# Patient Record
Sex: MALE | Race: WHITE | NOT HISPANIC OR LATINO | Employment: OTHER | URBAN - METROPOLITAN AREA
[De-identification: names, ages, dates, MRNs, and addresses within clinical notes are randomized per-mention and may not be internally consistent; named-entity substitution may affect disease eponyms.]

---

## 2017-01-04 ENCOUNTER — OFFICE VISIT (OUTPATIENT)
Dept: FAMILY MEDICINE | Facility: CLINIC | Age: 82
End: 2017-01-04
Payer: COMMERCIAL

## 2017-01-04 VITALS
OXYGEN SATURATION: 97 % | TEMPERATURE: 98 F | DIASTOLIC BLOOD PRESSURE: 74 MMHG | HEART RATE: 73 BPM | RESPIRATION RATE: 22 BRPM | HEIGHT: 67 IN | WEIGHT: 214 LBS | SYSTOLIC BLOOD PRESSURE: 130 MMHG | BODY MASS INDEX: 33.59 KG/M2

## 2017-01-04 DIAGNOSIS — J44.9 CHRONIC OBSTRUCTIVE PULMONARY DISEASE, UNSPECIFIED COPD TYPE (H): Primary | ICD-10-CM

## 2017-01-04 DIAGNOSIS — J43.1 PANLOBULAR EMPHYSEMA (H): ICD-10-CM

## 2017-01-04 DIAGNOSIS — R04.0 EPISTAXIS: ICD-10-CM

## 2017-01-04 PROCEDURE — 99214 OFFICE O/P EST MOD 30 MIN: CPT | Performed by: FAMILY MEDICINE

## 2017-01-04 NOTE — PROGRESS NOTES
"  SUBJECTIVE:                                                    Nitish Coreas is a 82 year old male who presents to clinic today for the following health issues:      COPD Follow-Up    Symptoms are currently: stable    Current fatigue or dyspnea with ambulation: stable     Shortness of breath: stable    Increased or change in Cough/Sputum: No- cough has improved and sputum is clear now instead of yellow like it was before    Fever(s): No    Baseline ambulation without stopping to rest 1000 feet. Able to walk up 1 flights of stairs without stopping to rest.    Any ER/UC or hospital admissions since your last visit? No     History   Smoking status     Former Smoker -- 4.00 packs/day for 45 years     Types: Cigarettes     Quit date: 05/08/1991   Smokeless tobacco     Never Used     FEV1        1.81   12/8/2014  [id number]      73%   12/8/2014       Amount of exercise or physical activity: None    Problems taking medications regularly: No    Medication side effects: nausea has improved. Nosebleeds     Diet: regular (no restrictions)    nosebleed      Duration: years    Description (location/character/radiation): right nostril    Intensity:  moderate    Accompanying signs and symptoms: when exhaling    History (similar episodes/previous evaluation): None    Precipitating or alleviating factors: None    Therapies tried and outcome: salt water nasal spray       Problem list and histories reviewed & adjusted, as indicated.  Additional history: as documented    Problem list, Medication list, Allergies, and Medical/Social/Surgical histories reviewed in [facility] and updated as appropriate.    ROS:  Constitutional, HEENT, cardiovascular, pulmonary, gi and gu systems are negative, except as otherwise noted.    OBJECTIVE:                                                    /74 mmHg  Pulse 73  Temp(Src) 98  F (36.7  C) (Tympanic)  Resp 22  Ht 5' 7\" (1.702 m)  Wt 214 lb (97.07 kg)  BMI 33.51 kg/m2  SpO2 97%  Body mass " index is 33.51 kg/(m^2).  GENERAL APPEARANCE: healthy, alert and no distress  RESP: lungs clear to auscultation - no rales, rhonchi or wheezes         ASSESSMENT/PLAN:                                                    No diagnosis found.    There are no Patient Instructions on file for this visit.    Remi Peterson MD  [facility]

## 2017-01-04 NOTE — MR AVS SNAPSHOT
After Visit Summary   1/4/2017    Nitish Coreas    MRN: [id number]           Patient Information     Date Of Birth          9/4/1934        Visit Information        Provider Department      1/4/2017 7:45 AM Remi Peterson MD [facility]        Today's Diagnoses     Chronic obstructive pulmonary disease, unspecified COPD type (H)    -  1     Epistaxis         Panlobular emphysema (H)           Care Instructions    Will see back in March. Referred to ENT for nose bleeds.        Follow-ups after your visit        Additional Services     OTOLARYNGOLOGY REFERRAL       Your provider has referred you to: FMG: [facility] [phone]   [email or web address]  FHN: [facility] [phone]   [email or web address]    Please be aware that coverage of these services is subject to the terms and limitations of your health insurance plan.  Call member services at your health plan with any benefit or coverage questions.      Please bring the following with you to your appointment:    (1) Any X-Rays, CTs or MRIs which have been performed.  Contact the facility where they were done to arrange for  prior to your scheduled appointment.   (2) List of current medications  (3) This referral request   (4) Any documents/labs given to you for this referral                  Who to contact     If you have questions or need follow up information about today's clinic visit or your schedule please contact [facility] directly at [phone].  Normal or non-critical lab and imaging results will be communicated to you by MyChart, letter or phone within 4 business days after the clinic has received the results. If you do not hear from us within 7 days, please contact the clinic through MyChart or phone. If you have  "a critical or abnormal lab result, we will notify you by phone as soon as possible.  Submit refill requests through [employer] or call your pharmacy and they will forward the refill request to us. Please allow 3 business days for your refill to be completed.          Additional Information About Your Visit        5151tuanhart Information     [employer] lets you send messages to your doctor, view your test results, renew your prescriptions, schedule appointments and more. To sign up, go to [email or web address] . Click on \"Log in\" on the left side of the screen, which will take you to the Welcome page. Then click on \"Sign up Now\" on the right side of the page.     You will be asked to enter the access code listed below, as well as some personal information. Please follow the directions to create your username and password.     Your access code is: [email or web address]  Expires: 3/19/2017  4:30 PM     Your access code will  in 90 days. If you need help or a new code, please call your [place] clinic or [phone].        Care EveryWhere ID     This is your Care EveryWhere ID. This could be used by other organizations to access your [place] medical records  [id number]        Your Vitals Were     Pulse Temperature Respirations Height BMI (Body Mass Index) Pulse Oximetry    73 98  F (36.7  C) (Tympanic) 22 5' 7\" (1.702 m) 33.51 kg/m2 97%       Blood Pressure from Last 3 Encounters:   17 130/74   16 126/82   16 124/82    Weight from Last 3 Encounters:   17 214 lb (97.07 kg)   16 215 lb (97.523 kg)   16 209 lb (94.802 kg)              We Performed the Following     OTOLARYNGOLOGY REFERRAL          Today's Medication Changes          These changes are accurate as of: 17  8:05 AM.  If you have any questions, ask your nurse or doctor.               These medicines have changed or have updated prescriptions.        Dose/Directions    fluticasone 100 MCG/BLIST Aepb   Commonly known as:  FLOVENT " DISKUS   This may have changed:    - how much to take  - how to take this  - when to take this  - additional instructions   Used for:  Panlobular emphysema (H)   Changed by:  Remi Peterson MD        2 puffs in the AM and one puff at night   Quantity:  3 Inhaler   Refills:  3            Where to get your medicines      These medications were sent to [facility] - [place], [place] - [address]  [address] [place] [address]     Phone:  [phone]    - fluticasone 100 MCG/BLIST Aepb             Primary Care Provider Office Phone # Fax #    Remi Peterson [phone]       [facility] [address] [facility] [address]        Thank you!     Thank you for choosing [facility]  for your care. Our goal is always to provide you with excellent care. Hearing back from our patients is one way we can continue to improve our services. Please take a few minutes to complete the written survey that you may receive in the mail after your visit with us. Thank you!             Your Updated Medication List - Protect others around you: Learn how to safely use, store and throw away your medicines at www.disposemymeds.org.          This list is accurate as of: 1/4/17  8:05 AM.  Always use your most recent med list.                   Brand Name Dispense Instructions for use    CLARITIN PO      Take 1 tablet by mouth daily       clindamycin 300 MG capsule    CLEOCIN    40 capsule    Take 4 capsules (1,200 mg) by mouth daily Take 4 capsules on hour before dental procedures.       fish oil-omega-3 fatty acids 1000 MG capsule      Take 2 g by mouth daily.       fluticasone 100 MCG/BLIST Aepb    FLOVENT DISKUS    3 Inhaler    2 puffs in the AM and one puff at night       gemfibrozil 600 MG tablet    LOPID    180 tablet    TAKE 1 TABLET TWICE DAILY       glucosamine 500 MG Tabs      Take 1 tablet by mouth daily.       lisinopril  40 MG tablet    PRINIVIL/ZESTRIL    90 tablet    Take 1 tablet (40 mg) by mouth daily       lovastatin 40 MG tablet    MEVACOR    180 tablet    TAKE 2 TABLETS AT BEDTIME       METAMUCIL PO      Take by mouth daily       MULTIVITAL Tabs      Take 1 tablet by mouth daily.       ranitidine 75 MG tablet    ZANTAC    30 tablet    Take 2 tablets (150 mg) by mouth daily       VITAMIN C PO      Take 1,000 mg by mouth daily       vitamin D 2000 UNITS Caps      Take 1 tablet by mouth 3 times daily.

## 2017-01-04 NOTE — NURSING NOTE
"Chief Complaint   Patient presents with     Respiratory Problems     Recheck Medication     flovent inhaler       Initial /74 mmHg  Pulse 73  Temp(Src) 98  F (36.7  C) (Tympanic)  Resp 22  Ht 5' 7\" (1.702 m)  Wt 214 lb (97.07 kg)  BMI 33.51 kg/m2  SpO2 97% Estimated body mass index is 33.51 kg/(m^2) as calculated from the following:    Height as of this encounter: 5' 7\" (1.702 m).    Weight as of this encounter: 214 lb (97.07 kg).  BP completed using cuff size: nelson Garcia CMA      "

## 2017-01-05 ENCOUNTER — TELEPHONE (OUTPATIENT)
Dept: FAMILY MEDICINE | Facility: CLINIC | Age: 82
End: 2017-01-05

## 2017-01-05 DIAGNOSIS — J43.1 PANLOBULAR EMPHYSEMA (H): Primary | ICD-10-CM

## 2017-01-05 NOTE — TELEPHONE ENCOUNTER
FYI  Called pharmacy with doctors message below.   Pharmacist states with qty ordered patient would need 5 inhalers for 90 day supply. Verbal approval given.  Medication list was updated

## 2017-01-05 NOTE — TELEPHONE ENCOUNTER
fluticasone (FLOVENT DISKUS) 100 MCG/BLIST AEPB    Rx for flovent is 2 puffs in the am and 1 puff in the pm for a total of 300 mcg/ day.  Maximum recommended dosage is 200 mcg/day  Please clarify the dosing /directions

## 2017-01-10 ENCOUNTER — TELEPHONE (OUTPATIENT)
Dept: FAMILY MEDICINE | Facility: CLINIC | Age: 82
End: 2017-01-10

## 2017-01-10 DIAGNOSIS — J44.9 CHRONIC OBSTRUCTIVE PULMONARY DISEASE, UNSPECIFIED COPD TYPE (H): Primary | ICD-10-CM

## 2017-01-12 NOTE — TELEPHONE ENCOUNTER
Patient states he is completely out of his medication and need it as soon as possible. He would like a call when this is done.

## 2017-01-15 RX ORDER — FLUTICASONE PROPIONATE 110 UG/1
2 AEROSOL, METERED RESPIRATORY (INHALATION) 2 TIMES DAILY
Qty: 3 INHALER | Refills: 1 | Status: SHIPPED | OUTPATIENT
Start: 2017-01-15 | End: 2017-01-17

## 2017-01-17 ENCOUNTER — TELEPHONE (OUTPATIENT)
Dept: FAMILY MEDICINE | Facility: CLINIC | Age: 82
End: 2017-01-17

## 2017-01-17 DIAGNOSIS — J44.9 CHRONIC OBSTRUCTIVE PULMONARY DISEASE, UNSPECIFIED COPD TYPE (H): Primary | ICD-10-CM

## 2017-01-17 RX ORDER — FLUTICASONE PROPIONATE 110 UG/1
2 AEROSOL, METERED RESPIRATORY (INHALATION) 2 TIMES DAILY
Qty: 3 INHALER | Refills: 1 | Status: SHIPPED | OUTPATIENT
Start: 2017-01-17 | End: 2017-04-11

## 2017-01-23 NOTE — TELEPHONE ENCOUNTER
Patient calling to state that he contacted [facility] pharmacy and there is still further documentation required before he can receive the Rx for fluticasone (FLOVENT HFA) 110 MCG/ACT Inhaler

## 2017-01-23 NOTE — TELEPHONE ENCOUNTER
I spoke with Ann-Marie at [facility] and she states this medication is covered without additional info or a PA. I have attempted to contact this patient by phone with the following results: left message to return my call on answering machine.

## 2017-03-23 ENCOUNTER — TELEPHONE (OUTPATIENT)
Dept: FAMILY MEDICINE | Facility: CLINIC | Age: 82
End: 2017-03-23

## 2017-03-23 NOTE — TELEPHONE ENCOUNTER
Reason for Call:  Other call back    Detailed comments: use of inhalers-states first day of new inhalers and doesn't know how to use    Phone Number Patient can be reached at: Home number on file [phone] (home)    Best Time: asap    Can we leave a detailed message on this number? YES    Call taken on 3/23/2017 at 7:28 AM by ABISAI HOLDER

## 2017-04-11 ENCOUNTER — OFFICE VISIT (OUTPATIENT)
Dept: FAMILY MEDICINE | Facility: CLINIC | Age: 82
End: 2017-04-11
Payer: COMMERCIAL

## 2017-04-11 VITALS
BODY MASS INDEX: 33.05 KG/M2 | OXYGEN SATURATION: 95 % | TEMPERATURE: 97 F | HEART RATE: 75 BPM | DIASTOLIC BLOOD PRESSURE: 80 MMHG | WEIGHT: 211 LBS | SYSTOLIC BLOOD PRESSURE: 130 MMHG

## 2017-04-11 DIAGNOSIS — J44.9 CHRONIC OBSTRUCTIVE PULMONARY DISEASE, UNSPECIFIED COPD TYPE (H): Primary | ICD-10-CM

## 2017-04-11 PROCEDURE — 99213 OFFICE O/P EST LOW 20 MIN: CPT | Performed by: FAMILY MEDICINE

## 2017-04-11 RX ORDER — FLUTICASONE PROPIONATE 220 UG/1
2 AEROSOL, METERED RESPIRATORY (INHALATION) 2 TIMES DAILY
Qty: 3 INHALER | Refills: 3 | Status: SHIPPED | OUTPATIENT
Start: 2017-04-11 | End: 2017-08-14

## 2017-04-11 NOTE — PATIENT INSTRUCTIONS
"Will increase flovent to 220 mcg twice daily. Will use up his 110 mcg inhalers at 4 puffs twice daily. Add 6 inch tube of 5/8\" polyethylene as an extender to his inhaler. Will see back in one month.  "

## 2017-04-11 NOTE — MR AVS SNAPSHOT
"              After Visit Summary   4/11/2017    Nitish Coreas    MRN: [id number]           Patient Information     Date Of Birth          9/4/1934        Visit Information        Provider Department      4/11/2017 9:15 AM Remi Peterson MD [facility]        Today's Diagnoses     Chronic obstructive pulmonary disease, unspecified COPD type (H)    -  1      Care Instructions    Will increase flovent to 220 mcg twice daily. Will use up his 110 mcg inhalers at 4 puffs twice daily. Add 6 inch tube of 5/8\" polyethylene as an extender to his inhaler. Will see back in one month.        Follow-ups after your visit        Who to contact     If you have questions or need follow up information about today's clinic visit or your schedule please contact [facility] directly at [phone].  Normal or non-critical lab and imaging results will be communicated to you by Chelailehart, letter or phone within 4 business days after the clinic has received the results. If you do not hear from us within 7 days, please contact the clinic through Chelailehart or phone. If you have a critical or abnormal lab result, we will notify you by phone as soon as possible.  Submit refill requests through [employer] or call your pharmacy and they will forward the refill request to us. Please allow 3 business days for your refill to be completed.          Additional Information About Your Visit        MyChart Information     [employer] lets you send messages to your doctor, view your test results, renew your prescriptions, schedule appointments and more. To sign up, go to [email or web address] . Click on \"Log in\" on the left side of the screen, which will take you to the Welcome page. Then click on \"Sign up Now\" on the right side of the page.     You will be asked to enter the access code listed below, as well as some personal information. Please follow the directions to create your " username and password.     Your access code is: [email or web address]  Expires: 7/10/2017  9:32 AM     Your access code will  in 90 days. If you need help or a new code, please call your [facility] or [phone].        Care EveryWhere ID     This is your Care EveryWhere ID. This could be used by other organizations to access your [place] medical records  [id number]        Your Vitals Were     Pulse Temperature Pulse Oximetry BMI (Body Mass Index)          75 97  F (36.1  C) (Tympanic) 95% 33.05 kg/m2         Blood Pressure from Last 3 Encounters:   17 130/80   17 130/74   16 126/82    Weight from Last 3 Encounters:   17 211 lb (95.7 kg)   17 214 lb (97.1 kg)   16 215 lb (97.5 kg)              Today, you had the following     No orders found for display         Today's Medication Changes          These changes are accurate as of: 17  9:33 AM.  If you have any questions, ask your nurse or doctor.               Start taking these medicines.        Dose/Directions    fluticasone 220 MCG/ACT Inhaler   Commonly known as:  FLOVENT HFA   Used for:  Chronic obstructive pulmonary disease, unspecified COPD type (H)   Replaces:  fluticasone 110 MCG/ACT Inhaler   Started by:  Remi Peterson MD        Dose:  2 puff   Inhale 2 puffs into the lungs 2 times daily   Quantity:  3 Inhaler   Refills:  3         Stop taking these medicines if you haven't already. Please contact your care team if you have questions.     fluticasone 110 MCG/ACT Inhaler   Commonly known as:  FLOVENT HFA   Replaced by:  fluticasone 220 MCG/ACT Inhaler   Stopped by:  Remi Peterson MD                Where to get your medicines      These medications were sent to [facility] - [place], [place] - [address]  [address] [place] [address]     Phone:  [phone]     fluticasone 220 MCG/ACT Inhaler                Primary Care Provider Office Phone # Fax #    Remi  Darryl Peterson [phone]       [facility] [address]  [facility] [address]        Thank you!     Thank you for choosing [facility]  for your care. Our goal is always to provide you with excellent care. Hearing back from our patients is one way we can continue to improve our services. Please take a few minutes to complete the written survey that you may receive in the mail after your visit with us. Thank you!             Your Updated Medication List - Protect others around you: Learn how to safely use, store and throw away your medicines at www.disposemymeds.org.          This list is accurate as of: 4/11/17  9:33 AM.  Always use your most recent med list.                   Brand Name Dispense Instructions for use    CLARITIN PO      Take 1 tablet by mouth daily       clindamycin 300 MG capsule    CLEOCIN    40 capsule    Take 4 capsules (1,200 mg) by mouth daily Take 4 capsules on hour before dental procedures.       fish oil-omega-3 fatty acids 1000 MG capsule      Take 2 g by mouth daily.       fluticasone 220 MCG/ACT Inhaler    FLOVENT HFA    3 Inhaler    Inhale 2 puffs into the lungs 2 times daily       gemfibrozil 600 MG tablet    LOPID    180 tablet    TAKE 1 TABLET TWICE DAILY       glucosamine 500 MG Tabs      Take 1 tablet by mouth daily.       lisinopril 40 MG tablet    PRINIVIL/ZESTRIL    90 tablet    Take 1 tablet (40 mg) by mouth daily       lovastatin 40 MG tablet    MEVACOR    180 tablet    TAKE 2 TABLETS AT BEDTIME       METAMUCIL PO      Take by mouth daily Reported on 4/11/2017       MULTIVITAL Tabs      Take 1 tablet by mouth daily.       ranitidine 75 MG tablet    ZANTAC    30 tablet    Take 2 tablets (150 mg) by mouth daily       VITAMIN C PO      Take 1,000 mg by mouth daily       vitamin D 2000 UNITS Caps      Take 1 tablet by mouth 3 times daily.

## 2017-04-11 NOTE — NURSING NOTE
"Chief Complaint   Patient presents with     Recheck Medication       Initial /80 (BP Location: Left arm, Patient Position: Chair, Cuff Size: Adult Regular)  Pulse 75  Temp 97  F (36.1  C) (Tympanic)  Wt 211 lb (95.7 kg)  SpO2 95%  BMI 33.05 kg/m2 Estimated body mass index is 33.05 kg/(m^2) as calculated from the following:    Height as of 1/4/17: 5' 7\" (1.702 m).    Weight as of this encounter: 211 lb (95.7 kg).  Medication Reconciliation: complete  "

## 2017-04-11 NOTE — PROGRESS NOTES
"  SUBJECTIVE:                                                    Nitish Coreas is a 82 year old male who presents to clinic today for the following health issues:      COPD Follow-Up    Symptoms are currently: Stable, but is still coughing up phlegm     Current fatigue or dyspnea with ambulation: stable     Shortness of breath: stable    Increased or change in Cough/Sputum: Yes-      Fever(s): No    Baseline ambulation without stopping to rest No  \"football field and a half\", feet. Able to walk up 2 flights of stairs without stopping to rest.    Any ER/UC or hospital admissions since your last visit? No     History   Smoking Status     Former Smoker     Packs/day: 4.00     Years: 45.00     Types: Cigarettes     Quit date: 5/8/1991   Smokeless Tobacco     Never Used     Lab Results   Component Value Date    FEV1 1.81 12/08/2014    [id number] 73% 12/08/2014          Amount of exercise or physical activity: 2-3 days/week for an average of 15-30 minutes    Problems taking medications regularly: No    Medication side effects: none    Diet: regular (no restrictions)          Problem list and histories reviewed & adjusted, as indicated.  Additional history: as documented    Patient Active Problem List   Diagnosis     Hyperlipidemia LDL goal <100     Hypertension goal BP (blood pressure) < 140/90     Renal insufficiency     Chronic rhinitis     Constipation     Advanced directives, counseling/discussion     BMI 30-35     Status post total knee replacement, unspecified laterality     Chronic obstructive pulmonary disease, unspecified COPD type (H)     Past Surgical History:   Procedure Laterality Date     C REPLANTATION THUMB DISTAL,COMPLETE  1962    cut off right thumb and repair     C TOTAL KNEE ARTHROPLASTY  2011, 2000    left then right     DENTAL SURGERY  1958    wisdom teeth     TONSILLECTOMY & ADENOIDECTOMY  1949       Social History   Substance Use Topics     Smoking status: Former Smoker     Packs/day: 4.00     " Years: 45.00     Types: Cigarettes     Quit date: 5/8/1991     Smokeless tobacco: Never Used     Alcohol use 0.0 oz/week     0 Standard drinks or equivalent per week      Comment: 1-2 friday night- cocktail     Family History   Problem Relation Age of Onset     CANCER Mother      Respiratory Mother      HEART DISEASE Father      CEREBROVASCULAR DISEASE Father      CEREBROVASCULAR DISEASE Sister      CVA x 2     DIABETES No family hx of      Coronary Artery Disease No family hx of      Hypertension No family hx of      Hyperlipidemia No family hx of      Colon Cancer No family hx of      Prostate Cancer No family hx of      Other Cancer No family hx of      Depression No family hx of      Anxiety Disorder No family hx of      MENTAL ILLNESS No family hx of      Substance Abuse No family hx of      Anesthesia Reaction No family hx of      Asthma No family hx of      OSTEOPOROSIS No family hx of      Genetic Disorder No family hx of      Thyroid Disease No family hx of      Obesity No family hx of      Unknown/Adopted No family hx of      Breast Cancer Daughter            Reviewed and updated as needed this visit by clinical staff  Tobacco  Allergies  Meds       Reviewed and updated as needed this visit by Provider         ROS:  Constitutional, HEENT, cardiovascular, pulmonary, gi and gu systems are negative, except as otherwise noted.  RESP:POSITIVE for cough-productive    OBJECTIVE:                                                    /80 (BP Location: Left arm, Patient Position: Chair, Cuff Size: Adult Regular)  Pulse 75  Temp 97  F (36.1  C) (Tympanic)  Wt 211 lb (95.7 kg)  SpO2 95%  BMI 33.05 kg/m2  Body mass index is 33.05 kg/(m^2).  GENERAL APPEARANCE: healthy, alert and no distress  RESP: lungs clear to auscultation - no rales, rhonchi or wheezes         ASSESSMENT/PLAN:                                                        ICD-10-CM    1. Chronic obstructive pulmonary disease, unspecified COPD  "type (H) J44.9        Patient Instructions   Will increase flovent to 220 mcg twice daily. Will use up his 110 mcg inhalers at 4 puffs twice daily. Add 6 inch tube of 5/8\" polyethylene as an extender to his inhaler. Will see back in one month.      Remi Peterson MD  [facility]    "

## 2017-05-15 ENCOUNTER — OFFICE VISIT (OUTPATIENT)
Dept: FAMILY MEDICINE | Facility: CLINIC | Age: 82
End: 2017-05-15
Payer: COMMERCIAL

## 2017-05-15 VITALS
BODY MASS INDEX: 32.42 KG/M2 | SYSTOLIC BLOOD PRESSURE: 120 MMHG | WEIGHT: 207 LBS | HEART RATE: 78 BPM | RESPIRATION RATE: 14 BRPM | TEMPERATURE: 98 F | OXYGEN SATURATION: 94 % | DIASTOLIC BLOOD PRESSURE: 70 MMHG

## 2017-05-15 DIAGNOSIS — J44.9 CHRONIC OBSTRUCTIVE PULMONARY DISEASE, UNSPECIFIED COPD TYPE (H): Primary | ICD-10-CM

## 2017-05-15 PROCEDURE — 99213 OFFICE O/P EST LOW 20 MIN: CPT | Performed by: FAMILY MEDICINE

## 2017-05-15 NOTE — NURSING NOTE
"Chief Complaint   Patient presents with     Respiratory Problems       Initial /70  Pulse 78  Temp 98  F (36.7  C) (Tympanic)  Resp 14  Wt 207 lb (93.9 kg)  SpO2 94%  BMI 32.42 kg/m2 Estimated body mass index is 32.42 kg/(m^2) as calculated from the following:    Height as of 1/4/17: 5' 7\" (1.702 m).    Weight as of this encounter: 207 lb (93.9 kg).  Medication Reconciliation: complete     Vikki Garcia CMA      "

## 2017-05-15 NOTE — PROGRESS NOTES
SUBJECTIVE:                                                    Nitish Coreas is a 82 year old male who presents to clinic today for the following health issues:      COPD Follow-Up    Symptoms are currently: stable    Current fatigue or dyspnea with ambulation: stable     Shortness of breath: stable    Increased or change in Cough/Sputum: No    Fever(s): No    Baseline ambulation without stopping to rest 1000 feet. Able to walk up 1 flights of stairs without stopping to rest.    Any ER/UC or hospital admissions since your last visit? No     History   Smoking Status     Former Smoker     Packs/day: 4.00     Years: 45.00     Types: Cigarettes     Quit date: 5/8/1991   Smokeless Tobacco     Never Used     Lab Results   Component Value Date    FEV1 1.81 12/08/2014    ENH7MMC 73% 12/08/2014          Amount of exercise or physical activity: None    Problems taking medications regularly: No    Medication side effects: none    Diet: regular (no restrictions)          Problem list and histories reviewed & adjusted, as indicated.  Additional history: as documented    Patient Active Problem List   Diagnosis     Hyperlipidemia LDL goal <100     Hypertension goal BP (blood pressure) < 140/90     Renal insufficiency     Chronic rhinitis     Constipation     Advanced directives, counseling/discussion     BMI 30-35     Status post total knee replacement, unspecified laterality     Chronic obstructive pulmonary disease, unspecified COPD type (H)     Past Surgical History:   Procedure Laterality Date     C REPLANTATION THUMB DISTAL,COMPLETE  1962    cut off right thumb and repair     C TOTAL KNEE ARTHROPLASTY  2011, 2000    left then right     DENTAL SURGERY  1958    wisdom teeth     TONSILLECTOMY & ADENOIDECTOMY  1949       Social History   Substance Use Topics     Smoking status: Former Smoker     Packs/day: 4.00     Years: 45.00     Types: Cigarettes     Quit date: 5/8/1991     Smokeless tobacco: Never Used     Alcohol  use 0.0 oz/week     0 Standard drinks or equivalent per week      Comment: 1-2 friday night- cocktail     Family History   Problem Relation Age of Onset     CANCER Mother      Respiratory Mother      HEART DISEASE Father      CEREBROVASCULAR DISEASE Father      CEREBROVASCULAR DISEASE Sister      CVA x 2     DIABETES No family hx of      Coronary Artery Disease No family hx of      Hypertension No family hx of      Hyperlipidemia No family hx of      Colon Cancer No family hx of      Prostate Cancer No family hx of      Other Cancer No family hx of      Depression No family hx of      Anxiety Disorder No family hx of      MENTAL ILLNESS No family hx of      Substance Abuse No family hx of      Anesthesia Reaction No family hx of      Asthma No family hx of      OSTEOPOROSIS No family hx of      Genetic Disorder No family hx of      Thyroid Disease No family hx of      Obesity No family hx of      Unknown/Adopted No family hx of      Breast Cancer Daughter            Reviewed and updated as needed this visit by clinical staff  Tobacco  Allergies  Med Hx  Surg Hx  Fam Hx  Soc Hx      Reviewed and updated as needed this visit by Provider         ROS:  Constitutional, neuro, ENT, endocrine, pulmonary, cardiac, gastrointestinal, genitourinary, musculoskeletal, integument and psychiatric systems are negative, except as otherwise noted.  RESP:NEGATIVE for significant cough or SOB    OBJECTIVE:                                                    /70  Pulse 78  Temp 98  F (36.7  C) (Tympanic)  Resp 14  Wt 207 lb (93.9 kg)  SpO2 94%  BMI 32.42 kg/m2  Body mass index is 32.42 kg/(m^2).  GENERAL APPEARANCE: healthy, alert and no distress  RESP: lungs clear to auscultation - no rales, rhonchi or wheezes         ASSESSMENT/PLAN:                                                        ICD-10-CM    1. Chronic obstructive pulmonary disease, unspecified COPD type (H) J44.9        Patient Instructions   Will see back in  August. No change in medications.      Remi Peterson MD  [facility]

## 2017-05-15 NOTE — MR AVS SNAPSHOT
"              After Visit Summary   5/15/2017    Nitish Coreas    MRN: [id number]           Patient Information     Date Of Birth          9/4/1934        Visit Information        Provider Department      5/15/2017 7:30 AM Remi Peterson MD [facility]        Today's Diagnoses     Chronic obstructive pulmonary disease, unspecified COPD type (H)    -  1      Care Instructions    Will see back in August. No change in medications.        Follow-ups after your visit        Your next 10 appointments already scheduled     Aug 14, 2017  8:45 AM CDT   SHORT with Remi Peterson MD   [facility] ([facility]    [address] [place] [address] [phone]              Who to contact     If you have questions or need follow up information about today's clinic visit or your schedule please contact [facility] directly at [phone].  Normal or non-critical lab and imaging results will be communicated to you by [employer], letter or phone within 4 business days after the clinic has received the results. If you do not hear from us within 7 days, please contact the clinic through [employer] or phone. If you have a critical or abnormal lab result, we will notify you by phone as soon as possible.  Submit refill requests through [employer] or call your pharmacy and they will forward the refill request to us. Please allow 3 business days for your refill to be completed.          Additional Information About Your Visit        [employer] Information     [employer] lets you send messages to your doctor, view your test results, renew your prescriptions, schedule appointments and more. To sign up, go to [email or web address] . Click on \"Log in\" on the left side of the screen, which will take you to the Welcome page. Then click on \"Sign up Now\" on the right side of the page. "     You will be asked to enter the access code listed below, as well as some personal information. Please follow the directions to create your username and password.     Your access code is: [email or web address]  Expires: 7/10/2017  9:32 AM     Your access code will  in 90 days. If you need help or a new code, please call your [place] clinic or [phone].        Care EveryWhere ID     This is your Care EveryWhere ID. This could be used by other organizations to access your [place] medical records  [id number]        Your Vitals Were     Pulse Temperature Respirations Pulse Oximetry BMI (Body Mass Index)       78 98  F (36.7  C) (Tympanic) 14 94% 32.42 kg/m2        Blood Pressure from Last 3 Encounters:   05/15/17 120/70   17 130/80   17 130/74    Weight from Last 3 Encounters:   05/15/17 207 lb (93.9 kg)   17 211 lb (95.7 kg)   17 214 lb (97.1 kg)              Today, you had the following     No orders found for display       Primary Care Provider Office Phone # Fax #    Remi Peterson [phone]       [facility] [address] [facility] [address]        Thank you!     Thank you for choosing [facility]  for your care. Our goal is always to provide you with excellent care. Hearing back from our patients is one way we can continue to improve our services. Please take a few minutes to complete the written survey that you may receive in the mail after your visit with us. Thank you!             Your Updated Medication List - Protect others around you: Learn how to safely use, store and throw away your medicines at www.disposemymeds.org.          This list is accurate as of: 5/15/17 12:50 PM.  Always use your most recent med list.                   Brand Name Dispense Instructions for use    CLARITIN PO      Take 1 tablet by mouth daily       clindamycin 300 MG capsule    CLEOCIN    40 capsule    Take 4 capsules (1,200  mg) by mouth daily Take 4 capsules on hour before dental procedures.       fish oil-omega-3 fatty acids 1000 MG capsule      Take 2 g by mouth daily.       fluticasone 220 MCG/ACT Inhaler    FLOVENT HFA    3 Inhaler    Inhale 2 puffs into the lungs 2 times daily       gemfibrozil 600 MG tablet    LOPID    180 tablet    TAKE 1 TABLET TWICE DAILY       glucosamine 500 MG Tabs      Take 1 tablet by mouth daily.       lisinopril 40 MG tablet    PRINIVIL/ZESTRIL    90 tablet    Take 1 tablet (40 mg) by mouth daily       lovastatin 40 MG tablet    MEVACOR    180 tablet    TAKE 2 TABLETS AT BEDTIME       METAMUCIL PO      Take by mouth daily Reported on 4/11/2017       MULTIVITAL Tabs      Take 1 tablet by mouth daily.       ranitidine 75 MG tablet    ZANTAC    30 tablet    Take 2 tablets (150 mg) by mouth daily       VITAMIN C PO      Take 1,000 mg by mouth daily       vitamin D 2000 UNITS Caps      Take 1 tablet by mouth 3 times daily.

## 2017-07-10 ENCOUNTER — TRANSFERRED RECORDS (OUTPATIENT)
Dept: HEALTH INFORMATION MANAGEMENT | Facility: CLINIC | Age: 82
End: 2017-07-10

## 2017-08-14 ENCOUNTER — OFFICE VISIT (OUTPATIENT)
Dept: FAMILY MEDICINE | Facility: CLINIC | Age: 82
End: 2017-08-14
Payer: COMMERCIAL

## 2017-08-14 VITALS
HEIGHT: 67 IN | WEIGHT: 204 LBS | HEART RATE: 72 BPM | SYSTOLIC BLOOD PRESSURE: 126 MMHG | TEMPERATURE: 98 F | DIASTOLIC BLOOD PRESSURE: 74 MMHG | RESPIRATION RATE: 18 BRPM | OXYGEN SATURATION: 97 % | BODY MASS INDEX: 32.02 KG/M2

## 2017-08-14 DIAGNOSIS — J44.9 CHRONIC OBSTRUCTIVE PULMONARY DISEASE, UNSPECIFIED COPD TYPE (H): Primary | Chronic | ICD-10-CM

## 2017-08-14 DIAGNOSIS — E78.5 HYPERLIPIDEMIA LDL GOAL <100: Chronic | ICD-10-CM

## 2017-08-14 DIAGNOSIS — N28.9 RENAL INSUFFICIENCY: Chronic | ICD-10-CM

## 2017-08-14 DIAGNOSIS — I10 HYPERTENSION GOAL BP (BLOOD PRESSURE) < 140/90: Chronic | ICD-10-CM

## 2017-08-14 LAB
ALBUMIN SERPL-MCNC: 3.7 G/DL (ref 3.4–5)
ALBUMIN UR-MCNC: 100 MG/DL
ALP SERPL-CCNC: 81 U/L (ref 40–150)
ALT SERPL W P-5'-P-CCNC: 24 U/L (ref 0–70)
ANION GAP SERPL CALCULATED.3IONS-SCNC: 9 MMOL/L (ref 3–14)
APPEARANCE UR: CLEAR
AST SERPL W P-5'-P-CCNC: 22 U/L (ref 0–45)
BASOPHILS # BLD AUTO: 0 10E9/L (ref 0–0.2)
BASOPHILS NFR BLD AUTO: 0.3 %
BILIRUB SERPL-MCNC: 0.5 MG/DL (ref 0.2–1.3)
BILIRUB UR QL STRIP: NEGATIVE
BUN SERPL-MCNC: 31 MG/DL (ref 7–30)
CALCIUM SERPL-MCNC: 9.1 MG/DL (ref 8.5–10.1)
CHLORIDE SERPL-SCNC: 109 MMOL/L (ref 94–109)
CHOLEST SERPL-MCNC: 149 MG/DL
CO2 SERPL-SCNC: 22 MMOL/L (ref 20–32)
COLOR UR AUTO: YELLOW
CREAT SERPL-MCNC: 1.58 MG/DL (ref 0.66–1.25)
DIFFERENTIAL METHOD BLD: ABNORMAL
EOSINOPHIL # BLD AUTO: 0.2 10E9/L (ref 0–0.7)
EOSINOPHIL NFR BLD AUTO: 2.1 %
ERYTHROCYTE [DISTWIDTH] IN BLOOD BY AUTOMATED COUNT: 13.1 % (ref 10–15)
GFR SERPL CREATININE-BSD FRML MDRD: 42 ML/MIN/1.7M2
GLUCOSE SERPL-MCNC: 97 MG/DL (ref 70–99)
GLUCOSE UR STRIP-MCNC: NEGATIVE MG/DL
HCT VFR BLD AUTO: 43.8 % (ref 40–53)
HDLC SERPL-MCNC: 37 MG/DL
HGB BLD-MCNC: 15.1 G/DL (ref 13.3–17.7)
HGB UR QL STRIP: NEGATIVE
KETONES UR STRIP-MCNC: NEGATIVE MG/DL
LDLC SERPL CALC-MCNC: 84 MG/DL
LEUKOCYTE ESTERASE UR QL STRIP: NEGATIVE
LYMPHOCYTES # BLD AUTO: 1.7 10E9/L (ref 0.8–5.3)
LYMPHOCYTES NFR BLD AUTO: 22.3 %
MCH RBC QN AUTO: 33.6 PG (ref 26.5–33)
MCHC RBC AUTO-ENTMCNC: 34.5 G/DL (ref 31.5–36.5)
MCV RBC AUTO: 98 FL (ref 78–100)
MONOCYTES # BLD AUTO: 1 10E9/L (ref 0–1.3)
MONOCYTES NFR BLD AUTO: 13.4 %
NEUTROPHILS # BLD AUTO: 4.8 10E9/L (ref 1.6–8.3)
NEUTROPHILS NFR BLD AUTO: 61.9 %
NITRATE UR QL: NEGATIVE
NONHDLC SERPL-MCNC: 112 MG/DL
PH UR STRIP: 5 PH (ref 5–7)
PLATELET # BLD AUTO: 222 10E9/L (ref 150–450)
POTASSIUM SERPL-SCNC: 4.4 MMOL/L (ref 3.4–5.3)
PROT SERPL-MCNC: 7.4 G/DL (ref 6.8–8.8)
RBC # BLD AUTO: 4.49 10E12/L (ref 4.4–5.9)
RBC #/AREA URNS AUTO: ABNORMAL /HPF (ref 0–2)
SODIUM SERPL-SCNC: 140 MMOL/L (ref 133–144)
SP GR UR STRIP: 1.02 (ref 1–1.03)
TRIGL SERPL-MCNC: 140 MG/DL
URN SPEC COLLECT METH UR: ABNORMAL
UROBILINOGEN UR STRIP-ACNC: 0.2 EU/DL (ref 0.2–1)
WBC # BLD AUTO: 7.8 10E9/L (ref 4–11)
WBC #/AREA URNS AUTO: ABNORMAL /HPF (ref 0–2)

## 2017-08-14 PROCEDURE — 80053 COMPREHEN METABOLIC PANEL: CPT | Performed by: FAMILY MEDICINE

## 2017-08-14 PROCEDURE — 80061 LIPID PANEL: CPT | Performed by: FAMILY MEDICINE

## 2017-08-14 PROCEDURE — 99214 OFFICE O/P EST MOD 30 MIN: CPT | Performed by: FAMILY MEDICINE

## 2017-08-14 PROCEDURE — 85025 COMPLETE CBC W/AUTO DIFF WBC: CPT | Performed by: FAMILY MEDICINE

## 2017-08-14 PROCEDURE — 81001 URINALYSIS AUTO W/SCOPE: CPT | Performed by: FAMILY MEDICINE

## 2017-08-14 PROCEDURE — 36415 COLL VENOUS BLD VENIPUNCTURE: CPT | Performed by: FAMILY MEDICINE

## 2017-08-14 RX ORDER — FLUTICASONE PROPIONATE 220 UG/1
2 AEROSOL, METERED RESPIRATORY (INHALATION) 2 TIMES DAILY
Qty: 3 INHALER | Refills: 3 | Status: SHIPPED | OUTPATIENT
Start: 2017-08-14 | End: 2018-01-31

## 2017-08-14 NOTE — PROGRESS NOTES
SUBJECTIVE:                                                    Nitish Coreas is a 82 year old male who presents to clinic today for the following health issues:      COPD Follow-Up    Symptoms are currently: stable    Current fatigue or dyspnea with ambulation: none    Shortness of breath: stable    Increased or change in Cough/Sputum: Yes- 2 attacks a day    Fever(s): No    Baseline ambulation without stopping to rest 1000 feet. Able to walk up 1 flights of stairs without stopping to rest.    Any ER/UC or hospital admissions since your last visit? No     History   Smoking Status     Former Smoker     Packs/day: 4.00     Years: 45.00     Types: Cigarettes     Quit date: 5/8/1991   Smokeless Tobacco     Never Used     Lab Results   Component Value Date    FEV1 1.81 12/08/2014    [id number] 73% 12/08/2014         Amount of exercise or physical activity: None    Problems taking medications regularly: No    Medication side effects: none    Diet: regular (no restrictions)        Hypertension Follow-up      Outpatient blood pressures are not being checked.    Low Salt Diet: no added salt          Problem list and histories reviewed & adjusted, as indicated.  Additional history: as documented    Patient Active Problem List   Diagnosis     Hyperlipidemia LDL goal <100     Hypertension goal BP (blood pressure) < 140/90     Renal insufficiency     Chronic rhinitis     Constipation     Advanced directives, counseling/discussion     BMI 30-35     Status post total knee replacement, unspecified laterality     Chronic obstructive pulmonary disease, unspecified COPD type (H)     Past Surgical History:   Procedure Laterality Date     C REPLANTATION THUMB DISTAL,COMPLETE  1962    cut off right thumb and repair     C TOTAL KNEE ARTHROPLASTY  2011, 2000    left then right     DENTAL SURGERY  1958    wisdom teeth     TONSILLECTOMY & ADENOIDECTOMY  1949       Social History   Substance Use Topics     Smoking status: Former Smoker  "    Packs/day: 4.00     Years: 45.00     Types: Cigarettes     Quit date: 5/8/1991     Smokeless tobacco: Never Used     Alcohol use 0.0 oz/week     0 Standard drinks or equivalent per week      Comment: 1-2 friday night- cocktail     Family History   Problem Relation Age of Onset     CANCER Mother      Respiratory Mother      HEART DISEASE Father      CEREBROVASCULAR DISEASE Father      CEREBROVASCULAR DISEASE Sister      CVA x 2     DIABETES No family hx of      Coronary Artery Disease No family hx of      Hypertension No family hx of      Hyperlipidemia No family hx of      Colon Cancer No family hx of      Prostate Cancer No family hx of      Other Cancer No family hx of      Depression No family hx of      Anxiety Disorder No family hx of      MENTAL ILLNESS No family hx of      Substance Abuse No family hx of      Anesthesia Reaction No family hx of      Asthma No family hx of      OSTEOPOROSIS No family hx of      Genetic Disorder No family hx of      Thyroid Disease No family hx of      Obesity No family hx of      Unknown/Adopted No family hx of      Breast Cancer Daughter              Reviewed and updated as needed this visit by clinical staffTobacco  Allergies  Meds  Med Hx  Surg Hx  Fam Hx  Soc Hx      Reviewed and updated as needed this visit by Provider         ROS:  Constitutional, neuro, ENT, endocrine, pulmonary, cardiac, gastrointestinal, genitourinary, musculoskeletal, integument and psychiatric systems are negative, except as otherwise noted.  RESP:POSITIVE for SOB/dyspnea and sputum 2 times/day  CV: NEGATIVE for chest pain, palpitations or peripheral edema      OBJECTIVE:                                                    /74  Pulse 72  Temp 98  F (36.7  C) (Tympanic)  Resp 18  Ht 5' 7\" (1.702 m)  Wt 204 lb (92.5 kg)  SpO2 97%  BMI 31.95 kg/m2  Body mass index is 31.95 kg/(m^2).  GENERAL APPEARANCE: healthy, alert and no distress  RESP: lungs clear to auscultation - no rales, " rhonchi or wheezes  CV: regular rates and rhythm, normal S1 S2, no S3 or S4 and no murmur, click or rub         ASSESSMENT/PLAN:                                                        ICD-10-CM    1. Chronic obstructive pulmonary disease, unspecified COPD type (H) J44.9 fluticasone (FLOVENT HFA) 220 MCG/ACT Inhaler   2. Hyperlipidemia LDL goal <100 E78.5 Lipid Profile   3. Hypertension goal BP (blood pressure) < 140/90 I10 UA with Microscopic     CBC with platelets differential     Comprehensive metabolic panel   4. Renal insufficiency N28.9        Patient Instructions   flovent was refilled. Will check labs.      Remi Peterson MD  [facility]

## 2017-08-14 NOTE — LETTER
Nitish Coreas  [address] [address]        August 15, 2017          Dear ,    We are writing to inform you of your test results.    Your test results fall within the expected range(s) or remain unchanged from previous results.  Please continue with current treatment plan. We can repeat these in 6 months.    Resulted Orders   UA with Microscopic   Result Value Ref Range    Color Urine Yellow     Appearance Urine Clear     Glucose Urine Negative NEG mg/dL    Bilirubin Urine Negative NEG    Ketones Urine Negative NEG mg/dL    Specific Gravity Urine 1.020 1.003 - 1.035    pH Urine 5.0 5.0 - 7.0 pH    Protein Albumin Urine 100 (A) NEG mg/dL    Urobilinogen Urine 0.2 0.2 - 1.0 EU/dL    Nitrite Urine Negative NEG    Blood Urine Negative NEG    Leukocyte Esterase Urine Negative NEG    Source Midstream Urine     WBC Urine O - 2 0 - 2 /HPF    RBC Urine O - 2 0 - 2 /HPF   CBC with platelets differential   Result Value Ref Range    WBC 7.8 4.0 - 11.0 10e9/L    RBC Count 4.49 4.4 - 5.9 10e12/L    Hemoglobin 15.1 13.3 - 17.7 g/dL    Hematocrit 43.8 40.0 - 53.0 %    MCV 98 78 - 100 fl    MCH 33.6 (H) 26.5 - 33.0 pg    MCHC 34.5 31.5 - 36.5 g/dL    RDW 13.1 10.0 - 15.0 %    Platelet Count 222 150 - 450 10e9/L    Diff Method Automated Method     % Neutrophils 61.9 %    % Lymphocytes 22.3 %    % Monocytes 13.4 %    % Eosinophils 2.1 %    % Basophils 0.3 %    Absolute Neutrophil 4.8 1.6 - 8.3 10e9/L    Absolute Lymphocytes 1.7 0.8 - 5.3 10e9/L    Absolute Monocytes 1.0 0.0 - 1.3 10e9/L    Absolute Eosinophils 0.2 0.0 - 0.7 10e9/L    Absolute Basophils 0.0 0.0 - 0.2 10e9/L   Comprehensive metabolic panel   Result Value Ref Range    Sodium 140 133 - 144 mmol/L    Potassium 4.4 3.4 - 5.3 mmol/L    Chloride 109 94 - 109 mmol/L    Carbon Dioxide 22 20 - 32 mmol/L    Anion Gap 9 3 - 14 mmol/L    Glucose 97 70 - 99 mg/dL      Comment:      Fasting specimen    Urea Nitrogen 31 (H) 7 - 30 mg/dL     Creatinine 1.58 (H) 0.66 - 1.25 mg/dL    GFR Estimate 42 (L) >60 mL/min/1.7m2      Comment:      Non  GFR Calc    GFR Estimate If Black 51 (L) >60 mL/min/1.7m2      Comment:       GFR Calc    Calcium 9.1 8.5 - 10.1 mg/dL    Bilirubin Total 0.5 0.2 - 1.3 mg/dL    Albumin 3.7 3.4 - 5.0 g/dL    Protein Total 7.4 6.8 - 8.8 g/dL    Alkaline Phosphatase 81 40 - 150 U/L    ALT 24 0 - 70 U/L    AST 22 0 - 45 U/L   Lipid Profile   Result Value Ref Range    Cholesterol 149 <200 mg/dL    Triglycerides 140 <150 mg/dL      Comment:      Fasting specimen    HDL Cholesterol 37 (L) >39 mg/dL    LDL Cholesterol Calculated 84 <100 mg/dL      Comment:      Desirable:       <100 mg/dl    Non HDL Cholesterol 112 <130 mg/dL       If you have any questions or concerns, please call the clinic at the number listed above.       Sincerely,        Remi Peterson MD

## 2017-08-14 NOTE — MR AVS SNAPSHOT
"              After Visit Summary   8/14/2017    Nitish Coreas    MRN: [id number]           Patient Information     Date Of Birth          9/4/1934        Visit Information        Provider Department      8/14/2017 8:45 AM Remi Peterson MD [facility]        Today's Diagnoses     Chronic obstructive pulmonary disease, unspecified COPD type (H)    -  1    Hyperlipidemia LDL goal <100        Hypertension goal BP (blood pressure) < 140/90        Renal insufficiency          Care Instructions    flovent was refilled. Will check labs.          Follow-ups after your visit        Who to contact     If you have questions or need follow up information about today's clinic visit or your schedule please contact [facility] directly at [phone].  Normal or non-critical lab and imaging results will be communicated to you by MyChart, letter or phone within 4 business days after the clinic has received the results. If you do not hear from us within 7 days, please contact the clinic through MyChart or phone. If you have a critical or abnormal lab result, we will notify you by phone as soon as possible.  Submit refill requests through [employer] or call your pharmacy and they will forward the refill request to us. Please allow 3 business days for your refill to be completed.          Additional Information About Your Visit        [employer] Information     [employer] technologies lets you send messages to your doctor, view your test results, renew your prescriptions, schedule appointments and more. To sign up, go to [email or web address] . Click on \"Log in\" on the left side of the screen, which will take you to the Welcome page. Then click on \"Sign up Now\" on the right side of the page.     You will be asked to enter the access code listed below, as well as some personal information. Please follow the directions to create your username and password.     Your access code " "is: [email or web address]  Expires: 2017  9:01 AM     Your access code will  in 90 days. If you need help or a new code, please call your [place] clinic or [phone].        Care EveryWhere ID     This is your Care EveryWhere ID. This could be used by other organizations to access your [place] medical records  [id number]        Your Vitals Were     Pulse Temperature Respirations Height Pulse Oximetry BMI (Body Mass Index)    72 98  F (36.7  C) (Tympanic) 18 5' 7\" (1.702 m) 97% 31.95 kg/m2       Blood Pressure from Last 3 Encounters:   17 126/74   05/15/17 120/70   17 130/80    Weight from Last 3 Encounters:   17 204 lb (92.5 kg)   05/15/17 207 lb (93.9 kg)   17 211 lb (95.7 kg)              We Performed the Following     CBC with platelets differential     Comprehensive metabolic panel     Lipid Profile     UA with Microscopic          Where to get your medicines      These medications were sent to [facility] [address]  [address], [facility] [address]     Phone:  [phone]     fluticasone 220 MCG/ACT Inhaler          Primary Care Provider Office Phone # Fax #    Remi Peterson [phone] [address] [facility] [facility] [address]        Equal Access to Services     GERRY GOMEZ AH: Hadii maine ku hadasho Soomaali, waaxda luqadaha, qaybta kaalmada adeegyada, waxay alf kim adealli vasquez . So [facility] [phone].    ATENCIÓN: Si habla español, tiene a ramos disposición servicios gratuitos de asistencia lingüística. Carolin al [phone].    We comply with applicable federal civil rights laws and [place] laws. We do not discriminate on the basis of race, color, national origin, age, disability sex, sexual orientation or gender identity.            Thank you!     Thank you for choosing [facility]  for your care. Our goal is always to provide you with excellent care. " Hearing back from our patients is one way we can continue to improve our services. Please take a few minutes to complete the written survey that you may receive in the mail after your visit with us. Thank you!             Your Updated Medication List - Protect others around you: Learn how to safely use, store and throw away your medicines at www.disposemymeds.org.          This list is accurate as of: 8/14/17  9:45 AM.  Always use your most recent med list.                   Brand Name Dispense Instructions for use Diagnosis    CLARITIN PO      Take 1 tablet by mouth daily        clindamycin 300 MG capsule    CLEOCIN    40 capsule    Take 4 capsules (1,200 mg) by mouth daily Take 4 capsules on hour before dental procedures.    Status post total knee replacement, unspecified laterality       fish oil-omega-3 fatty acids 1000 MG capsule      Take 2 g by mouth daily.        fluticasone 220 MCG/ACT Inhaler    FLOVENT HFA    3 Inhaler    Inhale 2 puffs into the lungs 2 times daily    Chronic obstructive pulmonary disease, unspecified COPD type (H)       gemfibrozil 600 MG tablet    LOPID    180 tablet    TAKE 1 TABLET TWICE DAILY    Hyperlipidemia LDL goal <100       glucosamine 500 MG Tabs      Take 1 tablet by mouth daily.        lisinopril 40 MG tablet    PRINIVIL/ZESTRIL    90 tablet    Take 1 tablet (40 mg) by mouth daily    Hypertension goal BP (blood pressure) < 140/90       lovastatin 40 MG tablet    MEVACOR    180 tablet    TAKE 2 TABLETS AT BEDTIME    Hyperlipidemia LDL goal <100       METAMUCIL PO      Take by mouth daily Reported on 4/11/2017        MULTIVITAL Tabs      Take 1 tablet by mouth daily.        ranitidine 75 MG tablet    ZANTAC    30 tablet    Take 2 tablets (150 mg) by mouth daily    Stomach upset       VITAMIN C PO      Take 1,000 mg by mouth daily        vitamin D 2000 UNITS Caps      Take 1 tablet by mouth 3 times daily.

## 2017-08-14 NOTE — NURSING NOTE
"Chief Complaint   Patient presents with     COPD       Initial /74  Pulse 72  Temp 98  F (36.7  C) (Tympanic)  Resp 18  Ht 5' 7\" (1.702 m)  Wt 204 lb (92.5 kg)  SpO2 97%  BMI 31.95 kg/m2 Estimated body mass index is 31.95 kg/(m^2) as calculated from the following:    Height as of this encounter: 5' 7\" (1.702 m).    Weight as of this encounter: 204 lb (92.5 kg).  Medication Reconciliation: complete     Vikki Garcia CMA      "

## 2017-11-14 ENCOUNTER — OFFICE VISIT (OUTPATIENT)
Dept: FAMILY MEDICINE | Facility: CLINIC | Age: 82
End: 2017-11-14
Payer: COMMERCIAL

## 2017-11-14 VITALS
BODY MASS INDEX: 32.11 KG/M2 | RESPIRATION RATE: 18 BRPM | HEART RATE: 74 BPM | TEMPERATURE: 97.9 F | OXYGEN SATURATION: 96 % | DIASTOLIC BLOOD PRESSURE: 84 MMHG | WEIGHT: 205 LBS | SYSTOLIC BLOOD PRESSURE: 130 MMHG

## 2017-11-14 DIAGNOSIS — J44.1 COPD EXACERBATION (H): Primary | ICD-10-CM

## 2017-11-14 PROCEDURE — 99214 OFFICE O/P EST MOD 30 MIN: CPT | Performed by: FAMILY MEDICINE

## 2017-11-14 RX ORDER — AZITHROMYCIN 250 MG/1
TABLET, FILM COATED ORAL
Qty: 6 TABLET | Refills: 0 | Status: SHIPPED | OUTPATIENT
Start: 2017-11-14 | End: 2017-11-19

## 2017-11-14 NOTE — PROGRESS NOTES
SUBJECTIVE:   Nitish Coreas is a 83 year old male who presents to clinic today for the following health issues:      COPD Follow-Up    Symptoms are currently: stable    Current fatigue or dyspnea with ambulation: stable     Shortness of breath: stable    Increased or change in Cough/Sputum: Yes-  Coughing x 3 weeks    Fever(s): yes on and off    Baseline ambulation without stopping to rest:  1000 feet. Able to walk up 1 flights of stairs without stopping to rest.    Any ER/UC or hospital admissions since your last visit? No     History   Smoking Status     Former Smoker     Packs/day: 4.00     Years: 45.00     Types: Cigarettes     Quit date: 5/8/1991   Smokeless Tobacco     Never Used     Lab Results   Component Value Date    FEV1 1.81 12/08/2014    TFI6NFI 73% 12/08/2014         Amount of exercise or physical activity: None    Problems taking medications regularly: No    Medication side effects: none    Diet: regular (no restrictions)              Problem list and histories reviewed & adjusted, as indicated.  Additional history: as documented    Patient Active Problem List   Diagnosis     Hyperlipidemia LDL goal <100     Hypertension goal BP (blood pressure) < 140/90     Renal insufficiency     Chronic rhinitis     Constipation     Advanced directives, counseling/discussion     BMI 30-35     Status post total knee replacement, unspecified laterality     Chronic obstructive pulmonary disease, unspecified COPD type (H)     Past Surgical History:   Procedure Laterality Date     C REPLANTATION THUMB DISTAL,COMPLETE  1962    cut off right thumb and repair     C TOTAL KNEE ARTHROPLASTY  2011, 2000    left then right     DENTAL SURGERY  1958    wisdom teeth     TONSILLECTOMY & ADENOIDECTOMY  1949       Social History   Substance Use Topics     Smoking status: Former Smoker     Packs/day: 4.00     Years: 45.00     Types: Cigarettes     Quit date: 5/8/1991     Smokeless tobacco: Never Used     Alcohol use 0.0  oz/week     0 Standard drinks or equivalent per week      Comment: 1-2 friday night- cocktail     Family History   Problem Relation Age of Onset     CANCER Mother      Respiratory Mother      HEART DISEASE Father      CEREBROVASCULAR DISEASE Father      CEREBROVASCULAR DISEASE Sister      CVA x 2     DIABETES No family hx of      Coronary Artery Disease No family hx of      Hypertension No family hx of      Hyperlipidemia No family hx of      Colon Cancer No family hx of      Prostate Cancer No family hx of      Other Cancer No family hx of      Depression No family hx of      Anxiety Disorder No family hx of      MENTAL ILLNESS No family hx of      Substance Abuse No family hx of      Anesthesia Reaction No family hx of      Asthma No family hx of      OSTEOPOROSIS No family hx of      Genetic Disorder No family hx of      Thyroid Disease No family hx of      Obesity No family hx of      Unknown/Adopted No family hx of      Breast Cancer Daughter              Reviewed and updated as needed this visit by clinical staffTobacco  Allergies  Meds       Reviewed and updated as needed this visit by Provider         ROS:  Constitutional, neuro, ENT, endocrine, pulmonary, cardiac, gastrointestinal, genitourinary, musculoskeletal, integument and psychiatric systems are negative, except as otherwise noted.      OBJECTIVE:                                                    /84  Pulse 74  Temp 97.9  F (36.6  C) (Tympanic)  Resp 18  Wt 205 lb (93 kg)  SpO2 96%  BMI 32.11 kg/m2  Body mass index is 32.11 kg/(m^2).  GENERAL APPEARANCE: healthy, alert and no distress  EYES: Eyes grossly normal to inspection, PERRL and conjunctivae and sclerae normal  HENT: ear canals and TM's normal and nose and mouth without ulcers or lesions  RESP: lungs clear to auscultation - no rales, rhonchi or wheezes  CV: regular rates and rhythm, normal S1 S2, no S3 or S4 and no murmur, click or rub  LYMPHATICS: normal ant/post cervical and  supraclavicular nodes         ASSESSMENT/PLAN:                                                        ICD-10-CM    1. COPD exacerbation (H) J44.1 azithromycin (ZITHROMAX) 250 MG tablet       Patient Instructions   Will treat symptomatically and see back as needed if not improving.Patient was placed on antibiotic as noted. Will push fluids.  Try tylenol and advil.  May use salt water nasal sprays. Could try a Vane pot.      Remi Peterson MD  [facility]

## 2017-11-14 NOTE — LETTER
My COPD Action Plan     Name: Nitish Coreas    YOB: 1934   Date: 11/14/2017    My doctor: Remi Peterson MD   My clinic: [address] [address]  [phone]  My Controller Medicine: { :848103}   Dose: ***     My Rescue Medicine: { :137468}   Dose: ***     My Flare Up Medicine: { :194543}   Dose: *** FEV-1 (no units)   Date Value   12/08/2014 1.81     FEV1/FVC (no units)   Date Value   12/08/2014 73%      My COPD Severity: { :481881}      Use of Oxygen: { :916675}     Make sure you've had your pneumonia   vaccines.          GREEN ZONE       Doing well today      Usual level of activity and exercise    Usual amount of cough and mucus    No shortness of breath    Usual level of health (thinking clearly, sleeping well, feel like eating) Actions:      Take daily medicines    Use oxygen as prescribed    Follow regular exercise and diet plan    Avoid cigarette smoke and other irritants that harm the lungs           YELLOW ZONE          Having a bad day or flare up      Short of breath more than usual    A lot more sputum (mucus) than usual    Sputum looks yellow, green, tan, brown or bloody    More coughing or wheezing    Fever or chills    Less energy; trouble completing activities    Trouble thinking or focusing    Using quick relief inhaler or nebulizer more often    Poor sleep; symptoms wake me up    Do not feel like eating Actions:      Get plenty of rest    Take daily medicines    Use quick relief inhaler every *** hours    If you use oxygen, call you doctor to see if you should adjust your oxygen    Do breathing exercises or other things to help you relax    Let a loved one, friend or neighbor know you are feeling worse    Call your care team if you have 2 or more symptoms.  Start taking steroids or antibiotics if directed by your care team           RED ZONE       Need medical care now      Severe  shortness of breath (feel you can't breathe)    Fever, chills    Not enough breath to do any activity    Trouble coughing up mucus, walking or talking    Blood in mucus    Frequent coughing   Rescue medicines are not working    Not able to sleep because of breathing    Feel confused or drowsy    Chest pain    Actions:      Call your health care team.  If you cannot reach your care team, call 911 or go to the emergency room.        Electronically signed by: Vikki Garcia, November 14, 2017  Annual Reminders:  Meet with Care Team, Flu Shot every Fall  Pharmacy:    [facility]/PHARMACY #[id number] - [place], [place] - [address] - [facility] [address]

## 2017-11-14 NOTE — PATIENT INSTRUCTIONS
Will treat symptomatically and see back as needed if not improving.Patient was placed on antibiotic as noted. Will push fluids.  Try tylenol and advil.  May use salt water nasal sprays. Could try a Vane pot.

## 2017-11-14 NOTE — MR AVS SNAPSHOT
"              After Visit Summary   11/14/2017    Nitish Coreas    MRN: [id number]           Patient Information     Date Of Birth          9/4/1934        Visit Information        Provider Department      11/14/2017 7:45 AM Remi Peterson MD [facility]        Today's Diagnoses     COPD exacerbation (H)    -  1      Care Instructions    Will treat symptomatically and see back as needed if not improving.Patient was placed on antibiotic as noted. Will push fluids.  Try tylenol and advil.  May use salt water nasal sprays. Could try a Vane pot.          Follow-ups after your visit        Follow-up notes from your care team     Return if symptoms worsen or fail to improve, for Routine Visit.      Who to contact     If you have questions or need follow up information about today's clinic visit or your schedule please contact [facility] directly at [phone].  Normal or non-critical lab and imaging results will be communicated to you by WorldRemithart, letter or phone within 4 business days after the clinic has received the results. If you do not hear from us within 7 days, please contact the clinic through WorldRemithart or phone. If you have a critical or abnormal lab result, we will notify you by phone as soon as possible.  Submit refill requests through Qriket or call your pharmacy and they will forward the refill request to us. Please allow 3 business days for your refill to be completed.          Additional Information About Your Visit        MyChart Information     Qriket lets you send messages to your doctor, view your test results, renew your prescriptions, schedule appointments and more. To sign up, go to [email or web address] . Click on \"Log in\" on the left side of the screen, which will take you to the Welcome page. Then click on \"Sign up Now\" on the right side of the page.     You will be asked to enter the access code listed below, as " well as some personal information. Please follow the directions to create your username and password.     Your access code is: [email or web address]  Expires: 2018  8:16 AM     Your access code will  in 90 days. If you need help or a new code, please call your [place] clinic or [phone].        Care EveryWhere ID     This is your Care EveryWhere ID. This could be used by other organizations to access your [place] medical records  [id number]        Your Vitals Were     Pulse Temperature Respirations Pulse Oximetry BMI (Body Mass Index)       74 97.9  F (36.6  C) (Tympanic) 18 96% 32.11 kg/m2        Blood Pressure from Last 3 Encounters:   17 130/84   17 126/74   05/15/17 120/70    Weight from Last 3 Encounters:   17 205 lb (93 kg)   17 204 lb (92.5 kg)   05/15/17 207 lb (93.9 kg)              Today, you had the following     No orders found for display         Today's Medication Changes          These changes are accurate as of: 17  8:16 AM.  If you have any questions, ask your nurse or doctor.               Start taking these medicines.        Dose/Directions    azithromycin 250 MG tablet   Commonly known as:  ZITHROMAX   Used for:  COPD exacerbation (H)   Started by:  Remi Peterson MD        Two tablets first day, then one tablet daily for four days.   Quantity:  6 tablet   Refills:  0            Where to get your medicines      These medications were sent to [facility]/pharmacy #[id number] [place] [id number] [address]     Phone:  [phone]     azithromycin 250 MG tablet                Primary Care Provider Office Phone # Fax #    Remi Peterson [phone]       [address] [facility] [address]        Equal Access to Services     GERRY GOMEZ AH: Kashif swaino Sotish, waaxda luqadaha, qaybta kaalmaflorentin cortes. So [facility]  [phone].    ATENCIÓN: Si brendon coon, tiene a ramos disposición servicios gratuitos de asistencia lingüística. Carolin anderson [phone].    We comply with applicable federal civil rights laws and [place] laws. We do not discriminate on the basis of race, color, national origin, age, disability, sex, sexual orientation, or gender identity.            Thank you!     Thank you for choosing [facility]  for your care. Our goal is always to provide you with excellent care. Hearing back from our patients is one way we can continue to improve our services. Please take a few minutes to complete the written survey that you may receive in the mail after your visit with us. Thank you!             Your Updated Medication List - Protect others around you: Learn how to safely use, store and throw away your medicines at www.disposemymeds.org.          This list is accurate as of: 11/14/17  8:16 AM.  Always use your most recent med list.                   Brand Name Dispense Instructions for use Diagnosis    azithromycin 250 MG tablet    ZITHROMAX    6 tablet    Two tablets first day, then one tablet daily for four days.    COPD exacerbation (H)       CLARITIN PO      Take 1 tablet by mouth daily        clindamycin 300 MG capsule    CLEOCIN    40 capsule    Take 4 capsules (1,200 mg) by mouth daily Take 4 capsules on hour before dental procedures.    Status post total knee replacement, unspecified laterality       fish oil-omega-3 fatty acids 1000 MG capsule      Take 2 g by mouth daily.        fluticasone 220 MCG/ACT Inhaler    FLOVENT HFA    3 Inhaler    Inhale 2 puffs into the lungs 2 times daily    Chronic obstructive pulmonary disease, unspecified COPD type (H)       gemfibrozil 600 MG tablet    LOPID    180 tablet    TAKE 1 TABLET TWICE DAILY    Hyperlipidemia LDL goal <100       glucosamine 500 MG Tabs      Take 1 tablet by mouth daily.        lisinopril 40 MG tablet    PRINIVIL/ZESTRIL    90  tablet    Take 1 tablet (40 mg) by mouth daily    Hypertension goal BP (blood pressure) < 140/90       lovastatin 40 MG tablet    MEVACOR    180 tablet    TAKE 2 TABLETS AT BEDTIME    Hyperlipidemia LDL goal <100       METAMUCIL PO      Take by mouth daily Reported on 4/11/2017        MULTIVITAL Tabs      Take 1 tablet by mouth daily.        ranitidine 75 MG tablet    ZANTAC    30 tablet    Take 2 tablets (150 mg) by mouth daily    Stomach upset       VITAMIN C PO      Take 1,000 mg by mouth daily        vitamin D 2000 UNITS Caps      Take 1 tablet by mouth 3 times daily.

## 2017-11-14 NOTE — NURSING NOTE
"Chief Complaint   Patient presents with     COPD     Cough     x 3 weeks       Initial /84  Pulse 74  Temp 97.9  F (36.6  C) (Tympanic)  Resp 18  Wt 205 lb (93 kg)  SpO2 96%  BMI 32.11 kg/m2 Estimated body mass index is 32.11 kg/(m^2) as calculated from the following:    Height as of 8/14/17: 5' 7\" (1.702 m).    Weight as of this encounter: 205 lb (93 kg).  Medication Reconciliation: complete     Vikki Garcia CMA      "

## 2017-12-21 ENCOUNTER — TRANSFERRED RECORDS (OUTPATIENT)
Dept: HEALTH INFORMATION MANAGEMENT | Facility: CLINIC | Age: 82
End: 2017-12-21

## 2018-01-08 ENCOUNTER — TRANSFERRED RECORDS (OUTPATIENT)
Dept: HEALTH INFORMATION MANAGEMENT | Facility: CLINIC | Age: 83
End: 2018-01-08

## 2018-01-30 ENCOUNTER — TELEPHONE (OUTPATIENT)
Dept: FAMILY MEDICINE | Facility: CLINIC | Age: 83
End: 2018-01-30

## 2018-01-30 NOTE — TELEPHONE ENCOUNTER
Patient reports that he has been having coughing spells at least 3 times a day sometimes more that last for 30-60 minutes where he coughs up thick white mucous and it is really difficult because of the COPD to get it out. He is wanting to know if his medication should be changed or is there something else he can do. Should he be seen.

## 2018-01-30 NOTE — TELEPHONE ENCOUNTER
Reason for Call:  Other call back    Detailed comments: Patient has some questions in regards to when he needs to come back for lab work and discuss his Flovent medication. States it is getting too expensive. Please call to discuss    Phone Number Patient can be reached at: Home number on file [phone] (home)    Best Time: any    Can we leave a detailed message on this number? YES    Call taken on 1/30/2018 at 11:30 AM by FRAN STILES

## 2018-01-30 NOTE — TELEPHONE ENCOUNTER
ED / Discharge Outreach Protocol    Patient Contact    Attempt # 1    Was call answered?  No.  Unable to leave message. Line busy will try again later.

## 2018-01-31 ENCOUNTER — OFFICE VISIT (OUTPATIENT)
Dept: FAMILY MEDICINE | Facility: CLINIC | Age: 83
End: 2018-01-31
Payer: COMMERCIAL

## 2018-01-31 VITALS
DIASTOLIC BLOOD PRESSURE: 84 MMHG | SYSTOLIC BLOOD PRESSURE: 128 MMHG | HEART RATE: 92 BPM | RESPIRATION RATE: 16 BRPM | BODY MASS INDEX: 32.33 KG/M2 | WEIGHT: 206 LBS | TEMPERATURE: 97.1 F | HEIGHT: 67 IN | OXYGEN SATURATION: 92 %

## 2018-01-31 DIAGNOSIS — R06.02 SOB (SHORTNESS OF BREATH): ICD-10-CM

## 2018-01-31 DIAGNOSIS — J44.9 CHRONIC OBSTRUCTIVE PULMONARY DISEASE, UNSPECIFIED COPD TYPE (H): Primary | ICD-10-CM

## 2018-01-31 PROCEDURE — 99214 OFFICE O/P EST MOD 30 MIN: CPT | Performed by: FAMILY MEDICINE

## 2018-01-31 RX ORDER — FORMOTEROL FUMARATE DIHYDRATE 20 UG/2ML
20 SOLUTION RESPIRATORY (INHALATION) EVERY 12 HOURS
Qty: 120 ML | Refills: 3 | Status: SHIPPED | OUTPATIENT
Start: 2018-01-31 | End: 2018-03-31

## 2018-01-31 RX ORDER — BUDESONIDE 0.5 MG/2ML
0.5 INHALANT ORAL 2 TIMES DAILY
Qty: 120 ML | Refills: 3 | Status: SHIPPED | OUTPATIENT
Start: 2018-01-31 | End: 2018-03-31

## 2018-01-31 NOTE — PROGRESS NOTES
SUBJECTIVE:   Nitish Coreas is a 83 year old male who presents to clinic today for the following health issues:    COPD Follow-Up      Symptoms are currently: stable    Current fatigue or dyspnea with ambulation: stable     Shortness of breath: stable    Increased or change in Cough/Sputum: Yes-      Fever(s): No    Baseline ambulation without stopping to rest: 1 block. Able to walk up 1 flights of stairs without stopping to rest.    Any ER/UC or hospital admissions since your last visit? No     History   Smoking Status     Former Smoker     Packs/day: 4.00     Years: 45.00     Types: Cigarettes     Quit date: 5/8/1991   Smokeless Tobacco     Never Used     Lab Results   Component Value Date    FEV1 1.81 12/08/2014    WTF5VLR 73% 12/08/2014       Amount of exercise or physical activity: None    Problems taking medications regularly: No    Medication side effects: none    Diet: regular (no restrictions)        Problem list and histories reviewed & adjusted, as indicated.  Additional history: as documented    Patient Active Problem List   Diagnosis     Hyperlipidemia LDL goal <100     Hypertension goal BP (blood pressure) < 140/90     Renal insufficiency     Chronic rhinitis     Constipation     Advanced directives, counseling/discussion     BMI 30-35     Status post total knee replacement, unspecified laterality     Chronic obstructive pulmonary disease, unspecified COPD type (H)     Past Surgical History:   Procedure Laterality Date     C REPLANTATION THUMB DISTAL,COMPLETE  1962    cut off right thumb and repair     C TOTAL KNEE ARTHROPLASTY  2011, 2000    left then right     DENTAL SURGERY  1958    wisdom teeth     TONSILLECTOMY & ADENOIDECTOMY  1949       Social History   Substance Use Topics     Smoking status: Former Smoker     Packs/day: 4.00     Years: 45.00     Types: Cigarettes     Quit date: 5/8/1991     Smokeless tobacco: Never Used     Alcohol use 0.0 oz/week     0 Standard drinks or equivalent  "per week      Comment: 1-2 friday night- cocktail     Family History   Problem Relation Age of Onset     CANCER Mother      Respiratory Mother      HEART DISEASE Father      CEREBROVASCULAR DISEASE Father      CEREBROVASCULAR DISEASE Sister      CVA x 2     Breast Cancer Daughter      DIABETES No family hx of      Coronary Artery Disease No family hx of      Hypertension No family hx of      Hyperlipidemia No family hx of      Colon Cancer No family hx of      Prostate Cancer No family hx of      Other Cancer No family hx of      Depression No family hx of      Anxiety Disorder No family hx of      MENTAL ILLNESS No family hx of      Substance Abuse No family hx of      Anesthesia Reaction No family hx of      Asthma No family hx of      OSTEOPOROSIS No family hx of      Genetic Disorder No family hx of      Thyroid Disease No family hx of      Obesity No family hx of      Unknown/Adopted No family hx of            Reviewed and updated as needed this visit by clinical staff  Tobacco  Allergies  Meds  Problems  Med Hx  Surg Hx  Fam Hx  Soc Hx        Reviewed and updated as needed this visit by Provider         ROS:  Constitutional, HEENT, cardiovascular, pulmonary, gi and gu systems are negative, except as otherwise noted.    OBJECTIVE:                                                    /84  Pulse 92  Temp 97.1  F (36.2  C) (Tympanic)  Resp 16  Ht 5' 7\" (1.702 m)  Wt 206 lb (93.4 kg)  SpO2 92%  BMI 32.26 kg/m2  Body mass index is 32.26 kg/(m^2).  GENERAL APPEARANCE: healthy, alert and no distress  RESP: lungs clear to auscultation - no rales, rhonchi or wheezes  CV: regular rates and rhythm, normal S1 S2, no S3 or S4 and no murmur, click or rub  ABDOMEN: soft, nontender, without hepatosplenomegaly or masses and bowel sounds normal         ASSESSMENT/PLAN:                                                        ICD-10-CM    1. Chronic obstructive pulmonary disease, unspecified COPD type (H) J44.9 " COPD ACTION PLAN     order for DME     formoterol (PERFOROMIST) 20 MCG/2ML neb solution     budesonide (PULMICORT) 0.5 MG/2ML neb solution   2. SOB (shortness of breath) R06.02     Increased       Patient Instructions   I will have the patient hold his Flovent for now.  There are 2 reasons for holding that, one is the expense as it is very expensive inhaler, and the other is that has not been working well.  I switched him to a nebulizer with formoterol and budesonide to use twice daily.  He will follow-up in a couple of weeks.  He will then be leaving town for about a month.  Hopefully his stamina will start to return.  He has had to stop after about a block to rest before he can go further.      Remi Peterson MD  [facility]

## 2018-01-31 NOTE — MR AVS SNAPSHOT
After Visit Summary   1/31/2018    Nitish Coreas    MRN: [id number]           Patient Information     Date Of Birth          9/4/1934        Visit Information        Provider Department      1/31/2018 3:00 PM Remi Peterson MD [facility]        Today's Diagnoses     Chronic obstructive pulmonary disease, unspecified COPD type (H)    -  1    SOB (shortness of breath)          Care Instructions    I will have the patient hold his Flovent for now.  There are 2 reasons for holding that, one is the expense as it is very expensive inhaler, and the other is that has not been working well.  I switched him to a nebulizer with formoterol and budesonide to use twice daily.  He will follow-up in a couple of weeks.  He will then be leaving town for about a month.  Hopefully his stamina will start to return.  He has had to stop after about a block to rest before he can go further.          Follow-ups after your visit        Your next 10 appointments already scheduled     Feb 14, 2018 11:30 AM CST   SHORT with Remi Peterson MD   [facility] ([facility])    [address] [place] [id number]   [phone]              Who to contact     If you have questions or need follow up information about today's clinic visit or your schedule please contact [facility] directly at [phone].  Normal or non-critical lab and imaging results will be communicated to you by MyChart, letter or phone within 4 business days after the clinic has received the results. If you do not hear from us within 7 days, please contact the clinic through MyChart or phone. If you have a critical or abnormal lab result, we will notify you by phone as soon as possible.  Submit refill requests through [employer] or call your pharmacy and they will forward the refill request to us.  "Please allow 3 business days for your refill to be completed.          Additional Information About Your Visit        Results [employer] Information     Results [employer] lets you send messages to your doctor, view your test results, renew your prescriptions, schedule appointments and more. To sign up, go to [email or web address] . Click on \"Log in\" on the left side of the screen, which will take you to the Welcome page. Then click on \"Sign up Now\" on the right side of the page.     You will be asked to enter the access code listed below, as well as some personal information. Please follow the directions to create your username and password.     Your access code is: [email or web address]  Expires: 2018  8:16 AM     Your access code will  in 90 days. If you need help or a new code, please call your [facility] clinic or [phone].        Care EveryWhere ID     This is your Care EveryWhere ID. This could be used by other organizations to access your [facility] medical records  [id number]        Your Vitals Were     Pulse Temperature Respirations Height Pulse Oximetry BMI (Body Mass Index)    92 97.1  F (36.2  C) (Tympanic) 16 5' 7\" (1.702 m) 92% 32.26 kg/m2       Blood Pressure from Last 3 Encounters:   18 128/84   17 130/84   17 126/74    Weight from Last 3 Encounters:   18 206 lb (93.4 kg)   17 205 lb (93 kg)   17 204 lb (92.5 kg)              We Performed the Following     COPD ACTION PLAN          Today's Medication Changes          These changes are accurate as of 18 11:59 PM.  If you have any questions, ask your nurse or doctor.               Start taking these medicines.        Dose/Directions    budesonide 0.5 MG/2ML neb solution   Commonly known as:  PULMICORT   Used for:  Chronic obstructive pulmonary disease, unspecified COPD type (H)   Started by:  Remi Peterson MD        Dose:  0.5 mg   Take 2 mLs (0.5 mg) by nebulization 2 times daily   Quantity:  120 mL   Refills:  3    "    formoterol 20 MCG/2ML neb solution   Commonly known as:  PERFOROMIST   Used for:  Chronic obstructive pulmonary disease, unspecified COPD type (H)   Started by:  Remi Peterson MD        Dose:  20 mcg   Take 2 mLs (20 mcg) by nebulization every 12 hours   Quantity:  120 mL   Refills:  3       order for DME   Used for:  Chronic obstructive pulmonary disease, unspecified COPD type (H)   Started by:  Remi Peterson MD        Equipment being ordered: Nebulizer   Quantity:  1 Device   Refills:  0         Stop taking these medicines if you haven't already. Please contact your care team if you have questions.     fluticasone 220 MCG/ACT Inhaler   Commonly known as:  FLOVENT HFA   Stopped by:  Remi Peterson MD                Where to get your medicines      These medications were sent to [facility] [address] [place] [id number]     Phone:  [phone]     budesonide 0.5 MG/2ML neb solution    formoterol 20 MCG/2ML neb solution         Some of these will need a paper prescription and others can be bought over the counter.  Ask your nurse if you have questions.     Bring a paper prescription for each of these medications     order for DME                Primary Care Provider Office Phone # Fax #    Remi Peterson [phone] [address] [facility] [address]        Equal Access to Services     GERRY GOMEZ AH: Hadii maine duffy hadasho Sojudithali, waaxda luqadaha, qaybta kaalmada adealliyada, florentin vasquez . So [facility] [phone].    ATENCIÓN: Si habla español, tiene a ramos disposición servicios gratuitos de asistencia lingüística. Carolin al [phone].    We comply with applicable federal civil rights laws and [place] laws. We do not discriminate on the basis of race, color, national origin, age, disability, sex, sexual orientation, or gender identity.            Thank  you!     Thank you for choosing [facility]  for your care. Our goal is always to provide you with excellent care. Hearing back from our patients is one way we can continue to improve our services. Please take a few minutes to complete the written survey that you may receive in the mail after your visit with us. Thank you!             Your Updated Medication List - Protect others around you: Learn how to safely use, store and throw away your medicines at www.disposemymeds.org.          This list is accurate as of 1/31/18 11:59 PM.  Always use your most recent med list.                   Brand Name Dispense Instructions for use Diagnosis    budesonide 0.5 MG/2ML neb solution    PULMICORT    120 mL    Take 2 mLs (0.5 mg) by nebulization 2 times daily    Chronic obstructive pulmonary disease, unspecified COPD type (H)       CLARITIN PO      Take 1 tablet by mouth daily        clindamycin 300 MG capsule    CLEOCIN    40 capsule    Take 4 capsules (1,200 mg) by mouth daily Take 4 capsules on hour before dental procedures.    Status post total knee replacement, unspecified laterality       fish oil-omega-3 fatty acids 1000 MG capsule      Take 2 g by mouth daily.        formoterol 20 MCG/2ML neb solution    PERFOROMIST    120 mL    Take 2 mLs (20 mcg) by nebulization every 12 hours    Chronic obstructive pulmonary disease, unspecified COPD type (H)       gemfibrozil 600 MG tablet    LOPID    180 tablet    TAKE 1 TABLET TWICE DAILY    Hyperlipidemia LDL goal <100       glucosamine 500 MG Tabs      Take 1 tablet by mouth daily.        lisinopril 40 MG tablet    PRINIVIL/ZESTRIL    90 tablet    TAKE 1 TABLET BY MOUTH DAILY    Hypertension goal BP (blood pressure) < 140/90       lovastatin 40 MG tablet    MEVACOR    180 tablet    TAKE 2 TABLETS AT BEDTIME    Hyperlipidemia LDL goal <100       METAMUCIL PO      Take by mouth daily Reported on 4/11/2017        MULTIVITAL Tabs      Take 1 tablet by  mouth daily.        order for DME     1 Device    Equipment being ordered: Nebulizer    Chronic obstructive pulmonary disease, unspecified COPD type (H)       ranitidine 75 MG tablet    ZANTAC    30 tablet    Take 2 tablets (150 mg) by mouth daily    Stomach upset       VITAMIN C PO      Take 1,000 mg by mouth daily        vitamin D 2000 UNITS Caps      Take 1 tablet by mouth 3 times daily.

## 2018-01-31 NOTE — NURSING NOTE
"Chief Complaint   Patient presents with     COPD     /84  Pulse 92  Temp 97.1  F (36.2  C) (Tympanic)  Resp 16  Ht 5' 7\" (1.702 m)  Wt 206 lb (93.4 kg)  SpO2 92%  BMI 32.26 kg/m2 Estimated body mass index is 32.26 kg/(m^2) as calculated from the following:    Height as of this encounter: 5' 7\" (1.702 m).    Weight as of this encounter: 206 lb (93.4 kg).  BP completed using cuff size: emerson Steele CMA    Health Maintenance Due   Topic Date Due     PNEUMOCOCCAL (2 of 2 - PCV13) 01/01/2005     INFLUENZA VACCINE (SYSTEM ASSIGNED)  09/01/2017     Health Maintenance reviewed at today's visit patient asked to schedule/complete:   Immunizations:  Patient agrees to schedule    "

## 2018-02-01 NOTE — PATIENT INSTRUCTIONS
I will have the patient hold his Flovent for now.  There are 2 reasons for holding that, one is the expense as it is very expensive inhaler, and the other is that has not been working well.  I switched him to a nebulizer with formoterol and budesonide to use twice daily.  He will follow-up in a couple of weeks.  He will then be leaving town for about a month.  Hopefully his stamina will start to return.  He has had to stop after about a block to rest before he can go further.

## 2018-02-02 ENCOUNTER — TELEPHONE (OUTPATIENT)
Dept: FAMILY MEDICINE | Facility: CLINIC | Age: 83
End: 2018-02-02

## 2018-02-02 NOTE — TELEPHONE ENCOUNTER
Reason for Call:  Form, our goal is to have forms completed with 72 hours, however, some forms may require a visit or additional information.    Type of letter, form or note:  medical    Who is the form from?: Home care    Where did the form come from: form was faxed in    What clinic location was the form placed at?: [facility]    Where the form was placed: 's Box: Remi Peterson MD    What number is listed as a contact on the form?: [phone]       Additional comments: corner [facility]   needs med list and chart notes for Nebulizer     Call taken on 2/2/2018 at 10:01 AM by Loren Zuniga

## 2018-02-14 ENCOUNTER — OFFICE VISIT (OUTPATIENT)
Dept: FAMILY MEDICINE | Facility: CLINIC | Age: 83
End: 2018-02-14
Payer: COMMERCIAL

## 2018-02-14 VITALS
RESPIRATION RATE: 15 BRPM | SYSTOLIC BLOOD PRESSURE: 126 MMHG | HEART RATE: 79 BPM | DIASTOLIC BLOOD PRESSURE: 80 MMHG | TEMPERATURE: 98.7 F | BODY MASS INDEX: 31.79 KG/M2 | OXYGEN SATURATION: 94 % | WEIGHT: 203 LBS

## 2018-02-14 DIAGNOSIS — Z23 NEED FOR PROPHYLACTIC VACCINATION AND INOCULATION AGAINST INFLUENZA: ICD-10-CM

## 2018-02-14 DIAGNOSIS — J44.9 CHRONIC OBSTRUCTIVE PULMONARY DISEASE, UNSPECIFIED COPD TYPE (H): Primary | ICD-10-CM

## 2018-02-14 PROCEDURE — G0008 ADMIN INFLUENZA VIRUS VAC: HCPCS | Performed by: FAMILY MEDICINE

## 2018-02-14 PROCEDURE — 99213 OFFICE O/P EST LOW 20 MIN: CPT | Mod: 25 | Performed by: FAMILY MEDICINE

## 2018-02-14 PROCEDURE — 90686 IIV4 VACC NO PRSV 0.5 ML IM: CPT | Performed by: FAMILY MEDICINE

## 2018-02-14 NOTE — MR AVS SNAPSHOT
After Visit Summary   2/14/2018    Nitish Coreas    MRN: [id number]           Patient Information     Date Of Birth          9/4/1934        Visit Information        Provider Department      2/14/2018 11:30 AM Remi Peterson MD [facility]        Today's Diagnoses     Chronic obstructive pulmonary disease, unspecified COPD type (H)    -  1    Need for prophylactic vaccination and inoculation against influenza          Care Instructions    The patient has his nebulizer machine now.  However, he has not yet picked up his medications to use in the nebulizer.  He is leaving shortly for [place] coming back in mid March.  The feed to take his nebulizer with him is too much so he is going to stay on his Flovent inhaler until he returns from [place].  At that point he will get his 2 medications for his nebulizer and start using that twice daily.  He will follow-up with me a couple of weeks after he is started the nebulizer.  Obviously, he will follow-up sooner as needed.  Will need laboratory testing done when he comes back.  Patient was given flu shot today.          Follow-ups after your visit        Who to contact     If you have questions or need follow up information about today's clinic visit or your schedule please contact [facility] directly at [phone].  Normal or non-critical lab and imaging results will be communicated to you by MyChart, letter or phone within 4 business days after the clinic has received the results. If you do not hear from us within 7 days, please contact the clinic through MyChart or phone. If you have a critical or abnormal lab result, we will notify you by phone as soon as possible.  Submit refill requests through [employer] or call your pharmacy and they will forward the refill request to us. Please allow 3 business days for your refill to be completed.          Additional Information About Your  "Visit        English [email or web address] Information     internetstores lets you send messages to your doctor, view your test results, renew your prescriptions, schedule appointments and more. To sign up, go to [email or web address] . Click on \"Log in\" on the left side of the screen, which will take you to the Welcome page. Then click on \"Sign up Now\" on the right side of the page.     You will be asked to enter the access code listed below, as well as some personal information. Please follow the directions to create your username and password.     Your access code is: [email or web address]  Expires: 5/15/2018 11:58 AM     Your access code will  in 90 days. If you need help or a new code, please call your [facility] clinic or [phone].        Care EveryWhere ID     This is your Care EveryWhere ID. This could be used by other organizations to access your [facility] medical records  [id number]        Your Vitals Were     Pulse Temperature Respirations Pulse Oximetry BMI (Body Mass Index)       79 98.7  F (37.1  C) (Tympanic) 15 94% 31.79 kg/m2        Blood Pressure from Last 3 Encounters:   18 126/80   18 128/84   17 130/84    Weight from Last 3 Encounters:   18 203 lb (92.1 kg)   18 206 lb (93.4 kg)   17 205 lb (93 kg)              Today, you had the following     No orders found for display       Primary Care Provider Office Phone # Fax #    Remi Peterson [phone]       [address] [facility] [address]        Equal Access to Services     [facility] Hospital[facility]ANDA : Hadii maine del rio Sotish, waaxda luqadaha, qaybta kaalmada kadie, florentin vasquez . So [facility] [phone].    ATENCIÓN: Si habla español, tiene a ramos disposición servicios gratuitos de asistencia lingüística. Llame al [phone].    We comply with applicable federal civil rights laws and [place] laws. We do not discriminate on the basis of race, color, national origin, age, disability, " sex, sexual orientation, or gender identity.            Thank you!     Thank you for choosing [facility]  for your care. Our goal is always to provide you with excellent care. Hearing back from our patients is one way we can continue to improve our services. Please take a few minutes to complete the written survey that you may receive in the mail after your visit with us. Thank you!             Your Updated Medication List - Protect others around you: Learn how to safely use, store and throw away your medicines at www.disposemymeds.org.          This list is accurate as of 2/14/18 11:58 AM.  Always use your most recent med list.                   Brand Name Dispense Instructions for use Diagnosis    budesonide 0.5 MG/2ML neb solution    PULMICORT    120 mL    Take 2 mLs (0.5 mg) by nebulization 2 times daily    Chronic obstructive pulmonary disease, unspecified COPD type (H)       CLARITIN PO      Take 1 tablet by mouth daily        clindamycin 300 MG capsule    CLEOCIN    40 capsule    Take 4 capsules (1,200 mg) by mouth daily Take 4 capsules on hour before dental procedures.    Status post total knee replacement, unspecified laterality       fish oil-omega-3 fatty acids 1000 MG capsule      Take 2 g by mouth daily.        formoterol 20 MCG/2ML neb solution    PERFOROMIST    120 mL    Take 2 mLs (20 mcg) by nebulization every 12 hours    Chronic obstructive pulmonary disease, unspecified COPD type (H)       gemfibrozil 600 MG tablet    LOPID    180 tablet    TAKE 1 TABLET TWICE DAILY    Hyperlipidemia LDL goal <100       glucosamine 500 MG Tabs      Take 1 tablet by mouth daily.        lisinopril 40 MG tablet    PRINIVIL/ZESTRIL    90 tablet    TAKE 1 TABLET BY MOUTH DAILY    Hypertension goal BP (blood pressure) < 140/90       lovastatin 40 MG tablet    MEVACOR    180 tablet    TAKE 2 TABLETS AT BEDTIME    Hyperlipidemia LDL goal <100       METAMUCIL PO      Take by mouth daily  Reported on 4/11/2017        MULTIVITAL Tabs      Take 1 tablet by mouth daily.        order for DME     1 Device    Equipment being ordered: Nebulizer    Chronic obstructive pulmonary disease, unspecified COPD type (H)       ranitidine 75 MG tablet    ZANTAC    30 tablet    Take 2 tablets (150 mg) by mouth daily    Stomach upset       VITAMIN C PO      Take 1,000 mg by mouth daily        vitamin D 2000 UNITS Caps      Take 1 tablet by mouth 3 times daily.

## 2018-02-14 NOTE — NURSING NOTE
"Chief Complaint   Patient presents with     COPD       Initial /80  Pulse 79  Temp 98.7  F (37.1  C) (Tympanic)  Resp 15  Wt 203 lb (92.1 kg)  SpO2 94%  BMI 31.79 kg/m2 Estimated body mass index is 31.79 kg/(m^2) as calculated from the following:    Height as of 1/31/18: 5' 7\" (1.702 m).    Weight as of this encounter: 203 lb (92.1 kg).  Medication Reconciliation: complete   Latha Dunn MA student     "

## 2018-02-14 NOTE — PROGRESS NOTES
SUBJECTIVE:   Nitish Coreas is a 83 year old male who presents to clinic today for the following health issues:      COPD Follow-Up    Symptoms are currently: stable    Current fatigue or dyspnea with ambulation: stable     Shortness of breath: stable    Increased or change in Cough/Sputum: Less cough, more Sputum    Fever(s): No    Baseline ambulation without stopping to rest: 1/4  miles. Able to walk up 1 flights of stairs without stopping to rest.    Any ER/UC or hospital admissions since your last visit? No     History   Smoking Status     Former Smoker     Packs/day: 4.00     Years: 45.00     Types: Cigarettes     Quit date: 5/8/1991   Smokeless Tobacco     Never Used     Lab Results   Component Value Date    FEV1 1.81 12/08/2014    LHH5RIV 73% 12/08/2014         Amount of exercise or physical activity: 2-3 days/week for an average of 15-30 minutes    Problems taking medications regularly: No    Medication side effects: none    Diet: regular (no restrictions)            Problem list and histories reviewed & adjusted, as indicated.  Additional history: patient now has his nebulizer but has not started his medications yet. He is still using the flovent inhaler.    Patient Active Problem List   Diagnosis     Hyperlipidemia LDL goal <100     Hypertension goal BP (blood pressure) < 140/90     Renal insufficiency     Chronic rhinitis     Constipation     Advanced directives, counseling/discussion     BMI 30-35     Status post total knee replacement, unspecified laterality     Chronic obstructive pulmonary disease, unspecified COPD type (H)     Past Surgical History:   Procedure Laterality Date     C REPLANTATION THUMB DISTAL,COMPLETE  1962    cut off right thumb and repair     C TOTAL KNEE ARTHROPLASTY  2011, 2000    left then right     DENTAL SURGERY  1958    wisdom teeth     TONSILLECTOMY & ADENOIDECTOMY  1949       Social History   Substance Use Topics     Smoking status: Former Smoker     Packs/day:  4.00     Years: 45.00     Types: Cigarettes     Quit date: 5/8/1991     Smokeless tobacco: Never Used     Alcohol use 0.0 oz/week     0 Standard drinks or equivalent per week      Comment: 1-2 friday night- cocktail     Family History   Problem Relation Age of Onset     CANCER Mother      Respiratory Mother      HEART DISEASE Father      CEREBROVASCULAR DISEASE Father      CEREBROVASCULAR DISEASE Sister      CVA x 2     Breast Cancer Daughter      DIABETES No family hx of      Coronary Artery Disease No family hx of      Hypertension No family hx of      Hyperlipidemia No family hx of      Colon Cancer No family hx of      Prostate Cancer No family hx of      Other Cancer No family hx of      Depression No family hx of      Anxiety Disorder No family hx of      MENTAL ILLNESS No family hx of      Substance Abuse No family hx of      Anesthesia Reaction No family hx of      Asthma No family hx of      OSTEOPOROSIS No family hx of      Genetic Disorder No family hx of      Thyroid Disease No family hx of      Obesity No family hx of      Unknown/Adopted No family hx of            Reviewed and updated as needed this visit by clinical staff  Tobacco  Allergies  Meds  Med Hx  Surg Hx  Fam Hx  Soc Hx      Reviewed and updated as needed this visit by Provider         ROS:  Constitutional, HEENT, cardiovascular, pulmonary, gi and gu systems are negative, except as otherwise noted.    OBJECTIVE:                                                    /80  Pulse 79  Temp 98.7  F (37.1  C) (Tympanic)  Resp 15  Wt 203 lb (92.1 kg)  SpO2 94%  BMI 31.79 kg/m2  Body mass index is 31.79 kg/(m^2).  GENERAL APPEARANCE: healthy, alert and no distress  RESP: lungs clear to auscultation - no rales, rhonchi or wheezes         ASSESSMENT/PLAN:                                                        ICD-10-CM    1. Chronic obstructive pulmonary disease, unspecified COPD type (H) J44.9    2. Need for prophylactic vaccination and  inoculation against influenza Z23        Patient Instructions   The patient has his nebulizer machine now.  However, he has not yet picked up his medications to use in the nebulizer.  He is leaving shortly for [place] coming back in mid March.  The feed to take his nebulizer with him is too much so he is going to stay on his Flovent inhaler until he returns from [place].  At that point he will get his 2 medications for his nebulizer and start using that twice daily.  He will follow-up with me a couple of weeks after he is started the nebulizer.  Obviously, he will follow-up sooner as needed.  Will need laboratory testing done when he comes back.  Patient was given flu shot today.      Remi Peterson MD  [facility]      Injectable Influenza Immunization Documentation    1.  Is the person to be vaccinated sick today?   No    2. Does the person to be vaccinated have an allergy to a component   of the vaccine?   No  Egg Allergy Algorithm Link    3. Has the person to be vaccinated ever had a serious reaction   to influenza vaccine in the past?   No    4. Has the person to be vaccinated ever had Guillain-Barré syndrome?   No    Form completed by Latha Dunn MA student

## 2018-02-14 NOTE — PROGRESS NOTES

## 2018-02-14 NOTE — PATIENT INSTRUCTIONS
The patient has his nebulizer machine now.  However, he has not yet picked up his medications to use in the nebulizer.  He is leaving shortly for [place] coming back in mid March.  The feed to take his nebulizer with him is too much so he is going to stay on his Flovent inhaler until he returns from [place].  At that point he will get his 2 medications for his nebulizer and start using that twice daily.  He will follow-up with me a couple of weeks after he is started the nebulizer.  Obviously, he will follow-up sooner as needed.  Will need laboratory testing done when he comes back.  Patient was given flu shot today.

## 2018-03-16 ENCOUNTER — TELEPHONE (OUTPATIENT)
Dept: FAMILY MEDICINE | Facility: CLINIC | Age: 83
End: 2018-03-16

## 2018-03-16 NOTE — TELEPHONE ENCOUNTER
Called patient back and let advised him not to mix the solutions together, but to take one of the neb solutions until completely gone then take the other. Repeat the process twice a day.

## 2018-03-31 ENCOUNTER — OFFICE VISIT (OUTPATIENT)
Dept: FAMILY MEDICINE | Facility: CLINIC | Age: 83
End: 2018-03-31
Payer: COMMERCIAL

## 2018-03-31 VITALS
SYSTOLIC BLOOD PRESSURE: 132 MMHG | TEMPERATURE: 96.6 F | DIASTOLIC BLOOD PRESSURE: 76 MMHG | RESPIRATION RATE: 20 BRPM | HEART RATE: 74 BPM | WEIGHT: 204 LBS | OXYGEN SATURATION: 95 % | BODY MASS INDEX: 31.95 KG/M2

## 2018-03-31 DIAGNOSIS — N28.9 RENAL INSUFFICIENCY: Chronic | ICD-10-CM

## 2018-03-31 DIAGNOSIS — I10 HYPERTENSION GOAL BP (BLOOD PRESSURE) < 140/90: Chronic | ICD-10-CM

## 2018-03-31 DIAGNOSIS — J44.9 CHRONIC OBSTRUCTIVE PULMONARY DISEASE, UNSPECIFIED COPD TYPE (H): Primary | ICD-10-CM

## 2018-03-31 DIAGNOSIS — E78.5 HYPERLIPIDEMIA LDL GOAL <100: Chronic | ICD-10-CM

## 2018-03-31 LAB
ALBUMIN SERPL-MCNC: 4 G/DL (ref 3.4–5)
ALBUMIN UR-MCNC: >=300 MG/DL
ALP SERPL-CCNC: 86 U/L (ref 40–150)
ALT SERPL W P-5'-P-CCNC: 27 U/L (ref 0–70)
ANION GAP SERPL CALCULATED.3IONS-SCNC: 6 MMOL/L (ref 3–14)
APPEARANCE UR: CLEAR
AST SERPL W P-5'-P-CCNC: 27 U/L (ref 0–45)
BASOPHILS # BLD AUTO: 0 10E9/L (ref 0–0.2)
BASOPHILS NFR BLD AUTO: 0.3 %
BILIRUB SERPL-MCNC: 0.6 MG/DL (ref 0.2–1.3)
BILIRUB UR QL STRIP: NEGATIVE
BUN SERPL-MCNC: 26 MG/DL (ref 7–30)
CALCIUM SERPL-MCNC: 9.6 MG/DL (ref 8.5–10.1)
CHLORIDE SERPL-SCNC: 106 MMOL/L (ref 94–109)
CHOLEST SERPL-MCNC: 142 MG/DL
CO2 SERPL-SCNC: 28 MMOL/L (ref 20–32)
COLOR UR AUTO: YELLOW
CREAT SERPL-MCNC: 1.55 MG/DL (ref 0.66–1.25)
DIFFERENTIAL METHOD BLD: NORMAL
EOSINOPHIL # BLD AUTO: 0.3 10E9/L (ref 0–0.7)
EOSINOPHIL NFR BLD AUTO: 4 %
ERYTHROCYTE [DISTWIDTH] IN BLOOD BY AUTOMATED COUNT: 12.8 % (ref 10–15)
GFR SERPL CREATININE-BSD FRML MDRD: 43 ML/MIN/1.7M2
GLUCOSE SERPL-MCNC: 109 MG/DL (ref 70–99)
GLUCOSE UR STRIP-MCNC: NEGATIVE MG/DL
HCT VFR BLD AUTO: 44.3 % (ref 40–53)
HDLC SERPL-MCNC: 37 MG/DL
HGB BLD-MCNC: 15.1 G/DL (ref 13.3–17.7)
HGB UR QL STRIP: NEGATIVE
HYALINE CASTS #/AREA URNS LPF: ABNORMAL /LPF
KETONES UR STRIP-MCNC: NEGATIVE MG/DL
LDLC SERPL CALC-MCNC: 65 MG/DL
LEUKOCYTE ESTERASE UR QL STRIP: NEGATIVE
LYMPHOCYTES # BLD AUTO: 2.1 10E9/L (ref 0.8–5.3)
LYMPHOCYTES NFR BLD AUTO: 25.7 %
MCH RBC QN AUTO: 32.8 PG (ref 26.5–33)
MCHC RBC AUTO-ENTMCNC: 34.1 G/DL (ref 31.5–36.5)
MCV RBC AUTO: 96 FL (ref 78–100)
MONOCYTES # BLD AUTO: 1 10E9/L (ref 0–1.3)
MONOCYTES NFR BLD AUTO: 12 %
NEUTROPHILS # BLD AUTO: 4.6 10E9/L (ref 1.6–8.3)
NEUTROPHILS NFR BLD AUTO: 58 %
NITRATE UR QL: NEGATIVE
NON-SQ EPI CELLS #/AREA URNS LPF: ABNORMAL /LPF
NONHDLC SERPL-MCNC: 105 MG/DL
PH UR STRIP: 5.5 PH (ref 5–7)
PLATELET # BLD AUTO: 248 10E9/L (ref 150–450)
POTASSIUM SERPL-SCNC: 4.7 MMOL/L (ref 3.4–5.3)
PROT SERPL-MCNC: 7.8 G/DL (ref 6.8–8.8)
RBC # BLD AUTO: 4.6 10E12/L (ref 4.4–5.9)
RBC #/AREA URNS AUTO: ABNORMAL /HPF
SODIUM SERPL-SCNC: 140 MMOL/L (ref 133–144)
SOURCE: ABNORMAL
SP GR UR STRIP: >1.03 (ref 1–1.03)
TRIGL SERPL-MCNC: 199 MG/DL
UROBILINOGEN UR STRIP-ACNC: 0.2 EU/DL (ref 0.2–1)
WBC # BLD AUTO: 8 10E9/L (ref 4–11)
WBC #/AREA URNS AUTO: ABNORMAL /HPF

## 2018-03-31 PROCEDURE — 81001 URINALYSIS AUTO W/SCOPE: CPT | Performed by: FAMILY MEDICINE

## 2018-03-31 PROCEDURE — 99214 OFFICE O/P EST MOD 30 MIN: CPT | Performed by: FAMILY MEDICINE

## 2018-03-31 PROCEDURE — 36415 COLL VENOUS BLD VENIPUNCTURE: CPT | Performed by: FAMILY MEDICINE

## 2018-03-31 PROCEDURE — 80061 LIPID PANEL: CPT | Performed by: FAMILY MEDICINE

## 2018-03-31 PROCEDURE — 80053 COMPREHEN METABOLIC PANEL: CPT | Performed by: FAMILY MEDICINE

## 2018-03-31 PROCEDURE — 85025 COMPLETE CBC W/AUTO DIFF WBC: CPT | Performed by: FAMILY MEDICINE

## 2018-03-31 RX ORDER — FLUTICASONE PROPIONATE 220 UG/1
2 AEROSOL, METERED RESPIRATORY (INHALATION) 2 TIMES DAILY
Qty: 3 INHALER | Refills: 3
Start: 2018-03-31 | End: 2018-04-23

## 2018-03-31 NOTE — NURSING NOTE
"Chief Complaint   Patient presents with     COPD       Initial /76 (Cuff Size: Adult Regular)  Pulse 74  Temp 96.6  F (35.9  C) (Tympanic)  Resp 20  Wt 204 lb (92.5 kg)  SpO2 95%  BMI 31.95 kg/m2 Estimated body mass index is 31.95 kg/(m^2) as calculated from the following:    Height as of 1/31/18: 5' 7\" (1.702 m).    Weight as of this encounter: 204 lb (92.5 kg).  Medication Reconciliation: complete   Dominique Schuster CMA    "

## 2018-03-31 NOTE — PROGRESS NOTES
SUBJECTIVE:   Nitish Coreas is a 83 year old male who presents to clinic today for the following health issues:      COPD Follow-Up    Symptoms are currently: some improvement    Current fatigue or dyspnea with ambulation: yes    Shortness of breath: stable    Increased or change in Cough/Sputum: Yes-      Fever(s): No    Baseline ambulation without stopping to rest:  1000  feet. Able to walk up yes flights of stairs without stopping to rest.    Any ER/UC or hospital admissions since your last visit? No     History   Smoking Status     Former Smoker     Packs/day: 4.00     Years: 45.00     Types: Cigarettes     Quit date: 5/8/1991   Smokeless Tobacco     Never Used     Lab Results   Component Value Date    FEV1 1.81 12/08/2014    [id number] 73% 12/08/2014         Amount of exercise or physical activity: 4-5 days/week for an average of 30-45 minutes    Problems taking medications regularly: No    Medication side effects: nebulizer causes congestion, dryness    Diet: regular (no restrictions)        Hyperlipidemia Follow-Up      Rate your low fat/cholesterol diet?: good    Taking statin?  Yes, no muscle aches from statin    Other lipid medications/supplements?:  none    Hypertension Follow-up      Outpatient blood pressures are not being checked.    Low Salt Diet: no added salt      Problem list and histories reviewed & adjusted, as indicated.  Additional history: The patient has not noticed any improvement changing from Flovent to the nebulizer.  It is more expensive and more time consuming and actually plugs up his head when he uses the nebulizer.  He has not noticed any improvement in his breathing with that either.    Patient Active Problem List   Diagnosis     Hyperlipidemia LDL goal <100     Hypertension goal BP (blood pressure) < 140/90     Renal insufficiency     Chronic rhinitis     Constipation     Advanced directives, counseling/discussion     BMI 30-35     Status post total knee replacement,  unspecified laterality     Chronic obstructive pulmonary disease, unspecified COPD type (H)     Past Surgical History:   Procedure Laterality Date     C REPLANTATION THUMB DISTAL,COMPLETE  1962    cut off right thumb and repair     C TOTAL KNEE ARTHROPLASTY  2011, 2000    left then right     DENTAL SURGERY  1958    wisdom teeth     TONSILLECTOMY & ADENOIDECTOMY  1949       Social History   Substance Use Topics     Smoking status: Former Smoker     Packs/day: 4.00     Years: 45.00     Types: Cigarettes     Quit date: 5/8/1991     Smokeless tobacco: Never Used     Alcohol use 0.0 oz/week     0 Standard drinks or equivalent per week      Comment: 1-2 friday night- cocktail     Family History   Problem Relation Age of Onset     CANCER Mother      Respiratory Mother      HEART DISEASE Father      CEREBROVASCULAR DISEASE Father      CEREBROVASCULAR DISEASE Sister      CVA x 2     Breast Cancer Daughter            Reviewed and updated as needed this visit by clinical staff  Tobacco  Allergies  Meds  Med Hx  Surg Hx  Fam Hx  Soc Hx      Reviewed and updated as needed this visit by Provider         ROS:  Constitutional, HEENT, cardiovascular, pulmonary, gi and gu systems are negative, except as otherwise noted.    OBJECTIVE:                                                    /76 (Cuff Size: Adult Regular)  Pulse 74  Temp 96.6  F (35.9  C) (Tympanic)  Resp 20  Wt 204 lb (92.5 kg)  SpO2 95%  BMI 31.95 kg/m2  Body mass index is 31.95 kg/(m^2).  GENERAL APPEARANCE: healthy, alert and no distress  RESP: lungs clear to auscultation - no rales, rhonchi or wheezes  CV: regular rates and rhythm, normal S1 S2, no S3 or S4 and no murmur, click or rub         ASSESSMENT/PLAN:                                                        ICD-10-CM    1. Chronic obstructive pulmonary disease, unspecified COPD type (H) J44.9 fluticasone (FLOVENT HFA) 220 MCG/ACT Inhaler   2. Hyperlipidemia LDL goal <100 E78.5 Lipid Profile   3.  Hypertension goal BP (blood pressure) < 140/90 I10 UA with Microscopic     CBC with platelets differential     Comprehensive metabolic panel   4. Renal insufficiency N28.9        Patient Instructions   Since the patient has not seen any improvement with his breathing on the nebulizer versus Flovent, will go back to Flovent as it is much cheaper and less time consuming and causes him less side effects.  He has enough medications to last him for a total of about 3 months.  He will follow-up at that time.  We did do his lipid profile, CMP, CBC and urinalysis today.  He will follow-up sooner pending review of his tests.  He did not need a refill on his Flovent as he has enough to last him until his next follow-up visit.      Remi Peterson MD  [facility]

## 2018-03-31 NOTE — LETTER
April 2, 2018      Nitish Coreas  [address] [address]        Dear ,    We are writing to inform you of your test results.    This is all pretty stable.  Your triglycerides continue to be high as does your blood sugar.  We should repeat the tests again in 3 months.    Resulted Orders   UA with Microscopic   Result Value Ref Range    Color Urine Yellow     Appearance Urine Clear     Glucose Urine Negative NEG^Negative mg/dL    Bilirubin Urine Negative NEG^Negative    Ketones Urine Negative NEG^Negative mg/dL    Specific Gravity Urine >1.030 1.003 - 1.035    pH Urine 5.5 5.0 - 7.0 pH    Protein Albumin Urine >=300 (A) NEG^Negative mg/dL    Urobilinogen Urine 0.2 0.2 - 1.0 EU/dL    Nitrite Urine Negative NEG^Negative    Blood Urine Negative NEG^Negative    Leukocyte Esterase Urine Negative NEG^Negative    Source Midstream Urine     WBC Urine O - 2 (A) OTO5^0 - 5 /HPF    RBC Urine O - 2 OTO2^O - 2 /HPF    Hyaline Casts 5-10 (A) OTO2^O - 2 /LPF    Squamous Epithelial /LPF Urine Few FEW^Few /LPF   CBC with platelets differential   Result Value Ref Range    WBC 8.0 4.0 - 11.0 10e9/L    RBC Count 4.60 4.4 - 5.9 10e12/L    Hemoglobin 15.1 13.3 - 17.7 g/dL    Hematocrit 44.3 40.0 - 53.0 %    MCV 96 78 - 100 fl    MCH 32.8 26.5 - 33.0 pg    MCHC 34.1 31.5 - 36.5 g/dL    RDW 12.8 10.0 - 15.0 %    Platelet Count 248 150 - 450 10e9/L    Diff Method Automated Method     % Neutrophils 58.0 %    % Lymphocytes 25.7 %    % Monocytes 12.0 %    % Eosinophils 4.0 %    % Basophils 0.3 %    Absolute Neutrophil 4.6 1.6 - 8.3 10e9/L    Absolute Lymphocytes 2.1 0.8 - 5.3 10e9/L    Absolute Monocytes 1.0 0.0 - 1.3 10e9/L    Absolute Eosinophils 0.3 0.0 - 0.7 10e9/L    Absolute Basophils 0.0 0.0 - 0.2 10e9/L   Comprehensive metabolic panel   Result Value Ref Range    Sodium 140 133 - 144 mmol/L    Potassium 4.7 3.4 - 5.3 mmol/L    Chloride 106 94 - 109 mmol/L    Carbon Dioxide 28 20 - 32 mmol/L    Anion  Gap 6 3 - 14 mmol/L    Glucose 109 (H) 70 - 99 mg/dL      Comment:      Fasting specimen    Urea Nitrogen 26 7 - 30 mg/dL    Creatinine 1.55 (H) 0.66 - 1.25 mg/dL    GFR Estimate 43 (L) >60 mL/min/1.7m2      Comment:      Non  GFR Calc    GFR Estimate If Black 52 (L) >60 mL/min/1.7m2      Comment:       GFR Calc    Calcium 9.6 8.5 - 10.1 mg/dL    Bilirubin Total 0.6 0.2 - 1.3 mg/dL    Albumin 4.0 3.4 - 5.0 g/dL    Protein Total 7.8 6.8 - 8.8 g/dL    Alkaline Phosphatase 86 40 - 150 U/L    ALT 27 0 - 70 U/L    AST 27 0 - 45 U/L   Lipid Profile   Result Value Ref Range    Cholesterol 142 <200 mg/dL    Triglycerides 199 (H) <150 mg/dL      Comment:      Borderline high:  150-199 mg/dl  High:             200-499 mg/dl  Very high:       >499 mg/dl  Fasting specimen      HDL Cholesterol 37 (L) >39 mg/dL    LDL Cholesterol Calculated 65 <100 mg/dL      Comment:      Desirable:       <100 mg/dl    Non HDL Cholesterol 105 <130 mg/dL       If you have any questions or concerns, please call the clinic at the number listed above.       Sincerely,        Remi Peterson MD

## 2018-03-31 NOTE — MR AVS SNAPSHOT
After Visit Summary   3/31/2018    Nitish Coreas    MRN: [id number]           Patient Information     Date Of Birth          9/4/1934        Visit Information        Provider Department      3/31/2018 8:00 AM Remi Peterson MD [facility]        Today's Diagnoses     Chronic obstructive pulmonary disease, unspecified COPD type (H)    -  1    Hyperlipidemia LDL goal <100        Hypertension goal BP (blood pressure) < 140/90        Renal insufficiency          Care Instructions    Since the patient has not seen any improvement with his breathing on the nebulizer versus Flovent, will go back to Flovent as it is much cheaper and less time consuming and causes him less side effects.  He has enough medications to last him for a total of about 3 months.  He will follow-up at that time.  We did do his lipid profile, CMP, CBC and urinalysis today.  He will follow-up sooner pending review of his tests.  He did not need a refill on his Flovent as he has enough to last him until his next follow-up visit.          Follow-ups after your visit        Who to contact     If you have questions or need follow up information about today's clinic visit or your schedule please contact [facility] directly at [phone].  Normal or non-critical lab and imaging results will be communicated to you by MyChart, letter or phone within 4 business days after the clinic has received the results. If you do not hear from us within 7 days, please contact the clinic through MyChart or phone. If you have a critical or abnormal lab result, we will notify you by phone as soon as possible.  Submit refill requests through [employer] or call your pharmacy and they will forward the refill request to us. Please allow 3 business days for your refill to be completed.          Additional Information About Your Visit        [employer] Information     [employer] lets you send  "messages to your doctor, view your test results, renew your prescriptions, schedule appointments and more. To sign up, go to [email or web address] . Click on \"Log in\" on the left side of the screen, which will take you to the Welcome page. Then click on \"Sign up Now\" on the right side of the page.     You will be asked to enter the access code listed below, as well as some personal information. Please follow the directions to create your username and password.     Your access code is: [email or web address]  Expires: 5/15/2018 12:58 PM     Your access code will  in 90 days. If you need help or a new code, please call your [facility] clinic or [phone].        Care EveryWhere ID     This is your Care EveryWhere ID. This could be used by other organizations to access your [facility] medical records  [id number]        Your Vitals Were     Pulse Temperature Respirations Pulse Oximetry BMI (Body Mass Index)       74 96.6  F (35.9  C) (Tympanic) 20 95% 31.95 kg/m2        Blood Pressure from Last 3 Encounters:   18 132/76   18 126/80   18 128/84    Weight from Last 3 Encounters:   18 204 lb (92.5 kg)   18 203 lb (92.1 kg)   18 206 lb (93.4 kg)              We Performed the Following     CBC with platelets differential     Comprehensive metabolic panel     Lipid Profile     UA with Microscopic          Today's Medication Changes          These changes are accurate as of 3/31/18  8:31 AM.  If you have any questions, ask your nurse or doctor.               Start taking these medicines.        Dose/Directions    fluticasone 220 MCG/ACT Inhaler   Commonly known as:  FLOVENT HFA   Used for:  Chronic obstructive pulmonary disease, unspecified COPD type (H)   Started by:  Remi Peterson MD        Dose:  2 puff   Inhale 2 puffs into the lungs 2 times daily   Quantity:  3 Inhaler   Refills:  3         Stop taking these medicines if you haven't already. Please contact your care team if you " have questions.     budesonide 0.5 MG/2ML neb solution   Commonly known as:  PULMICORT   Stopped by:  Remi Peterson MD           formoterol 20 MCG/2ML neb solution   Commonly known as:  PERFOROMIST   Stopped by:  Remi Peterson MD                Where to get your medicines      Some of these will need a paper prescription and others can be bought over the counter.  Ask your nurse if you have questions.     You don't need a prescription for these medications     fluticasone 220 MCG/ACT Inhaler                Primary Care Provider Office Phone # Fax #    Remi Peterson [phone]       [address] [address]        Equal Access to Services     NANCY GOMEZ : Hadii maine swaino Sotish, waaxda luqadaha, qaybta kaalmada adealliyada, florentin amaro. So [facility] [phone].    ATENCIÓN: Si habla español, tiene a ramos disposición servicios gratuitos de asistencia lingüística. Llame al [phone].    We comply with applicable federal civil rights laws and [place] laws. We do not discriminate on the basis of race, color, national origin, age, disability, sex, sexual orientation, or gender identity.            Thank you!     Thank you for choosing [facility]  for your care. Our goal is always to provide you with excellent care. Hearing back from our patients is one way we can continue to improve our services. Please take a few minutes to complete the written survey that you may receive in the mail after your visit with us. Thank you!             Your Updated Medication List - Protect others around you: Learn how to safely use, store and throw away your medicines at www.disposemymeds.org.          This list is accurate as of 3/31/18  8:31 AM.  Always use your most recent med list.                   Brand Name Dispense Instructions for use Diagnosis    CLARITIN PO      Take 1 tablet by mouth daily         clindamycin 300 MG capsule    CLEOCIN    40 capsule    Take 4 capsules (1,200 mg) by mouth daily Take 4 capsules on hour before dental procedures.    Status post total knee replacement, unspecified laterality       fish oil-omega-3 fatty acids 1000 MG capsule      Take 2 g by mouth daily.        fluticasone 220 MCG/ACT Inhaler    FLOVENT HFA    3 Inhaler    Inhale 2 puffs into the lungs 2 times daily    Chronic obstructive pulmonary disease, unspecified COPD type (H)       gemfibrozil 600 MG tablet    LOPID    180 tablet    TAKE 1 TABLET TWICE DAILY    Hyperlipidemia LDL goal <100       glucosamine 500 MG Tabs      Take 1 tablet by mouth daily.        lisinopril 40 MG tablet    PRINIVIL/ZESTRIL    90 tablet    TAKE 1 TABLET BY MOUTH DAILY    Hypertension goal BP (blood pressure) < 140/90       lovastatin 40 MG tablet    MEVACOR    180 tablet    TAKE 2 TABLETS AT BEDTIME    Hyperlipidemia LDL goal <100       METAMUCIL PO      Take by mouth daily Reported on 4/11/2017        MULTIVITAL Tabs      Take 1 tablet by mouth daily.        order for DME     1 Device    Equipment being ordered: Nebulizer    Chronic obstructive pulmonary disease, unspecified COPD type (H)       ranitidine 75 MG tablet    ZANTAC    30 tablet    Take 2 tablets (150 mg) by mouth daily    Stomach upset       VITAMIN C PO      Take 1,000 mg by mouth daily        vitamin D 2000 UNITS Caps      Take 1 tablet by mouth 3 times daily.

## 2018-03-31 NOTE — PATIENT INSTRUCTIONS
Since the patient has not seen any improvement with his breathing on the nebulizer versus Flovent, will go back to Flovent as it is much cheaper and less time consuming and causes him less side effects.  He has enough medications to last him for a total of about 3 months.  He will follow-up at that time.  We did do his lipid profile, CMP, CBC and urinalysis today.  He will follow-up sooner pending review of his tests.  He did not need a refill on his Flovent as he has enough to last him until his next follow-up visit.

## 2018-04-03 ENCOUNTER — TELEPHONE (OUTPATIENT)
Dept: FAMILY MEDICINE | Facility: CLINIC | Age: 83
End: 2018-04-03

## 2018-04-03 ENCOUNTER — OFFICE VISIT (OUTPATIENT)
Dept: FAMILY MEDICINE | Facility: CLINIC | Age: 83
End: 2018-04-03
Payer: COMMERCIAL

## 2018-04-03 VITALS
WEIGHT: 208.5 LBS | DIASTOLIC BLOOD PRESSURE: 78 MMHG | OXYGEN SATURATION: 94 % | BODY MASS INDEX: 32.66 KG/M2 | SYSTOLIC BLOOD PRESSURE: 122 MMHG | TEMPERATURE: 97.4 F | HEART RATE: 77 BPM

## 2018-04-03 DIAGNOSIS — J06.9 UPPER RESPIRATORY TRACT INFECTION, UNSPECIFIED TYPE: ICD-10-CM

## 2018-04-03 DIAGNOSIS — J44.9 CHRONIC OBSTRUCTIVE PULMONARY DISEASE, UNSPECIFIED COPD TYPE (H): Primary | ICD-10-CM

## 2018-04-03 DIAGNOSIS — J44.1 COPD EXACERBATION (H): ICD-10-CM

## 2018-04-03 PROCEDURE — 99214 OFFICE O/P EST MOD 30 MIN: CPT | Performed by: FAMILY MEDICINE

## 2018-04-03 RX ORDER — AZITHROMYCIN 250 MG/1
TABLET, FILM COATED ORAL
Qty: 6 TABLET | Refills: 0 | Status: SHIPPED | OUTPATIENT
Start: 2018-04-03 | End: 2018-04-08

## 2018-04-03 NOTE — PROGRESS NOTES
SUBJECTIVE:   Nitish Coreas is a 83 year old male who presents to clinic today for the following health issues:      RESPIRATORY SYMPTOMS      Duration: today    Description  nasal congestion, cough and chest tightness    Severity: moderate    Accompanying signs and symptoms: None    History (predisposing factors):  none    Precipitating or alleviating factors: None    Therapies tried and outcome:  none      COPD Follow-Up    Symptoms are currently: stable    Current fatigue or dyspnea with ambulation: stable     Shortness of breath: stable    Increased or change in Cough/Sputum: Yes-  Cough and sputum    Fever(s): No    Baseline ambulation without stopping to rest:    Able to walk up 1 flights of stairs without stopping to rest.    Any ER/UC or hospital admissions since your last visit? No     History   Smoking Status     Former Smoker     Packs/day: 4.00     Years: 45.00     Types: Cigarettes     Quit date: 5/8/1991   Smokeless Tobacco     Never Used     Lab Results   Component Value Date    FEV1 1.81 12/08/2014    XQP2QKM 73% 12/08/2014         Problem list and histories reviewed & adjusted, as indicated.  Additional history: as documented    Patient Active Problem List   Diagnosis     Hyperlipidemia LDL goal <100     Hypertension goal BP (blood pressure) < 140/90     Renal insufficiency     Chronic rhinitis     Constipation     Advanced directives, counseling/discussion     BMI 30-35     Status post total knee replacement, unspecified laterality     Chronic obstructive pulmonary disease, unspecified COPD type (H)     Past Surgical History:   Procedure Laterality Date     C REPLANTATION THUMB DISTAL,COMPLETE  1962    cut off right thumb and repair     C TOTAL KNEE ARTHROPLASTY  2011, 2000    left then right     DENTAL SURGERY  1958    wisdom teeth     TONSILLECTOMY & ADENOIDECTOMY  1949       Social History   Substance Use Topics     Smoking status: Former Smoker     Packs/day: 4.00     Years: 45.00      Types: Cigarettes     Quit date: 5/8/1991     Smokeless tobacco: Never Used     Alcohol use 0.0 oz/week     0 Standard drinks or equivalent per week      Comment: 1-2 friday night- cocktail     Family History   Problem Relation Age of Onset     CANCER Mother      Respiratory Mother      HEART DISEASE Father      CEREBROVASCULAR DISEASE Father      CEREBROVASCULAR DISEASE Sister      CVA x 2     Breast Cancer Daughter            Reviewed and updated as needed this visit by clinical staff  Tobacco  Allergies  Meds  Med Hx  Surg Hx  Fam Hx  Soc Hx      Reviewed and updated as needed this visit by Provider         ROS:  Constitutional, HEENT, cardiovascular, pulmonary, gi and gu systems are negative, except as otherwise noted.    OBJECTIVE:                                                    /78 (Cuff Size: Adult Large)  Pulse 77  Temp 97.4  F (36.3  C) (Tympanic)  Wt 208 lb 8 oz (94.6 kg)  SpO2 94%  BMI 32.66 kg/m2  Body mass index is 32.66 kg/(m^2).  GENERAL APPEARANCE: healthy, alert and no distress  EYES: Eyes grossly normal to inspection, PERRL and conjunctivae and sclerae normal  HENT: ear canals and TM's normal and nose and mouth without ulcers or lesions  NECK: no adenopathy and no asymmetry, masses, or scars  RESP: lungs clear to auscultation - no rales, rhonchi or wheezes  CV: regular rates and rhythm, normal S1 S2, no S3 or S4 and no murmur, click or rub  LYMPHATICS: no cervical adenopathy         ASSESSMENT/PLAN:                                                        ICD-10-CM    1. Chronic obstructive pulmonary disease, unspecified COPD type (H) J44.9 azithromycin (ZITHROMAX) 250 MG tablet   2. COPD exacerbation (H) J44.1 azithromycin (ZITHROMAX) 250 MG tablet   3. Upper respiratory tract infection, unspecified type J06.9 azithromycin (ZITHROMAX) 250 MG tablet       There are no Patient Instructions on file for this visit.    Remi Peterson MD  [facility]  [facility]

## 2018-04-03 NOTE — MR AVS SNAPSHOT
"              After Visit Summary   4/3/2018    Nitish Coreas    MRN: [id number]           Patient Information     Date Of Birth          9/4/1934        Visit Information        Provider Department      4/3/2018 8:30 AM Remi Peterson MD [facility]        Today's Diagnoses     Chronic obstructive pulmonary disease, unspecified COPD type (H)    -  1    COPD exacerbation (H)        Upper respiratory tract infection, unspecified type          Care Instructions    Will treat symptomatically and see back as needed if not improving. Patient was placed on antibiotic as noted. No change in inhalers.          Follow-ups after your visit        Who to contact     If you have questions or need follow up information about today's clinic visit or your schedule please contact [facility] directly at [phone].  Normal or non-critical lab and imaging results will be communicated to you by Cazoodlehart, letter or phone within 4 business days after the clinic has received the results. If you do not hear from us within 7 days, please contact the clinic through Cazoodlehart or phone. If you have a critical or abnormal lab result, we will notify you by phone as soon as possible.  Submit refill requests through [employer] or call your pharmacy and they will forward the refill request to us. Please allow 3 business days for your refill to be completed.          Additional Information About Your Visit        Cazoodlehart Information     [employer] lets you send messages to your doctor, view your test results, renew your prescriptions, schedule appointments and more. To sign up, go to [email or web address] . Click on \"Log in\" on the left side of the screen, which will take you to the Welcome page. Then click on \"Sign up Now\" on the right side of the page.     You will be asked to enter the access code listed below, as well as some personal information. Please follow the " directions to create your username and password.     Your access code is: [email or web address]  Expires: 5/15/2018 12:58 PM     Your access code will  in 90 days. If you need help or a new code, please call your [place] clinic or [phone].        Care EveryWhere ID     This is your Care EveryWhere ID. This could be used by other organizations to access your [place] medical records  [id number]        Your Vitals Were     Pulse Temperature Pulse Oximetry BMI (Body Mass Index)          77 97.4  F (36.3  C) (Tympanic) 94% 32.66 kg/m2         Blood Pressure from Last 3 Encounters:   18 122/78   18 132/76   18 126/80    Weight from Last 3 Encounters:   18 208 lb 8 oz (94.6 kg)   18 204 lb (92.5 kg)   18 203 lb (92.1 kg)              Today, you had the following     No orders found for display         Today's Medication Changes          These changes are accurate as of 4/3/18  8:59 AM.  If you have any questions, ask your nurse or doctor.               Start taking these medicines.        Dose/Directions    azithromycin 250 MG tablet   Commonly known as:  ZITHROMAX   Used for:  Chronic obstructive pulmonary disease, unspecified COPD type (H), COPD exacerbation (H), Upper respiratory tract infection, unspecified type   Started by:  Remi Peterson MD        Two tablets first day, then one tablet daily for four days.   Quantity:  6 tablet   Refills:  0            Where to get your medicines      These medications were sent to [facility] [address] [place] [address] [address] [facility]  [address] [facility] [address]     Phone:  [phone]     azithromycin 250 MG tablet                Primary Care Provider Office Phone # Fax #    Remi Peterson [phone] [address] [facility] [address]        Equal Access to Services     GERRY GOMEZ AH: Hadii maine swaino Sotish, waaxda luqadaha, qaybta kaalflorentin gramajo  alf conroyalli pearce'aan ah. So [facility] [phone].    ATENCIÓN: Si wendyla jaymie, tiene a ramos disposición servicios gratuitos de asistencia lingüística. Carolin anderson [phone].    We comply with applicable federal civil rights laws and [place] laws. We do not discriminate on the basis of race, color, national origin, age, disability, sex, sexual orientation, or gender identity.            Thank you!     Thank you for choosing [facility]  for your care. Our goal is always to provide you with excellent care. Hearing back from our patients is one way we can continue to improve our services. Please take a few minutes to complete the written survey that you may receive in the mail after your visit with us. Thank you!             Your Updated Medication List - Protect others around you: Learn how to safely use, store and throw away your medicines at www.disposemymeds.org.          This list is accurate as of 4/3/18  8:59 AM.  Always use your most recent med list.                   Brand Name Dispense Instructions for use Diagnosis    azithromycin 250 MG tablet    ZITHROMAX    6 tablet    Two tablets first day, then one tablet daily for four days.    Chronic obstructive pulmonary disease, unspecified COPD type (H), COPD exacerbation (H), Upper respiratory tract infection, unspecified type       CLARITIN PO      Take 1 tablet by mouth daily        clindamycin 300 MG capsule    CLEOCIN    40 capsule    Take 4 capsules (1,200 mg) by mouth daily Take 4 capsules on hour before dental procedures.    Status post total knee replacement, unspecified laterality       fish oil-omega-3 fatty acids 1000 MG capsule      Take 2 g by mouth daily.        fluticasone 220 MCG/ACT Inhaler    FLOVENT HFA    3 Inhaler    Inhale 2 puffs into the lungs 2 times daily    Chronic obstructive pulmonary disease, unspecified COPD type (H)       gemfibrozil 600 MG tablet    LOPID    180 tablet    TAKE 1 TABLET TWICE  DAILY    Hyperlipidemia LDL goal <100       glucosamine 500 MG Tabs      Take 1 tablet by mouth daily.        lisinopril 40 MG tablet    PRINIVIL/ZESTRIL    90 tablet    TAKE 1 TABLET BY MOUTH DAILY    Hypertension goal BP (blood pressure) < 140/90       lovastatin 40 MG tablet    MEVACOR    180 tablet    TAKE 2 TABLETS AT BEDTIME    Hyperlipidemia LDL goal <100       METAMUCIL PO      Take by mouth daily Reported on 4/11/2017        MULTIVITAL Tabs      Take 1 tablet by mouth daily.        order for DME     1 Device    Equipment being ordered: Nebulizer    Chronic obstructive pulmonary disease, unspecified COPD type (H)       ranitidine 75 MG tablet    ZANTAC    30 tablet    Take 2 tablets (150 mg) by mouth daily    Stomach upset       VITAMIN C PO      Take 1,000 mg by mouth daily        vitamin D 2000 UNITS Caps      Take 1 tablet by mouth 3 times daily.

## 2018-04-03 NOTE — PATIENT INSTRUCTIONS
Will treat symptomatically and see back as needed if not improving. Patient was placed on antibiotic as noted. No change in inhalers.

## 2018-04-03 NOTE — TELEPHONE ENCOUNTER
"Patient was called- he has cold with prod cough now that patient feels is \"on his chest\". work in today with Dr. Remi Peterson   "

## 2018-04-03 NOTE — TELEPHONE ENCOUNTER
Reason for call:  Patient reporting a symptom    Symptom or request: Cold    Duration (how long have symptoms been present): 1 day    Have you been treated for this before? Yes    Additional comments: Patient states he woke up with a cold. He has Emphysema and states he was told to call in when he gets a cold. He is wondering if he should be seen. Please call to advise.    Phone Number patient can be reached at:  Home number on file [phone] (home)    Best Time:  any    Can we leave a detailed message on this number:  YES    Call taken on 4/3/2018 at 7:52 AM by Shonda Campbell

## 2018-04-03 NOTE — NURSING NOTE
"Chief Complaint   Patient presents with     URI       Initial /78 (Cuff Size: Adult Large)  Pulse 77  Temp 97.4  F (36.3  C) (Tympanic)  Wt 208 lb 8 oz (94.6 kg)  SpO2 94%  BMI 32.66 kg/m2 Estimated body mass index is 32.66 kg/(m^2) as calculated from the following:    Height as of 1/31/18: 5' 7\" (1.702 m).    Weight as of this encounter: 208 lb 8 oz (94.6 kg).  Medication Reconciliation: complete   .Berkley ORTEZ      "

## 2018-08-13 DIAGNOSIS — Z96.659 STATUS POST TOTAL KNEE REPLACEMENT, UNSPECIFIED LATERALITY: ICD-10-CM

## 2018-08-13 RX ORDER — CLINDAMYCIN HCL 300 MG
CAPSULE ORAL
Qty: 2 CAPSULE | Refills: 11 | Status: SHIPPED | OUTPATIENT
Start: 2018-08-13 | End: 2019-09-17

## 2018-08-13 NOTE — TELEPHONE ENCOUNTER
clindamycin (CLEOCIN) 300 MG capsule  Last Written Prescription Date:  11/30/15  Last Fill Quantity: 40,  # refills: 11   Last office visit: 4/3/2018 with prescribing provider:  Dr. Peterson    Future Office Visit:      Routing refill request to provider for review/approval because:  Rx for dental appt on Wednesday

## 2018-08-13 NOTE — TELEPHONE ENCOUNTER
Reason for Call:  Medication or medication refill:    Do you use a [facility]?  Name of the pharmacy and phone number for the current request:  [facility]    Name of the medication requested: Clindamycin 300mg    Other request: Patient has a dental appt on Wednesday. Please send to pharmacy today. Patient is out of medication.    Can we leave a detailed message on this number? YES    Phone number patient can be reached at: Home number on file [phone] (home)    Best Time: any    Call taken on 8/13/2018 at 10:30 AM by FRAN STILES

## 2018-10-25 ENCOUNTER — TELEPHONE (OUTPATIENT)
Dept: FAMILY MEDICINE | Facility: CLINIC | Age: 83
End: 2018-10-25

## 2018-10-25 ENCOUNTER — OFFICE VISIT (OUTPATIENT)
Dept: FAMILY MEDICINE | Facility: CLINIC | Age: 83
End: 2018-10-25
Payer: COMMERCIAL

## 2018-10-25 VITALS
WEIGHT: 206 LBS | SYSTOLIC BLOOD PRESSURE: 122 MMHG | HEART RATE: 73 BPM | HEIGHT: 67 IN | OXYGEN SATURATION: 96 % | TEMPERATURE: 97.3 F | RESPIRATION RATE: 14 BRPM | BODY MASS INDEX: 32.33 KG/M2 | DIASTOLIC BLOOD PRESSURE: 72 MMHG

## 2018-10-25 DIAGNOSIS — J40 BRONCHITIS: ICD-10-CM

## 2018-10-25 DIAGNOSIS — J44.9 CHRONIC OBSTRUCTIVE PULMONARY DISEASE, UNSPECIFIED COPD TYPE (H): Primary | ICD-10-CM

## 2018-10-25 DIAGNOSIS — Z87.891 FORMER TOBACCO USE: ICD-10-CM

## 2018-10-25 PROCEDURE — 99213 OFFICE O/P EST LOW 20 MIN: CPT | Performed by: FAMILY MEDICINE

## 2018-10-25 RX ORDER — PREDNISONE 20 MG/1
40 TABLET ORAL DAILY
Qty: 10 TABLET | Refills: 0 | Status: SHIPPED | OUTPATIENT
Start: 2018-10-25 | End: 2018-10-30

## 2018-10-25 RX ORDER — AZITHROMYCIN 250 MG/1
TABLET, FILM COATED ORAL
Qty: 6 TABLET | Refills: 0 | Status: SHIPPED | OUTPATIENT
Start: 2018-10-25 | End: 2019-02-22

## 2018-10-25 NOTE — TELEPHONE ENCOUNTER
Pt says he is coughing almost constantly.  He has no temp.  The mucous he coughs up is yellow. Patient states Dr Peterson told him to call when he gets a cold. He states it is day 2. He thinks he is supposed to get medication as last time it turned into pneumonia. Pt can be reached on his cell # [phone]

## 2018-10-25 NOTE — TELEPHONE ENCOUNTER
Reason for Call:  Other call back    Detailed comments: Patient has COPD and emphysema and now has a cold. Patient states Dr Peterson told him to call when he gets a cold. He states it is day 2. He thinks he is supposed to get medication as last time it turned into pneumonia. Please call.    Phone Number Patient can be reached at: Home number on file [phone] (home)    Best Time: any    Can we leave a detailed message on this number? YES    Call taken on 10/25/2018 at 7:48 AM by FRAN STILES

## 2018-10-25 NOTE — TELEPHONE ENCOUNTER
Per chart review, Dr. CAMPA wants him to call when this happens but also wants him to be seen.  He needs to make an appointment.   Acute urinary retention

## 2018-10-25 NOTE — PROGRESS NOTES
"  SUBJECTIVE:   Nitish Coreas is a 84 year old male who presents to clinic today for the following health issues:      RESPIRATORY SYMPTOMS      Duration: He has always has a cough    Description  cough and conjunctival irritation    Severity: moderate    Accompanying signs and symptoms: Yellow discharge, cough up a lot after dinner.     History (predisposing factors):  COPD    Precipitating or alleviating factors: None    Therapies tried and outcome:  none    Used to smoke for 45 years.  Quit in 1991.  Has not seen a pulmonologist.   Has been on Flovent twice daily for the past 2 years per pt report.    Last COPD flare was last April 2018--was put on Azithromycin for 5 days and got better.     Has not seen a pulmonologist.  Tells me he has a history of Emphysema and is generally SOB/wheezing/coughing on a daily basis.     Problem list and histories reviewed & adjusted, as indicated.  Additional history: as documented    Labs reviewed in EPIC    Reviewed and updated as needed this visit by clinical staff  Tobacco  Allergies  Meds  Problems  Med Hx  Surg Hx  Fam Hx  Soc Hx        Reviewed and updated as needed this visit by Provider  Allergies  Meds  Problems         ROS:  C: NEGATIVE for fever, chills, change in weight  I: NEGATIVE for worrisome rashes, moles or lesions  CV: NEGATIVE for chest pain, palpitations or peripheral edema  GI: NEGATIVE for nausea, abdominal pain, heartburn, or change in bowel habits  M: NEGATIVE for significant arthralgias or myalgia  H: NEGATIVE for bleeding problems      OBJECTIVE:     /72 (BP Location: Left arm, Patient Position: Sitting, Cuff Size: Adult Regular)  Pulse 73  Temp 97.3  F (36.3  C)  Resp 14  Ht 5' 7\" (1.702 m)  Wt 206 lb (93.4 kg)  SpO2 96%  BMI 32.26 kg/m2  Body mass index is 32.26 kg/(m^2).   GENERAL: alert and very pleasant and cooperative  EYES: Eyes grossly normal to inspection, PERRL and conjunctivae and sclerae normal  HENT: ear " canals and TM's normal, mouth without ulcers or lesions.  +b/l boggy turbinates, no active nasal drainage.  NECK: no adenopathy, no asymmetry, masses, or scars and thyroid normal to palpation  RESP: No rales.  +rhonchi and b/l expiratory wheezes  CV: regular rate and rhythm, normal S1 S2, no S3 or S4, no murmur, click or rub, no peripheral edema and peripheral pulses strong  SKIN: no suspicious lesions or rashes      Diagnostic Test Results:  none     ASSESSMENT/PLAN:     Problem List Items Addressed This Visit     Chronic obstructive pulmonary disease, unspecified COPD type (H) - Primary    Relevant Medications    predniSONE (DELTASONE) 20 MG tablet    Other Relevant Orders    PULMONARY MEDICINE REFERRAL    Former tobacco use    Relevant Orders    PULMONARY MEDICINE REFERRAL      Other Visit Diagnoses     Bronchitis        Relevant Medications    azithromycin (ZITHROMAX) 250 MG tablet    predniSONE (DELTASONE) 20 MG tablet    Other Relevant Orders    PULMONARY MEDICINE REFERRAL         --push fluids, rest, and symptomatic treatment as needed:  Mucinex 600 mg 2 tabs bid  Increase humidity to 30-40% in bedroom at night - vaporizer  Saline nasal spray as needed  Benadryl 25mg 1/2 - 1 hour before bed time  Maintain 8 hr minimum of sleep at night  Robitussin for cough as needed  --Will return to clinic as needed.  See Patient Instructions for details and follow-up instructions  --Interested in establishing care with [facility], referral ordered.      Leah Hyatt, [facility]

## 2018-10-25 NOTE — MR AVS SNAPSHOT
After Visit Summary   10/25/2018    Nitish Coreas    MRN: [id number]           Patient Information     Date Of Birth          9/4/1934        Visit Information        Provider Department      10/25/2018 1:50 PM Leah Hyatt,  [facility]        Today's Diagnoses     Bronchitis    -  1    Chronic obstructive pulmonary disease, unspecified COPD type (H)        Former tobacco use          Care Instructions      COPD Flare    You have had a flare-up of your COPD.  COPD, or chronic obstructive pulmonary disease, is a common lung disease. It causes your airways to become irritated and narrower. This makes it harder for you to breathe. Emphysema and chronic bronchitis are both types of COPD. This is a chronic condition, which means you always have it. Sometimes it gets worse. When this happens, it is called a flare-up.  Symptoms of COPD  People with COPD may have symptoms most of the time. In a flare-up, your symptoms get worse. These symptoms may mean you are having a flare-up:    Shortness of breath, shallow or rapid breathing, or wheezing that gets worse    Lung infection    Cough that gets worse    More mucus, thicker mucus or mucus of a different color    Tiredness, decreased energy, or trouble doing your usual activities    Fever    Chest tightness    Your symptoms don t get better even when you use your usual medicines, inhalers, and nebulizer    Trouble talking    You feel confused  Causes of flare-ups  Unfortunately, a flare-up can happen even though you did everything right, and you followed your doctor s instructions. Some causes of flare-ups are:    Smoking or secondhand smoke    Colds, the flu, or respiratory infections    Air pollution    Sudden change in the weather    Dust, irritating chemicals, or strong fumes    Not taking your medicines as prescribed  Home care  Here are some things you can do at home to treat a flare-up:    Try not to panic.  This makes it harder to breathe, and keeps you from doing the right things.    Don t smoke or be around others who are smoking.    Try to drink more fluids than usual during a flare-up, unless your doctor has told you not to because of heart and kidney problems. More fluids can help loosen the mucus.    Use your inhalers and nebulizer, if you have one, as you have been told to.    If you were given antibiotics, take them until they are used up or your doctor tells you to stop. It s important to finish the antibiotics, even though you feel better. This will make sure the infection has cleared.    If you were given prednisone or another steroid, finish it even if you feel better.  Preventing a flare-up  Even though flare-ups happen, the best way to treat one is to prevent it before it starts. Here are some pointers:    Don t smoke or be around others who are smoking.    Take your medicines as you have been told.    Talk with your doctor about getting a flu shot every year. Also find out if you need a pneumonia shot.    If there is a weather advisory warning to stay indoors, try to stay inside when possible.    Try to eat healthy and get plenty of sleep.    Try to avoid things that usually set you off, like dust, chemical fumes, hairsprays, or strong perfumes.  Follow-up care  Follow up with your healthcare provider, or as advised.  If a culture was done, you will be told if your treatment needs to be changed. You can call as directed for the results.  If X-rays were done, you will be notified of any new findings that may affect your care.  Call 911  Call 911 if any of these occur:    You have trouble breathing    You feel confused or it s difficult to wake you up    You faint or lose consciousness    You have a rapid heart rate    You have new pain in your chest, arm, shoulder, neck or upper back  When to seek medical advice  Call your healthcare provider right away if any of these occur:    Wheezing or shortness of  "breath gets worse    You need to use your inhalers more often than usual without relief    Fever of 100.4 F (38 C) or higher, or as directed by your healthcare provider    Coughing up lots of dark-colored or bloody mucus (sputum)    Chest pain with each breath    You do not start to get better within 24 hours    Swelling of your ankles gets worse    Dizziness or weakness  Date Last Reviewed: 9/1/2016 2000-2017 [employer]. [address] [place] [address]. All rights reserved. This information is not intended as a substitute for professional medical care. Always follow your healthcare professional's instructions.                Follow-ups after your visit        Follow-up notes from your care team     Return in about 1 week (around 11/1/2018) for cough.      Who to contact     If you have questions or need follow up information about today's clinic visit or your schedule please contact [facility] directly at [phone].  Normal or non-critical lab and imaging results will be communicated to you by MyChart, letter or phone within 4 business days after the clinic has received the results. If you do not hear from us within 7 days, please contact the clinic through [employer] or phone. If you have a critical or abnormal lab result, we will notify you by phone as soon as possible.  Submit refill requests through [employer] or call your pharmacy and they will forward the refill request to us. Please allow 3 business days for your refill to be completed.          Additional Information About Your Visit        Care EveryWhere ID     This is your Care EveryWhere ID. This could be used by other organizations to access your [place] medical records  [id number]        Your Vitals Were     Pulse Temperature Respirations Height Pulse Oximetry BMI (Body Mass Index)    73 97.3  F (36.3  C) 14 5' 7\" (1.702 m) 96% 32.26 kg/m2       Blood Pressure from Last 3 Encounters:   10/25/18 " 122/72   04/03/18 122/78   03/31/18 132/76    Weight from Last 3 Encounters:   10/25/18 206 lb (93.4 kg)   04/03/18 208 lb 8 oz (94.6 kg)   03/31/18 204 lb (92.5 kg)              Today, you had the following     No orders found for display         Today's Medication Changes          These changes are accurate as of 10/25/18  2:12 PM.  If you have any questions, ask your nurse or doctor.               Start taking these medicines.        Dose/Directions    azithromycin 250 MG tablet   Commonly known as:  ZITHROMAX   Used for:  Bronchitis   Started by:  Leah Hyatt, DO        Two tablets first day, then one tablet daily for four days.   Quantity:  6 tablet   Refills:  0            Where to get your medicines      These medications were sent to [facility] [address] [place] [address] [address] [facility]  [address] [facility] [address]     Phone:  [phone]     azithromycin 250 MG tablet                Primary Care Provider Office Phone # Fax #    Remi Peterson [phone]       [address] [facility] [address]        Equal Access to Services     [facility] Center[facility] AH: Hadii aad ku hadasho Soomaali, waaxda luqadaha, qaybta kaalmada adeegyada, waxay idiin hayaan bartolo vasquez ah. So [facility] [phone].    ATENCIÓN: Si habla español, tiene a ramos disposición servicios gratuitos de asistencia lingüística. Llame al [phone].    We comply with applicable federal civil rights laws and [place] laws. We do not discriminate on the basis of race, color, national origin, age, disability, sex, sexual orientation, or gender identity.            Thank you!     Thank you for choosing [facility]  for your care. Our goal is always to provide you with excellent care. Hearing back from our patients is one way we can continue to improve our services. Please take a few minutes to complete the written survey that you may receive in the mail after  your visit with us. Thank you!             Your Updated Medication List - Protect others around you: Learn how to safely use, store and throw away your medicines at www.disposemymeds.org.          This list is accurate as of 10/25/18  2:12 PM.  Always use your most recent med list.                   Brand Name Dispense Instructions for use Diagnosis    azithromycin 250 MG tablet    ZITHROMAX    6 tablet    Two tablets first day, then one tablet daily for four days.    Bronchitis       CLARITIN PO      Take 1 tablet by mouth daily        clindamycin 300 MG capsule    CLEOCIN    2 capsule    Take 600 mg 1/2 hour before dental procedures    Status post total knee replacement, unspecified laterality       fish oil-omega-3 fatty acids 1000 MG capsule      Take 2 g by mouth daily.        FLOVENT  MCG/ACT Inhaler   Generic drug:  fluticasone     36 g    INHALE 2 PUFFS INTO THE LUNGS TWICE DAILY    Chronic obstructive pulmonary disease, unspecified COPD type (H)       gemfibrozil 600 MG tablet    LOPID    180 tablet    TAKE 1 TABLET TWICE DAILY    Hyperlipidemia LDL goal <100       glucosamine 500 MG Tabs      Take 1 tablet by mouth daily.        lisinopril 40 MG tablet    PRINIVIL/ZESTRIL    90 tablet    TAKE 1 TABLET BY MOUTH DAILY    Hypertension goal BP (blood pressure) < 140/90       lovastatin 40 MG tablet    MEVACOR    180 tablet    TAKE 2 TABLETS AT BEDTIME    Hyperlipidemia LDL goal <100       METAMUCIL PO      Take by mouth daily Reported on 4/11/2017        MULTIVITAL Tabs      Take 1 tablet by mouth daily.        order for DME     1 Device    Equipment being ordered: Nebulizer    Chronic obstructive pulmonary disease, unspecified COPD type (H)       ranitidine 75 MG tablet    ZANTAC    30 tablet    Take 2 tablets (150 mg) by mouth daily    Stomach upset       VITAMIN C PO      Take 1,000 mg by mouth daily        vitamin D 2000 units Caps      Take 2 tablets by mouth 3 times daily

## 2018-10-25 NOTE — PATIENT INSTRUCTIONS
COPD Flare    You have had a flare-up of your COPD.  COPD, or chronic obstructive pulmonary disease, is a common lung disease. It causes your airways to become irritated and narrower. This makes it harder for you to breathe. Emphysema and chronic bronchitis are both types of COPD. This is a chronic condition, which means you always have it. Sometimes it gets worse. When this happens, it is called a flare-up.  Symptoms of COPD  People with COPD may have symptoms most of the time. In a flare-up, your symptoms get worse. These symptoms may mean you are having a flare-up:    Shortness of breath, shallow or rapid breathing, or wheezing that gets worse    Lung infection    Cough that gets worse    More mucus, thicker mucus or mucus of a different color    Tiredness, decreased energy, or trouble doing your usual activities    Fever    Chest tightness    Your symptoms don t get better even when you use your usual medicines, inhalers, and nebulizer    Trouble talking    You feel confused  Causes of flare-ups  Unfortunately, a flare-up can happen even though you did everything right, and you followed your doctor s instructions. Some causes of flare-ups are:    Smoking or secondhand smoke    Colds, the flu, or respiratory infections    Air pollution    Sudden change in the weather    Dust, irritating chemicals, or strong fumes    Not taking your medicines as prescribed  Home care  Here are some things you can do at home to treat a flare-up:    Try not to panic. This makes it harder to breathe, and keeps you from doing the right things.    Don t smoke or be around others who are smoking.    Try to drink more fluids than usual during a flare-up, unless your doctor has told you not to because of heart and kidney problems. More fluids can help loosen the mucus.    Use your inhalers and nebulizer, if you have one, as you have been told to.    If you were given antibiotics, take them until they are used up or your doctor tells you  to stop. It s important to finish the antibiotics, even though you feel better. This will make sure the infection has cleared.    If you were given prednisone or another steroid, finish it even if you feel better.  Preventing a flare-up  Even though flare-ups happen, the best way to treat one is to prevent it before it starts. Here are some pointers:    Don t smoke or be around others who are smoking.    Take your medicines as you have been told.    Talk with your doctor about getting a flu shot every year. Also find out if you need a pneumonia shot.    If there is a weather advisory warning to stay indoors, try to stay inside when possible.    Try to eat healthy and get plenty of sleep.    Try to avoid things that usually set you off, like dust, chemical fumes, hairsprays, or strong perfumes.  Follow-up care  Follow up with your healthcare provider, or as advised.  If a culture was done, you will be told if your treatment needs to be changed. You can call as directed for the results.  If X-rays were done, you will be notified of any new findings that may affect your care.  Call 911  Call 911 if any of these occur:    You have trouble breathing    You feel confused or it s difficult to wake you up    You faint or lose consciousness    You have a rapid heart rate    You have new pain in your chest, arm, shoulder, neck or upper back  When to seek medical advice  Call your healthcare provider right away if any of these occur:    Wheezing or shortness of breath gets worse    You need to use your inhalers more often than usual without relief    Fever of 100.4 F (38 C) or higher, or as directed by your healthcare provider    Coughing up lots of dark-colored or bloody mucus (sputum)    Chest pain with each breath    You do not start to get better within 24 hours    Swelling of your ankles gets worse    Dizziness or weakness  Date Last Reviewed: 9/1/2016 2000-2017 [employer]. [address],  [place] [address]. All rights reserved. This information is not intended as a substitute for professional medical care. Always follow your healthcare professional's instructions.

## 2018-11-13 ENCOUNTER — TRANSFERRED RECORDS (OUTPATIENT)
Dept: HEALTH INFORMATION MANAGEMENT | Facility: CLINIC | Age: 83
End: 2018-11-13

## 2018-12-28 DIAGNOSIS — E78.5 HYPERLIPIDEMIA LDL GOAL <100: ICD-10-CM

## 2018-12-28 DIAGNOSIS — I10 HYPERTENSION GOAL BP (BLOOD PRESSURE) < 140/90: Chronic | ICD-10-CM

## 2018-12-28 NOTE — TELEPHONE ENCOUNTER
Reason for Call:  Medication or medication refill:    Do you use a [facility]?  Name of the pharmacy and phone number for the current request:     [facility] [place] [place] - [address] AT [facility]      Name of the medication requested: gemfibrozil (LOPID) 600 MG tablet, lisinopril (PRINIVIL/ZESTRIL) 40 MG tablet, lovastatin (MEVACOR) 40 MG tablet    Other request: Please send to pharmacy. Patient has about a week left. If any questions please call.    Can we leave a detailed message on this number? YES    Phone number patient can be reached at: Home number on file [phone] (home)    Best Time: any    Call taken on 12/28/2018 at 10:42 AM by FRAN STILES

## 2018-12-28 NOTE — TELEPHONE ENCOUNTER
"Requested Prescriptions   Pending Prescriptions Disp Refills     gemfibrozil (LOPID) 600 MG tablet  Last Written Prescription Date:  12/30/17  Last Fill Quantity: 180,  # refills: 3   Last office visit: 10/25/2018 with prescribing provider:  rashad   Future Office Visit:     180 tablet 3     Sig: Take 1 tablet (600 mg) by mouth 2 times daily    Fibrates Passed - 12/28/2018 10:43 AM       Passed - Lipid panel on file in past 12 months    Recent Labs   Lab Test 03/31/18  0821  08/10/15  0800   CHOL 142   < > 142   TRIG 199*   < > 162*   HDL 37*   < > 40*   LDL 65   < > 70   NHDL 105   < >  --    VLDL  --   --  32*   CHOLHDLRATIO  --   --  3.5    < > = values in this interval not displayed.              Passed - No abnormal creatine kinase in past 12 months    No lab results found.            Passed - Recent (12 mo) or future (30 days) visit within the authorizing provider's specialty    Patient had office visit in the last 12 months or has a visit in the next 30 days with authorizing provider or within the authorizing provider's specialty.  See \"Patient Info\" tab in inbasket, or \"Choose Columns\" in Meds & Orders section of the refill encounter.             Passed - Patient is age 18 or older        lisinopril (PRINIVIL/ZESTRIL) 40 MG tablet  Last Written Prescription Date:  12/30/17  Last Fill Quantity: 90,  # refills: 3   Last office visit: 10/25/2018 with prescribing provider: rashad     Future Office Visit:     90 tablet 3     Sig: Take 1 tablet (40 mg) by mouth daily    ACE Inhibitors (Including Combos) Protocol Failed - 12/28/2018 10:43 AM       Failed - Normal serum creatinine on file in past 12 months    Recent Labs   Lab Test 03/31/18  0821   CR 1.55*            Passed - Blood pressure under 140/90 in past 12 months    BP Readings from Last 3 Encounters:   10/25/18 122/72   04/03/18 122/78   03/31/18 132/76                Passed - Recent (12 mo) or future (30 days) visit within the authorizing provider's " "specialty    Patient had office visit in the last 12 months or has a visit in the next 30 days with authorizing provider or within the authorizing provider's specialty.  See \"Patient Info\" tab in inbasket, or \"Choose Columns\" in Meds & Orders section of the refill encounter.             Passed - Patient is age 18 or older       Passed - Normal serum potassium on file in past 12 months    Recent Labs   Lab Test 03/31/18  0821   POTASSIUM 4.7             lovastatin (MEVACOR) 40 MG tablet  Last Written Prescription Date:  12/30/17  Last Fill Quantity: 180,  # refills: 3   Last office visit: 10/25/2018 with prescribing provider:  rashad   Future Office Visit:     180 tablet 3    Statins Protocol Passed - 12/28/2018 10:43 AM       Passed - LDL on file in past 12 months    Recent Labs   Lab Test 03/31/18  0821   LDL 65            Passed - No abnormal creatine kinase in past 12 months    No lab results found.            Passed - Recent (12 mo) or future (30 days) visit within the authorizing provider's specialty    Patient had office visit in the last 12 months or has a visit in the next 30 days with authorizing provider or within the authorizing provider's specialty.  See \"Patient Info\" tab in inbasket, or \"Choose Columns\" in Meds & Orders section of the refill encounter.             Passed - Patient is age 18 or older          "

## 2019-01-02 RX ORDER — GEMFIBROZIL 600 MG/1
600 TABLET, FILM COATED ORAL 2 TIMES DAILY
Qty: 180 TABLET | Refills: 3 | Status: SHIPPED | OUTPATIENT
Start: 2019-01-02 | End: 2019-01-13

## 2019-01-02 RX ORDER — LOVASTATIN 40 MG
TABLET ORAL
Qty: 180 TABLET | Refills: 3 | Status: SHIPPED | OUTPATIENT
Start: 2019-01-02 | End: 2019-01-13

## 2019-01-02 RX ORDER — LISINOPRIL 40 MG/1
40 TABLET ORAL DAILY
Qty: 90 TABLET | Refills: 3 | Status: SHIPPED | OUTPATIENT
Start: 2019-01-02 | End: 2020-10-08

## 2019-01-02 NOTE — TELEPHONE ENCOUNTER
Prescription approved per [facility] Refill Protocol.    Routing request for lisinopril- Creatinine out of range

## 2019-01-07 ENCOUNTER — TELEPHONE (OUTPATIENT)
Dept: FAMILY MEDICINE | Facility: CLINIC | Age: 84
End: 2019-01-07

## 2019-01-07 DIAGNOSIS — E78.5 HYPERLIPIDEMIA LDL GOAL <100: ICD-10-CM

## 2019-01-07 NOTE — TELEPHONE ENCOUNTER
"PHARMACY REQUESTING CLARIFICATION BECAUSE as PER PHARMACY : THE RISK OF MYOPATHY AND RHABDOMYOLYSIS MAY BE INCREASED BY CO-ADMINISTRATION OF STATIN THERAPY AND GEMFIBROZIL.     Requested Prescriptions   Pending Prescriptions Disp Refills     gemfibrozil (LOPID) 600 MG tablet  PHARMACY REQUESTING CLARIFICATION.  Last Written Prescription Date:  1/2/19  Last Fill Quantity: 180 TABLET,  # refills: 3   Last office visit: 10/25/2018 with prescribing provider:  SIMONA   Future Office Visit:     180 tablet 3     Sig: Take 1 tablet (600 mg) by mouth 2 times daily    Fibrates Passed - 1/7/2019  4:30 PM       Passed - Lipid panel on file in past 12 months    Recent Labs   Lab Test 03/31/18  0821  08/10/15  0800   CHOL 142   < > 142   TRIG 199*   < > 162*   HDL 37*   < > 40*   LDL 65   < > 70   NHDL 105   < >  --    VLDL  --   --  32*   CHOLHDLRATIO  --   --  3.5    < > = values in this interval not displayed.              Passed - No abnormal creatine kinase in past 12 months    No lab results found.            Passed - Recent (12 mo) or future (30 days) visit within the authorizing provider's specialty    Patient had office visit in the last 12 months or has a visit in the next 30 days with authorizing provider or within the authorizing provider's specialty.  See \"Patient Info\" tab in inbasket, or \"Choose Columns\" in Meds & Orders section of the refill encounter.             Passed - Medication is active on med list       Passed - Patient is age 18 or older        lovastatin (MEVACOR) 40 MG tablet  PHARMACY REQUESTING CLARIFICATION.  Last Written Prescription Date:  1/2/19  Last Fill Quantity: 180 TABLET,  # refills: 3   Last office visit: 10/25/2018 with prescribing provider:  SIMONA   Future Office Visit:     180 tablet 3     Sig: TAKE 2 TABLETS AT BEDTIME    Statins Protocol Passed - 1/7/2019  4:30 PM       Passed - LDL on file in past 12 months    Recent Labs   Lab Test 03/31/18  0821   LDL 65            Passed - No " "abnormal creatine kinase in past 12 months    No lab results found.            Passed - Recent (12 mo) or future (30 days) visit within the authorizing provider's specialty    Patient had office visit in the last 12 months or has a visit in the next 30 days with authorizing provider or within the authorizing provider's specialty.  See \"Patient Info\" tab in inbasket, or \"Choose Columns\" in Meds & Orders section of the refill encounter.             Passed - Medication is active on med list       Passed - Patient is age 18 or older          "

## 2019-01-10 NOTE — TELEPHONE ENCOUNTER
Routing refill request to provider for review/approval because:  PCP please review pharmacy note for clarification.    RADHA AgostoN, RN  [facility] [facility]

## 2019-01-13 RX ORDER — LOVASTATIN 40 MG
TABLET ORAL
Qty: 180 TABLET | Refills: 3 | Status: SHIPPED | OUTPATIENT
Start: 2019-01-13 | End: 2019-12-26

## 2019-01-13 RX ORDER — GEMFIBROZIL 600 MG/1
600 TABLET, FILM COATED ORAL 2 TIMES DAILY
Qty: 180 TABLET | Refills: 3 | Status: SHIPPED | OUTPATIENT
Start: 2019-01-13 | End: 2019-12-26

## 2019-01-14 NOTE — TELEPHONE ENCOUNTER
Pt called back  Says pharmacy is holding his meds.  Needs a response regarding this call message and med issue.

## 2019-02-22 ENCOUNTER — TELEPHONE (OUTPATIENT)
Dept: FAMILY MEDICINE | Facility: CLINIC | Age: 84
End: 2019-02-22

## 2019-02-22 DIAGNOSIS — J44.1 COPD WITH EXACERBATION (H): Primary | ICD-10-CM

## 2019-02-22 RX ORDER — AZITHROMYCIN 250 MG/1
TABLET, FILM COATED ORAL
Qty: 6 TABLET | Refills: 0 | Status: SHIPPED | OUTPATIENT
Start: 2019-02-22 | End: 2019-09-17

## 2019-02-22 NOTE — TELEPHONE ENCOUNTER
Pt complains of upper chest congestion and cough since yesterday. Reports SOB of breath and wheezing. Notes he has hx of COPD/emphesema. Per pt, he was advised by PCP to call clinic for abx if he was had this symptoms before it develops into pneumonia. Pt is currently in [place]. Advised he see UC at current location. States that he will need to see ED an and cost is over $80. He would like message reviewed by clinic providers. Please advice.

## 2019-02-22 NOTE — TELEPHONE ENCOUNTER
Reason for Call:  Medication or medication refill:    Do you use a [facility]?  Name of the pharmacy and phone number for the current request:  [facility]    Name of the medication requested: antibiotic    Other request: pt is in [place] and has cold in upper chest.  Wants rx sent to pharmacy.  Pharmacy added to Epic.    Can we leave a detailed message on this number? YES    Phone number patient can be reached at: Other phone number:  [phone]    Best Time: asap    Call taken on 2/22/2019 at 8:41 AM by ERNESTO LANG

## 2019-02-22 NOTE — TELEPHONE ENCOUNTER
I sent a prescription for azithromycin to the patient's pharmacy, [facility] in [place], [place], to take for the next 5 days.  If not improving will need to be seen in [place].

## 2019-03-26 ENCOUNTER — TRANSFERRED RECORDS (OUTPATIENT)
Dept: HEALTH INFORMATION MANAGEMENT | Facility: CLINIC | Age: 84
End: 2019-03-26

## 2019-09-17 ENCOUNTER — OFFICE VISIT (OUTPATIENT)
Dept: FAMILY MEDICINE | Facility: CLINIC | Age: 84
End: 2019-09-17
Payer: COMMERCIAL

## 2019-09-17 VITALS
SYSTOLIC BLOOD PRESSURE: 102 MMHG | WEIGHT: 198 LBS | HEART RATE: 78 BPM | DIASTOLIC BLOOD PRESSURE: 70 MMHG | BODY MASS INDEX: 31.01 KG/M2 | TEMPERATURE: 97.3 F | RESPIRATION RATE: 18 BRPM | OXYGEN SATURATION: 94 %

## 2019-09-17 DIAGNOSIS — J01.40 ACUTE NON-RECURRENT PANSINUSITIS: ICD-10-CM

## 2019-09-17 DIAGNOSIS — J22 LRTI (LOWER RESPIRATORY TRACT INFECTION): Primary | ICD-10-CM

## 2019-09-17 DIAGNOSIS — J44.9 CHRONIC OBSTRUCTIVE PULMONARY DISEASE, UNSPECIFIED COPD TYPE (H): ICD-10-CM

## 2019-09-17 DIAGNOSIS — N18.30 CKD (CHRONIC KIDNEY DISEASE) STAGE 3, GFR 30-59 ML/MIN (H): ICD-10-CM

## 2019-09-17 PROCEDURE — 99214 OFFICE O/P EST MOD 30 MIN: CPT | Performed by: FAMILY MEDICINE

## 2019-09-17 RX ORDER — AZITHROMYCIN 250 MG/1
TABLET, FILM COATED ORAL
Qty: 6 TABLET | Refills: 0 | Status: SHIPPED | OUTPATIENT
Start: 2019-09-17 | End: 2019-09-30

## 2019-09-17 RX ORDER — FEXOFENADINE HCL 180 MG/1
180 TABLET ORAL DAILY
COMMUNITY
End: 2021-04-18

## 2019-09-17 NOTE — PATIENT INSTRUCTIONS
I placed the patient on a azithromycin for the next 5 days.  Follow-up will be in October when he gets his annual wellness exam done.  He will need blood tests done at that point.  He last had his laboratory studies done in March 2018 with a letter sent to him to follow-up in 3 months.  He remembers getting a letter but does not remember seeing that he was supposed to follow-up in 3 months.  Will treat symptomatically otherwise.  If not improving he will let us know sooner than his appointment for his wellness exam.  The patient has been tried on a variety of inhalers by [facility].  None of them have improved his breathing and they have come to the conclusion that while he still has emphysema he may also be having some allergies causing some of his cough.  He is therefore on Allegra on a daily basis.  We will follow-up on all of this at his annual wellness.

## 2019-09-17 NOTE — PROGRESS NOTES
Subjective     Nitish Coreas is a 85 year old male who presents to clinic today for the following health issues:  Symptoms are fine at night and into the morning. Late morning into late afternoon it gets better  HPI   Acute Illness   Acute illness concerns: coughing  Onset: 2 weeks    Fever: no    Chills/Sweats: no    Headache (location?): no    Sinus Pressure:no    Conjunctivitis:  no    Ear Pain: no    Rhinorrhea: YES    Congestion: YES    Sore Throat: no     Cough: YES-productive of yellow sputum    Wheeze: no    Decreased Appetite: no     Nausea: no    Vomiting: no    Diarrhea:  no    Dysuria/Freq.: no    Fatigue/Achiness: no    Sick/Strep Exposure: YES     Therapies Tried and outcome: nyquil not helping    Chronic Kidney Disease Follow-up      Current NSAID use?  No      Patient Active Problem List   Diagnosis     Hyperlipidemia LDL goal <100     Hypertension goal BP (blood pressure) < 140/90     Renal insufficiency     Chronic rhinitis     Constipation     Advanced directives, counseling/discussion     BMI 30-35     Status post total knee replacement, unspecified laterality     Chronic obstructive pulmonary disease, unspecified COPD type (H)     Former tobacco use     CKD (chronic kidney disease) stage 3, GFR 30-59 ml/min (H)     Past Surgical History:   Procedure Laterality Date     C REPLANTATION THUMB DISTAL,COMPLETE      cut off right thumb and repair     C TOTAL KNEE ARTHROPLASTY  2000    left then right     DENTAL SURGERY      wisdom teeth     TONSILLECTOMY & ADENOIDECTOMY         Social History     Tobacco Use     Smoking status: Former Smoker     Packs/day: 4.00     Years: 45.00     Pack years: 180.00     Types: Cigarettes     Last attempt to quit: 1991     Years since quittin.3     Smokeless tobacco: Never Used   Substance Use Topics     Alcohol use: Yes     Alcohol/week: 0.0 oz     Comment: -2 friday night- cocktail     Family History   Problem Relation Age of  Onset     Cancer Mother      Respiratory Mother      Heart Disease Father      Cerebrovascular Disease Father      Cerebrovascular Disease Sister         CVA x 2     Breast Cancer Daughter              Reviewed and updated as needed this visit by Provider         Review of Systems   ROS COMP: Constitutional, HEENT, cardiovascular, pulmonary, gi and gu systems are negative, except as otherwise noted.      Objective    /70   Pulse 78   Temp 97.3  F (36.3  C) (Tympanic)   Resp 18   Wt 89.8 kg (198 lb)   SpO2 94%   BMI 31.01 kg/m    Body mass index is 31.01 kg/m .  Physical Exam   GENERAL APPEARANCE: healthy, alert and no distress  EYES: Eyes grossly normal to inspection, PERRL and conjunctivae and sclerae normal  HENT: ear canals and TM's normal, nose and mouth without ulcers or lesions, frontal sinus tenderness bilateral and maxillary sinus tenderness bilateral  NECK: no adenopathy and no asymmetry, masses, or scars  RESP: rhonchi R lower posterior  CV: regular rates and rhythm, normal S1 S2, no S3 or S4 and no murmur, click or rub  LYMPHATICS: no cervical adenopathy            Assessment & Plan       ICD-10-CM    1. LRTI (lower respiratory tract infection) J22 azithromycin (ZITHROMAX) 250 MG tablet   2. CKD (chronic kidney disease) stage 3, GFR 30-59 ml/min (H) N18.3    3. Chronic obstructive pulmonary disease, unspecified COPD type (H) J44.9    4. Acute non-recurrent pansinusitis J01.40           Patient Instructions   I placed the patient on a azithromycin for the next 5 days.  Follow-up will be in October when he gets his annual wellness exam done.  He will need blood tests done at that point.  He last had his laboratory studies done in March 2018 with a letter sent to him to follow-up in 3 months.  He remembers getting a letter but does not remember seeing that he was supposed to follow-up in 3 months.  Will treat symptomatically otherwise.  If not improving he will let us know sooner than his  appointment for his wellness exam.  The patient has been tried on a variety of inhalers by [facility].  None of them have improved his breathing and they have come to the conclusion that while he still has emphysema he may also be having some allergies causing some of his cough.  He is therefore on Allegra on a daily basis.  We will follow-up on all of this at his annual wellness.      No follow-ups on file.    Remi Peterson MD  [facility]

## 2019-09-30 ENCOUNTER — OFFICE VISIT (OUTPATIENT)
Dept: FAMILY MEDICINE | Facility: CLINIC | Age: 84
End: 2019-09-30
Payer: COMMERCIAL

## 2019-09-30 VITALS
OXYGEN SATURATION: 95 % | HEART RATE: 72 BPM | DIASTOLIC BLOOD PRESSURE: 74 MMHG | BODY MASS INDEX: 31.47 KG/M2 | RESPIRATION RATE: 16 BRPM | TEMPERATURE: 97.6 F | WEIGHT: 200.5 LBS | SYSTOLIC BLOOD PRESSURE: 110 MMHG | HEIGHT: 67 IN

## 2019-09-30 DIAGNOSIS — J32.0 CHRONIC MAXILLARY SINUSITIS: Primary | ICD-10-CM

## 2019-09-30 PROCEDURE — 99214 OFFICE O/P EST MOD 30 MIN: CPT | Performed by: FAMILY MEDICINE

## 2019-09-30 RX ORDER — DOXYCYCLINE HYCLATE 100 MG
100 TABLET ORAL 2 TIMES DAILY
Qty: 28 TABLET | Refills: 0 | Status: SHIPPED | OUTPATIENT
Start: 2019-09-30 | End: 2019-11-07

## 2019-09-30 ASSESSMENT — MIFFLIN-ST. JEOR: SCORE: 1553.09

## 2019-09-30 NOTE — PATIENT INSTRUCTIONS
I placed the patient on doxycycline 100 mg twice daily for the next 2 weeks.  He has his annual wellness next week and we will follow-up on the issue at that point.  He will need labs done at his next visit.  We will otherwise treat symptomatically.

## 2019-09-30 NOTE — PROGRESS NOTES
Subjective     Nitish Coreas is a 85 year old male who presents to clinic today for the following health issues:    HPI   RESPIRATORY SYMPTOMS/Follow up      Duration: X4 weeks    Description  cough, wheezing and SOB, sinus drainage    Severity: moderate    Accompanying signs and symptoms: See above    History (predisposing factors):  COPD    Precipitating or alleviating factors: Allergies?    Therapies tried and outcome:  Finished RX        Patient Active Problem List   Diagnosis     Hyperlipidemia LDL goal <100     Hypertension goal BP (blood pressure) < 140/90     Renal insufficiency     Chronic rhinitis     Constipation     Advanced directives, counseling/discussion     BMI 30-35     Status post total knee replacement, unspecified laterality     Chronic obstructive pulmonary disease, unspecified COPD type (H)     Former tobacco use     CKD (chronic kidney disease) stage 3, GFR 30-59 ml/min (H)     Past Surgical History:   Procedure Laterality Date     C REPLANTATION THUMB DISTAL,COMPLETE      cut off right thumb and repair     C TOTAL KNEE ARTHROPLASTY  2000    left then right     DENTAL SURGERY      wisdom teeth     TONSILLECTOMY & ADENOIDECTOMY         Social History     Tobacco Use     Smoking status: Former Smoker     Packs/day: 4.00     Years: 45.00     Pack years: 180.00     Types: Cigarettes     Last attempt to quit: 1991     Years since quittin.4     Smokeless tobacco: Never Used   Substance Use Topics     Alcohol use: Yes     Alcohol/week: 0.0 standard drinks     Comment: 1-2 friday night- cocktail     Family History   Problem Relation Age of Onset     Cancer Mother      Respiratory Mother      Heart Disease Father      Cerebrovascular Disease Father      Cerebrovascular Disease Sister         CVA x 2     Breast Cancer Daughter              Reviewed and updated as needed this visit by Provider         Review of Systems   ROS COMP: Constitutional, HEENT, cardiovascular,  "pulmonary, gi and gu systems are negative, except as otherwise noted.      Objective    /74 (Patient Position: Sitting, Cuff Size: Adult Regular)   Pulse 72   Temp 97.6  F (36.4  C) (Tympanic)   Resp 16   Ht 1.702 m (5' 7\")   Wt 90.9 kg (200 lb 8 oz)   SpO2 95%   BMI 31.40 kg/m    Body mass index is 31.4 kg/m .  Physical Exam   GENERAL APPEARANCE: healthy, alert and no distress  EYES: Eyes grossly normal to inspection, PERRL and conjunctivae and sclerae normal  HENT: ear canals and TM's normal, nose and mouth without ulcers or lesions, frontal sinus tenderness bilateral and maxillary sinus tenderness bilateral  NECK: no adenopathy and no asymmetry, masses, or scars  RESP: lungs clear to auscultation - no rales, rhonchi or wheezes  LYMPHATICS: no cervical adenopathy            Assessment & Plan       ICD-10-CM    1. Chronic maxillary sinusitis J32.0 doxycycline hyclate (VIBRA-TABS) 100 MG tablet        BMI:   Estimated body mass index is 31.4 kg/m  as calculated from the following:    Height as of this encounter: 1.702 m (5' 7\").    Weight as of this encounter: 90.9 kg (200 lb 8 oz).           Patient Instructions   I placed the patient on doxycycline 100 mg twice daily for the next 2 weeks.  He has his annual wellness next week and we will follow-up on the issue at that point.  He will need labs done at his next visit.  We will otherwise treat symptomatically.      Return in about 1 week (around 10/7/2019) for wellness exam.    Remi Peterson MD  [facility]      "

## 2019-10-07 ENCOUNTER — OFFICE VISIT (OUTPATIENT)
Dept: FAMILY MEDICINE | Facility: CLINIC | Age: 84
End: 2019-10-07
Payer: COMMERCIAL

## 2019-10-07 VITALS
BODY MASS INDEX: 32.47 KG/M2 | HEIGHT: 66 IN | TEMPERATURE: 97.2 F | DIASTOLIC BLOOD PRESSURE: 78 MMHG | HEART RATE: 66 BPM | OXYGEN SATURATION: 96 % | SYSTOLIC BLOOD PRESSURE: 130 MMHG | WEIGHT: 202 LBS | RESPIRATION RATE: 16 BRPM

## 2019-10-07 DIAGNOSIS — I10 HYPERTENSION GOAL BP (BLOOD PRESSURE) < 140/90: Chronic | ICD-10-CM

## 2019-10-07 DIAGNOSIS — N18.30 CKD (CHRONIC KIDNEY DISEASE) STAGE 3, GFR 30-59 ML/MIN (H): ICD-10-CM

## 2019-10-07 DIAGNOSIS — Z00.00 ROUTINE GENERAL MEDICAL EXAMINATION AT A HEALTH CARE FACILITY: Primary | ICD-10-CM

## 2019-10-07 DIAGNOSIS — E78.5 HYPERLIPIDEMIA LDL GOAL <100: Chronic | ICD-10-CM

## 2019-10-07 DIAGNOSIS — J44.9 CHRONIC OBSTRUCTIVE PULMONARY DISEASE, UNSPECIFIED COPD TYPE (H): ICD-10-CM

## 2019-10-07 DIAGNOSIS — Z12.5 SCREENING FOR PROSTATE CANCER: ICD-10-CM

## 2019-10-07 DIAGNOSIS — Z87.891 FORMER TOBACCO USE: ICD-10-CM

## 2019-10-07 DIAGNOSIS — E66.09 NON MORBID OBESITY DUE TO EXCESS CALORIES: ICD-10-CM

## 2019-10-07 LAB
ALBUMIN SERPL-MCNC: 3.6 G/DL (ref 3.4–5)
ALBUMIN UR-MCNC: >=300 MG/DL
ALP SERPL-CCNC: 96 U/L (ref 40–150)
ALT SERPL W P-5'-P-CCNC: 24 U/L (ref 0–70)
ANION GAP SERPL CALCULATED.3IONS-SCNC: 11 MMOL/L (ref 3–14)
APPEARANCE UR: CLEAR
AST SERPL W P-5'-P-CCNC: 25 U/L (ref 0–45)
BASOPHILS # BLD AUTO: 0 10E9/L (ref 0–0.2)
BASOPHILS NFR BLD AUTO: 0.2 %
BILIRUB SERPL-MCNC: 0.4 MG/DL (ref 0.2–1.3)
BILIRUB UR QL STRIP: NEGATIVE
BUN SERPL-MCNC: 32 MG/DL (ref 7–30)
CALCIUM SERPL-MCNC: 9.3 MG/DL (ref 8.5–10.1)
CHLORIDE SERPL-SCNC: 106 MMOL/L (ref 94–109)
CHOLEST SERPL-MCNC: 168 MG/DL
CO2 SERPL-SCNC: 22 MMOL/L (ref 20–32)
COLOR UR AUTO: YELLOW
CREAT SERPL-MCNC: 2.03 MG/DL (ref 0.66–1.25)
DIFFERENTIAL METHOD BLD: ABNORMAL
EOSINOPHIL # BLD AUTO: 0.3 10E9/L (ref 0–0.7)
EOSINOPHIL NFR BLD AUTO: 3.1 %
ERYTHROCYTE [DISTWIDTH] IN BLOOD BY AUTOMATED COUNT: 12.9 % (ref 10–15)
GFR SERPL CREATININE-BSD FRML MDRD: 29 ML/MIN/{1.73_M2}
GLUCOSE SERPL-MCNC: 98 MG/DL (ref 70–99)
GLUCOSE UR STRIP-MCNC: NEGATIVE MG/DL
HCT VFR BLD AUTO: 42.8 % (ref 40–53)
HDLC SERPL-MCNC: 39 MG/DL
HGB BLD-MCNC: 14.8 G/DL (ref 13.3–17.7)
HGB UR QL STRIP: NEGATIVE
KETONES UR STRIP-MCNC: NEGATIVE MG/DL
LDLC SERPL CALC-MCNC: 100 MG/DL
LEUKOCYTE ESTERASE UR QL STRIP: NEGATIVE
LYMPHOCYTES # BLD AUTO: 2.6 10E9/L (ref 0.8–5.3)
LYMPHOCYTES NFR BLD AUTO: 32.8 %
MCH RBC QN AUTO: 33.1 PG (ref 26.5–33)
MCHC RBC AUTO-ENTMCNC: 34.6 G/DL (ref 31.5–36.5)
MCV RBC AUTO: 96 FL (ref 78–100)
MONOCYTES # BLD AUTO: 1 10E9/L (ref 0–1.3)
MONOCYTES NFR BLD AUTO: 12.4 %
NEUTROPHILS # BLD AUTO: 4.1 10E9/L (ref 1.6–8.3)
NEUTROPHILS NFR BLD AUTO: 51.5 %
NITRATE UR QL: NEGATIVE
NONHDLC SERPL-MCNC: 129 MG/DL
PH UR STRIP: 5.5 PH (ref 5–7)
PLATELET # BLD AUTO: 243 10E9/L (ref 150–450)
POTASSIUM SERPL-SCNC: 4.2 MMOL/L (ref 3.4–5.3)
PROT SERPL-MCNC: 7.4 G/DL (ref 6.8–8.8)
PSA SERPL-ACNC: 5 UG/L (ref 0–4)
RBC # BLD AUTO: 4.47 10E12/L (ref 4.4–5.9)
RBC #/AREA URNS AUTO: ABNORMAL /HPF
SODIUM SERPL-SCNC: 139 MMOL/L (ref 133–144)
SOURCE: ABNORMAL
SP GR UR STRIP: 1.02 (ref 1–1.03)
TRIGL SERPL-MCNC: 145 MG/DL
UROBILINOGEN UR STRIP-ACNC: 0.2 EU/DL (ref 0.2–1)
WBC # BLD AUTO: 8 10E9/L (ref 4–11)
WBC #/AREA URNS AUTO: ABNORMAL /HPF

## 2019-10-07 PROCEDURE — 36415 COLL VENOUS BLD VENIPUNCTURE: CPT | Performed by: FAMILY MEDICINE

## 2019-10-07 PROCEDURE — 80061 LIPID PANEL: CPT | Performed by: FAMILY MEDICINE

## 2019-10-07 PROCEDURE — G0103 PSA SCREENING: HCPCS | Performed by: FAMILY MEDICINE

## 2019-10-07 PROCEDURE — 80053 COMPREHEN METABOLIC PANEL: CPT | Performed by: FAMILY MEDICINE

## 2019-10-07 PROCEDURE — 85025 COMPLETE CBC W/AUTO DIFF WBC: CPT | Performed by: FAMILY MEDICINE

## 2019-10-07 PROCEDURE — 99397 PER PM REEVAL EST PAT 65+ YR: CPT | Performed by: FAMILY MEDICINE

## 2019-10-07 PROCEDURE — 81001 URINALYSIS AUTO W/SCOPE: CPT | Performed by: FAMILY MEDICINE

## 2019-10-07 ASSESSMENT — MIFFLIN-ST. JEOR: SCORE: 1547.99

## 2019-10-07 NOTE — PROGRESS NOTES
"  SUBJECTIVE:   Nitish Coreas is a 85 year old male who presents for Preventive Visit.      Are you in the first 12 months of your Medicare Part B coverage?  No    Physical Health:    In general, how would you rate your overall physical health? fair    Outside of work, how many days during the week do you exercise? 6-7 days/week    Outside of work, approximately how many minutes a day do you exercise?less than 15 minutes    If you drink alcohol do you typically have >3 drinks per day or >7 drinks per week? No    Do you usually eat at least 4 servings of fruit and vegetables a day, include whole grains & fiber and avoid regularly eating high fat or \"junk\" foods? Yes    Do you have any problems taking medications regularly?  No    Do you have any side effects from medications? none    Needs assistance for the following daily activities: no assistance needed    Which of the following safety concerns are present in your home?  none identified     Hearing impairment: No    In the past 6 months, have you been bothered by leaking of urine? yes    Mental Health:    In general, how would you rate your overall mental or emotional health? good  PHQ-2 Score: 0    Do you feel safe in your environment? Yes    Do you have a Health Care Directive? Yes: Patient states has Advance Directive and will bring in a copy to clinic.    Additional concerns to address?  No    Fall risk:  Fallen 2 or more times in the past year?: No  Any fall with injury in the past year?: No    Cognitive Screenin) Repeat 3 items (Leader, Season, Table)    2) Clock draw:   NORMAL  3) 3 item recall: Recalls 2 objects   Results: NORMAL clock, 1-2 items recalled: COGNITIVE IMPAIRMENT LESS LIKELY    Mini-CogTM Copyright HUGO Nieto. Licensed by the author for use in [facility]; reprinted with permission (halle@.[facility]). All rights reserved.      Do you have sleep apnea, excessive snoring or daytime drowsiness?: yes            Reviewed and " updated as needed this visit by clinical staff  Tobacco  Allergies  Meds  Med Hx  Surg Hx  Fam Hx  Soc Hx        Reviewed and updated as needed this visit by Provider        Social History     Tobacco Use     Smoking status: Former Smoker     Packs/day: 4.00     Years: 45.00     Pack years: 180.00     Types: Cigarettes     Last attempt to quit: 1991     Years since quittin.5     Smokeless tobacco: Never Used   Substance Use Topics     Alcohol use: Yes     Alcohol/week: 0.0 standard drinks     Comment: 1-2 friday night- cocktail                           Current providers sharing in care for this patient include:   Patient Care Team:  Remi Peterson MD as PCP - General (Family Practice)  Remi Peterson MD as Assigned PCP    The following health maintenance items are reviewed in Epic and correct as of today:  Health Maintenance   Topic Date Due     ZOSTER IMMUNIZATION (1 of 2) 1984     PNEUMOCOCCAL IMMUNIZATION 65+ LOW/MEDIUM RISK (2 of 2 - PCV13) 2005     DTAP/TDAP/TD IMMUNIZATION (2 - Td) 10/28/2018     INFLUENZA VACCINE (1) 2019     ADVANCE CARE PLANNING  2019     MEDICARE ANNUAL WELLNESS VISIT  10/07/2020     LIPID  10/07/2020     FALL RISK ASSESSMENT  10/07/2020     SPIROMETRY  Completed     COPD ACTION PLAN  Completed     PHQ-2  Completed     IPV IMMUNIZATION  Aged Out     MENINGITIS IMMUNIZATION  Aged Out     Patient Active Problem List   Diagnosis     Hyperlipidemia LDL goal <100     Hypertension goal BP (blood pressure) < 140/90     Renal insufficiency     Chronic rhinitis     Constipation     Advanced directives, counseling/discussion     BMI 30-35     Status post total knee replacement, unspecified laterality     Chronic obstructive pulmonary disease, unspecified COPD type (H)     Former tobacco use     CKD (chronic kidney disease) stage 3, GFR 30-59 ml/min (H)     Past Surgical History:   Procedure Laterality Date     C REPLANTATION THUMB  "DISTAL,COMPLETE      cut off right thumb and repair     C TOTAL KNEE ARTHROPLASTY  2000    left then right     DENTAL SURGERY      wisdom teeth     EYE SURGERY Right  and     macular pucker and cataract     TONSILLECTOMY & ADENOIDECTOMY         Social History     Tobacco Use     Smoking status: Former Smoker     Packs/day: 4.00     Years: 45.00     Pack years: 180.00     Types: Cigarettes     Last attempt to quit: 1991     Years since quittin.5     Smokeless tobacco: Never Used   Substance Use Topics     Alcohol use: Yes     Alcohol/week: 0.0 standard drinks     Comment: -2 friday night- cocktail     Family History   Problem Relation Age of Onset     Cancer Mother      Respiratory Mother      Heart Disease Father      Cerebrovascular Disease Father      Cerebrovascular Disease Sister         CVA x 2     Dementia Sister      Heart Disease Son      Breast Cancer Daughter              ROS:  Constitutional, HEENT, cardiovascular, pulmonary, gi and gu systems are negative, except as otherwise noted.    OBJECTIVE:   /78 (Patient Position: Sitting, Cuff Size: Adult Regular)   Pulse 66   Temp 97.2  F (36.2  C) (Tympanic)   Resp 16   Ht 1.683 m (5' 6.25\")   Wt 91.6 kg (202 lb)   SpO2 96%   BMI 32.36 kg/m   Estimated body mass index is 32.36 kg/m  as calculated from the following:    Height as of this encounter: 1.683 m (5' 6.25\").    Weight as of this encounter: 91.6 kg (202 lb).  EXAM:   GENERAL: healthy, alert and no distress  EYES: Eyes grossly normal to inspection, PERRL and conjunctivae and sclerae normal  HENT: ear canals and TM's normal, nose and mouth without ulcers or lesions  NECK: no adenopathy, no asymmetry, masses, or scars and thyroid normal to palpation  RESP: lungs clear to auscultation - no rales, rhonchi or wheezes  CV: regular rate and rhythm, normal S1 S2, no S3 or S4, no murmur, click or rub, no peripheral edema and peripheral pulses strong  ABDOMEN: soft, " "nontender, no hepatosplenomegaly, no masses and bowel sounds normal  MS: no gross musculoskeletal defects noted, no edema  SKIN: no suspicious lesions or rashes  NEURO: Normal strength and tone, mentation intact and speech normal  PSYCH: mentation appears normal, affect normal/bright        ASSESSMENT / PLAN:       ICD-10-CM    1. Routine general medical examination at a health care facility Z00.00    2. CKD (chronic kidney disease) stage 3, GFR 30-59 ml/min (H) N18.3    3. Hyperlipidemia LDL goal <100 E78.5 Lipid panel reflex to direct LDL Fasting     CANCELED: Lipid Profile   4. Hypertension goal BP (blood pressure) < 140/90 I10 UA with Microscopic     CBC with platelets differential     Comprehensive metabolic panel   5. Screening for prostate cancer Z12.5 Prostate spec antigen screen   6. Chronic obstructive pulmonary disease, unspecified COPD type (H) J44.9    7. BMI 30-35 E66.09    8. Former tobacco use Z87.891        End of Life Planning:  Patient currently has an advanced directive: No.  I have verified the patient's ablity to prepare an advanced directive/make health care decisions.  Literature was provided to assist patient in preparing an advanced directive.    COUNSELING:  Reviewed preventive health counseling, as reflected in patient instructions       Regular exercise       Healthy diet/nutrition       Vision screening    Estimated body mass index is 32.36 kg/m  as calculated from the following:    Height as of this encounter: 1.683 m (5' 6.25\").    Weight as of this encounter: 91.6 kg (202 lb).    Weight management plan: Discussed healthy diet and exercise guidelines     reports that he quit smoking about 28 years ago. His smoking use included cigarettes. He has a 180.00 pack-year smoking history. He has never used smokeless tobacco.      Appropriate preventive services were discussed with this patient, including applicable screening as appropriate for cardiovascular disease, diabetes, " osteopenia/osteoporosis, and glaucoma.  As appropriate for age/gender, discussed screening for colorectal cancer, prostate cancer, breast cancer, and cervical cancer. Checklist reviewing preventive services available has been given to the patient.    Reviewed patients plan of care and provided an AVS. The Basic Care Plan (routine screening as documented in Health Maintenance) for Nitish meets the Care Plan requirement. This Care Plan has been established and reviewed with the Patient.    Counseling Resources:  ATP IV Guidelines  Pooled Cohorts Equation Calculator  Breast Cancer Risk Calculator  FRAX Risk Assessment  ICSI Preventive Guidelines  Dietary Guidelines for Americans, 2010  USDA's MyPlate  ASA Prophylaxis  Lung CA Screening    Remi Peterson MD  [facility]

## 2019-10-07 NOTE — PROGRESS NOTES
"  SUBJECTIVE:   CC: Nitish Coreas is an 85 year old male who presents for preventive health visit.     Healthy Habits:    Do you get at least three servings of calcium containing foods daily (dairy, green leafy vegetables, etc.)? yes    Amount of exercise or daily activities, outside of work: 7 day(s) per week--limited due to COPD    Problems taking medications regularly No    Medication side effects: No    Have you had an eye exam in the past two years? no    Do you see a dentist twice per year? yes    Do you have sleep apnea, excessive snoring or daytime drowsiness?yes--snoring      {additional problems to add (Optional):013037}    Today's PHQ-2 Score:   PHQ-2 (  [employer]) 2019   Q1: Little interest or pleasure in doing things 0 0   Q2: Feeling down, depressed or hopeless 0 0   PHQ-2 Score 0 0       Abuse: Current or Past(Physical, Sexual or Emotional)- No  Do you feel safe in your environment? Yes    Social History     Tobacco Use     Smoking status: Former Smoker     Packs/day: 4.00     Years: 45.00     Pack years: 180.00     Types: Cigarettes     Last attempt to quit: 1991     Years since quittin.4     Smokeless tobacco: Never Used   Substance Use Topics     Alcohol use: Yes     Alcohol/week: 0.0 standard drinks     Comment: 1-2 friday night- cocktail     If you drink alcohol do you typically have >3 drinks per day or >7 drinks per week? No                      Last PSA:   PSA   Date Value Ref Range Status   08/10/2015 3.53 0 - 4 ug/L Final       Reviewed orders with patient. Reviewed health maintenance and updated orders accordingly - Yes  {Chronicprobdata (Optional):336089}    Reviewed and updated as needed this visit by clinical staff         Reviewed and updated as needed this visit by Provider        {HISTORY OPTIONS (Optional):385060}    ROS:  { :182741::\"CONSTITUTIONAL: NEGATIVE for fever, chills, change in weight\",\"INTEGUMENTARY/SKIN: NEGATIVE for worrisome rashes, " "moles or lesions\",\"EYES: NEGATIVE for vision changes or irritation\",\"ENT: NEGATIVE for ear, mouth and throat problems\",\"RESP: NEGATIVE for significant cough or SOB\",\"CV: NEGATIVE for chest pain, palpitations or peripheral edema\",\"GI: NEGATIVE for nausea, abdominal pain, heartburn, or change in bowel habits\",\" male: negative for dysuria, hematuria, decreased urinary stream, erectile dysfunction, urethral discharge\",\"MUSCULOSKELETAL: NEGATIVE for significant arthralgias or myalgia\",\"NEURO: NEGATIVE for weakness, dizziness or paresthesias\",\"PSYCHIATRIC: NEGATIVE for changes in mood or affect\"}    OBJECTIVE:   There were no vitals taken for this visit.  EXAM:  {Exam Choices:237234}    {Diagnostic Test Results (Optional):399323::\"Diagnostic Test Results:\",\"Labs reviewed in Epic\"}    ASSESSMENT/PLAN:   {Diag Picklist:191476}    COUNSELING:  {MALE COUNSELING MESSAGES:149785::\"Reviewed preventive health counseling, as reflected in patient instructions\"}    Estimated body mass index is 31.4 kg/m  as calculated from the following:    Height as of 9/30/19: 1.702 m (5' 7\").    Weight as of 9/30/19: 90.9 kg (200 lb 8 oz).    {Weight Management Plan (ACO) Complete if BMI is abnormal-  Ages 18-64  BMI >24.9.  Age 65+ with BMI <23 or >30 (Optional):466334}     reports that he quit smoking about 28 years ago. His smoking use included cigarettes. He has a 180.00 pack-year smoking history. He has never used smokeless tobacco.  {Tobacco Cessation -- Complete if patient is a smoker (Optional):499585}    Counseling Resources:  ATP IV Guidelines  Pooled Cohorts Equation Calculator  FRAX Risk Assessment  ICSI Preventive Guidelines  Dietary Guidelines for Americans, 2010  USDA's MyPlate  ASA Prophylaxis  Lung CA Screening    Remi Peterson MD  [facility]  "

## 2019-10-07 NOTE — PATIENT INSTRUCTIONS
Preventive Health Recommendations:     See your health care provider every year to    Review health changes.     Discuss preventive care.      Review your medicines if your doctor has prescribed any.      Talk with your health care provider about whether you should have a test to screen for prostate cancer (PSA).    Every 3 years, have a diabetes test (fasting glucose). If you are at risk for diabetes, you should have this test more often.    Every 5 years, have a cholesterol test. Have this test more often if you are at risk for high cholesterol or heart disease.     Every 10 years, have a colonoscopy. Or, have a yearly FIT test (stool test). These exams will check for colon cancer.    Talk to with your health care provider about screening for Abdominal Aortic Aneurysm if you have a family history of AAA or have a history of smoking.    Shots:     Get a flu shot each year.     Get a tetanus shot every 10 years.     Talk to your doctor about your pneumonia vaccines. There are now two you should receive - Pneumovax (PPSV 23) and Prevnar (PCV 13).     Talk to your pharmacist about a shingles vaccine.     Talk to your doctor about the hepatitis B vaccine.  Nutrition:     Eat at least 5 servings of fruits and vegetables each day.     Eat whole-grain bread, whole-wheat pasta and brown rice instead of white grains and rice.     Get adequate Calcium and Vitamin D.   Lifestyle    Exercise for at least 150 minutes a week (30 minutes a day, 5 days a week). This will help you control your weight and prevent disease.     Limit alcohol to one drink per day.     No smoking.     Wear sunscreen to prevent skin cancer.    See your dentist every six months for an exam and cleaning.    See your eye doctor every 1 to 2 years to screen for conditions such as glaucoma, macular degeneration, cataracts, etc.    Personalized Prevention Plan  You are due for the preventive services outlined below.  Your care team is available to assist you  in scheduling these services.  If you have already completed any of these items, please share that information with your care team to update in your medical record.  Health Maintenance Due   Topic Date Due     Zoster (Shingles) Vaccine (1 of 2) 09/04/1984     Pneumococcal Vaccine (2 of 2 - PCV13) 01/01/2005     Annual Wellness Visit  11/22/2017     Diptheria Tetanus Pertussis (DTAP/TDAP/TD) Vaccine (2 - Td) 10/28/2018     Cholesterol Lab  03/31/2019     Flu Vaccine (1) 09/01/2019     Preventive Health Recommendations:     See your health care provider every year to    Review health changes.     Discuss preventive care.      Review your medicines if your doctor has prescribed any.      Talk with your health care provider about whether you should have a test to screen for prostate cancer (PSA).    Every 3 years, have a diabetes test (fasting glucose). If you are at risk for diabetes, you should have this test more often.    Every 5 years, have a cholesterol test. Have this test more often if you are at risk for high cholesterol or heart disease.     Every 10 years, have a colonoscopy. Or, have a yearly FIT test (stool test). These exams will check for colon cancer.    Talk to with your health care provider about screening for Abdominal Aortic Aneurysm if you have a family history of AAA or have a history of smoking.    Shots:     Get a flu shot each year.     Get a tetanus shot every 10 years.     Talk to your doctor about your pneumonia vaccines. There are now two you should receive - Pneumovax (PPSV 23) and Prevnar (PCV 13).     Talk to your pharmacist about a shingles vaccine.     Talk to your doctor about the hepatitis B vaccine.  Nutrition:     Eat at least 5 servings of fruits and vegetables each day.     Eat whole-grain bread, whole-wheat pasta and brown rice instead of white grains and rice.     Get adequate Calcium and Vitamin D.   Lifestyle    Exercise for at least 150 minutes a week (30 minutes a day, 5  days a week). This will help you control your weight and prevent disease.     Limit alcohol to one drink per day.     No smoking.     Wear sunscreen to prevent skin cancer.    See your dentist every six months for an exam and cleaning.    See your eye doctor every 1 to 2 years to screen for conditions such as glaucoma, macular degeneration, cataracts, etc.    Personalized Prevention Plan  You are due for the preventive services outlined below.  Your care team is available to assist you in scheduling these services.  If you have already completed any of these items, please share that information with your care team to update in your medical record.  Health Maintenance Due   Topic Date Due     Zoster (Shingles) Vaccine (1 of 2) 09/04/1984     Pneumococcal Vaccine (2 of 2 - PCV13) 01/01/2005     Annual Wellness Visit  11/22/2017     Diptheria Tetanus Pertussis (DTAP/TDAP/TD) Vaccine (2 - Td) 10/28/2018     Cholesterol Lab  03/31/2019     Flu Vaccine (1) 09/01/2019     Preventive Health Recommendations:     See your health care provider every year to    Review health changes.     Discuss preventive care.      Review your medicines if your doctor has prescribed any.      Talk with your health care provider about whether you should have a test to screen for prostate cancer (PSA).    Every 3 years, have a diabetes test (fasting glucose). If you are at risk for diabetes, you should have this test more often.    Every 5 years, have a cholesterol test. Have this test more often if you are at risk for high cholesterol or heart disease.     Every 10 years, have a colonoscopy. Or, have a yearly FIT test (stool test). These exams will check for colon cancer.    Talk to with your health care provider about screening for Abdominal Aortic Aneurysm if you have a family history of AAA or have a history of smoking.    Shots:     Get a flu shot each year.     Get a tetanus shot every 10 years.     Talk to your doctor about your  pneumonia vaccines. There are now two you should receive - Pneumovax (PPSV 23) and Prevnar (PCV 13).     Talk to your pharmacist about a shingles vaccine.     Talk to your doctor about the hepatitis B vaccine.  Nutrition:     Eat at least 5 servings of fruits and vegetables each day.     Eat whole-grain bread, whole-wheat pasta and brown rice instead of white grains and rice.     Get adequate Calcium and Vitamin D.   Lifestyle    Exercise for at least 150 minutes a week (30 minutes a day, 5 days a week). This will help you control your weight and prevent disease.     Limit alcohol to one drink per day.     No smoking.     Wear sunscreen to prevent skin cancer.    See your dentist every six months for an exam and cleaning.    See your eye doctor every 1 to 2 years to screen for conditions such as glaucoma, macular degeneration, cataracts, etc.    Personalized Prevention Plan  You are due for the preventive services outlined below.  Your care team is available to assist you in scheduling these services.  If you have already completed any of these items, please share that information with your care team to update in your medical record.  Health Maintenance Due   Topic Date Due     Zoster (Shingles) Vaccine (1 of 2) 09/04/1984     Pneumococcal Vaccine (2 of 2 - PCV13) 01/01/2005     Annual Wellness Visit  11/22/2017     Diptheria Tetanus Pertussis (DTAP/TDAP/TD) Vaccine (2 - Td) 10/28/2018     Cholesterol Lab  03/31/2019     Flu Vaccine (1) 09/01/2019

## 2019-10-07 NOTE — LETTER
October 8, 2019      Nitish Coreas  [address] [address]        Dear ,    We are writing to inform you of your test results.    Your kidney function is off a bit. Please try to drink 6-8 eight ounce glasses of water daily. We can repeat the tests in one month.    Resulted Orders   Lipid panel reflex to direct LDL Fasting   Result Value Ref Range    Cholesterol 168 <200 mg/dL    Triglycerides 145 <150 mg/dL      Comment:      Fasting specimen    HDL Cholesterol 39 (L) >39 mg/dL    LDL Cholesterol Calculated 100 (H) <100 mg/dL      Comment:      Above desirable:  100-129 mg/dl  Borderline High:  130-159 mg/dL  High:             160-189 mg/dL  Very high:       >189 mg/dl      Non HDL Cholesterol 129 <130 mg/dL   UA with Microscopic   Result Value Ref Range    Color Urine Yellow     Appearance Urine Clear     Glucose Urine Negative NEG^Negative mg/dL    Bilirubin Urine Negative NEG^Negative    Ketones Urine Negative NEG^Negative mg/dL    Specific Gravity Urine 1.025 1.003 - 1.035    pH Urine 5.5 5.0 - 7.0 pH    Protein Albumin Urine >=300 (A) NEG^Negative mg/dL    Urobilinogen Urine 0.2 0.2 - 1.0 EU/dL    Nitrite Urine Negative NEG^Negative    Blood Urine Negative NEG^Negative    Leukocyte Esterase Urine Negative NEG^Negative    Source Midstream Urine     WBC Urine 0 - 5 OTO5^0 - 5 /HPF    RBC Urine O - 2 OTO2^O - 2 /HPF   CBC with platelets differential   Result Value Ref Range    WBC 8.0 4.0 - 11.0 10e9/L    RBC Count 4.47 4.4 - 5.9 10e12/L    Hemoglobin 14.8 13.3 - 17.7 g/dL    Hematocrit 42.8 40.0 - 53.0 %    MCV 96 78 - 100 fl    MCH 33.1 (H) 26.5 - 33.0 pg    MCHC 34.6 31.5 - 36.5 g/dL    RDW 12.9 10.0 - 15.0 %    Platelet Count 243 150 - 450 10e9/L    % Neutrophils 51.5 %    % Lymphocytes 32.8 %    % Monocytes 12.4 %    % Eosinophils 3.1 %    % Basophils 0.2 %    Absolute Neutrophil 4.1 1.6 - 8.3 10e9/L    Absolute Lymphocytes 2.6 0.8 - 5.3 10e9/L    Absolute Monocytes  1.0 0.0 - 1.3 10e9/L    Absolute Eosinophils 0.3 0.0 - 0.7 10e9/L    Absolute Basophils 0.0 0.0 - 0.2 10e9/L    Diff Method Automated Method    Comprehensive metabolic panel   Result Value Ref Range    Sodium 139 133 - 144 mmol/L    Potassium 4.2 3.4 - 5.3 mmol/L    Chloride 106 94 - 109 mmol/L    Carbon Dioxide 22 20 - 32 mmol/L    Anion Gap 11 3 - 14 mmol/L    Glucose 98 70 - 99 mg/dL      Comment:      Fasting specimen    Urea Nitrogen 32 (H) 7 - 30 mg/dL    Creatinine 2.03 (H) 0.66 - 1.25 mg/dL    GFR Estimate 29 (L) >60 mL/min/[1.73_m2]      Comment:      Non  GFR Calc  Starting 12/18/2018, serum creatinine based estimated GFR (eGFR) will be   calculated using the Chronic Kidney Disease Epidemiology Collaboration   (CKD-EPI) equation.      GFR Estimate If Black 34 (L) >60 mL/min/[1.73_m2]      Comment:       GFR Calc  Starting 12/18/2018, serum creatinine based estimated GFR (eGFR) will be   calculated using the Chronic Kidney Disease Epidemiology Collaboration   (CKD-EPI) equation.      Calcium 9.3 8.5 - 10.1 mg/dL    Bilirubin Total 0.4 0.2 - 1.3 mg/dL    Albumin 3.6 3.4 - 5.0 g/dL    Protein Total 7.4 6.8 - 8.8 g/dL    Alkaline Phosphatase 96 40 - 150 U/L    ALT 24 0 - 70 U/L    AST 25 0 - 45 U/L   Prostate spec antigen screen   Result Value Ref Range    PSA 5.00 (H) 0 - 4 ug/L      Comment:      Assay Method:  Chemiluminescence using Siemens Vista analyzer       If you have any questions or concerns, please call the clinic at the number listed above.       Sincerely,        Remi Peterson MD

## 2019-11-04 ENCOUNTER — TELEPHONE (OUTPATIENT)
Dept: FAMILY MEDICINE | Facility: CLINIC | Age: 84
End: 2019-11-04

## 2019-11-04 DIAGNOSIS — N18.30 CKD (CHRONIC KIDNEY DISEASE) STAGE 3, GFR 30-59 ML/MIN (H): Primary | ICD-10-CM

## 2019-11-04 NOTE — TELEPHONE ENCOUNTER
"Patient called back re 10/8/19 lab letter: \"Your kidney function is off a bit. Please try to drink 6-8 eight ounce glasses of water daily. We can repeat the tests in one month.\" Lab only scheduled for Thursday. Provider to place labs.     Cough and phlegm got better with Doxycycline hyclate 100 mg. Sinus congestion, cough, and phlegm didn't go away right away and now symptoms are coming back again. What are the next steps?   "

## 2019-11-07 ENCOUNTER — ALLIED HEALTH/NURSE VISIT (OUTPATIENT)
Dept: NURSING | Facility: CLINIC | Age: 84
End: 2019-11-07
Payer: COMMERCIAL

## 2019-11-07 DIAGNOSIS — J32.0 CHRONIC MAXILLARY SINUSITIS: ICD-10-CM

## 2019-11-07 DIAGNOSIS — N18.30 CKD (CHRONIC KIDNEY DISEASE) STAGE 3, GFR 30-59 ML/MIN (H): ICD-10-CM

## 2019-11-07 DIAGNOSIS — Z23 NEED FOR VACCINATION: Primary | ICD-10-CM

## 2019-11-07 LAB
ANION GAP SERPL CALCULATED.3IONS-SCNC: 9 MMOL/L (ref 3–14)
BUN SERPL-MCNC: 30 MG/DL (ref 7–30)
CALCIUM SERPL-MCNC: 9 MG/DL (ref 8.5–10.1)
CHLORIDE SERPL-SCNC: 107 MMOL/L (ref 94–109)
CO2 SERPL-SCNC: 22 MMOL/L (ref 20–32)
CREAT SERPL-MCNC: 1.93 MG/DL (ref 0.66–1.25)
GFR SERPL CREATININE-BSD FRML MDRD: 31 ML/MIN/{1.73_M2}
GLUCOSE SERPL-MCNC: 104 MG/DL (ref 70–99)
POTASSIUM SERPL-SCNC: 3.9 MMOL/L (ref 3.4–5.3)
SODIUM SERPL-SCNC: 138 MMOL/L (ref 133–144)

## 2019-11-07 PROCEDURE — 36415 COLL VENOUS BLD VENIPUNCTURE: CPT | Performed by: FAMILY MEDICINE

## 2019-11-07 PROCEDURE — 90714 TD VACC NO PRESV 7 YRS+ IM: CPT

## 2019-11-07 PROCEDURE — 99207 ZZC NO CHARGE LOS: CPT

## 2019-11-07 PROCEDURE — 90471 IMMUNIZATION ADMIN: CPT

## 2019-11-07 PROCEDURE — 80048 BASIC METABOLIC PNL TOTAL CA: CPT | Performed by: FAMILY MEDICINE

## 2019-11-07 RX ORDER — DOXYCYCLINE HYCLATE 100 MG
100 TABLET ORAL 2 TIMES DAILY
Qty: 28 TABLET | Refills: 0 | Status: SHIPPED | OUTPATIENT
Start: 2019-11-07 | End: 2019-11-07

## 2019-11-07 RX ORDER — LEVOFLOXACIN 250 MG/1
500 TABLET, FILM COATED ORAL DAILY
Qty: 8 TABLET | Refills: 0 | Status: SHIPPED | OUTPATIENT
Start: 2019-11-07 | End: 2020-11-23

## 2019-11-21 ENCOUNTER — ALLIED HEALTH/NURSE VISIT (OUTPATIENT)
Dept: NURSING | Facility: CLINIC | Age: 84
End: 2019-11-21
Payer: COMMERCIAL

## 2019-11-21 DIAGNOSIS — Z23 NEED FOR PROPHYLACTIC VACCINATION AND INOCULATION AGAINST INFLUENZA: Primary | ICD-10-CM

## 2019-11-21 PROCEDURE — 99207 ZZC NO CHARGE NURSE ONLY: CPT

## 2019-11-21 PROCEDURE — 90662 IIV NO PRSV INCREASED AG IM: CPT

## 2019-11-21 PROCEDURE — G0008 ADMIN INFLUENZA VIRUS VAC: HCPCS

## 2019-11-25 ENCOUNTER — TELEPHONE (OUTPATIENT)
Dept: FAMILY MEDICINE | Facility: CLINIC | Age: 84
End: 2019-11-25

## 2019-11-25 NOTE — TELEPHONE ENCOUNTER
Reason for Call:  Other call back    Detailed comments: The patient called and stated that he recently got a bill in the mail for an immunization that he got on 11/7. He apparently needs to pay the bill and then provide his insurance company with an itemized receipt in order to be reimbursed for the injection. He was told that he needs to contact the billing department as we cannot print an itemized receipt here at the clinic but he stated he called multiple times and was never able to talk to anybody in person. He has questions about the specific injection that he received that day so he can give that information to his insurance company. He would like a call back to discuss this.     Phone Number Patient can be reached at: Home number on file [phone] (home)    Best Time: Any    Can we leave a detailed message on this number? YES    Call taken on 11/25/2019 at 2:48 PM by Johana Shafer

## 2019-11-25 NOTE — TELEPHONE ENCOUNTER
Pt called reporting he needs vaccine information for insurance. He received a bill from clinic but information on statement is not what his insurance needs. He was advised to contact financial services. Notes he has tried multiple times but is unable to speak to a . Pt notes he has some forms that need to be signed he will drop forms to clinic.

## 2019-12-26 DIAGNOSIS — E78.5 HYPERLIPIDEMIA LDL GOAL <100: ICD-10-CM

## 2019-12-26 NOTE — TELEPHONE ENCOUNTER
"Requested Prescriptions   Pending Prescriptions Disp Refills     gemfibrozil (LOPID) 600 MG tablet [Pharmacy Med Name: GEMFIBROZIL 600MG TABLETS] 180 tablet 3     Sig: TAKE 1 TABLET BY MOUTH TWICE DAILY  Last Written Prescription Date:  1/13/19  Last Fill Quantity: 180 tab,  # refills: 3   Last office visit: 10/7/2019 with prescribing provider:  Nicholas   Future Office Visit:         Fibrates Passed - 12/26/2019  7:48 AM        Passed - Lipid panel on file in past 12 months     Recent Labs   Lab Test 10/07/19  0916  08/10/15  0800   CHOL 168   < > 142   TRIG 145   < > 162*   HDL 39*   < > 40*   *   < > 70   NHDL 129   < >  --    VLDL  --   --  32*   CHOLHDLRATIO  --   --  3.5    < > = values in this interval not displayed.               Passed - No abnormal creatine kinase in past 12 months     No lab results found.             Passed - Recent (12 mo) or future (30 days) visit within the authorizing provider's specialty     Patient has had an office visit with the authorizing provider or a provider within the authorizing providers department within the previous 12 mos or has a future within next 30 days. See \"Patient Info\" tab in inbasket, or \"Choose Columns\" in Meds & Orders section of the refill encounter.              Passed - Medication is active on med list        Passed - Patient is age 18 or older        lovastatin (MEVACOR) 40 MG tablet [Pharmacy Med Name: LOVASTATIN 40MG TABLETS] 180 tablet 3     Sig: TAKE 2 TABLETS BY MOUTH AT BEDTIME  Last Written Prescription Date:  1/13/19  Last Fill Quantity: 180 tab,  # refills: 3   Last office visit: 10/7/2019 with prescribing provider:  Nicholas   Future Office Visit:         Statins Protocol Passed - 12/26/2019  7:48 AM        Passed - LDL on file in past 12 months     Recent Labs   Lab Test 10/07/19  0916   *             Passed - No abnormal creatine kinase in past 12 months     No lab results found.             Passed - Recent (12 mo) or future " "(30 days) visit within the authorizing provider's specialty     Patient has had an office visit with the authorizing provider or a provider within the authorizing providers department within the previous 12 mos or has a future within next 30 days. See \"Patient Info\" tab in inbasket, or \"Choose Columns\" in Meds & Orders section of the refill encounter.              Passed - Medication is active on med list        Passed - Patient is age 18 or older           "

## 2019-12-28 RX ORDER — GEMFIBROZIL 600 MG/1
TABLET, FILM COATED ORAL
Qty: 180 TABLET | Refills: 3 | Status: SHIPPED | OUTPATIENT
Start: 2019-12-28 | End: 2021-01-04

## 2019-12-28 RX ORDER — LOVASTATIN 40 MG
TABLET ORAL
Qty: 180 TABLET | Refills: 3 | Status: SHIPPED | OUTPATIENT
Start: 2019-12-28 | End: 2021-01-05

## 2020-10-08 DIAGNOSIS — I10 HYPERTENSION GOAL BP (BLOOD PRESSURE) < 140/90: Chronic | ICD-10-CM

## 2020-10-08 RX ORDER — LISINOPRIL 40 MG/1
TABLET ORAL
Qty: 90 TABLET | Refills: 0 | Status: SHIPPED | OUTPATIENT
Start: 2020-10-08 | End: 2021-01-05

## 2020-10-08 NOTE — LETTER
[facility]  [address] [id number] [phone]            Nitish Coreas  [address]  [facility] [address]        October 8, 2020    Dear Nitish,    While refilling your prescription today, we noticed that you are due for an appointment with your provider.  We will refill your prescription for 30 days, but a follow-up appointment must be made before any additional refills can be approved.     Taking care of your health is important to us and we look forward to seeing you in the near future.  Please call us at [phone] or [phone] (or use Playviews) to schedule an appointment.     Please disregard this notice if you have already made an appointment.    Sincerely,        [facility]

## 2020-10-08 NOTE — TELEPHONE ENCOUNTER
Letter sent .Adriana Hooper RN    Prescription approved per [facility] Refill Protocol.

## 2020-11-23 ENCOUNTER — OFFICE VISIT (OUTPATIENT)
Dept: FAMILY MEDICINE | Facility: CLINIC | Age: 85
End: 2020-11-23
Payer: COMMERCIAL

## 2020-11-23 VITALS
WEIGHT: 201 LBS | TEMPERATURE: 97 F | OXYGEN SATURATION: 97 % | SYSTOLIC BLOOD PRESSURE: 120 MMHG | HEART RATE: 78 BPM | HEIGHT: 66 IN | DIASTOLIC BLOOD PRESSURE: 74 MMHG | RESPIRATION RATE: 14 BRPM | BODY MASS INDEX: 32.3 KG/M2

## 2020-11-23 DIAGNOSIS — N18.31 STAGE 3A CHRONIC KIDNEY DISEASE (H): ICD-10-CM

## 2020-11-23 DIAGNOSIS — Z00.00 ENCOUNTER FOR MEDICARE ANNUAL WELLNESS EXAM: Primary | ICD-10-CM

## 2020-11-23 DIAGNOSIS — J44.9 CHRONIC OBSTRUCTIVE PULMONARY DISEASE, UNSPECIFIED COPD TYPE (H): ICD-10-CM

## 2020-11-23 DIAGNOSIS — E78.5 HYPERLIPIDEMIA LDL GOAL <100: Chronic | ICD-10-CM

## 2020-11-23 DIAGNOSIS — I10 HYPERTENSION GOAL BP (BLOOD PRESSURE) < 140/90: Chronic | ICD-10-CM

## 2020-11-23 DIAGNOSIS — Z12.5 SCREENING FOR PROSTATE CANCER: ICD-10-CM

## 2020-11-23 LAB
ALBUMIN SERPL-MCNC: 3.5 G/DL (ref 3.4–5)
ALBUMIN UR-MCNC: >=300 MG/DL
ALP SERPL-CCNC: 130 U/L (ref 40–150)
ALT SERPL W P-5'-P-CCNC: 24 U/L (ref 0–70)
ANION GAP SERPL CALCULATED.3IONS-SCNC: 4 MMOL/L (ref 3–14)
APPEARANCE UR: CLEAR
AST SERPL W P-5'-P-CCNC: 26 U/L (ref 0–45)
BASOPHILS # BLD AUTO: 0 10E9/L (ref 0–0.2)
BASOPHILS NFR BLD AUTO: 0.2 %
BILIRUB SERPL-MCNC: 0.4 MG/DL (ref 0.2–1.3)
BILIRUB UR QL STRIP: NEGATIVE
BUN SERPL-MCNC: 30 MG/DL (ref 7–30)
CALCIUM SERPL-MCNC: 9.4 MG/DL (ref 8.5–10.1)
CHLORIDE SERPL-SCNC: 111 MMOL/L (ref 94–109)
CHOLEST SERPL-MCNC: 204 MG/DL
CO2 SERPL-SCNC: 25 MMOL/L (ref 20–32)
COLOR UR AUTO: YELLOW
CREAT SERPL-MCNC: 2.14 MG/DL (ref 0.66–1.25)
DIFFERENTIAL METHOD BLD: NORMAL
EOSINOPHIL # BLD AUTO: 0.3 10E9/L (ref 0–0.7)
EOSINOPHIL NFR BLD AUTO: 3.6 %
ERYTHROCYTE [DISTWIDTH] IN BLOOD BY AUTOMATED COUNT: 12.7 % (ref 10–15)
GFR SERPL CREATININE-BSD FRML MDRD: 27 ML/MIN/{1.73_M2}
GLUCOSE SERPL-MCNC: 109 MG/DL (ref 70–99)
GLUCOSE UR STRIP-MCNC: NEGATIVE MG/DL
HCT VFR BLD AUTO: 44.2 % (ref 40–53)
HDLC SERPL-MCNC: 39 MG/DL
HGB BLD-MCNC: 14.6 G/DL (ref 13.3–17.7)
HGB UR QL STRIP: ABNORMAL
KETONES UR STRIP-MCNC: NEGATIVE MG/DL
LDLC SERPL CALC-MCNC: 117 MG/DL
LEUKOCYTE ESTERASE UR QL STRIP: NEGATIVE
LYMPHOCYTES # BLD AUTO: 2.5 10E9/L (ref 0.8–5.3)
LYMPHOCYTES NFR BLD AUTO: 30.3 %
MCH RBC QN AUTO: 32.6 PG (ref 26.5–33)
MCHC RBC AUTO-ENTMCNC: 33 G/DL (ref 31.5–36.5)
MCV RBC AUTO: 99 FL (ref 78–100)
MONOCYTES # BLD AUTO: 1 10E9/L (ref 0–1.3)
MONOCYTES NFR BLD AUTO: 12 %
NEUTROPHILS # BLD AUTO: 4.4 10E9/L (ref 1.6–8.3)
NEUTROPHILS NFR BLD AUTO: 53.9 %
NITRATE UR QL: NEGATIVE
NONHDLC SERPL-MCNC: 165 MG/DL
PH UR STRIP: 6 PH (ref 5–7)
PLATELET # BLD AUTO: 239 10E9/L (ref 150–450)
POTASSIUM SERPL-SCNC: 4.3 MMOL/L (ref 3.4–5.3)
PROT SERPL-MCNC: 7.5 G/DL (ref 6.8–8.8)
PSA SERPL-ACNC: 5.3 UG/L (ref 0–4)
RBC # BLD AUTO: 4.48 10E12/L (ref 4.4–5.9)
RBC #/AREA URNS AUTO: ABNORMAL /HPF
SODIUM SERPL-SCNC: 140 MMOL/L (ref 133–144)
SOURCE: ABNORMAL
SP GR UR STRIP: >1.03 (ref 1–1.03)
TRIGL SERPL-MCNC: 240 MG/DL
UROBILINOGEN UR STRIP-ACNC: 0.2 EU/DL (ref 0.2–1)
WBC # BLD AUTO: 8.1 10E9/L (ref 4–11)
WBC #/AREA URNS AUTO: ABNORMAL /HPF

## 2020-11-23 PROCEDURE — 36415 COLL VENOUS BLD VENIPUNCTURE: CPT | Performed by: FAMILY MEDICINE

## 2020-11-23 PROCEDURE — 80053 COMPREHEN METABOLIC PANEL: CPT | Performed by: FAMILY MEDICINE

## 2020-11-23 PROCEDURE — 80061 LIPID PANEL: CPT | Performed by: FAMILY MEDICINE

## 2020-11-23 PROCEDURE — 85025 COMPLETE CBC W/AUTO DIFF WBC: CPT | Performed by: FAMILY MEDICINE

## 2020-11-23 PROCEDURE — G0103 PSA SCREENING: HCPCS | Performed by: FAMILY MEDICINE

## 2020-11-23 PROCEDURE — 81001 URINALYSIS AUTO W/SCOPE: CPT | Performed by: FAMILY MEDICINE

## 2020-11-23 PROCEDURE — 99397 PER PM REEVAL EST PAT 65+ YR: CPT | Performed by: FAMILY MEDICINE

## 2020-11-23 ASSESSMENT — MIFFLIN-ST. JEOR: SCORE: 1534.48

## 2020-11-23 ASSESSMENT — ACTIVITIES OF DAILY LIVING (ADL): CURRENT_FUNCTION: NO ASSISTANCE NEEDED

## 2020-11-23 NOTE — PATIENT INSTRUCTIONS
Patient Education   Personalized Prevention Plan  You are due for the preventive services outlined below.  Your care team is available to assist you in scheduling these services.  If you have already completed any of these items, please share that information with your care team to update in your medical record.  Health Maintenance Due   Topic Date Due     Zoster (Shingles) Vaccine (1 of 2) 09/04/1984     Flu Vaccine (1) 09/01/2020     Cholesterol Lab  10/07/2020     FALL RISK ASSESSMENT  10/07/2020       Exercise for a Healthier Heart     Exercise with a friend. When activity is fun, you're more likely to stick with it.   You may wonder how you can improve the health of your heart. If you re thinking about exercise, you re on the right track. You don t need to become an athlete. But you do need a certain amount of brisk exercise to help strengthen your heart. If you have been diagnosed with a heart condition, your healthcare provider may advise exercise to help stabilize your condition. To help make exercise a habit, choose safe, fun activities.   Before you start  Check with your healthcare provider before starting an exercise program. This is especially important if you have not been active for a while. It's also important if you have a long-term (chronic) health problem such as heart disease, diabetes, or obesity. Or if you are at high risk for having these problems.   Why exercise?  Exercising regularly offers many healthy rewards. It can help you do all of the following:    Improve your blood cholesterol level to help prevent further heart trouble    Lower your blood pressure to help prevent a stroke or heart attack    Control diabetes, or reduce your risk of getting this disease    Improve your heart and lung function    Reach and stay at a healthy weight    Make your muscles stronger so you can stay active    Prevent falls and fractures by slowing the loss of bone mass (osteoporosis)    Manage stress  better    Reduce your blood pressure    Improve your sense of self and your body image  Exercise tips      Ease into your routine. Set small goals. Then build on them. If you are not sure what your activity level should be, talk with your healthcare provider first before starting an exercise routine.    Exercise on most days. Aim for a total of 150 minutes (2 hours and 30 minutes) or more of moderate-intensity aerobic activity each week. Or 75 minutes (1 hour and 15 minutes) or more of vigorous-intensity aerobic activity each week. Or try for a combination of both. Moderate activity means that you breathe heavier and your heart rate increases but you can still talk. Think about doing 40 minutes of moderate exercise, 3 to 4 times a week. For best results, activity should last for about 40 minutes to lower blood pressure and cholesterol. It's OK to work up to the 40-minute period over time. Examples of moderate-intensity activity are walking 1 mile in 15 minutes. Or doing 30 to 45 minutes of yard work.    Step up your daily activity level.  Along with your exercise program, try being more active the whole day. Walk instead of drive. Or park further away so that you take more steps each day. Do more household tasks or yard work. You may not be able to meet the advised mount of physical activity. But doing some moderate- or vigorous-intensity aerobic activity can help reduce your risk for heart disease. Your healthcare provider can help you figure out what is best for you.    Choose 1 or more activities you enjoy.  Walking is one of the easiest things you can do. You can also try swimming, riding a bike, dancing, or taking an exercise class.    When to call your healthcare provider  Call your healthcare provider if you have any of these:     Chest pain or feel dizzy or lightheaded    Burning, tightness, pressure, or heaviness in your chest, neck, shoulders, back, or arms    Abnormal shortness of breath    More joint or  muscle pain    A very fast or irregular heartbeat (palpitations)  [email or web address] last reviewed this educational content on 7/1/2019 2000-2020 [employer]. [address] [place] [address], [place] [address]. All rights reserved. This information is not intended as a substitute for professional medical care. Always follow your healthcare professional's instructions.          Understanding USDA MyPlate  The USDA (U.S. Department of Agriculture) has guidelines to help you make healthy food choices. These are called MyPlate. MyPlate shows the food groups that make up healthy meals using the image of a place setting. Before you eat, think about the healthiest choices for what to put onto your plate or into your cup or bowl. To learn more about building a healthy plate, visit www.choosemyplate.gov.    The food groups    Fruits. Any fruit or 100% fruit juice counts as part of the Fruit Group. Fruits may be fresh, canned, frozen, or dried, and may be whole, cut-up, or pureed. Make half your plate fruits and vegetables.    Vegetables. Any vegetable or 100% vegetable juice counts as a member of the Vegetable Group. Vegetables may be fresh, frozen, canned, or dried. They can be served raw or cooked and may be whole, cut-up, or mashed. Make half your plate fruits and vegetables.    Grains. All foods made from grains are part of the Grains Group. These include wheat, rice, oats, cornmeal, and barley such as bread, pasta, oatmeal, cereal, tortillas, and grits. Grains should be no more than a quarter of your plate. At least half of your grains should be whole grains.    Protein. This group includes meat, poultry, seafood, beans and peas, eggs, processed soy products (like tofu), nuts (including nut butters), and seeds. Make protein choices no more than a quarter of your plate. Meat and poultry choices should be lean or low fat.    Dairy. All fluid milk products and foods made from milk that contain calcium, like yogurt and  cheese, are part of the Dairy Group. (Foods that have little calcium, such as cream, butter, and cream cheese, are not part of the group.) Most dairy choices should be low-fat or fat-free.    Oils. These are fats that are liquid at room temperature. They include canola, corn, olive, soybean, and sunflower oil. Foods that are mainly oil include mayonnaise, certain salad dressings, and soft margarines. You should have only 5 to 7 teaspoons of oils a day. You probably already get this much from the food you eat.  StayWell last reviewed this educational content on 8/1/2017 2000-2020 [employer]. [address] [place] [address], [place] [address]. All rights reserved. This information is not intended as a substitute for professional medical care. Always follow your healthcare professional's instructions.

## 2020-11-23 NOTE — PROGRESS NOTES
"SUBJECTIVE:   Nitish Coreas is a 86 year old male who presents for Preventive Visit.      Patient has been advised of split billing requirements and indicates understanding: Yes   Are you in the first 12 months of your Medicare coverage?  No    Healthy Habits:     In general, how would you rate your overall health?  Good    Frequency of exercise:  None    Do you usually eat at least 4 servings of fruit and vegetables a day, include whole grains    & fiber and avoid regularly eating high fat or \"junk\" foods?  No    Taking medications regularly:  Yes    Medication side effects:  None    Ability to successfully perform activities of daily living:  No assistance needed    Home Safety:  Lack of grab bars in the bathroom and no safety concerns identified    Hearing Impairment:  No hearing concerns    In the past 6 months, have you been bothered by leaking of urine?  No    In general, how would you rate your overall mental or emotional health?  Good      PHQ-2 Total Score: 0    Additional concerns today:  No    Do you feel safe in your environment? Yes    Have you ever done Advance Care Planning? (For example, a Health Directive, POLST, or a discussion with a medical provider or your loved ones about your wishes): Yes, advance care planning is on file.      Fall risk  Fallen 2 or more times in the past year?: No  Any fall with injury in the past year?: No  click delete button to remove this line now  Cognitive Screening   1) Repeat 3 items (Leader, Season, Table)    2) Clock draw: NORMAL  3) 3 item recall: Recalls 2 objects   Results: NORMAL clock, 1-2 items recalled: COGNITIVE IMPAIRMENT LESS LIKELY    Mini-CogTM Copyright HUGO Niet[employer]. Licensed by the author for use in [facility]; reprinted with permission (halle@.[facility]). All rights reserved.      Do you have sleep apnea, excessive snoring or daytime drowsiness?: yes    Reviewed and updated as needed this visit by clinical staff  Tobacco  Allergies  " Meds   Med Hx  Surg Hx  Fam Hx  Soc Hx        Reviewed and updated as needed this visit by Provider                Social History     Tobacco Use     Smoking status: Former Smoker     Packs/day: 4.00     Years: 45.00     Pack years: 180.00     Types: Cigarettes     Quit date: 1991     Years since quittin.5     Smokeless tobacco: Never Used   Substance Use Topics     Alcohol use: Yes     Alcohol/week: 0.0 standard drinks     Comment: -2 friday night- cocktail     If you drink alcohol do you typically have >3 drinks per day or >7 drinks per week? No    Alcohol Use 2020   Prescreen: >3 drinks/day or >7 drinks/week? No   Prescreen: >3 drinks/day or >7 drinks/week? -   No flowsheet data found.        Current providers sharing in care for this patient include:   Patient Care Team:  Remi Peterson MD as PCP - General (Family Practice)  Remi Peterson MD as Assigned PCP    The following health maintenance items are reviewed in Epic and correct as of today:  Health Maintenance   Topic Date Due     ZOSTER IMMUNIZATION (1 of 2) 1984     FALL RISK ASSESSMENT  10/07/2020     MEDICARE ANNUAL WELLNESS VISIT  2021     LIPID  [id number]     ADVANCE CARE PLANNING  2025     DTAP/TDAP/TD IMMUNIZATION (3 - Td) 2029     SPIROMETRY  Completed     COPD ACTION PLAN  Completed     PHQ-2  Completed     INFLUENZA VACCINE  Completed     Pneumococcal Vaccine: 65+ Years  Completed     Pneumococcal Vaccine: Pediatrics (0 to 5 Years) and At-Risk Patients (6 to 64 Years)  Aged Out     IPV IMMUNIZATION  Aged Out     MENINGITIS IMMUNIZATION  Aged Out     HEPATITIS B IMMUNIZATION  Aged Out     Patient Active Problem List   Diagnosis     Hyperlipidemia LDL goal <100     Hypertension goal BP (blood pressure) < 140/90     Renal insufficiency     Chronic rhinitis     Constipation     Advanced directives, counseling/discussion     BMI 30-35     Status post total knee replacement, unspecified  "laterality     Chronic obstructive pulmonary disease, unspecified COPD type (H)     Former tobacco use     CKD (chronic kidney disease) stage 3, GFR 30-59 ml/min     Screening for prostate cancer     Past Surgical History:   Procedure Laterality Date     C REPLANTATION THUMB DISTAL,COMPLETE      cut off right thumb and repair     C TOTAL KNEE ARTHROPLASTY  2000    left then right     DENTAL SURGERY      wisdom teeth     EYE SURGERY Right  and     macular pucker and cataract     TONSILLECTOMY & ADENOIDECTOMY         Social History     Tobacco Use     Smoking status: Former Smoker     Packs/day: 4.00     Years: 45.00     Pack years: 180.00     Types: Cigarettes     Quit date: 1991     Years since quittin.5     Smokeless tobacco: Never Used   Substance Use Topics     Alcohol use: Yes     Alcohol/week: 0.0 standard drinks     Comment: -2 friday night- cocktail     Family History   Problem Relation Age of Onset     Cancer Mother      Respiratory Mother      Heart Disease Father      Cerebrovascular Disease Father      Cerebrovascular Disease Sister         CVA x 2     Dementia Sister      Heart Disease Son      Breast Cancer Daughter              Review of Systems  Constitutional, HEENT, cardiovascular, pulmonary, GI, , musculoskeletal, neuro, skin, endocrine and psych systems are negative, except as otherwise noted.    OBJECTIVE:   /74   Pulse 78   Temp 97  F (36.1  C) (Tympanic)   Resp 14   Ht 1.676 m (5' 6\")   Wt 91.2 kg (201 lb)   SpO2 97%   BMI 32.44 kg/m   Estimated body mass index is 32.44 kg/m  as calculated from the following:    Height as of this encounter: 1.676 m (5' 6\").    Weight as of this encounter: 91.2 kg (201 lb).  Physical Exam  GENERAL: healthy, alert and no distress  EYES: Eyes grossly normal to inspection, PERRL and conjunctivae and sclerae normal  HENT: ear canals and TM's normal, nose and mouth without ulcers or lesions  NECK: no adenopathy, no " "asymmetry, masses, or scars and thyroid normal to palpation  RESP: lungs clear to auscultation - no rales, rhonchi or wheezes  CV: regular rate and rhythm, normal S1 S2, no S3 or S4, no murmur, click or rub, no peripheral edema and peripheral pulses strong  ABDOMEN: soft, nontender, no hepatosplenomegaly, no masses and bowel sounds normal  MS: no gross musculoskeletal defects noted, no edema  SKIN: no suspicious lesions or rashes  NEURO: Normal strength and tone, mentation intact and speech normal  PSYCH: mentation appears normal, affect normal/bright  LYMPH: no cervical, supraclavicular, axillary, or inguinal adenopathy        ASSESSMENT / PLAN:       ICD-10-CM    1. Encounter for Medicare annual wellness exam  Z00.00    2. Hypertension goal BP (blood pressure) < 140/90  I10 UA with Microscopic     CBC with platelets differential     Comprehensive metabolic panel   3. Chronic obstructive pulmonary disease, unspecified COPD type (H)  J44.9    4. Stage 3a chronic kidney disease  N18.31    5. Hyperlipidemia LDL goal <100  E78.5 Lipid Profile   6. Screening for prostate cancer  Z12.5 Prostate spec antigen screen       Patient has been advised of split billing requirements and indicates understanding: Yes  COUNSELING:  Reviewed preventive health counseling, as reflected in patient instructions       Regular exercise       Healthy diet/nutrition    Estimated body mass index is 32.44 kg/m  as calculated from the following:    Height as of this encounter: 1.676 m (5' 6\").    Weight as of this encounter: 91.2 kg (201 lb).    Weight management plan: Discussed healthy diet and exercise guidelines    He reports that he quit smoking about 29 years ago. His smoking use included cigarettes. He has a 180.00 pack-year smoking history. He has never used smokeless tobacco.      Appropriate preventive services were discussed with this patient, including applicable screening as appropriate for cardiovascular disease, diabetes, " osteopenia/osteoporosis, and glaucoma.  As appropriate for age/gender, discussed screening for colorectal cancer, prostate cancer, breast cancer, and cervical cancer. Checklist reviewing preventive services available has been given to the patient.    Reviewed patients plan of care and provided an AVS. The Basic Care Plan (routine screening as documented in Health Maintenance) for Nitish meets the Care Plan requirement. This Care Plan has been established and reviewed with the Patient.    Counseling Resources:  ATP IV Guidelines  Pooled Cohorts Equation Calculator  Breast Cancer Risk Calculator  Breast Cancer: Medication to Reduce Risk  FRAX Risk Assessment  ICSI Preventive Guidelines  Dietary Guidelines for Americans, 2010  [employer]'s MyPlate  ASA Prophylaxis  Lung CA Screening    Remi Peterson MD  [facility]    Identified Health Risks:    He is at risk for lack of exercise and has been provided with information to increase physical activity for the benefit of his well-being.  The patient was counseled and encouraged to consider modifying their diet and eating habits. He was provided with information on recommended healthy diet options.

## 2020-11-23 NOTE — LETTER
November 25, 2020      Nitish Coreas  [id number] [facility] [address]        Dear ,    We are writing to inform you of your test results.    This is all pretty stable and can be repeated in 6 months when we do your annual wellness exam.    Resulted Orders   UA with Microscopic   Result Value Ref Range    Color Urine Yellow     Appearance Urine Clear     Glucose Urine Negative NEG^Negative mg/dL    Bilirubin Urine Negative NEG^Negative    Ketones Urine Negative NEG^Negative mg/dL    Specific Gravity Urine >1.030 1.003 - 1.035    pH Urine 6.0 5.0 - 7.0 pH    Protein Albumin Urine >=300 (A) NEG^Negative mg/dL    Urobilinogen Urine 0.2 0.2 - 1.0 EU/dL    Nitrite Urine Negative NEG^Negative    Blood Urine Trace (A) NEG^Negative    Leukocyte Esterase Urine Negative NEG^Negative    Source Midstream Urine     WBC Urine 0 - 5 OTO5^0 - 5 /HPF    RBC Urine O - 2 OTO2^O - 2 /HPF   CBC with platelets differential   Result Value Ref Range    WBC 8.1 4.0 - 11.0 10e9/L    RBC Count 4.48 4.4 - 5.9 10e12/L    Hemoglobin 14.6 13.3 - 17.7 g/dL    Hematocrit 44.2 40.0 - 53.0 %    MCV 99 78 - 100 fl    MCH 32.6 26.5 - 33.0 pg    MCHC 33.0 31.5 - 36.5 g/dL    RDW 12.7 10.0 - 15.0 %    Platelet Count 239 150 - 450 10e9/L    % Neutrophils 53.9 %    % Lymphocytes 30.3 %    % Monocytes 12.0 %    % Eosinophils 3.6 %    % Basophils 0.2 %    Absolute Neutrophil 4.4 1.6 - 8.3 10e9/L    Absolute Lymphocytes 2.5 0.8 - 5.3 10e9/L    Absolute Monocytes 1.0 0.0 - 1.3 10e9/L    Absolute Eosinophils 0.3 0.0 - 0.7 10e9/L    Absolute Basophils 0.0 0.0 - 0.2 10e9/L    Diff Method Automated Method    Comprehensive metabolic panel   Result Value Ref Range    Sodium 140 133 - 144 mmol/L    Potassium 4.3 3.4 - 5.3 mmol/L    Chloride 111 (H) 94 - 109 mmol/L    Carbon Dioxide 25 20 - 32 mmol/L    Anion Gap 4 3 - 14 mmol/L    Glucose 109 (H) 70 - 99 mg/dL      Comment:      Fasting specimen    Urea Nitrogen 30 7 - 30 mg/dL     Creatinine 2.14 (H) 0.66 - 1.25 mg/dL    GFR Estimate 27 (L) >60 mL/min/[1.73_m2]      Comment:      Non  GFR Calc  Starting 12/18/2018, serum creatinine based estimated GFR (eGFR) will be   calculated using the Chronic Kidney Disease Epidemiology Collaboration   (CKD-EPI) equation.      GFR Estimate If Black 31 (L) >60 mL/min/[1.73_m2]      Comment:       GFR Calc  Starting 12/18/2018, serum creatinine based estimated GFR (eGFR) will be   calculated using the Chronic Kidney Disease Epidemiology Collaboration   (CKD-EPI) equation.      Calcium 9.4 8.5 - 10.1 mg/dL    Bilirubin Total 0.4 0.2 - 1.3 mg/dL    Albumin 3.5 3.4 - 5.0 g/dL    Protein Total 7.5 6.8 - 8.8 g/dL    Alkaline Phosphatase 130 40 - 150 U/L    ALT 24 0 - 70 U/L    AST 26 0 - 45 U/L   Lipid Profile   Result Value Ref Range    Cholesterol 204 (H) <200 mg/dL      Comment:      Desirable:       <200 mg/dl    Triglycerides 240 (H) <150 mg/dL      Comment:      Borderline high:  150-199 mg/dl  High:             200-499 mg/dl  Very high:       >499 mg/dl  Fasting specimen      HDL Cholesterol 39 (L) >39 mg/dL    LDL Cholesterol Calculated 117 (H) <100 mg/dL      Comment:      Above desirable:  100-129 mg/dl  Borderline High:  130-159 mg/dL  High:             160-189 mg/dL  Very high:       >189 mg/dl      Non HDL Cholesterol 165 (H) <130 mg/dL      Comment:      Above Desirable:  130-159 mg/dl  Borderline high:  160-189 mg/dl  High:             190-219 mg/dl  Very high:       >219 mg/dl     Prostate spec antigen screen   Result Value Ref Range    PSA 5.30 (H) 0 - 4 ug/L      Comment:      Assay Method:  Chemiluminescence using Siemens Vista analyzer       If you have any questions or concerns, please call the clinic at the number listed above.       Sincerely,        Remi Peterson MD

## 2021-01-04 DIAGNOSIS — E78.5 HYPERLIPIDEMIA LDL GOAL <100: ICD-10-CM

## 2021-01-04 DIAGNOSIS — I10 HYPERTENSION GOAL BP (BLOOD PRESSURE) < 140/90: Chronic | ICD-10-CM

## 2021-01-04 NOTE — TELEPHONE ENCOUNTER
Reason for Call:  Other prescription    Detailed comments: Patient is checking status of refill request and asking for this to be sent to Dr. Peterson    Phone Number Patient can be reached at: Home number on file [phone] (home)    Best Time: any    Can we leave a detailed message on this number? YES    Call taken on 1/4/2021 at 9:06 AM by Yesi Pacheco

## 2021-01-05 RX ORDER — LISINOPRIL 40 MG/1
TABLET ORAL
Qty: 90 TABLET | Refills: 0 | Status: SHIPPED | OUTPATIENT
Start: 2021-01-05 | End: 2021-04-18

## 2021-01-05 RX ORDER — GEMFIBROZIL 600 MG/1
600 TABLET, FILM COATED ORAL 2 TIMES DAILY
Qty: 180 TABLET | Refills: 3 | Status: SHIPPED | OUTPATIENT
Start: 2021-01-05 | End: 2022-01-13

## 2021-01-05 RX ORDER — LOVASTATIN 40 MG
TABLET ORAL
Qty: 180 TABLET | Refills: 3 | Status: SHIPPED | OUTPATIENT
Start: 2021-01-05 | End: 2021-04-18

## 2021-02-11 ENCOUNTER — AMBULATORY - HEALTHEAST (OUTPATIENT)
Dept: NURSING | Facility: CLINIC | Age: 86
End: 2021-02-11

## 2021-03-04 ENCOUNTER — AMBULATORY - HEALTHEAST (OUTPATIENT)
Dept: NURSING | Facility: CLINIC | Age: 86
End: 2021-03-04

## 2021-04-18 ENCOUNTER — HOSPITAL ENCOUNTER (INPATIENT)
Facility: CLINIC | Age: 86
LOS: 4 days | Discharge: HOME | DRG: 417 | End: 2021-04-22
Attending: EMERGENCY MEDICINE | Admitting: HOSPITALIST
Payer: COMMERCIAL

## 2021-04-18 ENCOUNTER — APPOINTMENT (OUTPATIENT)
Dept: ULTRASOUND IMAGING | Facility: CLINIC | Age: 86
DRG: 417 | End: 2021-04-18
Attending: EMERGENCY MEDICINE
Payer: COMMERCIAL

## 2021-04-18 ENCOUNTER — APPOINTMENT (OUTPATIENT)
Dept: CT IMAGING | Facility: CLINIC | Age: 86
DRG: 417 | End: 2021-04-18
Attending: EMERGENCY MEDICINE
Payer: COMMERCIAL

## 2021-04-18 ENCOUNTER — NURSE TRIAGE (OUTPATIENT)
Dept: NURSING | Facility: CLINIC | Age: 86
End: 2021-04-18

## 2021-04-18 DIAGNOSIS — J44.9 CHRONIC OBSTRUCTIVE PULMONARY DISEASE, UNSPECIFIED COPD TYPE (H): Primary | ICD-10-CM

## 2021-04-18 DIAGNOSIS — K80.50 CHOLEDOCHOLITHIASIS: ICD-10-CM

## 2021-04-18 DIAGNOSIS — I10 HYPERTENSION GOAL BP (BLOOD PRESSURE) < 140/90: Chronic | ICD-10-CM

## 2021-04-18 LAB
ALBUMIN SERPL-MCNC: 3.1 G/DL (ref 3.4–5)
ALBUMIN UR-MCNC: 600 MG/DL
ALP SERPL-CCNC: 123 U/L (ref 40–150)
ALT SERPL W P-5'-P-CCNC: 128 U/L (ref 0–70)
ANION GAP SERPL CALCULATED.3IONS-SCNC: 8 MMOL/L (ref 3–14)
APPEARANCE UR: CLEAR
AST SERPL W P-5'-P-CCNC: 140 U/L (ref 0–45)
BACTERIA #/AREA URNS HPF: ABNORMAL /HPF
BASOPHILS # BLD AUTO: 0 10E9/L (ref 0–0.2)
BASOPHILS NFR BLD AUTO: 0.1 %
BILIRUB SERPL-MCNC: 7.9 MG/DL (ref 0.2–1.3)
BILIRUB UR QL STRIP: ABNORMAL
BUN SERPL-MCNC: 36 MG/DL (ref 7–30)
CALCIUM SERPL-MCNC: 8.8 MG/DL (ref 8.5–10.1)
CHLORIDE SERPL-SCNC: 102 MMOL/L (ref 94–109)
CO2 SERPL-SCNC: 25 MMOL/L (ref 20–32)
COLOR UR AUTO: ABNORMAL
CREAT SERPL-MCNC: 2.42 MG/DL (ref 0.66–1.25)
DIFFERENTIAL METHOD BLD: ABNORMAL
EOSINOPHIL # BLD AUTO: 0 10E9/L (ref 0–0.7)
EOSINOPHIL NFR BLD AUTO: 0 %
ERYTHROCYTE [DISTWIDTH] IN BLOOD BY AUTOMATED COUNT: 12.7 % (ref 10–15)
GFR SERPL CREATININE-BSD FRML MDRD: 23 ML/MIN/{1.73_M2}
GLUCOSE SERPL-MCNC: 150 MG/DL (ref 70–99)
GLUCOSE UR STRIP-MCNC: 200 MG/DL
GRAN CASTS #/AREA URNS LPF: 3 /LPF
HCT VFR BLD AUTO: 43.1 % (ref 40–53)
HGB BLD-MCNC: 14.8 G/DL (ref 13.3–17.7)
HGB UR QL STRIP: ABNORMAL
HYALINE CASTS #/AREA URNS LPF: 4 /LPF (ref 0–2)
IMM GRANULOCYTES # BLD: 0.1 10E9/L (ref 0–0.4)
IMM GRANULOCYTES NFR BLD: 0.6 %
INTERPRETATION ECG - MUSE: NORMAL
KETONES UR STRIP-MCNC: NEGATIVE MG/DL
LABORATORY COMMENT REPORT: NORMAL
LACTATE BLD-SCNC: 0.7 MMOL/L (ref 0.7–2)
LEUKOCYTE ESTERASE UR QL STRIP: NEGATIVE
LIPASE SERPL-CCNC: 91 U/L (ref 73–393)
LYMPHOCYTES # BLD AUTO: 0.9 10E9/L (ref 0.8–5.3)
LYMPHOCYTES NFR BLD AUTO: 5.4 %
MCH RBC QN AUTO: 33.2 PG (ref 26.5–33)
MCHC RBC AUTO-ENTMCNC: 34.3 G/DL (ref 31.5–36.5)
MCV RBC AUTO: 97 FL (ref 78–100)
MONOCYTES # BLD AUTO: 1.4 10E9/L (ref 0–1.3)
MONOCYTES NFR BLD AUTO: 8.6 %
MUCOUS THREADS #/AREA URNS LPF: PRESENT /LPF
NEUTROPHILS # BLD AUTO: 14.3 10E9/L (ref 1.6–8.3)
NEUTROPHILS NFR BLD AUTO: 85.3 %
NITRATE UR QL: NEGATIVE
NRBC # BLD AUTO: 0 10*3/UL
NRBC BLD AUTO-RTO: 0 /100
PH UR STRIP: 6.5 PH (ref 5–7)
PLATELET # BLD AUTO: 194 10E9/L (ref 150–450)
POTASSIUM SERPL-SCNC: 3.9 MMOL/L (ref 3.4–5.3)
PROT SERPL-MCNC: 7.4 G/DL (ref 6.8–8.8)
RBC # BLD AUTO: 4.46 10E12/L (ref 4.4–5.9)
RBC #/AREA URNS AUTO: 3 /HPF (ref 0–2)
SARS-COV-2 RNA RESP QL NAA+PROBE: NEGATIVE
SODIUM SERPL-SCNC: 135 MMOL/L (ref 133–144)
SOURCE: ABNORMAL
SP GR UR STRIP: 1.02 (ref 1–1.03)
SPECIMEN SOURCE: NORMAL
SQUAMOUS #/AREA URNS AUTO: 1 /HPF (ref 0–1)
UROBILINOGEN UR STRIP-MCNC: 0 MG/DL (ref 0–2)
WBC # BLD AUTO: 16.7 10E9/L (ref 4–11)
WBC #/AREA URNS AUTO: 2 /HPF (ref 0–5)

## 2021-04-18 PROCEDURE — 93005 ELECTROCARDIOGRAM TRACING: CPT

## 2021-04-18 PROCEDURE — 96375 TX/PRO/DX INJ NEW DRUG ADDON: CPT

## 2021-04-18 PROCEDURE — 83605 ASSAY OF LACTIC ACID: CPT | Performed by: HOSPITALIST

## 2021-04-18 PROCEDURE — C9803 HOPD COVID-19 SPEC COLLECT: HCPCS

## 2021-04-18 PROCEDURE — 120N000001 HC R&B MED SURG/OB

## 2021-04-18 PROCEDURE — 83690 ASSAY OF LIPASE: CPT | Performed by: EMERGENCY MEDICINE

## 2021-04-18 PROCEDURE — 250N000011 HC RX IP 250 OP 636: Performed by: EMERGENCY MEDICINE

## 2021-04-18 PROCEDURE — 99223 1ST HOSP IP/OBS HIGH 75: CPT | Mod: AI | Performed by: HOSPITALIST

## 2021-04-18 PROCEDURE — 258N000003 HC RX IP 258 OP 636: Performed by: HOSPITALIST

## 2021-04-18 PROCEDURE — 96361 HYDRATE IV INFUSION ADD-ON: CPT

## 2021-04-18 PROCEDURE — 87635 SARS-COV-2 COVID-19 AMP PRB: CPT | Performed by: EMERGENCY MEDICINE

## 2021-04-18 PROCEDURE — 85025 COMPLETE CBC W/AUTO DIFF WBC: CPT | Performed by: EMERGENCY MEDICINE

## 2021-04-18 PROCEDURE — 99285 EMERGENCY DEPT VISIT HI MDM: CPT | Mod: 25

## 2021-04-18 PROCEDURE — 81001 URINALYSIS AUTO W/SCOPE: CPT | Performed by: EMERGENCY MEDICINE

## 2021-04-18 PROCEDURE — 258N000003 HC RX IP 258 OP 636: Performed by: EMERGENCY MEDICINE

## 2021-04-18 PROCEDURE — 80053 COMPREHEN METABOLIC PANEL: CPT | Performed by: EMERGENCY MEDICINE

## 2021-04-18 PROCEDURE — 71250 CT THORAX DX C-: CPT

## 2021-04-18 PROCEDURE — 76705 ECHO EXAM OF ABDOMEN: CPT

## 2021-04-18 PROCEDURE — 250N000011 HC RX IP 250 OP 636: Performed by: HOSPITALIST

## 2021-04-18 PROCEDURE — 36415 COLL VENOUS BLD VENIPUNCTURE: CPT | Performed by: HOSPITALIST

## 2021-04-18 PROCEDURE — 250N000009 HC RX 250: Performed by: HOSPITALIST

## 2021-04-18 PROCEDURE — 96367 TX/PROPH/DG ADDL SEQ IV INF: CPT

## 2021-04-18 PROCEDURE — 96365 THER/PROPH/DIAG IV INF INIT: CPT

## 2021-04-18 RX ORDER — SODIUM CHLORIDE 9 MG/ML
INJECTION, SOLUTION INTRAVENOUS CONTINUOUS
Status: DISCONTINUED | OUTPATIENT
Start: 2021-04-18 | End: 2021-04-21

## 2021-04-18 RX ORDER — AMOXICILLIN 250 MG
2 CAPSULE ORAL 2 TIMES DAILY PRN
Status: DISCONTINUED | OUTPATIENT
Start: 2021-04-18 | End: 2021-04-22 | Stop reason: HOSPADM

## 2021-04-18 RX ORDER — CETIRIZINE HYDROCHLORIDE 10 MG/1
10 TABLET ORAL DAILY
COMMUNITY
End: 2022-05-16

## 2021-04-18 RX ORDER — ACETAMINOPHEN 650 MG/1
650 SUPPOSITORY RECTAL EVERY 6 HOURS PRN
Status: DISCONTINUED | OUTPATIENT
Start: 2021-04-18 | End: 2021-04-22 | Stop reason: HOSPADM

## 2021-04-18 RX ORDER — HYDROMORPHONE HYDROCHLORIDE 1 MG/ML
.3-.5 INJECTION, SOLUTION INTRAMUSCULAR; INTRAVENOUS; SUBCUTANEOUS
Status: DISCONTINUED | OUTPATIENT
Start: 2021-04-18 | End: 2021-04-22 | Stop reason: HOSPADM

## 2021-04-18 RX ORDER — LISINOPRIL 40 MG/1
40 TABLET ORAL DAILY
Status: ON HOLD | COMMUNITY
End: 2021-04-22

## 2021-04-18 RX ORDER — KRILL/OM-3/DHA/EPA/PHOSPHO/AST 500MG-86MG
1 CAPSULE ORAL DAILY
Status: ON HOLD | COMMUNITY

## 2021-04-18 RX ORDER — LOVASTATIN 40 MG
80 TABLET ORAL AT BEDTIME
COMMUNITY
End: 2022-01-13

## 2021-04-18 RX ORDER — ALBUTEROL SULFATE 0.83 MG/ML
2.5 SOLUTION RESPIRATORY (INHALATION)
Status: DISCONTINUED | OUTPATIENT
Start: 2021-04-18 | End: 2021-04-21

## 2021-04-18 RX ORDER — ALBUTEROL SULFATE 0.83 MG/ML
2.5 SOLUTION RESPIRATORY (INHALATION)
Status: DISCONTINUED | OUTPATIENT
Start: 2021-04-18 | End: 2021-04-22 | Stop reason: HOSPADM

## 2021-04-18 RX ORDER — ACETAMINOPHEN 325 MG/1
650 TABLET ORAL EVERY 4 HOURS PRN
Status: DISCONTINUED | OUTPATIENT
Start: 2021-04-18 | End: 2021-04-22 | Stop reason: HOSPADM

## 2021-04-18 RX ORDER — NALOXONE HYDROCHLORIDE 0.4 MG/ML
0.2 INJECTION, SOLUTION INTRAMUSCULAR; INTRAVENOUS; SUBCUTANEOUS
Status: DISCONTINUED | OUTPATIENT
Start: 2021-04-18 | End: 2021-04-22 | Stop reason: HOSPADM

## 2021-04-18 RX ORDER — ONDANSETRON 2 MG/ML
4 INJECTION INTRAMUSCULAR; INTRAVENOUS EVERY 30 MIN PRN
Status: DISCONTINUED | OUTPATIENT
Start: 2021-04-18 | End: 2021-04-18

## 2021-04-18 RX ORDER — NALOXONE HYDROCHLORIDE 0.4 MG/ML
0.4 INJECTION, SOLUTION INTRAMUSCULAR; INTRAVENOUS; SUBCUTANEOUS
Status: DISCONTINUED | OUTPATIENT
Start: 2021-04-18 | End: 2021-04-22 | Stop reason: HOSPADM

## 2021-04-18 RX ORDER — ONDANSETRON 2 MG/ML
4 INJECTION INTRAMUSCULAR; INTRAVENOUS EVERY 6 HOURS PRN
Status: DISCONTINUED | OUTPATIENT
Start: 2021-04-18 | End: 2021-04-22 | Stop reason: HOSPADM

## 2021-04-18 RX ORDER — HYDROMORPHONE HYDROCHLORIDE 2 MG/1
2 TABLET ORAL EVERY 6 HOURS PRN
Status: DISCONTINUED | OUTPATIENT
Start: 2021-04-18 | End: 2021-04-20

## 2021-04-18 RX ORDER — HYDROMORPHONE HYDROCHLORIDE 1 MG/ML
0.5 INJECTION, SOLUTION INTRAMUSCULAR; INTRAVENOUS; SUBCUTANEOUS
Status: DISCONTINUED | OUTPATIENT
Start: 2021-04-18 | End: 2021-04-18

## 2021-04-18 RX ORDER — CIPROFLOXACIN 2 MG/ML
400 INJECTION, SOLUTION INTRAVENOUS EVERY 24 HOURS
Status: DISCONTINUED | OUTPATIENT
Start: 2021-04-19 | End: 2021-04-22 | Stop reason: HOSPADM

## 2021-04-18 RX ORDER — SODIUM CHLORIDE 9 MG/ML
INJECTION, SOLUTION INTRAVENOUS CONTINUOUS
Status: DISCONTINUED | OUTPATIENT
Start: 2021-04-18 | End: 2021-04-18

## 2021-04-18 RX ORDER — AMOXICILLIN 250 MG
1 CAPSULE ORAL 2 TIMES DAILY PRN
Status: DISCONTINUED | OUTPATIENT
Start: 2021-04-18 | End: 2021-04-22 | Stop reason: HOSPADM

## 2021-04-18 RX ORDER — ONDANSETRON 4 MG/1
4 TABLET, ORALLY DISINTEGRATING ORAL EVERY 6 HOURS PRN
Status: DISCONTINUED | OUTPATIENT
Start: 2021-04-18 | End: 2021-04-22 | Stop reason: HOSPADM

## 2021-04-18 RX ORDER — HYDRALAZINE HYDROCHLORIDE 20 MG/ML
5 INJECTION INTRAMUSCULAR; INTRAVENOUS
Status: DISCONTINUED | OUTPATIENT
Start: 2021-04-18 | End: 2021-04-22 | Stop reason: HOSPADM

## 2021-04-18 RX ORDER — POLYETHYLENE GLYCOL 3350 17 G/17G
17 POWDER, FOR SOLUTION ORAL DAILY PRN
Status: DISCONTINUED | OUTPATIENT
Start: 2021-04-18 | End: 2021-04-22 | Stop reason: HOSPADM

## 2021-04-18 RX ORDER — ACETAMINOPHEN 650 MG/1
650 SUPPOSITORY RECTAL EVERY 4 HOURS PRN
Status: DISCONTINUED | OUTPATIENT
Start: 2021-04-18 | End: 2021-04-18

## 2021-04-18 RX ORDER — LIDOCAINE 40 MG/G
CREAM TOPICAL
Status: DISCONTINUED | OUTPATIENT
Start: 2021-04-18 | End: 2021-04-22 | Stop reason: HOSPADM

## 2021-04-18 RX ORDER — CIPROFLOXACIN 2 MG/ML
400 INJECTION, SOLUTION INTRAVENOUS ONCE
Status: COMPLETED | OUTPATIENT
Start: 2021-04-18 | End: 2021-04-18

## 2021-04-18 RX ADMIN — SODIUM CHLORIDE 1000 ML: 9 INJECTION, SOLUTION INTRAVENOUS at 10:21

## 2021-04-18 RX ADMIN — HYDROMORPHONE HYDROCHLORIDE 0.5 MG: 1 INJECTION, SOLUTION INTRAMUSCULAR; INTRAVENOUS; SUBCUTANEOUS at 21:57

## 2021-04-18 RX ADMIN — ONDANSETRON 4 MG: 2 INJECTION INTRAMUSCULAR; INTRAVENOUS at 12:02

## 2021-04-18 RX ADMIN — CIPROFLOXACIN 400 MG: 2 INJECTION, SOLUTION INTRAVENOUS at 12:00

## 2021-04-18 RX ADMIN — METRONIDAZOLE 500 MG: 500 INJECTION, SOLUTION INTRAVENOUS at 13:07

## 2021-04-18 RX ADMIN — FAMOTIDINE 20 MG: 10 INJECTION, SOLUTION INTRAVENOUS at 15:11

## 2021-04-18 RX ADMIN — SODIUM CHLORIDE: 9 INJECTION, SOLUTION INTRAVENOUS at 15:06

## 2021-04-18 RX ADMIN — HYDROMORPHONE HYDROCHLORIDE 0.3 MG: 1 INJECTION, SOLUTION INTRAMUSCULAR; INTRAVENOUS; SUBCUTANEOUS at 16:01

## 2021-04-18 RX ADMIN — HYDROMORPHONE HYDROCHLORIDE 0.5 MG: 1 INJECTION, SOLUTION INTRAMUSCULAR; INTRAVENOUS; SUBCUTANEOUS at 12:02

## 2021-04-18 ASSESSMENT — ENCOUNTER SYMPTOMS
FREQUENCY: 0
ABDOMINAL PAIN: 1
FEVER: 0
HEMATURIA: 0
DYSURIA: 0

## 2021-04-18 ASSESSMENT — ACTIVITIES OF DAILY LIVING (ADL)
ADLS_ACUITY_SCORE: 15
ADLS_ACUITY_SCORE: 17

## 2021-04-18 ASSESSMENT — MIFFLIN-ST. JEOR: SCORE: 1484.58

## 2021-04-18 NOTE — ED PROVIDER NOTES
History   Chief Complaint:  Abdominal pain    HPI   Nitish Coreas is a 86 year old male with history of chronic obstructive pulmonary disease, chronic kidney disease, hypertension, and hyperlipidemia who presents with abdominal pain. The patient had a bologna sandwich with cookies for lung on Friday (4/16/2021) and had abdominal pain followed by 5-6 emesis episodes starting at 1600. He states that the abdominal pain preventing him from sleeping that night. On Saturday morning (4/17/2021), he states that there pain moved from his abdomen to his lower ribs. He has been unable to produce any bowel movements during this time. Here in the ED, he states that he only has pain at the bottom of his lungs. He does note a baseline cough with white discharge due to his chronic obstructive pulmonary disease. He also notes being a former smoker and quit in 1991. He denies any prior abdominal surgeries. He also denies urinary problems and fever. Of note, he had his second COVID vaccine dose last month.    Review of Systems   Constitutional: Negative for fever.   Cardiovascular: Positive for chest pain.   Gastrointestinal: Positive for abdominal pain.   Genitourinary: Negative for dysuria, frequency, hematuria and urgency.   All other systems reviewed and are negative.      Allergies:  Amoxicillin  Aspiring  Benadryl  Ibuprofen  Morphine  Penicillins    Medications:  Lopid  Zestril  Mevacor  Prilosec    Past Medical History:    Hyperlipidemia  Hypertension  Chronic kidney disease  Chronic obstructive pulmonary disease  Renal insufficiency  Chronic rhinitis    Past Surgical History:    Cut off right thumb and repair  Bilateral knee total arthroplasty  [place] teeth removal  Macular pucker and cataract  Tonsillectomy and Adenoidectomy    Family History:    Cancer  Respiratory disease  Cerebrovascular disease  Dementia  Heart Disease    Social History:  The patient presents with his wife.  The patient quit smoking in 1991  He  "drinks alcohol occasionally  The patient is retired and used to work as a .    Physical Exam     Patient Vitals for the past 24 hrs:   BP Temp Temp src Pulse Resp SpO2 Height Weight   04/18/21 1402 (!) 145/82 98.4  F (36.9  C) Oral 90 18 95 % -- --   04/18/21 1330 113/81 -- -- 76 -- 92 % -- --   04/18/21 1315 124/79 -- -- 83 -- 91 % -- --   04/18/21 1230 (!) 149/91 -- -- 86 -- 96 % -- --   04/18/21 1215 (!) 134/92 -- -- 84 -- 90 % -- --   04/18/21 1000 (!) 147/80 98.1  F (36.7  C) Temporal 97 20 95 % 1.676 m (5' 6\") 86.2 kg (190 lb)       Physical Exam  Constitutional: Elderly white male. Supine.  No respiratory distress.  HENT: No signs of trauma.   Eyes: EOM are normal. Pupils are equal, round, and reactive to light.   Neck: Normal range of motion. No JVD present. No cervical adenopathy.  Cardiovascular: Regular rhythm.  Exam reveals no gallop and no friction rub.    No murmur heard.  Pulmonary/Chest:  Occasional expiratory wheeze.  Good air exchange  Abdominal: Distended. Active bowel sounds. Right sided abdominal pain, mid to upper abdomen with guarding. 2+ femoral pulses. Normal male   Musculoskeletal: No edema. No tenderness.   Lymphadenopathy: No lymphadenopathy.   Neurological: Alert and oriented to person, place, and time. Normal strength. Coordination normal.   Skin: Skin is warm and dry. No rash noted. No erythema.      Emergency Department Course   ECG (10:27:01):  Rate 87 bpm. ID interval 150. QRS duration 92. QT/QTc 348/418. P-R-T axes 118 -8 74. Normal sinus rhythm. Normal EKG. Interpreted at 1030 by Hipolito Peña MD.    Imaging:    CT-scan Chest Abdomen/Pelvis w/o contrast:  1.  Dilatation of the common duct measures 1.7 cm, with subtle   increased density in the region of the inferior common duct possibly   representing obstructing choledocholithiasis. MRCP or ERCP should be   considered for further evaluation.   2.  Infrarenal abdominal aortic aneurysm measures up to 4.8 cm " AP.   3.  Aneurysmal dilatation of the ascending thoracic aorta measures 4.8   cm in diameter.   4.  Mild gallbladder distention. If there is clinical suspicion for   cholecystitis, gallbladder ultrasound should be considered for further   evaluation.   5.  Trace amount of nonspecific free fluid in the pelvis.   6.  Scattered colonic diverticulosis, without convincing evidence for   diverticulitis.   7.  Emphysema.  Result per radiology    US Abdomen (RUQ only) Limited:  1.  Cholelithiasis, mild gallbladder distention, and diffuse   gallbladder wall thickening. Acute cholecystitis could have this   appearance.   2.  The common duct is dilated at 1.5 cm, with no definite cause   identified. Choledocholithiasis causing biliary obstruction is not   excluded.   3.  Nonvisualization of the pancreas.  Result per radiology.    Laboratory:    CBC: WBC: 16.7, HGB: 14.8, PLT: 194  CMP: Glucose 150, Urea Nitrogen: 36, Creatinine 2.42, , GFR: 23, Bilirubin Total: 7.9, Albumin: 3.1, ALT: 128, AST: 140, o/w WNL  UA: Glucose: 200, Bilirubin: Moderate, Blood: Moderate, Protein Albumin: 600, RBC: 3, Bacteria: Few, Mucous: Present, Hyaline Casts: 4, Granular Casts: 3, o/w Negative  Lipase: 91    Asymptomatic COVID19 Virus PCR by nasopharyngeal swab Negative    Emergency Department Course:    Reviewed:    I reviewed the patient's nursing notes, vitals, past medical records, Care Everywhere.     Assessments:    1008: I performed an exam of the patient and obtained history, as documented above.    1152: I rechecked the patient and updated them on findings. They are amenable to admission.     Consults:   1152: I spoke with Hospitalist Dr. Young regarding the patient. They accept for care.    Interventions:  1021: NS 1L IV Bolus  1200: Cipro 400 mg IV Infusion   1202: Dilaudid 0.5 mg IV  1202: Zofran 4 mg IV  1307: Flagyl 500 mg IV    Disposition:  The patient was admitted to the hospital under the care of Dr. Young.       Impression &  Plan   Medical Decision Making:  Nitish is a 86 year old male who over the past few days has not been feeling well. He thought that maybe he was getting pneumonia because he was coughing and it hurt to take a big breath. He states that his symptoms began in his lower abdomen and kind of worked its way up and even now he hold his hand over his upper abdomen. On exam, he has an occasional wheeze on his lungs. He is moving good air but he has a particularly tender abdomen, particularly on the right lower side. He also appears jaundiced. Workup included CT scan which shoed a dilated bile duct, possible stones in the gallbladder and possible stone in the common duct and ultrasound confirmed stones in the gallbladder and common duct distention. Some of his liver functions were mildly elevated and the bili was quite elevated. White count is also up. Patient has received fluids, pain medication, nausea medications have been ordered and he was started on antibiotics in case he develops an infection. Patient has been discussed with the hospitalist and will be admitted for further evaluation and treatment. He also has baseline renal insufficiency so IV contrast was not an option.     Covid-19  Nitish Coreas was evaluated during a global COVID-19 pandemic, which necessitated consideration that the patient might be at risk for infection with the SARS-CoV-2 virus that causes COVID-19.   Applicable protocols for evaluation were followed during the patient's care.   COVID-19 was considered as part of the patient's evaluation. The plan for testing is:  a test was obtained during this visit.    Diagnosis:    ICD-10-CM    1. Choledocholithiasis  K80.50 UA with Microscopic reflex to Culture     Scribe Disclosure:  Hector MERINO, am serving as a scribe at 10:03 AM on 4/18/2021 to document services personally performed by Hipolito Peña MD based on my observations and the provider's statements to me.         Mariana  Hipolito Eller MD  04/18/21 [id number]

## 2021-04-18 NOTE — ED TRIAGE NOTES
"Patient got his covid vaccine a month ago, he has been extremely SOB this past week and his wife is concerned about PNA, he stated that he had lost his taste recently.  Patient is fatigued and stated \"I cant breath\"  VSS oxygen sats are 95.  Visible increase struggle due to fatigue and SOB.  No fever.  "

## 2021-04-18 NOTE — TELEPHONE ENCOUNTER
"\"My sx started on Friday night 4/16 with heartburn and vomiting(times 5). Then yesterday and today I am having shortness of breath, chest tightness and pain and a productive cough. I think I have pneumonia. My chest feels really tight this morning.   I also have COPD and emphysema.\"  Denies fever or other sx at this time  Triaged and advised ED  Mendy Edward RN [facility]    COVID 19 Nurse Triage Plan/Patient Instructions    Please be aware that novel coronavirus (COVID-19) may be circulating in the community. If you develop symptoms such as fever, cough, or SOB or if you have concerns about the presence of another infection including coronavirus (COVID-19), please contact your health care provider or visit [email or web address].     Disposition/Instructions    ED Visit recommended. Follow protocol based instructions.     Bring Your Own Device:  Please also bring your smart device(s) (smart phones, tablets, laptops) and their charging cables for your personal use and to communicate with your care team during your visit.    Thank you for taking steps to prevent the spread of this virus.  o Limit your contact with others.  o Wear a simple mask to cover your cough.  o Wash your hands well and often.    Resources    [facility]: About COVID-19: [email or web address]    CDC: What to Do If You're Sick: www.cdc.gov/coronavirus/2019-ncov/about/steps-when-sick.html    CDC: Ending Home Isolation: www.cdc.gov/coronavirus/2019-ncov/hcp/disposition-in-home-patients.html     CDC: Caring for Someone: www.cdc.gov/coronavirus/2019-ncov/if-you-are-sick/care-for-someone.html     [facility]: Interim Guidance for Hospital Discharge to Home: www.health.[facility].[place]/diseases/coronavirus/hcp/hospdischarge.pdf    [facility] clinical trials (COVID-19 research studies): [email or web address]     Below are the COVID-19 hotlines at the [place] Department of Health ([facility]). " Interpreters are available.   o For health questions: Call 593-311-2308 or 1-116.487.1221 (7 a.m. to 7 p.m.)  o For questions about schools and childcare: Call 193-809-1971 or 1-186.593.7643 (7 a.m. to 7 p.m.)                      Reason for Disposition    [1] MODERATE difficulty breathing (e.g., speaks in phrases, SOB even at rest, pulse 100-120) AND [2] NEW-onset or WORSE than normal    Protocols used: BREATHING DIFFICULTY-A-AH

## 2021-04-18 NOTE — PROGRESS NOTES
RECEIVING UNIT ED HANDOFF REVIEW    ED Nurse Handoff Report was reviewed by: Yuridia Colvin RN on April 18, 2021 at 1:36 PM

## 2021-04-18 NOTE — H&P
[facility]    History and Physical - Hospitalist Service       Date of Admission:  4/18/2021    Assessment & Plan   Nitish Coreas is a 86 year old male with h/o COPD, HTN, HL and chronic back pain was brought to the ER for evaluation of nausea, vomiting and abd pain. He was admitted on 4/18/2021 for further evaluation.     Possible cholecystitis  Choledocholithiasis, dilated CBD and obstructive jaundice  Ongoing abdominal pain with nausea and vomiting and chills.  In ER, noted leukocytosis.  CT abdomen pelvis showed dilated CBD, 1.7 cm with possible obstructing choledocholithiasis. RUQ ultrasound was obtained, showed cholelithiasis and finding suggestive of acute cholecystitis.  Dilated CBD was noted but no definitive CBD stone was noted on gallbladder. Cipro and Flagyl IV, IV hydration, IV hydromorphone given in ER.  -Continue Cipro and Flagyl IV, noted PCN allergy  -N.p.o., IVF, pain medication.  -Follow leukocytosis and LFTs  -General surgery and GI consult.    Suspected Acute kidney injury on CKD stage 4  Patient with creatinine of 2.14 in November 2020.  Creatinine here at presentation is 2.42.  Suspect component of acute kidney injury given nausea vomiting.  -Continue IV hydration  -Monitor intake and output, bladder scan as needed.  -Follow BMP  -Hold PTA ACE inhibitor    HTN  HL  PTA is on lisinopril and gemfibrozil/lovastatin, Hold PTA meds.  - PRN IV hydralazine available.    COPD  Not on home O2. Feels like he can not take deep breaths, likely related to abd pain/cholecystitis. Not on inhalers.  - scheduled and PRN albuterol neb ordered.    Chronic back pain  - heating pad, lidocaine gel PRN  - getting narcotic for abd pain as above. 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    - aortic dilation noted  in 2016 as well. No significant change in size.   - outpatient follow up     GERD  - change PPI to IV     Diet: NPO for Medical/Clinical Reasons Except for: Meds    DVT Prophylaxis: PCDs  Mckeon Catheter: not present  Code Status:   Full code, discussed with patient and his spouse       Disposition Plan   Expected discharge: 3-5 days, recommended to prior living arrangement once GI and surgical evals and interventions completed.  Entered: Arcenio Young MD 04/18/2021, 2:25 PM     The patient's care was discussed with the Bedside Nurse, Patient and Patient's Family.    Arcenio Young MD  [facility]  Contact information available via [facility] Paging/Directory      ______________________________________________________________________    Chief Complaint   Abdominal pain, nausea and vomiting.     History is obtained from the patient, spouse, chart review and discussion with ER physician.     History of Present Illness   Nitish Coreas is a 86 year old male with h/o COPD, HTN, HL and chronic back pain was brought to the ER for evaluation of nausea, vomiting and abd pain.    According to the patient and his wife, patient started having abdominal pain, right upper quadrant 2 days ago with associated nausea.  He had multiple episodes of emesis, 5-6 episodes that evening.  He had chills but his wife checked his temperature and there was no fever.  No diarrhea, hematochezia or melena.  There was no blood in the emesis.  With ongoing intermittent pain, his wife drove him to ER today.    He denies any change in appetite or unintentional weight loss.    In ER, patient was evaluated by Dr. Michael.  Vitals stable.  Noted leukocytosis.  CT abdomen pelvis showed dilated CBD, 1.7 cm with possible obstructing choledocholithiasis.  Also infrarenal abdominal aortic aneurysm 4.8 cm and aneurysmal dilatation of ascending aorta measuring 4.8 cm noted.  Patient received Cipro and Flagyl IV, IV hydration, IV  hydromorphone and an admission was requested.  RUQ ultrasound was obtained, showed cholelithiasis and finding suggestive of acute cholecystitis.  Dilated CBD was noted but no definitive CBD stone was noted on gallbladder.    Review of Systems    The 10 point Review of Systems is negative other than noted in the HPI or here.      Past Medical History    I have reviewed this patient's medical history and updated it with pertinent information if needed.   Past Medical History:   Diagnosis Date     Hyperlipidemia LDL goal < 100      Hypertension        Past Surgical History   I have reviewed this patient's surgical history and updated it with pertinent information if needed.  Past Surgical History:   Procedure Laterality Date     C REPLANTATION THUMB DISTAL,COMPLETE      cut off right thumb and repair     C TOTAL KNEE ARTHROPLASTY  ,     left then right     DENTAL SURGERY      wisdom teeth     EYE SURGERY Right  and     macular pucker and cataract     TONSILLECTOMY & ADENOIDECTOMY         Social History   I have reviewed this patient's social history and updated it with pertinent information if needed.  Social History     Tobacco Use     Smoking status: Former Smoker     Packs/day: 4.00     Years: 45.00     Pack years: 180.00     Types: Cigarettes     Quit date: 1991     Years since quittin.9     Smokeless tobacco: Never Used   Substance Use Topics     Alcohol use: Yes     Alcohol/week: 0.0 standard drinks     Comment: 1-2 friday night- cocktail     Drug use: No       Family History   I have reviewed this patient's family history and updated it with pertinent information if needed.  Family History   Problem Relation Age of Onset     Cancer Mother      Respiratory Mother      Heart Disease Father      Cerebrovascular Disease Father      Cerebrovascular Disease Sister         CVA x 2     Dementia Sister      Heart Disease Son      Breast Cancer Daughter        Prior to Admission  Medications   Prior to Admission Medications   Prescriptions Last Dose Informant Patient Reported? Taking?   Boswellia-Glucosamine-Vit D (OSTEO BI-FLEX ONE PER DAY PO) 4/18/2021 at Unknown time  Yes Yes   Sig: Take 1 capsule by mouth daily   Cholecalciferol (VITAMIN D) 2000 UNITS CAPS 4/18/2021 at Unknown time  Yes Yes   Sig: Take 2 tablets by mouth daily    Krill Oil 500 MG CAPS 4/18/2021 at Unknown time  Yes Yes   Sig: Take 1 capsule by mouth daily   Multiple Vitamins-Minerals (MULTIVITAL) TABS 4/18/2021 at Unknown time  Yes Yes   Sig: Take 1 tablet by mouth daily.   cetirizine (ZYRTEC) 10 MG tablet 4/18/2021 at Unknown time  Yes Yes   Sig: Take 10 mg by mouth daily   gemfibrozil (LOPID) 600 MG tablet 4/18/2021 at morning  No Yes   Sig: Take 1 tablet (600 mg) by mouth 2 times daily   lisinopril (ZESTRIL) 40 MG tablet 4/18/2021 at Unknown time  Yes Yes   Sig: Take 40 mg by mouth daily   lovastatin (MEVACOR) 40 MG tablet 4/17/2021 at Unknown time  Yes Yes   Sig: Take 80 mg by mouth At Bedtime   omeprazole (PRILOSEC) 20 MG DR capsule 4/18/2021 at Unknown time  Yes Yes   Sig: Take 20 mg by mouth daily      Facility-Administered Medications: None     Allergies   Allergies   Allergen Reactions     Penicillins Anaphylaxis     Amoxicillin      Aspirin      bleeding     Benadryl [Anti-Itch]      Doesn't help in allergic reaction. Benadryl cream causes worsening rash        Ibuprofen Sodium      Esophageal bleeding     Morphine Sulfate        Physical Exam   Vital Signs: Temp: 98.4  F (36.9  C) Temp src: Oral BP: (!) 145/82 Pulse: 90   Resp: 18 SpO2: 95 % O2 Device: Nasal cannula Oxygen Delivery: 2 LPM  Weight: 190 lbs 0 oz    General: AAOx3, appears comfortable.  HEENT: PERRLA EOMI. Mucosa moist.   Lungs: Bilateral equal air entry. Clear to auscultation, normal work of breathing.   CVS: S1S2 regular, no tachycardia or murmur.   Abdomen: Soft, obese/diatended, tender RUQ. BS heard.  MSK: No edema or deformities.  Neuro:  AAOX3. CN 2-12 normal. Strength symmetrical.  Skin: No rash.       Data   Data reviewed today: I reviewed all medications, new labs and imaging results over the last 24 hours. I personally reviewed EKG- sinus. CT abd pelvis and RUQ US reports reviewed as above.    Recent Labs   Lab 04/18/21  1022   WBC 16.7*   HGB 14.8   MCV 97         POTASSIUM 3.9   CHLORIDE 102   CO2 25   BUN 36*   CR 2.42*   ANIONGAP 8   HANS 8.8   *   ALBUMIN 3.1*   PROTTOTAL 7.4   BILITOTAL 7.9*   ALKPHOS 123   *   *   LIPASE 91     Recent Results (from the past 24 hour(s))   CT Chest Abdomen Pelvis w/o Contrast    Narrative    CT CHEST, ABDOMEN, PELVIS WITHOUT CONTRAST 4/18/2021 10:52 AM    CLINICAL HISTORY: Abdominal pain. Cough. History of COPD.    TECHNIQUE: CT scan of the chest, abdomen, and pelvis was performed  without IV contrast. Multiplanar reformats were obtained. Dose  reduction techniques were used.     CONTRAST: None.    COMPARISON: None.    FINDINGS:   LUNGS AND PLEURA: No pleural effusions. Advanced emphysematous changes  are noted throughout both lungs.    MEDIASTINUM/AXILLAE: No enlarged lymph nodes are identified in the  chest. No pericardial effusion. Atherosclerotic calcification of the  thoracic aorta and coronary arteries. Aneurysmal dilatation of the  ascending thoracic aorta measures up to 4.8 cm in diameter.    HEPATOBILIARY: The gallbladder is mildly distended. The common duct is  dilated, measuring 1.7 cm in diameter. Subtle area of increased  density in the region of the inferior common duct (series 7 image 56)  could represent obstructing choledocholithiasis. No hepatic masses are  seen.    PANCREAS: Normal.    SPLEEN: Normal.    ADRENAL GLANDS: Normal.    KIDNEYS/BLADDER: Few bilateral renal cysts are noted, with the largest  in the upper pole of the left kidney medially measuring 4.9 cm. The  kidneys have otherwise unremarkable noncontrast appearances. No  urinary calculi or  hydronephrosis.    BOWEL: No bowel obstruction. Scattered colonic diverticulosis. No  convincing evidence for colitis or diverticulitis. Unremarkable  appendix.    PELVIC ORGANS: Mild-to-moderate prostatic enlargement.    LYMPH NODES: No enlarged lymph nodes are identified in the abdomen or  pelvis.    VASCULATURE: Advanced atherosclerotic aortoiliac calcification.  Infrarenal abdominal aortic aneurysm measures 4.8 cm AP x 4.9 cm  transverse. No convincing evidence for aneurysm rupture.    ADDITIONAL FINDINGS: Trace amount of nonspecific free fluid in the  pelvis.    MUSCULOSKELETAL: Degenerative changes are noted in the thoracolumbar  spine.      Impression    IMPRESSION:   1.  Dilatation of the common duct measures 1.7 cm, with subtle  increased density in the region of the inferior common duct possibly  representing obstructing choledocholithiasis. MRCP or ERCP should be  considered for further evaluation.  2.  Infrarenal abdominal aortic aneurysm measures up to 4.8 cm AP.  3.  Aneurysmal dilatation of the ascending thoracic aorta measures 4.8  cm in diameter.  4.  Mild gallbladder distention. If there is clinical suspicion for  cholecystitis, gallbladder ultrasound should be considered for further  evaluation.  5.  Trace amount of nonspecific free fluid in the pelvis.  6.  Scattered colonic diverticulosis, without convincing evidence for  diverticulitis.  7.  Emphysema.    ISAAC LORA MD   Abdomen US, limited (RUQ only)    Narrative    ULTRASOUND ABDOMEN LIMITED 4/18/2021 12:50 PM    CLINICAL HISTORY: Right upper quadrant pain.    TECHNIQUE: Limited abdominal ultrasound.    COMPARISON: CT of the chest, abdomen, and pelvis performed earlier  today.    FINDINGS:  GALLBLADDER: Sludge and small gallstones are noted within the  gallbladder. The gallbladder is mildly distended. The gallbladder wall  is diffusely thickened at 0.4 cm. No pericholecystic fluid is  identified. The sonographer reports a negative  sonographic Curtis  sign.    BILE DUCTS: There is no intrahepatic biliary dilatation. The common  duct is dilated at 1.5 cm. Portions of the common duct could not be  visualized due to overlying bowel gas.    LIVER: Unremarkable. No evidence for fatty infiltration of the liver.  No focal hepatic masses.    RIGHT KIDNEY: A few right renal simple cysts are identified, with the  largest measuring 3.8 cm. No routine follow-up for these right renal  cysts would be typically recommended. No hydronephrosis.    PANCREAS: Pancreas is obscured by overlying bowel gas, and could not  be evaluated.    No ascites.      Impression    IMPRESSION:  1.  Cholelithiasis, mild gallbladder distention, and diffuse  gallbladder wall thickening. Acute cholecystitis could have this  appearance.  2.  The common duct is dilated at 1.5 cm, with no definite cause  identified. Choledocholithiasis causing biliary obstruction is not  excluded.  3.  Nonvisualization of the pancreas.    ISAAC LORA MD

## 2021-04-18 NOTE — CONSULTS
GI aware of the consult which pt likely has choledocholithiasis plan is for EUS ERCP tmr. Can start clear liquid diet. NPO after MN. Abx if sign of infection.    Harinder Palacio MD (Richard)  Provider's Cell: [phone]     [facility] GI consultant PA  [phone]

## 2021-04-18 NOTE — PLAN OF CARE
Nursing Note:   Pt arrived on cart from ED to unit at approx 1400 presenting with abd pain, general weakness,some forgetfulness. VSS except some HTN with SBP of 145. Skin jaundiced  Imaging and labs done.Gall bladder shown to have gall stones and sludge. Surgical consult pending. Denied any pain upon arrival but did get IV Dilaudid in ED. Wife Emily (designated visitor) ar bedside. Pt settled in room, reorientated to surrounds, use of call light. Fall risk precautions initiated. IV fliuds initiated NS at 75/hr. Scheduled nebs to start for Emphsyema hx. States has EDWARDS but denies SOB at rest - was a former smoker. Continue to monitor

## 2021-04-18 NOTE — PHARMACY-ADMISSION MEDICATION HISTORY
Pharmacy Medication History  Admission medication history interview status for the 4/18/2021  admission is complete. See EPIC admission navigator for prior to admission medications     Location of Interview: Patient room  Medication history sources: Patient, Patient's family/friend (Spouse in the room. ) and Surescripts    Significant changes made to the medication list:  Added: krill oil, cetirizine, osteo biflex  Changed: cholecalciferol (frequency)  Removed: ascorbic acid, fexofenadine, fish oil,       In the past week, patient estimated taking medication this percent of the time: greater than 90%    Additional medication history information:   - None    Medication reconciliation completed by provider prior to medication history? No    Time spent in this activity: 15 minutes    Prior to Admission medications    Medication Sig Last Dose Taking? Auth Provider   Boswellia-Glucosamine-Vit D (OSTEO BI-FLEX ONE PER DAY PO) Take 1 capsule by mouth daily 4/18/2021 at Unknown time Yes Unknown, Entered By History   cetirizine (ZYRTEC) 10 MG tablet Take 10 mg by mouth daily 4/18/2021 at Unknown time Yes Unknown, Entered By History   Cholecalciferol (VITAMIN D) 2000 UNITS CAPS Take 2 tablets by mouth daily  4/18/2021 at Unknown time Yes Reported, Patient   gemfibrozil (LOPID) 600 MG tablet Take 1 tablet (600 mg) by mouth 2 times daily 4/18/2021 at morning Yes Remi Peterson MD   Krill Oil 500 MG CAPS Take 1 capsule by mouth daily 4/18/2021 at Unknown time Yes Unknown, Entered By History   lisinopril (ZESTRIL) 40 MG tablet Take 40 mg by mouth daily 4/18/2021 at Unknown time Yes Unknown, Entered By History   lovastatin (MEVACOR) 40 MG tablet Take 80 mg by mouth At Bedtime 4/17/2021 at Unknown time Yes Unknown, Entered By History   Multiple Vitamins-Minerals (MULTIVITAL) TABS Take 1 tablet by mouth daily. 4/18/2021 at Unknown time Yes Reported, Patient   omeprazole (PRILOSEC) 20 MG DR capsule Take 20 mg by mouth daily  4/18/2021 at Unknown time Yes Reported, Patient             The information provided in this note is only as accurate as the sources available at the time of update(s)

## 2021-04-19 ENCOUNTER — ANESTHESIA EVENT (OUTPATIENT)
Dept: SURGERY | Facility: CLINIC | Age: 86
DRG: 417 | End: 2021-04-19
Payer: COMMERCIAL

## 2021-04-19 ENCOUNTER — APPOINTMENT (OUTPATIENT)
Dept: GENERAL RADIOLOGY | Facility: CLINIC | Age: 86
DRG: 417 | End: 2021-04-19
Attending: INTERNAL MEDICINE
Payer: COMMERCIAL

## 2021-04-19 ENCOUNTER — ANESTHESIA (OUTPATIENT)
Dept: SURGERY | Facility: CLINIC | Age: 86
DRG: 417 | End: 2021-04-19
Payer: COMMERCIAL

## 2021-04-19 LAB
ALBUMIN SERPL-MCNC: 2.4 G/DL (ref 3.4–5)
ALP SERPL-CCNC: 138 U/L (ref 40–150)
ALT SERPL W P-5'-P-CCNC: 127 U/L (ref 0–70)
ANION GAP SERPL CALCULATED.3IONS-SCNC: 6 MMOL/L (ref 3–14)
AST SERPL W P-5'-P-CCNC: 139 U/L (ref 0–45)
BASOPHILS # BLD AUTO: 0 10E9/L (ref 0–0.2)
BASOPHILS NFR BLD AUTO: 0.2 %
BILIRUB SERPL-MCNC: 8.7 MG/DL (ref 0.2–1.3)
BUN SERPL-MCNC: 38 MG/DL (ref 7–30)
CALCIUM SERPL-MCNC: 8.1 MG/DL (ref 8.5–10.1)
CHLORIDE SERPL-SCNC: 109 MMOL/L (ref 94–109)
CO2 SERPL-SCNC: 22 MMOL/L (ref 20–32)
CREAT SERPL-MCNC: 2.5 MG/DL (ref 0.66–1.25)
DIFFERENTIAL METHOD BLD: ABNORMAL
EOSINOPHIL # BLD AUTO: 0 10E9/L (ref 0–0.7)
EOSINOPHIL NFR BLD AUTO: 0.1 %
ERCP: NORMAL
ERYTHROCYTE [DISTWIDTH] IN BLOOD BY AUTOMATED COUNT: 13.1 % (ref 10–15)
GFR SERPL CREATININE-BSD FRML MDRD: 22 ML/MIN/{1.73_M2}
GLUCOSE SERPL-MCNC: 96 MG/DL (ref 70–99)
HCT VFR BLD AUTO: 37.5 % (ref 40–53)
HGB BLD-MCNC: 12.6 G/DL (ref 13.3–17.7)
IMM GRANULOCYTES # BLD: 0.1 10E9/L (ref 0–0.4)
IMM GRANULOCYTES NFR BLD: 0.6 %
LYMPHOCYTES # BLD AUTO: 1 10E9/L (ref 0.8–5.3)
LYMPHOCYTES NFR BLD AUTO: 7.8 %
MCH RBC QN AUTO: 32.8 PG (ref 26.5–33)
MCHC RBC AUTO-ENTMCNC: 33.6 G/DL (ref 31.5–36.5)
MCV RBC AUTO: 98 FL (ref 78–100)
MONOCYTES # BLD AUTO: 1.3 10E9/L (ref 0–1.3)
MONOCYTES NFR BLD AUTO: 10.5 %
NEUTROPHILS # BLD AUTO: 10.2 10E9/L (ref 1.6–8.3)
NEUTROPHILS NFR BLD AUTO: 80.8 %
NRBC # BLD AUTO: 0 10*3/UL
NRBC BLD AUTO-RTO: 0 /100
PLATELET # BLD AUTO: 154 10E9/L (ref 150–450)
POTASSIUM SERPL-SCNC: 4 MMOL/L (ref 3.4–5.3)
PROT SERPL-MCNC: 6.2 G/DL (ref 6.8–8.8)
RBC # BLD AUTO: 3.84 10E12/L (ref 4.4–5.9)
SODIUM SERPL-SCNC: 137 MMOL/L (ref 133–144)
UPPER EUS: NORMAL
WBC # BLD AUTO: 12.6 10E9/L (ref 4–11)

## 2021-04-19 PROCEDURE — 370N000017 HC ANESTHESIA TECHNICAL FEE, PER MIN: Performed by: INTERNAL MEDICINE

## 2021-04-19 PROCEDURE — 999N000141 HC STATISTIC PRE-PROCEDURE NURSING ASSESSMENT: Performed by: INTERNAL MEDICINE

## 2021-04-19 PROCEDURE — C9113 INJ PANTOPRAZOLE SODIUM, VIA: HCPCS | Performed by: HOSPITALIST

## 2021-04-19 PROCEDURE — 250N000011 HC RX IP 250 OP 636: Performed by: HOSPITALIST

## 2021-04-19 PROCEDURE — 250N000011 HC RX IP 250 OP 636: Performed by: NURSE ANESTHETIST, CERTIFIED REGISTERED

## 2021-04-19 PROCEDURE — 94640 AIRWAY INHALATION TREATMENT: CPT

## 2021-04-19 PROCEDURE — 250N000009 HC RX 250: Performed by: HOSPITALIST

## 2021-04-19 PROCEDURE — 360N000075 HC SURGERY LEVEL 2, PER MIN: Performed by: INTERNAL MEDICINE

## 2021-04-19 PROCEDURE — C1876 STENT, NON-COA/NON-COV W/DEL: HCPCS | Performed by: INTERNAL MEDICINE

## 2021-04-19 PROCEDURE — 0DJ08ZZ INSPECTION OF UPPER INTESTINAL TRACT, VIA NATURAL OR ARTIFICIAL OPENING ENDOSCOPIC: ICD-10-PCS | Performed by: INTERNAL MEDICINE

## 2021-04-19 PROCEDURE — C1887 CATHETER, GUIDING: HCPCS | Performed by: INTERNAL MEDICINE

## 2021-04-19 PROCEDURE — 258N000003 HC RX IP 258 OP 636: Performed by: NURSE ANESTHETIST, CERTIFIED REGISTERED

## 2021-04-19 PROCEDURE — 250N000009 HC RX 250: Performed by: NURSE ANESTHETIST, CERTIFIED REGISTERED

## 2021-04-19 PROCEDURE — 99233 SBSQ HOSP IP/OBS HIGH 50: CPT | Performed by: HOSPITALIST

## 2021-04-19 PROCEDURE — 272N000048 HC BALLOON ADDITIONAL: Performed by: INTERNAL MEDICINE

## 2021-04-19 PROCEDURE — 85025 COMPLETE CBC W/AUTO DIFF WBC: CPT | Performed by: HOSPITALIST

## 2021-04-19 PROCEDURE — 710N000009 HC RECOVERY PHASE 1, LEVEL 1, PER MIN: Performed by: INTERNAL MEDICINE

## 2021-04-19 PROCEDURE — 80053 COMPREHEN METABOLIC PANEL: CPT | Performed by: HOSPITALIST

## 2021-04-19 PROCEDURE — 120N000001 HC R&B MED SURG/OB

## 2021-04-19 PROCEDURE — 36415 COLL VENOUS BLD VENIPUNCTURE: CPT | Performed by: HOSPITALIST

## 2021-04-19 PROCEDURE — 258N000003 HC RX IP 258 OP 636: Performed by: HOSPITALIST

## 2021-04-19 PROCEDURE — 99204 OFFICE O/P NEW MOD 45 MIN: CPT | Mod: 57 | Performed by: SURGERY

## 2021-04-19 PROCEDURE — C1874 STENT, COATED/COV W/DEL SYS: HCPCS | Performed by: INTERNAL MEDICINE

## 2021-04-19 PROCEDURE — 74330 X-RAY BILE/PANC ENDOSCOPY: CPT

## 2021-04-19 PROCEDURE — 0F798DZ DILATION OF COMMON BILE DUCT WITH INTRALUMINAL DEVICE, VIA NATURAL OR ARTIFICIAL OPENING ENDOSCOPIC: ICD-10-PCS | Performed by: INTERNAL MEDICINE

## 2021-04-19 PROCEDURE — 0FC98ZZ EXTIRPATION OF MATTER FROM COMMON BILE DUCT, VIA NATURAL OR ARTIFICIAL OPENING ENDOSCOPIC: ICD-10-PCS | Performed by: INTERNAL MEDICINE

## 2021-04-19 DEVICE — STENT BILIARY ADVANIX NAVIFLEX BEND L7 CM OD1 M00534690: Type: IMPLANTABLE DEVICE | Site: BILE DUCT | Status: FUNCTIONAL

## 2021-04-19 RX ORDER — LIDOCAINE HYDROCHLORIDE 20 MG/ML
SOLUTION OROPHARYNGEAL PRN
Status: DISCONTINUED | OUTPATIENT
Start: 2021-04-19 | End: 2021-04-19

## 2021-04-19 RX ORDER — FENTANYL CITRATE 50 UG/ML
INJECTION, SOLUTION INTRAMUSCULAR; INTRAVENOUS PRN
Status: DISCONTINUED | OUTPATIENT
Start: 2021-04-19 | End: 2021-04-19

## 2021-04-19 RX ORDER — ONDANSETRON 4 MG/1
4 TABLET, ORALLY DISINTEGRATING ORAL EVERY 30 MIN PRN
Status: DISCONTINUED | OUTPATIENT
Start: 2021-04-19 | End: 2021-04-19 | Stop reason: HOSPADM

## 2021-04-19 RX ORDER — FLUMAZENIL 0.1 MG/ML
0.2 INJECTION, SOLUTION INTRAVENOUS
Status: CANCELLED | OUTPATIENT
Start: 2021-04-19 | End: 2021-04-20

## 2021-04-19 RX ORDER — ONDANSETRON 2 MG/ML
INJECTION INTRAMUSCULAR; INTRAVENOUS PRN
Status: DISCONTINUED | OUTPATIENT
Start: 2021-04-19 | End: 2021-04-19

## 2021-04-19 RX ORDER — LIDOCAINE HYDROCHLORIDE 20 MG/ML
INJECTION, SOLUTION INFILTRATION; PERINEURAL PRN
Status: DISCONTINUED | OUTPATIENT
Start: 2021-04-19 | End: 2021-04-19

## 2021-04-19 RX ORDER — ONDANSETRON 2 MG/ML
4 INJECTION INTRAMUSCULAR; INTRAVENOUS EVERY 30 MIN PRN
Status: DISCONTINUED | OUTPATIENT
Start: 2021-04-19 | End: 2021-04-19 | Stop reason: HOSPADM

## 2021-04-19 RX ORDER — FENTANYL CITRATE 50 UG/ML
25-50 INJECTION, SOLUTION INTRAMUSCULAR; INTRAVENOUS
Status: DISCONTINUED | OUTPATIENT
Start: 2021-04-19 | End: 2021-04-19 | Stop reason: HOSPADM

## 2021-04-19 RX ORDER — HYDROMORPHONE HYDROCHLORIDE 1 MG/ML
.3-.5 INJECTION, SOLUTION INTRAMUSCULAR; INTRAVENOUS; SUBCUTANEOUS EVERY 5 MIN PRN
Status: DISCONTINUED | OUTPATIENT
Start: 2021-04-19 | End: 2021-04-19 | Stop reason: HOSPADM

## 2021-04-19 RX ORDER — PROPOFOL 10 MG/ML
INJECTION, EMULSION INTRAVENOUS CONTINUOUS PRN
Status: DISCONTINUED | OUTPATIENT
Start: 2021-04-19 | End: 2021-04-19

## 2021-04-19 RX ORDER — SODIUM CHLORIDE, SODIUM LACTATE, POTASSIUM CHLORIDE, CALCIUM CHLORIDE 600; 310; 30; 20 MG/100ML; MG/100ML; MG/100ML; MG/100ML
INJECTION, SOLUTION INTRAVENOUS CONTINUOUS
Status: DISCONTINUED | OUTPATIENT
Start: 2021-04-19 | End: 2021-04-19 | Stop reason: HOSPADM

## 2021-04-19 RX ADMIN — PHENYLEPHRINE HYDROCHLORIDE 100 MCG: 10 INJECTION INTRAVENOUS at 17:17

## 2021-04-19 RX ADMIN — METRONIDAZOLE 500 MG: 500 INJECTION, SOLUTION INTRAVENOUS at 01:13

## 2021-04-19 RX ADMIN — HYDROMORPHONE HYDROCHLORIDE 0.5 MG: 1 INJECTION, SOLUTION INTRAMUSCULAR; INTRAVENOUS; SUBCUTANEOUS at 01:13

## 2021-04-19 RX ADMIN — SODIUM CHLORIDE: 9 INJECTION, SOLUTION INTRAVENOUS at 05:35

## 2021-04-19 RX ADMIN — ONDANSETRON 4 MG: 2 INJECTION INTRAMUSCULAR; INTRAVENOUS at 17:15

## 2021-04-19 RX ADMIN — FENTANYL CITRATE 50 MCG: 50 INJECTION, SOLUTION INTRAMUSCULAR; INTRAVENOUS at 16:55

## 2021-04-19 RX ADMIN — LIDOCAINE HYDROCHLORIDE 5 ML: 20 SOLUTION ORAL; TOPICAL at 16:32

## 2021-04-19 RX ADMIN — CIPROFLOXACIN 400 MG: 2 INJECTION INTRAVENOUS at 09:29

## 2021-04-19 RX ADMIN — METRONIDAZOLE 500 MG: 500 INJECTION, SOLUTION INTRAVENOUS at 14:01

## 2021-04-19 RX ADMIN — HYDROMORPHONE HYDROCHLORIDE 0.5 MG: 1 INJECTION, SOLUTION INTRAMUSCULAR; INTRAVENOUS; SUBCUTANEOUS at 13:22

## 2021-04-19 RX ADMIN — ALBUTEROL SULFATE 2.5 MG: 2.5 SOLUTION RESPIRATORY (INHALATION) at 07:45

## 2021-04-19 RX ADMIN — HYDROMORPHONE HYDROCHLORIDE 0.5 MG: 1 INJECTION, SOLUTION INTRAMUSCULAR; INTRAVENOUS; SUBCUTANEOUS at 21:04

## 2021-04-19 RX ADMIN — FENTANYL CITRATE 50 MCG: 50 INJECTION, SOLUTION INTRAMUSCULAR; INTRAVENOUS at 16:50

## 2021-04-19 RX ADMIN — PROPOFOL 200 MCG/KG/MIN: 10 INJECTION, EMULSION INTRAVENOUS at 16:53

## 2021-04-19 RX ADMIN — PHENYLEPHRINE HYDROCHLORIDE 150 MCG: 10 INJECTION INTRAVENOUS at 17:11

## 2021-04-19 RX ADMIN — PHENYLEPHRINE HYDROCHLORIDE 100 MCG: 10 INJECTION INTRAVENOUS at 17:02

## 2021-04-19 RX ADMIN — HYDROMORPHONE HYDROCHLORIDE 0.5 MG: 1 INJECTION, SOLUTION INTRAMUSCULAR; INTRAVENOUS; SUBCUTANEOUS at 09:27

## 2021-04-19 RX ADMIN — FAMOTIDINE 20 MG: 10 INJECTION, SOLUTION INTRAVENOUS at 14:01

## 2021-04-19 RX ADMIN — HYDROMORPHONE HYDROCHLORIDE 0.5 MG: 1 INJECTION, SOLUTION INTRAMUSCULAR; INTRAVENOUS; SUBCUTANEOUS at 04:04

## 2021-04-19 RX ADMIN — PANTOPRAZOLE SODIUM 40 MG: 40 INJECTION, POWDER, FOR SOLUTION INTRAVENOUS at 09:30

## 2021-04-19 RX ADMIN — LIDOCAINE HYDROCHLORIDE 40 MG: 20 INJECTION, SOLUTION INFILTRATION; PERINEURAL at 16:54

## 2021-04-19 ASSESSMENT — ACTIVITIES OF DAILY LIVING (ADL)
ADLS_ACUITY_SCORE: 17
ADLS_ACUITY_SCORE: 18
ADLS_ACUITY_SCORE: 17

## 2021-04-19 ASSESSMENT — LIFESTYLE VARIABLES: TOBACCO_USE: 1

## 2021-04-19 ASSESSMENT — COPD QUESTIONNAIRES: COPD: 1

## 2021-04-19 NOTE — CONSULTS
[facility] General Surgery Consultation    Nitish Coreas MRN# [id number]   YOB: 1934 Age: 86 year old      Date of Admission:  4/18/2021  Date of Consult: 4/19/2021         Assessment and Plan:   Patient is a 86 year old male with possible choledocholithiasis and acute cholecystitis    PLAN:  - EUS, possible ERCP today by GI. May have clear liquid diet following this from surgical standpoint.  - NPO at midnight for laparoscopic cholecystectomy tomorrow.  - The benefits and risks of surgical intervention versus non-operative management including but not limited to infection, bleeding, conversion to open, bile leak, bile duct injury, retained gallstones, injuring neighboring structures, and anesthesia complications with the patient. He expressed understanding and would like to proceed with surgery. All questions were answered to the patient's satisfaction.         Requesting Physician:      Dr. Arcenio Young        Chief Complaint:     Chief Complaint   Patient presents with     Covid Concern     month ago he got his covid vaccine     Shortness of Breath          History of Present Illness:   Nitish Coreas is a 86 year old male who was seen in consultation at the request of Dr. Arcenio Young for abdominal pain. He states the abdominal pain started on 3 days ago after eating a bologna sandwich. He thought he had food poisoning since it was associated with 5 episodes of nonbloody emesis. The abdominal pain started in his central abdomen but now is located in his epigastric region. It feels sharp and is worse with movement. Analgesia here has helped the pain. He states last BM was 4 days ago and was normal. He has COPD and his cough has been causing him more pain. He has not had changes in his cough, fever, chills, rhinorrhea, or any other symptoms. He did have COVID vaccine last month. Labs revealed leukocytosis of 16.7 with left shift, elevated Cr of 2.42, bilirubin of 7.9, elevated  "transaminases (, ), and normal alk phos. CT abdomen and pelvis with IV contrast visualized dilated common bile duct of 1.7 cm and increased density in the region concerning for choledocholithiasis. RUQ ultrasound visualized cholelithiasis with diffuse gallbladder wall thickening concerning for acute cholecystitis as well as dilated common bile duct 1.5 cm.    Nitish has chronic medical conditions including COPD, chronic kidney disease, hypertension, and hyperlipidemia. He denies any previous abdominal surgeries. No prior problems with anesthesia for tonsillectomy, thumb surgery, and bilateral knee arthroplasty. Nitish is not on any blood thinning products. He denies pertinent family history. He is a former smoker who quit in 1991, he drinks alcohol, and does not use drugs.          Physical Exam:   Blood pressure 103/69, pulse 86, temperature 98.5  F (36.9  C), temperature source Oral, resp. rate 12, height 1.676 m (5' 6\"), weight 86.2 kg (190 lb), SpO2 90 %.  190 lbs 0 oz  General: Vital signs reviewed, in no apparent distress  Eyes: Anicteric  HENT: Normocephalic, atraumatic, trachea midline   Respiratory: Breathing nonlabored  Cardiovascular: Regular rate and rhythm  GI: Abdomen distended but soft, tender in epigastric region, minimally tender in RUQ less than epigastric, no rebound or guarding  Musculoskeletal: No gross deformities  Neurologic: Grossly nonfocal exam  Psychiatric: Normal mood, affect and insight  Integumentary: Warm and dry         Past Medical History:     Past Medical History:   Diagnosis Date     Hyperlipidemia LDL goal < 100      Hypertension             Past Surgical History:     Past Surgical History:   Procedure Laterality Date     C REPLANTATION THUMB DISTAL,COMPLETE  1962    cut off right thumb and repair     C TOTAL KNEE ARTHROPLASTY  2011, 2000    left then right     DENTAL SURGERY  1958    wisdom teeth     EYE SURGERY Right 2004 and 2005    macular pucker and cataract     " TONSILLECTOMY & ADENOIDECTOMY  1949            Current Medications:           albuterol  2.5 mg Nebulization Q6H     ciprofloxacin  400 mg Intravenous Q24H     famotidine  20 mg Intravenous Q24H     metroNIDAZOLE  500 mg Intravenous Q12H     pantoprazole (PROTONIX) IV  40 mg Intravenous Daily with breakfast     sodium chloride (PF)  3 mL Intracatheter Q8H       acetaminophen, acetaminophen, albuterol, hydrALAZINE, HYDROmorphone, HYDROmorphone, lidocaine 4%, lidocaine (buffered or not buffered), melatonin, naloxone **OR** naloxone **OR** naloxone **OR** naloxone, ondansetron **OR** ondansetron, polyethylene glycol, senna-docusate **OR** senna-docusate, sodium chloride (PF), sodium chloride (PF)         Home Medications:     Prior to Admission medications    Medication Sig Last Dose Taking? Auth Provider   Boswellia-Glucosamine-Vit D (OSTEO BI-FLEX ONE PER DAY PO) Take 1 capsule by mouth daily 4/18/2021 at Unknown time Yes Unknown, Entered By History   cetirizine (ZYRTEC) 10 MG tablet Take 10 mg by mouth daily 4/18/2021 at Unknown time Yes Unknown, Entered By History   Cholecalciferol (VITAMIN D) 2000 UNITS CAPS Take 2 tablets by mouth daily  4/18/2021 at Unknown time Yes Reported, Patient   gemfibrozil (LOPID) 600 MG tablet Take 1 tablet (600 mg) by mouth 2 times daily 4/18/2021 at morning Yes Remi Peterson MD   Krill Oil 500 MG CAPS Take 1 capsule by mouth daily 4/18/2021 at Unknown time Yes Unknown, Entered By History   lisinopril (ZESTRIL) 40 MG tablet Take 40 mg by mouth daily 4/18/2021 at Unknown time Yes Unknown, Entered By History   lovastatin (MEVACOR) 40 MG tablet Take 80 mg by mouth At Bedtime 4/17/2021 at Unknown time Yes Unknown, Entered By History   Multiple Vitamins-Minerals (MULTIVITAL) TABS Take 1 tablet by mouth daily. 4/18/2021 at Unknown time Yes Reported, Patient   omeprazole (PRILOSEC) 20 MG DR capsule Take 20 mg by mouth daily 4/18/2021 at Unknown time Yes Reported, Patient             Allergies:     Allergies   Allergen Reactions     Penicillins Anaphylaxis     Amoxicillin      Aspirin      bleeding     Benadryl [Anti-Itch]      Doesn't help in allergic reaction. Benadryl cream causes worsening rash        Ibuprofen Sodium      Esophageal bleeding     Morphine Sulfate             Family History:     Family History   Problem Relation Age of Onset     Cancer Mother      Respiratory Mother      Heart Disease Father      Cerebrovascular Disease Father      Cerebrovascular Disease Sister         CVA x 2     Dementia Sister      Heart Disease Son      Breast Cancer Daughter            Social History:   Nitish Coreas  reports that he quit smoking about 29 years ago. His smoking use included cigarettes. He has a 180.00 pack-year smoking history. He has never used smokeless tobacco. He reports current alcohol use. He reports that he does not use drugs.          Review of Systems:   The 12 point Review of Systems is negative other than noted in the HPI.         Labs/Imaging   All new lab and imaging data was reviewed.   Recent Labs   Lab 04/18/21  1022   WBC 16.7*   HGB 14.8   HCT 43.1   MCV 97        Recent Labs   Lab 04/18/21  1022      POTASSIUM 3.9   CHLORIDE 102   CO2 25   ANIONGAP 8   *   BUN 36*   CR 2.42*   GFRESTIMATED 23*   GFRESTBLACK 27*   HANS 8.8   PROTTOTAL 7.4   ALBUMIN 3.1*   BILITOTAL 7.9*   ALKPHOS 123   *   *       I have personally reviewed the imaging studies-   US ABDOMEN  IMPRESSION:  1.  Cholelithiasis, mild gallbladder distention, and diffuse  gallbladder wall thickening. Acute cholecystitis could have this  appearance.  2.  The common duct is dilated at 1.5 cm, with no definite cause  identified. Choledocholithiasis causing biliary obstruction is not  excluded.  3.  Nonvisualization of the pancreas.    CT CHEST ABD PELVIS  IMPRESSION:   1.  Dilatation of the common duct measures 1.7 cm, with subtle  increased density in the region of the  inferior common duct possibly  representing obstructing choledocholithiasis. MRCP or ERCP should be  considered for further evaluation.  2.  Infrarenal abdominal aortic aneurysm measures up to 4.8 cm AP.  3.  Aneurysmal dilatation of the ascending thoracic aorta measures 4.8  cm in diameter.  4.  Mild gallbladder distention. If there is clinical suspicion for  cholecystitis, gallbladder ultrasound should be considered for further  evaluation.  5.  Trace amount of nonspecific free fluid in the pelvis.  6.  Scattered colonic diverticulosis, without convincing evidence for  diverticulitis.  7.  Emphysema.       Whit Garcia PA-C

## 2021-04-19 NOTE — PLAN OF CARE
AOx4. VSS on 2L NC. Up SBA and IV pole. NPO ex meds. C/o 10/10 back and RUQ abd pain, managed with PRN dilaudid 2x. PIV infusing NS @ 75 ml/hr. Refused scheduled nebs this shift. Continent of B/B, up to BR. Pt slept between cares. Surg and GI following. Plan on ERCP tomorrow.

## 2021-04-19 NOTE — UTILIZATION REVIEW
"  Admission Status; Secondary Review Determination         Under the authority of the Utilization Management Committee, the utilization review process indicated a secondary review on the above patient.  The review outcome is based on review of the medical records, discussions with staff, and applying clinical experience noted on the date of the review.        (x)      Inpatient Status Appropriate - This patient's medical care is consistent with medical management for inpatient care and reasonable inpatient medical practice.      () Observation Status Appropriate - This patient does not meet hospital inpatient criteria and is placed in observation status. If this patient's primary payer is Medicare and was admitted as an inpatient, Condition Code 44 should be used and patient status changed to \"observation\".   () Admission Status NOT Appropriate - This patient's medical care is not consistent with medical management for Inpatient or Observation Status.          RATIONALE FOR DETERMINATION     Nitish Coreas is a 86 year old male with h/o COPD, HTN, HL and chronic back pain was admitted on 4/18/2021 with nausea, vomiting and abd pain. In ER, labs showed leukocytosis.  CT abdomen/pelvis showed dilated CBD, 1.7 cm with possible obstructing choledocholithiasis. RUQ ultrasound showed cholelithiasis and finding suggestive of acute cholecystitis.  Dilated CBD was noted but no definitive CBD stone was seen. IVF, IV pain medication, and IV antibiotics initiated.  GI consulted and plan for ERCP and EUS today. General surgery consulted and plan is for cholecystectomy 4/20/2021.  IP status appropriate.    The severity of illness, intensity of service provided, expected LOS and risk for adverse outcome make the care complex, high risk and appropriate for hospital admission.        The information on this document is developed by the utilization review team in order for the business office to ensure compliance.  This only denotes " the appropriateness of proper admission status and does not reflect the quality of care rendered.         The definitions of Inpatient Status and Observation Status used in making the determination above are those provided in the CMS Coverage Manual, Chapter 1 and Chapter 6, section 70.4.      Sincerely,     Mounika Medina MD  Physician Advisor   Utilization Review/ Case Management  [facility].

## 2021-04-19 NOTE — ANESTHESIA POSTPROCEDURE EVALUATION
Patient: Nitish Coreas    Procedure(s):  ENDOSCOPIC ULTRASOUND, ESOPHAGOSCOPY / UPPER GASTROINTESTINAL TRACT (GI)  ENDOSCOPIC RETROGRADE CHOLANGIOPANCREATOGRAPHY, WITH CALCULUS REMOVAL USING BALLOON  AND CATHETER WITH STENT PLACEMENT    Diagnosis:Choledocholithiasis [K80.50]  Diagnosis Additional Information: No value filed.    Anesthesia Type:  MAC    Note:     Postop Pain Control: Uneventful            Sign Out: Well controlled pain   PONV:    Neuro/Psych: Uneventful            Sign Out: Acceptable/Baseline neuro status   Airway/Respiratory: Uneventful            Sign Out: Acceptable/Baseline resp. status   CV/Hemodynamics: Uneventful            Sign Out: Acceptable CV status   Other NRE: NONE   DID A NON-ROUTINE EVENT OCCUR? No         Last vitals:  Vitals:    04/19/21 1742 04/19/21 1750 04/19/21 1800   BP: 117/80 120/81 123/89   Pulse: 91 97 89   Resp: 19 14 17   Temp: 36.3  C (97.4  F) 36.3  C (97.4  F) 36.8  C (98.3  F)   SpO2: 93% 94% 96%       Last vitals prior to Anesthesia Care Transfer:  CRNA VITALS  4/19/2021 1655 - 4/19/2021 1755      4/19/2021             Resp Rate (set):  10          Electronically Signed By: Sukumar Will MD  April 19, 2021  6:10 PM

## 2021-04-19 NOTE — ANESTHESIA CARE TRANSFER NOTE
Patient: Nitish Coreas    Procedure(s):  ENDOSCOPIC ULTRASOUND, ESOPHAGOSCOPY / UPPER GASTROINTESTINAL TRACT (GI)  ENDOSCOPIC RETROGRADE CHOLANGIOPANCREATOGRAPHY, WITH CALCULUS REMOVAL USING BALLOON  AND CATHETER WITH STENT PLACEMENT    Diagnosis: Choledocholithiasis [K80.50]  Diagnosis Additional Information: No value filed.    Anesthesia Type:   MAC     Note:    Oropharynx: oropharynx clear of all foreign objects  Level of Consciousness: awake  Oxygen Supplementation: nasal cannula    Independent Airway: airway patency satisfactory and stable  Dentition: dentition unchanged  Vital Signs Stable: post-procedure vital signs reviewed and stable  Report to RN Given: handoff report given  Patient transferred to: PACU    Handoff Report: Identifed the Patient, Identified the Reponsible Provider, Reviewed the pertinent medical history, Discussed the surgical course, Reviewed Intra-OP anesthesia mangement and issues during anesthesia, Set expectations for post-procedure period and Allowed opportunity for questions and acknowledgement of understanding      Vitals: (Last set prior to Anesthesia Care Transfer)  CRNA VITALS  4/19/2021 1655 - 4/19/2021 1743      4/19/2021             Resp Rate (set):  10        Electronically Signed By: SUSAN Wilson CRNA  April 19, 2021  5:43 PM

## 2021-04-19 NOTE — CONSULTS
[facility]  Gastroenterology Consultation         Nitish Coreas  [address] [facility] [address] [address]  86 year old male    Admission Date/Time: 4/18/2021  Primary Care Provider: Remi Peterson  Referring / Attending Physician:  Dr. Arcenio Young    We were asked to see the patient in consultation by Dr. Arcenio Youngfor evaluation of choledocholithiasis vs mass.      CC: Abdominal pain    HPI:  Nitish Coreas is a 86 year old male with PMH of COPD, hypertension, hyperlipidemia who presented with c/o abdominal pain, nausea and vomiting. Patient had acute onset RUQ abdominal pain 2 days ago and was associated with nausea. He had 5-6 episodes of emesis and developed chills with no fever. He has had no diarrhea, melena, hematochezia, or lower abdominal pain, weigth loss or changes in appetite. Has not associated any activity with pain that makes better or worse. Pain continues in colicky pattern throughout day yesterday and therefore, patient and wife concern and brought to ED.     Vitals stable, afebrile, requires supplemental O2 at 2 LPM on nasal cannula. CMP remarkable for; Creatinine 2.42, Albumin 3.1, T bili 7.9, , . Lipase normal. CBC remarkable for WBC 16.7k.    CT abdomen pelvis showed dilated CBD, 1.7 cm with possible obstructing choledocholithiasis.  Also infrarenal abdominal aortic aneurysm 4.8 cm and aneurysmal dilatation of ascending aorta measuring 4.8 cm noted    ROS: A comprehensive ten point review of systems was negative aside from those in mentioned in the HPI.      PAST MED HX:  I have reviewed this patient's medical history and updated it with pertinent information if needed.   Past Medical History:   Diagnosis Date     Hyperlipidemia LDL goal < 100      Hypertension        MEDICATIONS:   Prior to Admission Medications   Prescriptions Last Dose Informant Patient Reported? Taking?   Boswellia-Glucosamine-Vit D (OSTEO BI-FLEX ONE PER DAY PO)  2021 at Unknown time  Yes Yes   Sig: Take 1 capsule by mouth daily   Cholecalciferol (VITAMIN D) 2000 UNITS CAPS 2021 at Unknown time  Yes Yes   Sig: Take 2 tablets by mouth daily    Krill Oil 500 MG CAPS 2021 at Unknown time  Yes Yes   Sig: Take 1 capsule by mouth daily   Multiple Vitamins-Minerals (MULTIVITAL) TABS 2021 at Unknown time  Yes Yes   Sig: Take 1 tablet by mouth daily.   cetirizine (ZYRTEC) 10 MG tablet 2021 at Unknown time  Yes Yes   Sig: Take 10 mg by mouth daily   gemfibrozil (LOPID) 600 MG tablet 2021 at morning  No Yes   Sig: Take 1 tablet (600 mg) by mouth 2 times daily   lisinopril (ZESTRIL) 40 MG tablet 2021 at Unknown time  Yes Yes   Sig: Take 40 mg by mouth daily   lovastatin (MEVACOR) 40 MG tablet 2021 at Unknown time  Yes Yes   Sig: Take 80 mg by mouth At Bedtime   omeprazole (PRILOSEC) 20 MG DR capsule 2021 at Unknown time  Yes Yes   Sig: Take 20 mg by mouth daily      Facility-Administered Medications: None       ALLERGIES:   Allergies   Allergen Reactions     Penicillins Anaphylaxis     Amoxicillin      Aspirin      bleeding     Benadryl [Anti-Itch]      Doesn't help in allergic reaction. Benadryl cream causes worsening rash        Ibuprofen Sodium      Esophageal bleeding     Morphine Sulfate        SOCIAL HISTORY:  Social History     Tobacco Use     Smoking status: Former Smoker     Packs/day: 4.00     Years: 45.00     Pack years: 180.00     Types: Cigarettes     Quit date: 1991     Years since quittin.9     Smokeless tobacco: Never Used   Substance Use Topics     Alcohol use: Yes     Alcohol/week: 0.0 standard drinks     Comment: 1-2 friday night- cocktail     Drug use: No       FAMILY HISTORY:  Family History   Problem Relation Age of Onset     Cancer Mother      Respiratory Mother      Heart Disease Father      Cerebrovascular Disease Father      Cerebrovascular Disease Sister         CVA x 2     Dementia Sister      Heart  Disease Son      Breast Cancer Daughter        PHYSICAL EXAM:   General  Alert, oriented and comfortable  Vital Signs with Ranges  Temp: 98.5  F (36.9  C) Temp src: Oral BP: 103/69 Pulse: 86   Resp: 12 SpO2: 90 % O2 Device: Nasal cannula Oxygen Delivery: 2 LPM  I/O last 3 completed shifts:  In: 2305 [I.V.:1105; IV Piggyback:1200]  Out: 200 [Urine:200]    Constitutional: healthy, alert and no distress   Cardiovascular: negative, PMI normal. No lifts, heaves, or thrills. RRR. No murmurs, clicks gallops or rub  Respiratory: negative, Percussion normal. Good diaphragmatic excursion. Lungs clear  Head: Normocephalic. No masses, lesions, tenderness or abnormalities  Neck: Neck supple. No adenopathy. Thyroid symmetric, normal size,, Carotids without bruits.  Abdomen: Abdomen soft, non-tender. BS normal. No masses, organomegaly, positive findings: tenderness mild epigastric, RUQ and LUQ          ADDITIONAL COMMENTS:   I reviewed the patient's new clinical lab test results.   Recent Labs   Lab Test 04/18/21  1022 11/23/20  0755 10/07/19  0916   WBC 16.7* 8.1 8.0   HGB 14.8 14.6 14.8   MCV 97 99 96    239 243     Recent Labs   Lab Test 04/18/21  1022 11/23/20  0755 11/07/19  0752   POTASSIUM 3.9 4.3 3.9   CHLORIDE 102 111* 107   CO2 25 25 22   BUN 36* 30 30   ANIONGAP 8 4 9     Recent Labs   Lab Test 04/18/21  1125 04/18/21  1022 11/23/20  0755 10/07/19  0916 10/07/19  0915   ALBUMIN  --  3.1* 3.5 3.6  --    BILITOTAL  --  7.9* 0.4 0.4  --    ALT  --  128* 24 24  --    AST  --  140* 26 25  --    PROTEIN 600*  --  >=300*  --  >=300*   LIPASE  --  91  --   --   --        I reviewed the patient's new imaging results.        CONSULTATION ASSESSMENT AND PLAN:   Nitish Coreas is a 86 year old male admitted on 4/18/21 with abdominal pain, nausea and vomiting. Found to have dilated CBD and cholelithiasis with leukocytosis.    Active Problems:  Dilated CBD  Choledocholithiasis  Obstructive  Jaundice  Cholecystitis  Leukocytosis  Has had improvement in abdominal pain but ongoing. WBC 16.7k.   CT noted dilated CBD at 1.7 cm with possible obstructing choledocholithiasis.  RUQ noted cholelithiasis and findings to suggest acute cholecystitis  CBD dilated and unsure if findings are related to more likely choledocholithiasis vs neoplastic cause.    -- Will recommend EUS with ERCP today  -- NPO for procedure  -- Continue with IVF, IV pain control, IV antibiotics, and IV antiemetics  -- Repeat CBC and CMP today to follow WBC and LFTs              Lakeisha Lantigua PA-C, FACP  [facility].  Office: [phone]  Cell : [phone] (Dr. Milan)  Cell: [phone] (Lakeisha Lantigua PA-C)

## 2021-04-19 NOTE — PROGRESS NOTES
[facility]    Medicine Progress Note - Hospitalist Service       Date of Admission:  4/18/2021  Assessment & Plan       Nitish Coreas is a 86 year old male with h/o COPD, HTN, HL and chronic back pain was brought to the ER for evaluation of nausea, vomiting and abd pain. He was admitted on 4/18/2021 for further evaluation.     Possible cholecystitis  Choledocholithiasis, dilated CBD and obstructive jaundice  Ongoing abdominal pain with nausea and vomiting and chills.  In ER, noted leukocytosis.  CT abdomen pelvis showed dilated CBD, 1.7 cm with possible obstructing choledocholithiasis. RUQ ultrasound was obtained, showed cholelithiasis and finding suggestive of acute cholecystitis.  Dilated CBD was noted but no definitive CBD stone was noted on gallbladder. Cipro and Flagyl IV, IV hydration, IV hydromorphone given in ER.  -Continue Cipro and Flagyl IV, noted PCN allergy  -N.p.o., IVF, pain medication. Minimize narcotic as able.  - GI consulted, plan for ERCP and EUS today. Gen surgery consulted, plan is cholecystectomy tomorrow. Appreciate assistance from consult services.  -Leukocytosis better and LFTs stable, follow. Lipase neg on admission.     Suspected Acute kidney injury on CKD stage 4  Patient with creatinine of 2.14 in November 2020.  Creatinine here at presentation is 2.42.  Suspect component of acute kidney injury given nausea vomiting.  -Continue IV hydration  -Monitor intake and output, bladder scan as needed.  -Follow BMP  -Continue to hold PTA ACE inhibitor  -bladder scan PRN to make sure he is not retaining.     HTN  HL  PTA is on lisinopril and gemfibrozil/lovastatin, Hold PTA meds.  - PRN IV hydralazine available.    COPD  Not on home O2. Feels like he can not take deep breaths, likely related to abd pain/cholecystitis. Not on inhalers.  - scheduled and PRN albuterol neb ordered.    Chronic back pain  - heating pad, lidocaine gel PRN  - getting narcotic for abd pain as  above. 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    - aortic dilation noted in 2016 as well. No significant change in size.   - outpatient follow up     GERD  - changed PPI to IV     Diet: NPO for Medical/Clinical Reasons Except for: Meds  NPO per Anesthesia Guidelines for Procedure/Surgery Except for: Meds    DVT Prophylaxis: Pneumatic Compression Devices  Mckeon Catheter: not present  Code Status: Full Code        Disposition Plan   Expected discharge: 2 - 3 days, recommended to TBD, home vs may need rehab once after ERCP/EUS and cholecystectomy and diet resumed/tolerates.  Entered: Arcenio Young MD 04/19/2021, 10:23 AM     The patient's care was discussed with the Bedside Nurse, Patient, Patient's Family and GI/surgery Consultant.    Arcenio Young MD  Hospitalist Service  [facility]  Contact information available via [facility] Paging/Directory    ______________________________________________________________________    Interval History   Patient with ongoing RUQ and back pain.   Denies nausea.   Afebrile.   Breathing at his baseline.     Data reviewed today: I reviewed all medications, new labs and imaging results over the last 24 hours. I personally reviewed no images or EKG's today.    Physical Exam   Vital Signs: Temp: 98.5  F (36.9  C) Temp src: Oral BP: 103/69 Pulse: 86   Resp: 12 SpO2: 90 % O2 Device: Nasal cannula Oxygen Delivery: 2 LPM  Weight: 190 lbs 0 oz    General: sleepy but arouses and remains AAOx3, appears comfortable.  HEENT: PERRLA EOMI. Mucosa moist.   Lungs: Bilateral equal air entry. Clear to auscultation, normal work of breathing.   CVS: S1S2 regular, no tachycardia or murmur.   Abdomen: Soft, obese/diatended, tender RUQ unchanged. BS heard.  MSK: No edema or deformities.  Neuro: AAOX3.  CN 2-12 normal. Strength symmetrical.  Skin: No rash.     Data   Recent Labs   Lab 04/19/21  0907 04/18/21  1022   WBC 12.6* 16.7*   HGB 12.6* 14.8   MCV 98 97    194    135   POTASSIUM 4.0 3.9   CHLORIDE 109 102   CO2 22 25   BUN 38* 36*   CR 2.50* 2.42*   ANIONGAP 6 8   HANS 8.1* 8.8   GLC 96 150*   ALBUMIN 2.4* 3.1*   PROTTOTAL 6.2* 7.4   BILITOTAL 8.7* 7.9*   ALKPHOS 138 123   * 128*   * 140*   LIPASE  --  91     Recent Results (from the past 24 hour(s))   Abdomen US, limited (RUQ only)    Narrative    ULTRASOUND ABDOMEN LIMITED 4/18/2021 12:50 PM    CLINICAL HISTORY: Right upper quadrant pain.    TECHNIQUE: Limited abdominal ultrasound.    COMPARISON: CT of the chest, abdomen, and pelvis performed earlier  today.    FINDINGS:  GALLBLADDER: Sludge and small gallstones are noted within the  gallbladder. The gallbladder is mildly distended. The gallbladder wall  is diffusely thickened at 0.4 cm. No pericholecystic fluid is  identified. The sonographer reports a negative sonographic Curtis  sign.    BILE DUCTS: There is no intrahepatic biliary dilatation. The common  duct is dilated at 1.5 cm. Portions of the common duct could not be  visualized due to overlying bowel gas.    LIVER: Unremarkable. No evidence for fatty infiltration of the liver.  No focal hepatic masses.    RIGHT KIDNEY: A few right renal simple cysts are identified, with the  largest measuring 3.8 cm. No routine follow-up for these right renal  cysts would be typically recommended. No hydronephrosis.    PANCREAS: Pancreas is obscured by overlying bowel gas, and could not  be evaluated.    No ascites.      Impression    IMPRESSION:  1.  Cholelithiasis, mild gallbladder distention, and diffuse  gallbladder wall thickening. Acute cholecystitis could have this  appearance.  2.  The common duct is dilated at 1.5 cm, with no definite cause  identified. Choledocholithiasis causing biliary obstruction is not  excluded.  3.   Nonvisualization of the pancreas.    ISAAC LORA MD     Medications     sodium chloride 75 mL/hr at 04/19/21 0535       albuterol  2.5 mg Nebulization Q6H     ciprofloxacin  400 mg Intravenous Q24H     famotidine  20 mg Intravenous Q24H     metroNIDAZOLE  500 mg Intravenous Q12H     pantoprazole (PROTONIX) IV  40 mg Intravenous Daily with breakfast     sodium chloride (PF)  3 mL Intracatheter Q8H

## 2021-04-19 NOTE — ANESTHESIA PREPROCEDURE EVALUATION
Anesthesia Pre-Procedure Evaluation    Patient: Nitish Coreas   MRN: [id number] : 1934        Preoperative Diagnosis: Choledocholithiasis [K80.50]   Procedure : Procedure(s):  ENDOSCOPIC ULTRASOUND, ESOPHAGOSCOPY / UPPER GASTROINTESTINAL TRACT (GI)  ENDOSCOPIC RETROGRADE CHOLANGIOPANCREATOGRAPHY, WITH CALCULUS REMOVAL USING BALLOON CATHETER     Past Medical History:   Diagnosis Date     Hyperlipidemia LDL goal < 100      Hypertension       Past Surgical History:   Procedure Laterality Date     C REPLANTATION THUMB DISTAL,COMPLETE      cut off right thumb and repair     C TOTAL KNEE ARTHROPLASTY  2000    left then right     DENTAL SURGERY      wisdom teeth     EYE SURGERY Right  and     macular pucker and cataract     TONSILLECTOMY & ADENOIDECTOMY        Allergies   Allergen Reactions     Penicillins Anaphylaxis     Amoxicillin      Aspirin      bleeding     Benadryl [Anti-Itch]      Doesn't help in allergic reaction. Benadryl cream causes worsening rash        Ibuprofen Sodium      Esophageal bleeding     Morphine Sulfate       Social History     Tobacco Use     Smoking status: Former Smoker     Packs/day: 4.00     Years: 45.00     Pack years: 180.00     Types: Cigarettes     Quit date: 1991     Years since quittin.9     Smokeless tobacco: Never Used   Substance Use Topics     Alcohol use: Yes     Alcohol/week: 0.0 standard drinks     Comment: 1-2 twice per week      Wt Readings from Last 1 Encounters:   21 86.2 kg (190 lb)        Anesthesia Evaluation   Pt has had prior anesthetic. Type: General.    No history of anesthetic complications       ROS/MED HX  ENT/Pulmonary:     (+) allergic rhinitis, tobacco use, Past use, COPD,     Neurologic:  - neg neurologic ROS     Cardiovascular: Comment: ECG-SR, isoelectric, low voltage   Ascending and descending aortic aneurysms    (+) Dyslipidemia hypertension----- (-) CAD   METS/Exercise Tolerance:     Hematologic:     (+)  anemia,     Musculoskeletal:   (+) arthritis,     GI/Hepatic: Comment: Choledocholithiasis      (+) GERD,  (-) liver disease   Renal/Genitourinary:     (+) renal disease, type: CRI and ARF,     Endo:     (+) Obesity,     Psychiatric/Substance Use:       Infectious Disease:       Malignancy:       Other:            Physical Exam    Airway        Mallampati: III   TM distance: > 3 FB   Neck ROM: full   Mouth opening: > 3 cm    Respiratory Devices and Support         Dental       (+) missing and caps    B=Bridge, C=Chipped, L=Loose, M=Missing    Cardiovascular   cardiovascular exam normal          Pulmonary   pulmonary exam normal                OUTSIDE LABS:  CBC:   Lab Results   Component Value Date    WBC 12.6 (H) 04/19/2021    WBC 16.7 (H) 04/18/2021    HGB 12.6 (L) 04/19/2021    HGB 14.8 04/18/2021    HCT 37.5 (L) 04/19/2021    HCT 43.1 04/18/2021     04/19/2021     04/18/2021     BMP:   Lab Results   Component Value Date     04/19/2021     04/18/2021    POTASSIUM 4.0 04/19/2021    POTASSIUM 3.9 04/18/2021    CHLORIDE 109 04/19/2021    CHLORIDE 102 04/18/2021    CO2 22 04/19/2021    CO2 25 04/18/2021    BUN 38 (H) 04/19/2021    BUN 36 (H) 04/18/2021    CR 2.50 (H) 04/19/2021    CR 2.42 (H) 04/18/2021    GLC 96 04/19/2021     (H) 04/18/2021     COAGS:   Lab Results   Component Value Date    PTT 29 11/11/2008    INR 0.99 11/11/2008     POC: No results found for: BGM, HCG, HCGS  HEPATIC:   Lab Results   Component Value Date    ALBUMIN 2.4 (L) 04/19/2021    PROTTOTAL 6.2 (L) 04/19/2021     (H) 04/19/2021     (H) 04/19/2021    ALKPHOS 138 04/19/2021    BILITOTAL 8.7 (H) 04/19/2021     OTHER:   Lab Results   Component Value Date    HANS 8.1 (L) 04/19/2021    PHOS 3.6 01/24/2011    LIPASE 91 04/18/2021       Anesthesia Plan    ASA Status:  3   NPO Status:  NPO Appropriate    Anesthesia Type: MAC.     - Reason for MAC: chronic cardiopulmonary disease               Consents    Anesthesia Plan(s) and associated risks, benefits, and realistic alternatives discussed. Questions answered and patient/representative(s) expressed understanding.     - Discussed with:  Patient      - Extended Intubation/Ventilatory Support Discussed: No.      - Patient is DNR/DNI Status: No    Use of blood products discussed: No .     Postoperative Care    Pain management: IV analgesics.     - Plan for long acting post-op opioid use   PONV prophylaxis: Ondansetron (or other 5HT-3)     Comments:                Daphnie Lewis MD

## 2021-04-19 NOTE — PLAN OF CARE
A/O x4. VSS, on 2L nc. triggered sepsis at shift change, Lactic 0.7. PRN dilaudid x2 for back pain, denies N/V, NPO. Up SBA with IV pole. Continent, urinal at bedside. PIV infusing NS @ 75 ml/hr. Plan for ERCP today.

## 2021-04-19 NOTE — ANESTHESIA POSTPROCEDURE EVALUATION
Patient: Nitish Coreas    Procedure(s):  ENDOSCOPIC ULTRASOUND, ESOPHAGOSCOPY / UPPER GASTROINTESTINAL TRACT (GI)  ENDOSCOPIC RETROGRADE CHOLANGIOPANCREATOGRAPHY, WITH CALCULUS REMOVAL USING BALLOON  AND CATHETER WITH STENT PLACEMENT    Diagnosis:Choledocholithiasis [K80.50]  Diagnosis Additional Information: No value filed.    Anesthesia Type:  MAC    Note:  Disposition: Admission   Postop Pain Control:    PONV:    Neuro/Psych:    Airway/Respiratory:    CV/Hemodynamics:    Other NRE:    DID A NON-ROUTINE EVENT OCCUR?          Last vitals:  Vitals:    04/19/21 1742 04/19/21 1750 04/19/21 1800   BP: 117/80 120/81 123/89   Pulse: 91 97 89   Resp: 19 14 17   Temp: 36.3  C (97.4  F) 36.3  C (97.4  F) 36.8  C (98.3  F)   SpO2: 93% 94% 96%       Last vitals prior to Anesthesia Care Transfer:  CRNA VITALS  4/19/2021 1655 - 4/19/2021 1755      4/19/2021             Resp Rate (set):  10          Electronically Signed By: Sukumar Will MD  April 19, 2021  6:10 PM

## 2021-04-20 ENCOUNTER — SURGERY (OUTPATIENT)
Age: 86
End: 2021-04-20
Payer: COMMERCIAL

## 2021-04-20 ENCOUNTER — ANESTHESIA EVENT (OUTPATIENT)
Dept: SURGERY | Facility: CLINIC | Age: 86
DRG: 417 | End: 2021-04-20
Payer: COMMERCIAL

## 2021-04-20 ENCOUNTER — ANESTHESIA (OUTPATIENT)
Dept: SURGERY | Facility: CLINIC | Age: 86
DRG: 417 | End: 2021-04-20
Payer: COMMERCIAL

## 2021-04-20 LAB
ALBUMIN SERPL-MCNC: 2.1 G/DL (ref 3.4–5)
ALP SERPL-CCNC: 238 U/L (ref 40–150)
ALT SERPL W P-5'-P-CCNC: 170 U/L (ref 0–70)
ANION GAP SERPL CALCULATED.3IONS-SCNC: 6 MMOL/L (ref 3–14)
AST SERPL W P-5'-P-CCNC: 230 U/L (ref 0–45)
BASOPHILS # BLD AUTO: 0 10E9/L (ref 0–0.2)
BASOPHILS NFR BLD AUTO: 0.3 %
BILIRUB SERPL-MCNC: 6.7 MG/DL (ref 0.2–1.3)
BUN SERPL-MCNC: 45 MG/DL (ref 7–30)
CALCIUM SERPL-MCNC: 8 MG/DL (ref 8.5–10.1)
CHLORIDE SERPL-SCNC: 110 MMOL/L (ref 94–109)
CO2 SERPL-SCNC: 21 MMOL/L (ref 20–32)
CREAT SERPL-MCNC: 2.83 MG/DL (ref 0.66–1.25)
DIFFERENTIAL METHOD BLD: ABNORMAL
EOSINOPHIL # BLD AUTO: 0.1 10E9/L (ref 0–0.7)
EOSINOPHIL NFR BLD AUTO: 1.6 %
ERYTHROCYTE [DISTWIDTH] IN BLOOD BY AUTOMATED COUNT: 13.2 % (ref 10–15)
GFR SERPL CREATININE-BSD FRML MDRD: 19 ML/MIN/{1.73_M2}
GLUCOSE SERPL-MCNC: 91 MG/DL (ref 70–99)
HCT VFR BLD AUTO: 34.8 % (ref 40–53)
HGB BLD-MCNC: 11.7 G/DL (ref 13.3–17.7)
IMM GRANULOCYTES # BLD: 0 10E9/L (ref 0–0.4)
IMM GRANULOCYTES NFR BLD: 0.4 %
LACTATE BLD-SCNC: 1.3 MMOL/L (ref 0.7–2)
LIPASE SERPL-CCNC: 144 U/L (ref 73–393)
LYMPHOCYTES # BLD AUTO: 0.7 10E9/L (ref 0.8–5.3)
LYMPHOCYTES NFR BLD AUTO: 9.1 %
MCH RBC QN AUTO: 32.7 PG (ref 26.5–33)
MCHC RBC AUTO-ENTMCNC: 33.6 G/DL (ref 31.5–36.5)
MCV RBC AUTO: 97 FL (ref 78–100)
MONOCYTES # BLD AUTO: 1 10E9/L (ref 0–1.3)
MONOCYTES NFR BLD AUTO: 12 %
NEUTROPHILS # BLD AUTO: 6.1 10E9/L (ref 1.6–8.3)
NEUTROPHILS NFR BLD AUTO: 76.6 %
NRBC # BLD AUTO: 0 10*3/UL
NRBC BLD AUTO-RTO: 0 /100
PLATELET # BLD AUTO: 160 10E9/L (ref 150–450)
POTASSIUM SERPL-SCNC: 3.8 MMOL/L (ref 3.4–5.3)
PROT SERPL-MCNC: 5.8 G/DL (ref 6.8–8.8)
RBC # BLD AUTO: 3.58 10E12/L (ref 4.4–5.9)
SODIUM SERPL-SCNC: 137 MMOL/L (ref 133–144)
WBC # BLD AUTO: 8 10E9/L (ref 4–11)

## 2021-04-20 PROCEDURE — 47562 LAPAROSCOPIC CHOLECYSTECTOMY: CPT | Mod: AS | Performed by: PHYSICIAN ASSISTANT

## 2021-04-20 PROCEDURE — 710N000009 HC RECOVERY PHASE 1, LEVEL 1, PER MIN: Performed by: SURGERY

## 2021-04-20 PROCEDURE — 258N000003 HC RX IP 258 OP 636: Performed by: ANESTHESIOLOGY

## 2021-04-20 PROCEDURE — 999N000141 HC STATISTIC PRE-PROCEDURE NURSING ASSESSMENT: Performed by: SURGERY

## 2021-04-20 PROCEDURE — 80053 COMPREHEN METABOLIC PANEL: CPT | Performed by: SURGERY

## 2021-04-20 PROCEDURE — 36415 COLL VENOUS BLD VENIPUNCTURE: CPT | Performed by: SURGERY

## 2021-04-20 PROCEDURE — 250N000011 HC RX IP 250 OP 636: Performed by: NURSE ANESTHETIST, CERTIFIED REGISTERED

## 2021-04-20 PROCEDURE — 250N000025 HC SEVOFLURANE, PER MIN: Performed by: SURGERY

## 2021-04-20 PROCEDURE — 360N000083 HC SURGERY LEVEL 3 W/ FLUORO, PER MIN: Performed by: SURGERY

## 2021-04-20 PROCEDURE — 370N000017 HC ANESTHESIA TECHNICAL FEE, PER MIN: Performed by: SURGERY

## 2021-04-20 PROCEDURE — 83605 ASSAY OF LACTIC ACID: CPT | Performed by: HOSPITALIST

## 2021-04-20 PROCEDURE — 47562 LAPAROSCOPIC CHOLECYSTECTOMY: CPT | Performed by: SURGERY

## 2021-04-20 PROCEDURE — 250N000009 HC RX 250: Performed by: NURSE ANESTHETIST, CERTIFIED REGISTERED

## 2021-04-20 PROCEDURE — 83690 ASSAY OF LIPASE: CPT | Performed by: SURGERY

## 2021-04-20 PROCEDURE — 88304 TISSUE EXAM BY PATHOLOGIST: CPT | Mod: TC | Performed by: SURGERY

## 2021-04-20 PROCEDURE — 0FT44ZZ RESECTION OF GALLBLADDER, PERCUTANEOUS ENDOSCOPIC APPROACH: ICD-10-PCS | Performed by: SURGERY

## 2021-04-20 PROCEDURE — 85025 COMPLETE CBC W/AUTO DIFF WBC: CPT | Performed by: SURGERY

## 2021-04-20 PROCEDURE — 36415 COLL VENOUS BLD VENIPUNCTURE: CPT | Performed by: HOSPITALIST

## 2021-04-20 PROCEDURE — 250N000011 HC RX IP 250 OP 636: Performed by: HOSPITALIST

## 2021-04-20 PROCEDURE — 258N000003 HC RX IP 258 OP 636: Performed by: NURSE ANESTHETIST, CERTIFIED REGISTERED

## 2021-04-20 PROCEDURE — 250N000011 HC RX IP 250 OP 636: Performed by: PHYSICIAN ASSISTANT

## 2021-04-20 PROCEDURE — 120N000001 HC R&B MED SURG/OB

## 2021-04-20 PROCEDURE — 250N000026 HC DESFLURANE, PER MIN: Performed by: SURGERY

## 2021-04-20 PROCEDURE — 88304 TISSUE EXAM BY PATHOLOGIST: CPT | Mod: 26 | Performed by: PATHOLOGY

## 2021-04-20 PROCEDURE — 258N000001 HC RX 258: Performed by: SURGERY

## 2021-04-20 PROCEDURE — C9113 INJ PANTOPRAZOLE SODIUM, VIA: HCPCS | Performed by: HOSPITALIST

## 2021-04-20 PROCEDURE — 272N000001 HC OR GENERAL SUPPLY STERILE: Performed by: SURGERY

## 2021-04-20 PROCEDURE — 250N000009 HC RX 250: Performed by: SURGERY

## 2021-04-20 PROCEDURE — 250N000013 HC RX MED GY IP 250 OP 250 PS 637: Performed by: NURSE ANESTHETIST, CERTIFIED REGISTERED

## 2021-04-20 PROCEDURE — 99233 SBSQ HOSP IP/OBS HIGH 50: CPT | Performed by: HOSPITALIST

## 2021-04-20 PROCEDURE — 250N000009 HC RX 250: Performed by: ANESTHESIOLOGY

## 2021-04-20 PROCEDURE — 250N000011 HC RX IP 250 OP 636: Performed by: ANESTHESIOLOGY

## 2021-04-20 RX ORDER — DEXAMETHASONE SODIUM PHOSPHATE 4 MG/ML
INJECTION, SOLUTION INTRA-ARTICULAR; INTRALESIONAL; INTRAMUSCULAR; INTRAVENOUS; SOFT TISSUE PRN
Status: DISCONTINUED | OUTPATIENT
Start: 2021-04-20 | End: 2021-04-20

## 2021-04-20 RX ORDER — ALBUTEROL SULFATE 90 UG/1
AEROSOL, METERED RESPIRATORY (INHALATION) PRN
Status: DISCONTINUED | OUTPATIENT
Start: 2021-04-20 | End: 2021-04-20

## 2021-04-20 RX ORDER — LABETALOL HYDROCHLORIDE 5 MG/ML
10 INJECTION, SOLUTION INTRAVENOUS
Status: DISCONTINUED | OUTPATIENT
Start: 2021-04-20 | End: 2021-04-20 | Stop reason: HOSPADM

## 2021-04-20 RX ORDER — SODIUM CHLORIDE, SODIUM LACTATE, POTASSIUM CHLORIDE, CALCIUM CHLORIDE 600; 310; 30; 20 MG/100ML; MG/100ML; MG/100ML; MG/100ML
INJECTION, SOLUTION INTRAVENOUS CONTINUOUS
Status: DISCONTINUED | OUTPATIENT
Start: 2021-04-20 | End: 2021-04-20 | Stop reason: HOSPADM

## 2021-04-20 RX ORDER — HYDROMORPHONE HYDROCHLORIDE 2 MG/1
2 TABLET ORAL EVERY 6 HOURS PRN
Status: DISCONTINUED | OUTPATIENT
Start: 2021-04-20 | End: 2021-04-22 | Stop reason: HOSPADM

## 2021-04-20 RX ORDER — HYDROMORPHONE HYDROCHLORIDE 1 MG/ML
.3-.5 INJECTION, SOLUTION INTRAMUSCULAR; INTRAVENOUS; SUBCUTANEOUS EVERY 5 MIN PRN
Status: DISCONTINUED | OUTPATIENT
Start: 2021-04-20 | End: 2021-04-20 | Stop reason: HOSPADM

## 2021-04-20 RX ORDER — ONDANSETRON 2 MG/ML
4 INJECTION INTRAMUSCULAR; INTRAVENOUS EVERY 30 MIN PRN
Status: DISCONTINUED | OUTPATIENT
Start: 2021-04-20 | End: 2021-04-20 | Stop reason: HOSPADM

## 2021-04-20 RX ORDER — PROPOFOL 10 MG/ML
INJECTION, EMULSION INTRAVENOUS PRN
Status: DISCONTINUED | OUTPATIENT
Start: 2021-04-20 | End: 2021-04-20

## 2021-04-20 RX ORDER — FENTANYL CITRATE 50 UG/ML
INJECTION, SOLUTION INTRAMUSCULAR; INTRAVENOUS PRN
Status: DISCONTINUED | OUTPATIENT
Start: 2021-04-20 | End: 2021-04-20

## 2021-04-20 RX ORDER — HYDROCODONE BITARTRATE AND ACETAMINOPHEN 5; 325 MG/1; MG/1
1 TABLET ORAL EVERY 6 HOURS PRN
Status: DISCONTINUED | OUTPATIENT
Start: 2021-04-20 | End: 2021-04-22 | Stop reason: HOSPADM

## 2021-04-20 RX ORDER — IPRATROPIUM BROMIDE AND ALBUTEROL SULFATE 2.5; .5 MG/3ML; MG/3ML
3 SOLUTION RESPIRATORY (INHALATION)
Status: DISCONTINUED | OUTPATIENT
Start: 2021-04-20 | End: 2021-04-20 | Stop reason: HOSPADM

## 2021-04-20 RX ORDER — MAGNESIUM HYDROXIDE 1200 MG/15ML
LIQUID ORAL PRN
Status: DISCONTINUED | OUTPATIENT
Start: 2021-04-20 | End: 2021-04-20 | Stop reason: HOSPADM

## 2021-04-20 RX ORDER — ONDANSETRON 4 MG/1
4 TABLET, ORALLY DISINTEGRATING ORAL EVERY 30 MIN PRN
Status: DISCONTINUED | OUTPATIENT
Start: 2021-04-20 | End: 2021-04-20 | Stop reason: HOSPADM

## 2021-04-20 RX ORDER — FENTANYL CITRATE 50 UG/ML
25-50 INJECTION, SOLUTION INTRAMUSCULAR; INTRAVENOUS
Status: DISCONTINUED | OUTPATIENT
Start: 2021-04-20 | End: 2021-04-20 | Stop reason: HOSPADM

## 2021-04-20 RX ORDER — LIDOCAINE HYDROCHLORIDE 20 MG/ML
INJECTION, SOLUTION INFILTRATION; PERINEURAL PRN
Status: DISCONTINUED | OUTPATIENT
Start: 2021-04-20 | End: 2021-04-20

## 2021-04-20 RX ADMIN — ROCURONIUM BROMIDE 50 MG: 10 INJECTION INTRAVENOUS at 13:50

## 2021-04-20 RX ADMIN — HYDROMORPHONE HYDROCHLORIDE 0.5 MG: 1 INJECTION, SOLUTION INTRAMUSCULAR; INTRAVENOUS; SUBCUTANEOUS at 10:17

## 2021-04-20 RX ADMIN — HYDROMORPHONE HYDROCHLORIDE 0.25 MG: 1 INJECTION, SOLUTION INTRAMUSCULAR; INTRAVENOUS; SUBCUTANEOUS at 14:28

## 2021-04-20 RX ADMIN — DEXAMETHASONE SODIUM PHOSPHATE 4 MG: 4 INJECTION, SOLUTION INTRA-ARTICULAR; INTRALESIONAL; INTRAMUSCULAR; INTRAVENOUS; SOFT TISSUE at 14:03

## 2021-04-20 RX ADMIN — PHENYLEPHRINE HYDROCHLORIDE 200 MCG: 10 INJECTION INTRAVENOUS at 14:08

## 2021-04-20 RX ADMIN — METRONIDAZOLE 500 MG: 500 INJECTION, SOLUTION INTRAVENOUS at 01:40

## 2021-04-20 RX ADMIN — BUPIVACAINE HYDROCHLORIDE AND EPINEPHRINE BITARTRATE: 5; .005 INJECTION, SOLUTION PERINEURAL at 14:19

## 2021-04-20 RX ADMIN — SODIUM CHLORIDE, POTASSIUM CHLORIDE, SODIUM LACTATE AND CALCIUM CHLORIDE: 600; 310; 30; 20 INJECTION, SOLUTION INTRAVENOUS at 13:43

## 2021-04-20 RX ADMIN — SUGAMMADEX 160 MG: 100 INJECTION, SOLUTION INTRAVENOUS at 15:19

## 2021-04-20 RX ADMIN — PROPOFOL 160 MG: 10 INJECTION, EMULSION INTRAVENOUS at 13:50

## 2021-04-20 RX ADMIN — ROCURONIUM BROMIDE 10 MG: 10 INJECTION INTRAVENOUS at 14:32

## 2021-04-20 RX ADMIN — HYDROMORPHONE HYDROCHLORIDE 0.5 MG: 1 INJECTION, SOLUTION INTRAMUSCULAR; INTRAVENOUS; SUBCUTANEOUS at 19:33

## 2021-04-20 RX ADMIN — PHENYLEPHRINE HYDROCHLORIDE 100 MCG: 10 INJECTION INTRAVENOUS at 14:04

## 2021-04-20 RX ADMIN — DEXMEDETOMIDINE HYDROCHLORIDE 8 MCG: 100 INJECTION, SOLUTION INTRAVENOUS at 14:28

## 2021-04-20 RX ADMIN — IPRATROPIUM BROMIDE AND ALBUTEROL SULFATE 3 ML: .5; 3 SOLUTION RESPIRATORY (INHALATION) at 17:12

## 2021-04-20 RX ADMIN — CIPROFLOXACIN 400 MG: 2 INJECTION INTRAVENOUS at 10:11

## 2021-04-20 RX ADMIN — BUPIVACAINE HYDROCHLORIDE AND EPINEPHRINE BITARTRATE 38 ML: 5; .005 INJECTION, SOLUTION PERINEURAL at 15:20

## 2021-04-20 RX ADMIN — SODIUM CHLORIDE 1000 ML: 900 IRRIGANT IRRIGATION at 14:20

## 2021-04-20 RX ADMIN — HYDROMORPHONE HYDROCHLORIDE 0.5 MG: 1 INJECTION, SOLUTION INTRAMUSCULAR; INTRAVENOUS; SUBCUTANEOUS at 17:04

## 2021-04-20 RX ADMIN — HYDROMORPHONE HYDROCHLORIDE 0.5 MG: 1 INJECTION, SOLUTION INTRAMUSCULAR; INTRAVENOUS; SUBCUTANEOUS at 23:58

## 2021-04-20 RX ADMIN — HYDROMORPHONE HYDROCHLORIDE 0.5 MG: 1 INJECTION, SOLUTION INTRAMUSCULAR; INTRAVENOUS; SUBCUTANEOUS at 17:15

## 2021-04-20 RX ADMIN — ALBUTEROL SULFATE 6 PUFF: 90 AEROSOL, METERED RESPIRATORY (INHALATION) at 14:28

## 2021-04-20 RX ADMIN — METRONIDAZOLE 500 MG: 500 INJECTION, SOLUTION INTRAVENOUS at 14:00

## 2021-04-20 RX ADMIN — HYDROMORPHONE HYDROCHLORIDE 0.5 MG: 1 INJECTION, SOLUTION INTRAMUSCULAR; INTRAVENOUS; SUBCUTANEOUS at 00:43

## 2021-04-20 RX ADMIN — PHENYLEPHRINE HYDROCHLORIDE 100 MCG: 10 INJECTION INTRAVENOUS at 14:01

## 2021-04-20 RX ADMIN — FENTANYL CITRATE 100 MCG: 50 INJECTION, SOLUTION INTRAMUSCULAR; INTRAVENOUS at 13:50

## 2021-04-20 RX ADMIN — LIDOCAINE HYDROCHLORIDE 100 MG: 20 INJECTION, SOLUTION INFILTRATION; PERINEURAL at 13:50

## 2021-04-20 RX ADMIN — PANTOPRAZOLE SODIUM 40 MG: 40 INJECTION, POWDER, FOR SOLUTION INTRAVENOUS at 10:18

## 2021-04-20 RX ADMIN — PHENYLEPHRINE HYDROCHLORIDE 100 MCG: 10 INJECTION INTRAVENOUS at 14:15

## 2021-04-20 ASSESSMENT — ACTIVITIES OF DAILY LIVING (ADL)
ADLS_ACUITY_SCORE: 17
ADLS_ACUITY_SCORE: 20
ADLS_ACUITY_SCORE: 20
ADLS_ACUITY_SCORE: 17
ADLS_ACUITY_SCORE: 17

## 2021-04-20 ASSESSMENT — COPD QUESTIONNAIRES
CAT_SEVERITY: MILD
COPD: 1

## 2021-04-20 ASSESSMENT — LIFESTYLE VARIABLES: TOBACCO_USE: 1

## 2021-04-20 NOTE — OR NURSING
Initially ion PACU p[t lungs rhonchi, exp wheezes throughout- however pt refuses neb TX- multiple attempts to encourage compliance w neb TX- refuses- while giving report pt pulls out IV- eventual after multiple attempts ne IV- administer dilaudid 0.05 mg IV X2- effective- also pt eventually agrees to Neb TX- Lungs still wheezes and rhonchi but much better- sent to floor- Jelly COOMBS updated.

## 2021-04-20 NOTE — PLAN OF CARE
"hift Note: VSS, afebrile, c/o back pain (cr injury exacerbated in ER) taking IV dilaudid. ERCP/EUS today, \"lots of sludge! And large stone, [facility] notified [facility]\". OK for clears, NPO again at MN for surgery tomorrow.  "

## 2021-04-20 NOTE — PROGRESS NOTES
[facility]  Gastroenterology Progress Note     Nitish Coreas MRN# [id number]   YOB: 1934 Age: 86 year old          Assessment and Plan:   Niitsh Coreas is a 86 year old male admitted on 4/18/21 with abdominal pain, nausea and vomiting. Found to have dilated CBD and cholelithiasis with leukocytosis.     Active Problems:  Dilated CBD  Choledocholithiasis  Obstructive Jaundice  Cholecystitis  Leukocytosis  Has had improvement in abdominal pain but ongoing. WBC 16.7k->8k  LFTs have increased ->238, ->170, ->230. Tbili 8.7->6.7  CT noted dilated CBD at 1.7 cm with possible obstructing choledocholithiasis.  RUQ noted cholelithiasis and findings to suggest acute cholecystitis  4/19 EUS noted stone 13 mm in dimension in CBD. Multiple stones noted in the gallbladder.  4/19 ERCP noted choledocholithiasis. Complete removal was accomplished by biliary sphincterotomy and balloon extraction. One temporary stent placed in the common bile duct.  Laparoscopic cholecystectomy today     -- NPO for laparoscopic cholecystectomy  -- Continue with IVF, IV pain control, IV antibiotics, and IV antiemetics  -- Repeat CMP and CBC in a.m.  -- Repeat ERCP in 6-8 weeks for stent removal          Interval History:   no new complaints, doing well, denies chest pain, denies shortness of breath and alert, oriented to person, place and time              Review of Systems:   C: NEGATIVE for fever, chills, change in weight  E/M: NEGATIVE for ear, mouth and throat problems  R: NEGATIVE for significant cough or SOB  CV: NEGATIVE for chest pain, palpitations or peripheral edema             Medications:   I have reviewed this patient's current medications    albuterol  2.5 mg Nebulization Q6H     ciprofloxacin  400 mg Intravenous Q24H     famotidine  20 mg Intravenous Q24H     metroNIDAZOLE  500 mg Intravenous Q12H     pantoprazole (PROTONIX) IV  40 mg Intravenous Daily with breakfast      sodium chloride (PF)  3 mL Intracatheter Q8H                  Physical Exam:   Vitals were reviewed  Vital Signs with Ranges  Temp:  [97.1  F (36.2  C)-99.2  F (37.3  C)] 97.1  F (36.2  C)  Pulse:  [86-97] 88  Resp:  [14-20] 16  BP: (115-143)/(63-95) 134/63  SpO2:  [92 %-97 %] 94 %  I/O last 3 completed shifts:  In: 2685 [P.O.:150; I.V.:2535]  Out: 275 [Urine:275]  Constitutional: alert, mild distress and cooperative   Cardiovascular: negative, PMI normal. No lifts, heaves, or thrills. RRR. No murmurs, clicks gallops or rub  Respiratory: negative, Percussion normal. Good diaphragmatic excursion. Lungs clear  Head: Normocephalic. No masses, lesions, tenderness or abnormalities  Neck: Neck supple. No adenopathy. Thyroid symmetric, normal size,, Carotids without bruits.  Abdomen: Abdomen soft, tender. BS normal. No masses, organomegaly, positive findings: tenderness moderate RUQ           Data:   I reviewed the patient's new clinical lab test results.   Recent Labs   Lab Test 04/20/21  0750 04/19/21  0907 04/18/21  1022   WBC 8.0 12.6* 16.7*   HGB 11.7* 12.6* 14.8   MCV 97 98 97    154 194     Recent Labs   Lab Test 04/20/21  0750 04/19/21  0907 04/18/21  1022   POTASSIUM 3.8 4.0 3.9   CHLORIDE 110* 109 102   CO2 21 22 25   BUN 45* 38* 36*   ANIONGAP 6 6 8     Recent Labs   Lab Test 04/20/21  0750 04/19/21  0907 04/18/21  1125 04/18/21  1022 11/23/20  0755 10/07/19  0915 10/07/19  0915   ALBUMIN 2.1* 2.4*  --  3.1* 3.5   < >  --    BILITOTAL 6.7* 8.7*  --  7.9* 0.4   < >  --    * 127*  --  128* 24   < >  --    * 139*  --  140* 26   < >  --    PROTEIN  --   --  600*  --  >=300*  --  >=300*   LIPASE 144  --   --  91  --   --   --     < > = values in this interval not displayed.       I reviewed the patient's new imaging results.    All laboratory data reviewed  All imaging studies reviewed by me.    Lakeisha Lantigua PA-C,  4/20/2021  [facility]  Office : [phone]  Cell: [phone] (Dr. Milan)  Cell: [phone] (Lakeisha Lantigua PA-C)

## 2021-04-20 NOTE — PLAN OF CARE
Pt is A&Ox4. VSS on 2 L NC. Dilaudid given x2 for low back pain. Had EUS/ERCP w/ stent yesterday. Denies nausea. Has been NPO since midnight for lap/herrera today. Up SBA in room. PIV infusing NS w/ int abx. Continent of B/B. Discharge is pending.

## 2021-04-20 NOTE — ANESTHESIA PROCEDURE NOTES
Airway       Patient location during procedure: OR       Procedure Start/Stop Times: 4/20/2021 1:52 PM  Staff -        Anesthesiologist:  Remi Kearney MD       CRNA: Latha Day APRN CRNA       Other Anesthesia Staff: Sloan Schafer       Performed By: MISSY  Consent for Airway        Urgency: elective  Indications and Patient Condition       Indications for airway management: denver-procedural       Induction type:intravenous       Mask difficulty assessment: 2 - vent by mask + OA or adjuvant +/- NMBA    Final Airway Details       Final airway type: endotracheal airway       Successful airway: ETT - single  Endotracheal Airway Details        ETT size (mm): 8.0       Cuffed: yes       Successful intubation technique: direct laryngoscopy       DL Blade Type: Hill 2       Grade View of Cords: 1       Adjucts: stylet       Position: Right       Measured from: gums/teeth       Secured at (cm): 23       Bite block used: None    Post intubation assessment        Number of attempts at approach: 1       Number of other approaches attempted: 0       Secured with: pink tape       Ease of procedure: easy       Dentition: Unchanged    Medication(s) Administered   Medication Administration Time: 4/20/2021 1:52 PM

## 2021-04-20 NOTE — PROGRESS NOTES
Surgery:    86-year-old male with cholecystitis and choledocholithiasis/cholangitis, status post ERCP 4/19/2021.  Patient reports feeling somewhat better today.  Bilirubin and white blood cell count improved.  Lipase within the normal range.  Abdomen remains tender on examination, particularly in the midepigastric area, with associated guarding.  Will plan to proceed to the operating room this afternoon for laparoscopic cholecystectomy.  Risks and benefits of the procedure discussed with the patient, who is in agreement with proceeding.    Callie Inman MD

## 2021-04-20 NOTE — BRIEF OP NOTE
[facility]    Brief Operative Note    Pre-operative diagnosis: Choledocholithiasis [K80.50]  Post-operative diagnosis Acute Cholecystitis with Cholelithiasis    Procedure: Procedure(s):  CHOLECYSTECTOMY, LAPAROSCOPIC     Surgeon: Surgeon(s) and Role:     * Callie Inman MD - Primary     * Lopez Richey PA-C - Assisting     Anesthesia: General   Estimated blood loss: 25 mL  Drains: None  Specimens:   ID Type Source Tests Collected by Time Destination   A : GALLBLADDER AND CONTENTS Tissue Gallbladder and Contents SURGICAL PATHOLOGY EXAM Callie Inman MD 4/20/2021  2:28 PM      Findings:   None.  Complications: None.  Implants: * No implants in log *

## 2021-04-20 NOTE — PROGRESS NOTES
[facility]    Medicine Progress Note - Hospitalist Service       Date of Admission:  4/18/2021  Assessment & Plan       Nitish Coreas is a 86 year old male with h/o COPD, HTN, HL and chronic back pain was brought to the ER for evaluation of nausea, vomiting and abd pain. He was admitted on 4/18/2021 for further evaluation.     Possible cholecystitis  Choledocholithiasis, dilated CBD and obstructive jaundice  Ongoing abdominal pain with nausea and vomiting and chills.  In ER, noted leukocytosis.  CT abdomen pelvis showed dilated CBD, 1.7 cm with possible obstructing choledocholithiasis. RUQ ultrasound was obtained, showed cholelithiasis and finding suggestive of acute cholecystitis.  Dilated CBD was noted but no definitive CBD stone was noted on gallbladder. Cipro and Flagyl IV, IV hydration, IV hydromorphone given in ER.    - GI consulted, s/p ERCP and EUS 4/19. GB stone/sludge swiped and pus drained, sphincterotomy and CBD stent placed.   - Gen surgery following, plan is cholecystectomy today. Appreciate assistance from consult services.  -Continue Cipro and Flagyl IV, noted PCN allergy  - N.p.o., IVF, pain medication. Minimize narcotic as able.  - Leukocytosis better and LFTs stable, follow. Lipase neg on admission.     Suspected Acute kidney injury on CKD stage 4  Patient with creatinine of 2.14 in November 2020.  Creatinine here at presentation is 2.42.  Suspect component of acute kidney injury given nausea vomiting.  -Continue IV hydration  -Monitor intake and output, bladder scan as needed.  -Follow BMP  -Continue to hold PTA ACE inhibitor  -bladder scan PRN to make sure he is not retaining.     HTN  HL  PTA is on lisinopril and gemfibrozil/lovastatin, Hold PTA meds.  - PRN IV hydralazine available.    COPD  Not on home O2. Feels like he can not take deep breaths, likely related to abd pain/cholecystitis. Not on inhalers.  - scheduled and PRN albuterol neb ordered.    Chronic back  pain  - heating pad, lidocaine gel PRN  - getting narcotic for abd pain as above. 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    - aortic dilation noted in 2016 as well. No significant change in size.   - outpatient follow up     GERD  - changed PPI to IV     Diet: NPO per Anesthesia Guidelines for Procedure/Surgery Except for: Meds    DVT Prophylaxis: Pneumatic Compression Devices  Mckeon Catheter: not present  Code Status: Full Code        Disposition Plan   Expected discharge: 1-2 days, recommended to TBD, home vs may need rehab once cholecystectomy done, diet resumed and tolerates    Entered: Arcenio Young MD 04/20/2021, 8:13 AM     The patient's care was discussed with the Bedside Nurse, Patient and Patient's Family.    Arcenio Young MD  Hospitalist Service  [facility]  Contact information available via [facility] Paging/Directory    ______________________________________________________________________    Interval History   RUQ pain is better, using pain med more for his back pain. Denies nausea. Afebrile. Eagerly waiting for the surgery to get the GB out.    Data reviewed today: I reviewed all medications, new labs and imaging results over the last 24 hours. I personally reviewed no images or EKG's today.    Physical Exam   Vital Signs: Temp: 97.1  F (36.2  C) Temp src: Oral BP: 134/63 Pulse: 86   Resp: 16 SpO2: 94 % O2 Device: Nasal cannula Oxygen Delivery: 2 LPM  Weight: 190 lbs 0 oz    General: AAOx3, appears comfortable.  HEENT: PERRLA EOMI. Mucosa moist.   Lungs: Bilateral equal air entry. Clear to auscultation, normal work of breathing.   CVS: S1S2 regular, no tachycardia or murmur.   Abdomen: Soft, obese/diatended, RUQ is mildly tender. BS heard.  MSK: No edema or deformities.  Neuro: AAOX3. CN 2-12  normal. Strength symmetrical.  Skin: No rash.     Data   Recent Labs   Lab 04/20/21  0750 04/19/21  0907 04/18/21  1022   WBC 8.0 12.6* 16.7*   HGB 11.7* 12.6* 14.8   MCV 97 98 97    154 194   NA  --  137 135   POTASSIUM  --  4.0 3.9   CHLORIDE  --  109 102   CO2  --  22 25   BUN  --  38* 36*   CR  --  2.50* 2.42*   ANIONGAP  --  6 8   HANS  --  8.1* 8.8   GLC  --  96 150*   ALBUMIN  --  2.4* 3.1*   PROTTOTAL  --  6.2* 7.4   BILITOTAL  --  8.7* 7.9*   ALKPHOS  --  138 123   ALT  --  127* 128*   AST  --  139* 140*   LIPASE  --   --  91     Recent Results (from the past 24 hour(s))   XR ERCP    Narrative    ENDOSCOPIC RETROGRADE CHOLANGIOPANCREATOGRAPHY  4/19/2021 5:20 PM    HISTORY: ERCP, with calculus removal using balloon and catheter with  stent placement.     Images: 3  Fluoroscopy time: 0.9 minutes 4751-5727    COMPARISON: None.    FLUOROSCOPY TIME: .9 minutes.      Impression    IMPRESSION: Three images from ERCP. Initial image demonstrates a large  filling defect in the distal common bile duct that appears to have  been removed by a balloon sweep. A common bile duct stent was placed  at the end of the study.    KAUSHAL HAN MD     Medications     sodium chloride 75 mL/hr at 04/19/21 0535       albuterol  2.5 mg Nebulization Q6H     ciprofloxacin  400 mg Intravenous Q24H     famotidine  20 mg Intravenous Q24H     metroNIDAZOLE  500 mg Intravenous Q12H     pantoprazole (PROTONIX) IV  40 mg Intravenous Daily with breakfast     sodium chloride (PF)  3 mL Intracatheter Q8H

## 2021-04-20 NOTE — ANESTHESIA CARE TRANSFER NOTE
Patient: Nitish Coreas    Procedure(s):  CHOLECYSTECTOMY, LAPAROSCOPIC    Diagnosis: Choledocholithiasis [K80.50]  Diagnosis Additional Information: No value filed.    Anesthesia Type:   General     Note:    Oropharynx: oropharynx clear of all foreign objects  Level of Consciousness: drowsy  Oxygen Supplementation: face mask  Level of Supplemental Oxygen (L/min / FiO2): 6  Independent Airway: airway patency satisfactory and stable    Vital Signs Stable: post-procedure vital signs reviewed and stable  Report to RN Given: handoff report given  Patient transferred to: PACU  Comments: Spont. Resps, pt. Responding.  Extubated, sufficient air exchange. Oxygen mask placed with 10 L O2. To PACU VSS, Monitors on. Report to RN  Handoff Report: Identifed the Patient, Identified the Reponsible Provider, Reviewed the pertinent medical history, Discussed the surgical course, Reviewed Intra-OP anesthesia mangement and issues during anesthesia, Set expectations for post-procedure period and Allowed opportunity for questions and acknowledgement of understanding      Vitals: (Last set prior to Anesthesia Care Transfer)  CRNA VITALS  4/20/2021 1457 - 4/20/2021 1533      4/20/2021             SpO2:  (!) 86 %    Resp Rate (set):  10        Electronically Signed By: SUSAN Reed CRNA  April 20, 2021  3:33 PM

## 2021-04-20 NOTE — ANESTHESIA PREPROCEDURE EVALUATION
Anesthesia Pre-Procedure Evaluation    Patient: Nitish Coreas   MRN: [id number] : 1934        Preoperative Diagnosis: Choledocholithiasis [K80.50]   Procedure : Procedure(s):  CHOLECYSTECTOMY, LAPAROSCOPIC     Past Medical History:   Diagnosis Date     Hyperlipidemia LDL goal < 100      Hypertension       Past Surgical History:   Procedure Laterality Date     C REPLANTATION THUMB DISTAL,COMPLETE      cut off right thumb and repair     C TOTAL KNEE ARTHROPLASTY  2000    left then right     DENTAL SURGERY      wisdom teeth     ENDOSCOPIC RETROGRADE CHOLANGIOPANCREATOGRAM N/A 2021    Procedure: ENDOSCOPIC RETROGRADE CHOLANGIOPANCREATOGRAPHY, WITH CALCULUS REMOVAL USING BALLOON  AND CATHETER WITH STENT PLACEMENT;  Surgeon: Tres Milan MD;  Location:  OR     ENDOSCOPIC ULTRASOUND UPPER GASTROINTESTINAL TRACT (GI) N/A 2021    Procedure: ENDOSCOPIC ULTRASOUND, ESOPHAGOSCOPY / UPPER GASTROINTESTINAL TRACT (GI);  Surgeon: Tres Milan MD;  Location:  OR     EYE SURGERY Right  and     macular pucker and cataract     TONSILLECTOMY & ADENOIDECTOMY        Allergies   Allergen Reactions     Penicillins Anaphylaxis     Amoxicillin      Aspirin      bleeding     Benadryl [Anti-Itch]      Doesn't help in allergic reaction. Benadryl cream causes worsening rash        Ibuprofen Sodium      Esophageal bleeding     Morphine Sulfate       Social History     Tobacco Use     Smoking status: Former Smoker     Packs/day: 4.00     Years: 45.00     Pack years: 180.00     Types: Cigarettes     Quit date: 1991     Years since quittin.9     Smokeless tobacco: Never Used   Substance Use Topics     Alcohol use: Yes     Alcohol/week: 0.0 standard drinks     Comment: 1-2 twice per week      Wt Readings from Last 1 Encounters:   21 86.2 kg (190 lb)        Anesthesia Evaluation            ROS/MED HX  ENT/Pulmonary:     (+) tobacco use, Past use, mild,  COPD,  (-) sleep  apnea   Neurologic:       Cardiovascular:     (+) Dyslipidemia hypertension-----    METS/Exercise Tolerance:     Hematologic:       Musculoskeletal:       GI/Hepatic:    (-) GERD   Renal/Genitourinary:     (+) renal disease, type: CRI,     Endo:     (+) Obesity,     Psychiatric/Substance Use:       Infectious Disease:       Malignancy:       Other:            Physical Exam    Airway        Mallampati: II   TM distance: > 3 FB   Neck ROM: full   Mouth opening: > 3 cm    Respiratory Devices and Support         Dental  no notable dental history         Cardiovascular   cardiovascular exam normal          Pulmonary   pulmonary exam normal                OUTSIDE LABS:  CBC:   Lab Results   Component Value Date    WBC 8.0 04/20/2021    WBC 12.6 (H) 04/19/2021    HGB 11.7 (L) 04/20/2021    HGB 12.6 (L) 04/19/2021    HCT 34.8 (L) 04/20/2021    HCT 37.5 (L) 04/19/2021     04/20/2021     04/19/2021     BMP:   Lab Results   Component Value Date     04/20/2021     04/19/2021    POTASSIUM 3.8 04/20/2021    POTASSIUM 4.0 04/19/2021    CHLORIDE 110 (H) 04/20/2021    CHLORIDE 109 04/19/2021    CO2 21 04/20/2021    CO2 22 04/19/2021    BUN 45 (H) 04/20/2021    BUN 38 (H) 04/19/2021    CR 2.83 (H) 04/20/2021    CR 2.50 (H) 04/19/2021    GLC 91 04/20/2021    GLC 96 04/19/2021     COAGS:   Lab Results   Component Value Date    PTT 29 11/11/2008    INR 0.99 11/11/2008     POC: No results found for: BGM, HCG, HCGS  HEPATIC:   Lab Results   Component Value Date    ALBUMIN 2.1 (L) 04/20/2021    PROTTOTAL 5.8 (L) 04/20/2021     (H) 04/20/2021     (H) 04/20/2021    ALKPHOS 238 (H) 04/20/2021    BILITOTAL 6.7 (H) 04/20/2021     OTHER:   Lab Results   Component Value Date    HANS 8.0 (L) 04/20/2021    PHOS 3.6 01/24/2011    LIPASE 144 04/20/2021       Anesthesia Plan    ASA Status:  3      Anesthesia Type: General.   Induction: Intravenous.   Maintenance: Inhalation.        Consents    Anesthesia Plan(s)  and associated risks, benefits, and realistic alternatives discussed. Questions answered and patient/representative(s) expressed understanding.     - Discussed with:  Patient         Postoperative Care    Pain management: IV analgesics, Oral pain medications.   PONV prophylaxis: Ondansetron (or other 5HT-3)     Comments:                Remi Kearney MD

## 2021-04-20 NOTE — OP NOTE
General Surgery Operative Note    PREOPERATIVE DIAGNOSIS: Cholecystitis and choledocholithiasis/cholangitis      POSTOPERATIVE DIAGNOSIS: Same    PROCEDURE: LAPAROSCOPIC CHOLECYSTECTOMY     ANESTHESIA:  General    SURGEON:  Callie Inman MD    ASSISTANT:  Lopez Richey PA-C assisted in the above procedure. They provided assistance with pre-operative positioning, prepping, and draping of the patient. The assistant provided vital operative assistance with retraction using instruments thus providing the necessary exposure and visualization for the case, manipulation of tissues to achieve hemostasis, suction for visualization and assisted in closure of the wound. Post-operatively they assisted in transfer of the patient off the operative table and transition to the PACU physicians.    INDICATIONS:  Nitish Coreas is a 86 year old male who presented with complaints of abdominal pain nausea. The patient was evaluated with a right upper quadrant ultrasound which revealed evidence of cholelithiasis and cholecystitis. The patient's LFTs were significantly elevated.  The patient ultimately underwent ERCP with clearance of stones and pus from the common bile duct.  The decision was then made for the patient to proceed to the operating room for laparoscopic removal of the gallbladder. The risks and benefits of the procedure were discussed with the patient, and consent for the procedure was obtained.     PROCEDURE: The patient was brought back to the operating room and placed in the supine position on the operating table. A time out was completed. Anesthesia was induced and the patient was intubated. The patient was secured to the operating table and their pressure points were padded. The patient's abdomen was prepped and draped in the sterile fashion. Following infiltration of local anesthetic, the 11 blade scalpel was used to make a small left upper quadrant incision.  Entrance into the abdomen was gained using the 5mm  visiport. The patient's abdomen was then fully insufflated. The scope was then placed within the abdomen. Initial inspection revealed no evidence of injury at the port entry site. Under direct visualization, three additional ports were placed, one 5 mm supraumbilical port and two 5 mm right lateral ports.  The patient's left upper quadrant port was then upsized to an 11 mm port.  The gallbladder was identified, grasped, and retracted cephalad over the dome of the liver. The infundibulum of the gallbladder was grasped and retracted laterally. A combination of careful dissection utilizing the [place] dissector and hook electrocautery was then carried out to carefully dissect out the cystic duct and cystic artery. When both structures could be clearly seen to be coursing directly into the gallbladder and the critical view had been obtained, the patient's cystic duct was doubly clipped proximally, singly clipped distally, and divided. In a similar fashion, the cystic artery was doubly clipped proximally, singly clipped distally, and divided. The hook electrocautery was then used to carefully dissect the gallbladder free from its attachments to the liver bed. When the gallbladder had been completely dissected free, it was placed in an Endo Catch bag and removed through the left upper quadrant port. The port was reinserted and the surgical site inspected. Hemostasis of the liver bed was obtained using the electrocautery. The patient's right upper quadrant was then irrigated with normal saline solution. The patient's left upper quadrant port was then removed and there was no evidence of bleeding at the port exit site.  The fascia was reapproximated using a figure-of-eight 0 Vicryl stitch and the Velasquez-Parminder fascial closure device. The patient's 5 mm ports were then removed and there was no evidence of bleeding at the port exit sites. The patient's abdomen was then allowed to desufflate fully.  The port sites were then  inspected and hemostasis was obtained using the electrocautery. The port sites were reapproximated using 4-0 Monocryl subcuticular stitches. The incisions were washed and dried and sterile dressings were applied. The lap, needle, and instrument counts were correct at the end of the procedure. The patient tolerated the procedure well and was extubated and taken to the recovery area in stable condition.     ESTIMATED BLOOD LOSS: 25 mL    INTRAOPERATIVE FINDINGS: Cholecystitis and cholelithiasis    SPECIMENS: Gallbladder and contents     DRAINS: None    CONDITION: The patient will be readmitted to the surgical unit with instructions for postoperative cares and medications for pain management.      Callie Inman MD

## 2021-04-20 NOTE — ANESTHESIA POSTPROCEDURE EVALUATION
Patient: Nitish Coreas    Procedure(s):  CHOLECYSTECTOMY, LAPAROSCOPIC    Diagnosis:Choledocholithiasis [K80.50]  Diagnosis Additional Information: No value filed.    Anesthesia Type:  General    Note:  Disposition: Inpatient   Postop Pain Control: Uneventful            Sign Out: Well controlled pain   PONV: No   Neuro/Psych: Uneventful            Sign Out: Acceptable/Baseline neuro status   Airway/Respiratory: Uneventful            Sign Out: Acceptable/Baseline resp. status   CV/Hemodynamics: Uneventful            Sign Out: Acceptable CV status   Other NRE: NONE   DID A NON-ROUTINE EVENT OCCUR? No    Event details/Postop Comments:  Patients respiratory status is at hs baseline.  Coughing, wheezing with ronchi.  He continues to refuse any neb's or inhalers.  IS provided and encouraged.  Also encouraged ambulation with nursing support on the floor.          Last vitals:  Vitals:    04/20/21 1610 04/20/21 1620 04/20/21 1630   BP:  139/86 (!) 141/84   Pulse: 85 83 86   Resp: 18 15 18   Temp: 36.1  C (97  F) 36.2  C (97.2  F)    SpO2: 95% 93% 93%       Last vitals prior to Anesthesia Care Transfer:  CRNA VITALS  4/20/2021 1457 - 4/20/2021 1557      4/20/2021             SpO2:  (!) 86 %    Resp Rate (set):  10          Electronically Signed By: Remi Kearney MD  April 20, 2021  4:42 PM

## 2021-04-21 ENCOUNTER — APPOINTMENT (OUTPATIENT)
Dept: GENERAL RADIOLOGY | Facility: CLINIC | Age: 86
DRG: 417 | End: 2021-04-21
Attending: NURSE PRACTITIONER
Payer: COMMERCIAL

## 2021-04-21 LAB
ALBUMIN SERPL-MCNC: 2.2 G/DL (ref 3.4–5)
ALP SERPL-CCNC: 258 U/L (ref 40–150)
ALT SERPL W P-5'-P-CCNC: 171 U/L (ref 0–70)
ANION GAP SERPL CALCULATED.3IONS-SCNC: 6 MMOL/L (ref 3–14)
AST SERPL W P-5'-P-CCNC: 167 U/L (ref 0–45)
BASE DEFICIT BLDV-SCNC: 5.1 MMOL/L
BASOPHILS # BLD AUTO: 0 10E9/L (ref 0–0.2)
BASOPHILS NFR BLD AUTO: 0.2 %
BILIRUB SERPL-MCNC: 3.6 MG/DL (ref 0.2–1.3)
BUN SERPL-MCNC: 46 MG/DL (ref 7–30)
CALCIUM SERPL-MCNC: 8.1 MG/DL (ref 8.5–10.1)
CHLORIDE SERPL-SCNC: 108 MMOL/L (ref 94–109)
CO2 SERPL-SCNC: 22 MMOL/L (ref 20–32)
CREAT SERPL-MCNC: 2.98 MG/DL (ref 0.66–1.25)
DIFFERENTIAL METHOD BLD: ABNORMAL
EOSINOPHIL # BLD AUTO: 0 10E9/L (ref 0–0.7)
EOSINOPHIL NFR BLD AUTO: 0 %
ERYTHROCYTE [DISTWIDTH] IN BLOOD BY AUTOMATED COUNT: 13.2 % (ref 10–15)
GFR SERPL CREATININE-BSD FRML MDRD: 18 ML/MIN/{1.73_M2}
GLUCOSE SERPL-MCNC: 162 MG/DL (ref 70–99)
HCO3 BLDV-SCNC: 22 MMOL/L (ref 21–28)
HCT VFR BLD AUTO: 35.8 % (ref 40–53)
HGB BLD-MCNC: 11.7 G/DL (ref 13.3–17.7)
IMM GRANULOCYTES # BLD: 0.1 10E9/L (ref 0–0.4)
IMM GRANULOCYTES NFR BLD: 0.6 %
LACTATE BLD-SCNC: 2 MMOL/L (ref 0.7–2)
LYMPHOCYTES # BLD AUTO: 0.7 10E9/L (ref 0.8–5.3)
LYMPHOCYTES NFR BLD AUTO: 6.5 %
MCH RBC QN AUTO: 32 PG (ref 26.5–33)
MCHC RBC AUTO-ENTMCNC: 32.7 G/DL (ref 31.5–36.5)
MCV RBC AUTO: 98 FL (ref 78–100)
MONOCYTES # BLD AUTO: 1.3 10E9/L (ref 0–1.3)
MONOCYTES NFR BLD AUTO: 11.2 %
NEUTROPHILS # BLD AUTO: 9.2 10E9/L (ref 1.6–8.3)
NEUTROPHILS NFR BLD AUTO: 81.5 %
NRBC # BLD AUTO: 0 10*3/UL
NRBC BLD AUTO-RTO: 0 /100
NT-PROBNP SERPL-MCNC: 733 PG/ML (ref 0–1800)
O2/TOTAL GAS SETTING VFR VENT: 89 %
OXYHGB MFR BLDV: 30 %
PCO2 BLDV: 48 MM HG (ref 40–50)
PH BLDV: 7.27 PH (ref 7.32–7.43)
PLATELET # BLD AUTO: 209 10E9/L (ref 150–450)
PO2 BLDV: 19 MM HG (ref 25–47)
POTASSIUM SERPL-SCNC: 4.1 MMOL/L (ref 3.4–5.3)
PROT SERPL-MCNC: 6.4 G/DL (ref 6.8–8.8)
RBC # BLD AUTO: 3.66 10E12/L (ref 4.4–5.9)
SODIUM SERPL-SCNC: 136 MMOL/L (ref 133–144)
TROPONIN I SERPL-MCNC: <0.015 UG/L (ref 0–0.04)
WBC # BLD AUTO: 11.3 10E9/L (ref 4–11)

## 2021-04-21 PROCEDURE — 94640 AIRWAY INHALATION TREATMENT: CPT | Mod: 76

## 2021-04-21 PROCEDURE — 84484 ASSAY OF TROPONIN QUANT: CPT | Performed by: INTERNAL MEDICINE

## 2021-04-21 PROCEDURE — 250N000009 HC RX 250: Performed by: NURSE PRACTITIONER

## 2021-04-21 PROCEDURE — 250N000013 HC RX MED GY IP 250 OP 250 PS 637: Performed by: NURSE PRACTITIONER

## 2021-04-21 PROCEDURE — 250N000009 HC RX 250: Performed by: PHYSICIAN ASSISTANT

## 2021-04-21 PROCEDURE — 120N000001 HC R&B MED SURG/OB

## 2021-04-21 PROCEDURE — 36415 COLL VENOUS BLD VENIPUNCTURE: CPT | Performed by: INTERNAL MEDICINE

## 2021-04-21 PROCEDURE — 82805 BLOOD GASES W/O2 SATURATION: CPT | Performed by: NURSE PRACTITIONER

## 2021-04-21 PROCEDURE — 85025 COMPLETE CBC W/AUTO DIFF WBC: CPT | Performed by: PHYSICIAN ASSISTANT

## 2021-04-21 PROCEDURE — 83880 ASSAY OF NATRIURETIC PEPTIDE: CPT | Performed by: NURSE PRACTITIONER

## 2021-04-21 PROCEDURE — C9113 INJ PANTOPRAZOLE SODIUM, VIA: HCPCS | Performed by: PHYSICIAN ASSISTANT

## 2021-04-21 PROCEDURE — 83605 ASSAY OF LACTIC ACID: CPT | Performed by: NURSE PRACTITIONER

## 2021-04-21 PROCEDURE — 99233 SBSQ HOSP IP/OBS HIGH 50: CPT | Performed by: INTERNAL MEDICINE

## 2021-04-21 PROCEDURE — 999N000157 HC STATISTIC RCP TIME EA 10 MIN

## 2021-04-21 PROCEDURE — 36415 COLL VENOUS BLD VENIPUNCTURE: CPT | Performed by: PHYSICIAN ASSISTANT

## 2021-04-21 PROCEDURE — 93005 ELECTROCARDIOGRAM TRACING: CPT

## 2021-04-21 PROCEDURE — 36415 COLL VENOUS BLD VENIPUNCTURE: CPT | Performed by: NURSE PRACTITIONER

## 2021-04-21 PROCEDURE — 84484 ASSAY OF TROPONIN QUANT: CPT | Performed by: NURSE PRACTITIONER

## 2021-04-21 PROCEDURE — 250N000011 HC RX IP 250 OP 636: Performed by: PHYSICIAN ASSISTANT

## 2021-04-21 PROCEDURE — 71045 X-RAY EXAM CHEST 1 VIEW: CPT

## 2021-04-21 PROCEDURE — 258N000003 HC RX IP 258 OP 636: Performed by: PHYSICIAN ASSISTANT

## 2021-04-21 PROCEDURE — 250N000011 HC RX IP 250 OP 636: Performed by: NURSE PRACTITIONER

## 2021-04-21 PROCEDURE — 94640 AIRWAY INHALATION TREATMENT: CPT

## 2021-04-21 PROCEDURE — 80053 COMPREHEN METABOLIC PANEL: CPT | Performed by: PHYSICIAN ASSISTANT

## 2021-04-21 PROCEDURE — 99291 CRITICAL CARE FIRST HOUR: CPT | Performed by: NURSE PRACTITIONER

## 2021-04-21 PROCEDURE — 250N000013 HC RX MED GY IP 250 OP 250 PS 637: Performed by: PHYSICIAN ASSISTANT

## 2021-04-21 RX ORDER — METHOCARBAMOL 500 MG/1
500 TABLET, FILM COATED ORAL 3 TIMES DAILY PRN
Status: DISCONTINUED | OUTPATIENT
Start: 2021-04-21 | End: 2021-04-22 | Stop reason: HOSPADM

## 2021-04-21 RX ORDER — NITROGLYCERIN 0.4 MG/1
0.4 TABLET SUBLINGUAL EVERY 5 MIN PRN
Status: DISCONTINUED | OUTPATIENT
Start: 2021-04-21 | End: 2021-04-22 | Stop reason: HOSPADM

## 2021-04-21 RX ORDER — METHYLPREDNISOLONE SODIUM SUCCINATE 125 MG/2ML
125 INJECTION, POWDER, LYOPHILIZED, FOR SOLUTION INTRAMUSCULAR; INTRAVENOUS ONCE
Status: COMPLETED | OUTPATIENT
Start: 2021-04-21 | End: 2021-04-21

## 2021-04-21 RX ORDER — SIMETHICONE 80 MG
160 TABLET,CHEWABLE ORAL 4 TIMES DAILY
Status: DISCONTINUED | OUTPATIENT
Start: 2021-04-21 | End: 2021-04-22 | Stop reason: HOSPADM

## 2021-04-21 RX ORDER — FUROSEMIDE 10 MG/ML
40 INJECTION INTRAMUSCULAR; INTRAVENOUS ONCE
Status: COMPLETED | OUTPATIENT
Start: 2021-04-21 | End: 2021-04-21

## 2021-04-21 RX ORDER — IPRATROPIUM BROMIDE AND ALBUTEROL SULFATE 2.5; .5 MG/3ML; MG/3ML
3 SOLUTION RESPIRATORY (INHALATION)
Status: DISCONTINUED | OUTPATIENT
Start: 2021-04-21 | End: 2021-04-22 | Stop reason: HOSPADM

## 2021-04-21 RX ORDER — IPRATROPIUM BROMIDE AND ALBUTEROL SULFATE 2.5; .5 MG/3ML; MG/3ML
3 SOLUTION RESPIRATORY (INHALATION)
Status: DISCONTINUED | OUTPATIENT
Start: 2021-04-21 | End: 2021-04-21

## 2021-04-21 RX ADMIN — SODIUM CHLORIDE: 9 INJECTION, SOLUTION INTRAVENOUS at 04:25

## 2021-04-21 RX ADMIN — PANTOPRAZOLE SODIUM 40 MG: 40 INJECTION, POWDER, FOR SOLUTION INTRAVENOUS at 08:18

## 2021-04-21 RX ADMIN — METHOCARBAMOL 500 MG: 500 TABLET ORAL at 14:06

## 2021-04-21 RX ADMIN — METRONIDAZOLE 500 MG: 500 INJECTION, SOLUTION INTRAVENOUS at 02:21

## 2021-04-21 RX ADMIN — NITROGLYCERIN 0.4 MG: 0.4 TABLET SUBLINGUAL at 08:49

## 2021-04-21 RX ADMIN — METHYLPREDNISOLONE SODIUM SUCCINATE 125 MG: 125 INJECTION, POWDER, FOR SOLUTION INTRAMUSCULAR; INTRAVENOUS at 10:10

## 2021-04-21 RX ADMIN — SIMETHICONE 160 MG: 80 TABLET, CHEWABLE ORAL at 22:27

## 2021-04-21 RX ADMIN — IPRATROPIUM BROMIDE AND ALBUTEROL SULFATE 3 ML: .5; 3 SOLUTION RESPIRATORY (INHALATION) at 09:06

## 2021-04-21 RX ADMIN — SIMETHICONE 160 MG: 80 TABLET, CHEWABLE ORAL at 10:09

## 2021-04-21 RX ADMIN — HYDROMORPHONE HYDROCHLORIDE 0.5 MG: 1 INJECTION, SOLUTION INTRAMUSCULAR; INTRAVENOUS; SUBCUTANEOUS at 04:25

## 2021-04-21 RX ADMIN — FUROSEMIDE 40 MG: 10 INJECTION, SOLUTION INTRAVENOUS at 10:10

## 2021-04-21 RX ADMIN — IPRATROPIUM BROMIDE AND ALBUTEROL SULFATE 3 ML: .5; 3 SOLUTION RESPIRATORY (INHALATION) at 16:34

## 2021-04-21 RX ADMIN — IPRATROPIUM BROMIDE AND ALBUTEROL SULFATE 3 ML: .5; 3 SOLUTION RESPIRATORY (INHALATION) at 12:47

## 2021-04-21 RX ADMIN — SIMETHICONE 160 MG: 80 TABLET, CHEWABLE ORAL at 19:00

## 2021-04-21 RX ADMIN — FAMOTIDINE 20 MG: 10 INJECTION, SOLUTION INTRAVENOUS at 14:05

## 2021-04-21 RX ADMIN — CIPROFLOXACIN 400 MG: 2 INJECTION INTRAVENOUS at 10:10

## 2021-04-21 RX ADMIN — HYDROMORPHONE HYDROCHLORIDE 0.5 MG: 1 INJECTION, SOLUTION INTRAMUSCULAR; INTRAVENOUS; SUBCUTANEOUS at 08:19

## 2021-04-21 RX ADMIN — METRONIDAZOLE 500 MG: 500 INJECTION, SOLUTION INTRAVENOUS at 14:06

## 2021-04-21 RX ADMIN — IPRATROPIUM BROMIDE AND ALBUTEROL SULFATE 3 ML: .5; 3 SOLUTION RESPIRATORY (INHALATION) at 20:21

## 2021-04-21 ASSESSMENT — ACTIVITIES OF DAILY LIVING (ADL)
ADLS_ACUITY_SCORE: 19
ADLS_ACUITY_SCORE: 18
ADLS_ACUITY_SCORE: 19
ADLS_ACUITY_SCORE: 17

## 2021-04-21 NOTE — PLAN OF CARE
[id number]:  Pt is a/ox4. VS stable on 4L O2/NC. Tele ST with PACs. Dyspneic with exertion. Pt still reports some pain but declines medications. Ambulated around the halls x1 this afternoon with assist of 1 w/gb. Final troponin came back within normal limits.

## 2021-04-21 NOTE — CODE/RAPID RESPONSE
[facility]    RRT Note  4/21/2021   Time Called: 8: 20 AM    RRT called for: chest pain     Assessment & Plan   Chest pain, likely surgical related  Concern for COPD exacerbation with history of COPD  RRT called for right sided chest pain. Mr. Coreas notes he woke from surgery yesterday with this pain. It is right sided, worsens with respiration, and worsens with movement. He has known COPD, is not oxygen dependent. CT on 4/18/2021 concerning for advanced emphysematous changes of the lungs. Mr. Coreas has been refusing nebulizer treatments as they make him cough. He has the tendency to use humor to deflect questions, appears to minimize his disease, and does not always provide direct answers. I re-enforced the importance of nebulizer treatments. His oxygen needs has been steadily increasing this hospital stay while on IVF.     INTERVENTIONS:  - chest Xray:  - will schedule DuoNebs for now, highly encourage compliance   - SoluMedrol 125- mg IV x 1 dose with further dosing consideration per Dr. Lyons   - CMP, CBC, lactic acid, troponin, BNP, VBG  - will give a one- time dose of Lasix since oxygenation has worsened with IVF  - may have simethicone for gas pain     Discussed with and defer further cares to Dr. Lyons, Hospitalist, and Dr. Inman, General Surgery.     Code Status: Full Code: Discussed. At this time he would want all resuscitative measures.     SUSAN Gomez, CNP  Hospitalist Service, House Officer  [facility]     Text Page  Pager: [phone]    Allergies   Allergies   Allergen Reactions     Penicillins Anaphylaxis     Amoxicillin      Aspirin      bleeding     Benadryl [Anti-Itch]      Doesn't help in allergic reaction. Benadryl cream causes worsening rash        Ibuprofen Sodium      Esophageal bleeding     Morphine Sulfate        Physical Exam   Vital Signs with Ranges:  Temp:  [95.6  F (35.3  C)-99.1  F (37.3  C)] 95.9  F (35.5  C)  Pulse:  [82-94]  92  Resp:  [8-24] 16  BP: (107-148)/(69-93) 142/85  FiO2 (%):  [4 %] 4 %  SpO2:  [90 %-97 %] 92 %  I/O last 3 completed shifts:  In: 600 [I.V.:600]  Out: 225 [Urine:225]    Constitutional: 86- year old male laying in bed with some acute distress.    Pulmonary: Very diminished throughout wit diffuse wheezing, prolonged expiration, some increased work of breathing, worsening hypoxia with activity.   Cardiovascular: S1, S2 without obvious murmur, rub, or gallop. He appears adequately perfused.   GI: Round, firm, appropriately tender for duration of surgical recovery.   Skin/Integumen: No obvious new concerning rashes or lesions on exposed skin.   Neuro: Awake, alert, oriented x 4. Non-focal.   Psych:  Calm, cooperative.   Extremities: Moves all extremities.     ABG:  -  Recent Labs   Lab 04/21/21  0842   O2PER 89       Troponin:    Recent Labs   Lab Test 04/21/21  0842   TROPI <0.015     CBC with Diff:  Recent Labs   Lab Test 04/21/21  0825   WBC 11.3*   HGB 11.7*   MCV 98           Lactic Acid:    Lab Results   Component Value Date    LACT 2.0 04/21/2021           Comprehensive Metabolic Panel:  Recent Labs   Lab 04/21/21  0825      POTASSIUM 4.1   CHLORIDE 108   CO2 22   ANIONGAP 6   *   BUN 46*   CR 2.98*   GFRESTIMATED 18*   GFRESTBLACK 21*   HANS 8.1*   PROTTOTAL 6.4*   ALBUMIN 2.2*   BILITOTAL 3.6*   ALKPHOS 258*   *   *     UA:  Recent Labs   Lab 04/18/21  1125   COLOR Dark Yellow   APPEARANCE Clear   URINEGLC 200*   URINEBILI Moderate*   URINEKETONE Negative   SG 1.025   UBLD Moderate*   URINEPH 6.5   PROTEIN 600*   NITRITE Negative   LEUKEST Negative   RBCU 3*   WBCU 2     Time Spent on this Encounter   I spent 60 minutes of critical care time on the unit/floor managing the care of Nitish Coreas. Upon evaluation, this patient had a high probability of imminent or life-threatening deterioration due to COPD exacerbation, which required my direct attention, intervention, and  personal management. 100% of my time was spent at the bedside counseling the patient and/or coordinating care regarding services listed in this note.

## 2021-04-21 NOTE — PLAN OF CARE
"POD 1 lap herrera. A&Ox4. VSS on 3L NC. Capno in place - pt mouth breather, does not always read correctly. C/O \"10/10\" back pain, PRN IV dilaudid given x2 - pt refusing PO, states they do not work. Clear liquid diet. Denies nausea. SBA, up to BR. Using bedside urinal as well. L PIV infusing NS @ 100 mL/hr w/ intermittent abx. 4 lap sites w/ liquid bandages. Surgery & GI following. Discharge pending clinical improvement.   "

## 2021-04-21 NOTE — PROGRESS NOTES
[facility]    Medicine Progress Note - Hospitalist Service       Date of Admission:  4/18/2021  Assessment & Plan       Nitish Coreas is a 86 year old male with h/o COPD, HTN, HL and chronic back pain was brought to the ER for evaluation of nausea, vomiting and abd pain. He was admitted on 4/18/2021 for further evaluation.     Possible cholecystitis  Choledocholithiasis, dilated CBD and obstructive jaundice  Ongoing abdominal pain with nausea and vomiting and chills.  In ER, noted leukocytosis.  CT abdomen pelvis showed dilated CBD, 1.7 cm with possible obstructing choledocholithiasis. RUQ ultrasound was obtained, showed cholelithiasis and finding suggestive of acute cholecystitis.  Dilated CBD was noted but no definitive CBD stone was noted on gallbladder. Cipro and Flagyl IV, IV hydration, IV hydromorphone given in ER.  - GI consulted, s/p ERCP and EUS 4/19. GB stone/sludge swiped and pus drained, sphincterotomy and CBD stent placed.   - status post cholecystectomy 4/20  -Continue Cipro and Flagyl IV, noted PCN allergy  -Patient is tolerating advanced diet  -White count slightly elevated along with LFTs, will repeat in a.m.  Acute respiratory failure  --1 event on 4/21, please review notes from RRT, troponins to be followed every 4 hours x 3.  -EKG showed mild changes in the inferior leads, will repeat another EKG today.,  Will try to hold off p.o. steroids as patient is a recovering from surgery, continue with duo nebs, if his shortness of breath persist will have to think about starting steroids.  Suspected Acute kidney injury on CKD stage 4  Patient with creatinine of 2.14 in November 2020.  Creatinine here at presentation is 2.42.  Suspect component of acute kidney injury given nausea vomiting.  -Monitor intake and output, bladder scan as needed.  -Follow BMP, creatinine did go up to 2.92 today, continue to hold ACE inhibitor      HTN  HL  PTA is on lisinopril and  gemfibrozil/lovastatin, Hold PTA meds.  - PRN IV hydralazine available.    COPD   acute exacerbation of COPD  Not on home O2.  Patient had another episode of acute exacerbation on 4/21, he was started on scheduled duo nebs and IV steroids.    Chronic back pain  - heating pad, lidocaine gel PRN  - getting narcotic for abd pain as above. 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    - aortic dilation noted in 2016 as well. No significant change in size.   - outpatient follow up     GERD  - changed PPI to IV     Diet: Advance Diet as Tolerated: Full Liquid Diet    DVT Prophylaxis: Pneumatic Compression Devices  Mckeon Catheter: not present  Code Status: Full Code        Disposition Plan   Expected discharge: 1-2 days,  we will request PT/OT evaluation  Entered: Lori Lyons MD 04/21/2021, 9:39 AM     The patient's care was discussed with the Bedside Nurse, Patient and Patient's Family.    Lori Lyons MD  Hospitalist Service  [facility]  Contact information available via [facility] Paging/Directory    ______________________________________________________________________    Interval History   Events from early morning noted, patient had concern about gastric pain along with mild diaphoresis.  He did receive IV steroids along with the nebulizations with some improvement in his symptoms, he denied having any active chest pain, troponins obtained in the time of the event BMP remained within normal limits, chest x-ray showed bilateral atelectasis EKG showed changes in the inferior leads.  Data reviewed today: I reviewed all medications, new labs and imaging results over the last 24 hours. I personally reviewed no images or EKG's today.    Physical Exam   Vital Signs: Temp: 97.2  F (36.2  C) Temp src: Oral BP: 122/81  Pulse: 96   Resp: 20 SpO2: 92 % O2 Device: Nasal cannula Oxygen Delivery: 4 LPM  Weight: 190 lbs 0 oz    Constitutional: Awake, alert, cooperative, no apparent distress  Respiratory: Clear to auscultation bilaterally, no crackles or wheezing  Cardiovascular: Regular rate and rhythm, normal S1 and S2, and no murmur noted  GI: Bowel sounds are heard, abdomen distended, laparoscopic incision site noted  Skin/Integumen: No rashes, no cyanosis, 1+ edema noted bilateral lower extremities  Neuro : moving all 4 extremities, no focal deficit noted       Data   Recent Labs   Lab 04/21/21  0842 04/21/21  0825 04/20/21  0750 04/19/21  0907 04/18/21  1022   WBC  --  11.3* 8.0 12.6* 16.7*   HGB  --  11.7* 11.7* 12.6* 14.8   MCV  --  98 97 98 97   PLT  --  209 160 154 194   NA  --  136 137 137 135   POTASSIUM  --  4.1 3.8 4.0 3.9   CHLORIDE  --  108 110* 109 102   CO2  --  22 21 22 25   BUN  --  46* 45* 38* 36*   CR  --  2.98* 2.83* 2.50* 2.42*   ANIONGAP  --  6 6 6 8   HANS  --  8.1* 8.0* 8.1* 8.8   GLC  --  162* 91 96 150*   ALBUMIN  --  2.2* 2.1* 2.4* 3.1*   PROTTOTAL  --  6.4* 5.8* 6.2* 7.4   BILITOTAL  --  3.6* 6.7* 8.7* 7.9*   ALKPHOS  --  258* 238* 138 123   ALT  --  171* 170* 127* 128*   AST  --  167* 230* 139* 140*   LIPASE  --   --  144  --  91   TROPI <0.015  --   --   --   --      No results found for this or any previous visit (from the past 24 hour(s)).  Medications       ciprofloxacin  400 mg Intravenous Q24H     famotidine  20 mg Intravenous Q24H     furosemide  40 mg Intravenous Once     ipratropium - albuterol 0.5 mg/2.5 mg/3 mL  3 mL Nebulization 4x daily     methylPREDNISolone  125 mg Intravenous Once     metroNIDAZOLE  500 mg Intravenous Q12H     pantoprazole (PROTONIX) IV  40 mg Intravenous Daily with breakfast     simethicone  160 mg Oral 4x Daily     sodium chloride (PF)  3 mL Intracatheter Q8H

## 2021-04-21 NOTE — PROGRESS NOTES
[facility] General Surgery:    The patient was seen and examined today. I agree with the note written today by Lopez Richey PA-C, including the findings and plan as written.  Pertinent aspects from my exam or visit today in regards to medical decision making: Patient remained stable postoperatively.  RRT called this morning due to chest pain and increasing oxygen requirements.  Patient appears improved this afternoon.  White blood cell count is increased, patient is afebrile.  Hemoglobin is stable.  Bilirubin and AST are decreased, alk phos is increased, ALT is stable.  Patient reports tolerating oral intake.  Denies nausea.  Abdomen is soft, distended, mildly tender with palpation, incisions without drainage or erythema.  Okay for diet as tolerated.  Continue IV antibiotics.  Follow-up labs tomorrow a.m.    Callie Inman MD

## 2021-04-21 NOTE — PLAN OF CARE
Shift Note: HTN, c/o chest pain (likely referred gas) and very dyspneic w-wheezes today. RRT called, started on TELE, given steroids and lasix. He is tolerating regular food, voiding well and passing gas, but now having likely COPD exacerbation. Up wSBA to ambulate, calls for needs, discharge pending respiratory status.

## 2021-04-21 NOTE — ADDENDUM NOTE
Addendum  created 04/21/21 1224 by Sara Figueroa APRN CRNA    Clinical Note Signed, Intraprocedure Blocks edited

## 2021-04-21 NOTE — PROGRESS NOTES
[facility]  Gastroenterology Progress Note     Nitish Coreas MRN# [id number]   YOB: 1934 Age: 86 year old          Assessment and Plan:   Nitish Coreas is a 86 year old male admitted on 4/18/21 with abdominal pain, nausea and vomiting. Found to have dilated CBD and cholelithiasis with leukocytosis.     Active Problems:  Dilated CBD  Choledocholithiasis  Obstructive Jaundice  Cholecystitis  Leukocytosis  Has had improvement in abdominal pain but ongoing. WBC 16.7k->8k  LFTs have increased ->238, ->170, ->230. Tbili 8.7->6.7  CT noted dilated CBD at 1.7 cm with possible obstructing choledocholithiasis.  RUQ noted cholelithiasis and findings to suggest acute cholecystitis  4/19 EUS noted stone 13 mm in dimension in CBD. Multiple stones noted in the gallbladder.  4/19 ERCP noted choledocholithiasis. Complete removal was accomplished by biliary sphincterotomy and balloon extraction. One temporary stent placed in the common bile duct.  4/20 Laparoscopic cholecystectomy  Had acute episode of chest pain and likely exacerbation of COPD- receiving nebulizer, O2 and doing well.  -- Tolerating low fat diet  -- Continue with IVF, IV pain control, IV antibiotics, and IV antiemetics  -- Repeat CMP and CBC in a.m.  -- Repeat ERCP in 6-8 weeks for stent removal               Interval History:   no new complaints, denies chest pain, denies shortness of breath,  alert, oriented to person, place and time and doing well; no cp, sob, n/v/d. No flatus              Review of Systems:   C: NEGATIVE for fever, chills, change in weight  E/M: NEGATIVE for ear, mouth and throat problems  R: NEGATIVE for significant cough or SOB  CV: NEGATIVE for chest pain, palpitations or peripheral edema             Medications:   I have reviewed this patient's current medications    ciprofloxacin  400 mg Intravenous Q24H     famotidine  20 mg Intravenous Q24H     ipratropium - albuterol 0.5  mg/2.5 mg/3 mL  3 mL Nebulization 4x daily     metroNIDAZOLE  500 mg Intravenous Q12H     pantoprazole (PROTONIX) IV  40 mg Intravenous Daily with breakfast     simethicone  160 mg Oral 4x Daily     sodium chloride (PF)  3 mL Intracatheter Q8H                  Physical Exam:   Vitals were reviewed  Vital Signs with Ranges  Temp:  [95.6  F (35.3  C)-99.1  F (37.3  C)] 97.2  F (36.2  C)  Pulse:  [82-96] 96  Resp:  [8-24] 20  BP: (107-148)/(69-93) 122/81  FiO2 (%):  [4 %] 4 %  SpO2:  [90 %-97 %] 92 %  I/O last 3 completed shifts:  In: 600 [I.V.:600]  Out: 225 [Urine:225]  Constitutional: healthy, alert and no distress   Cardiovascular: negative, PMI normal. No lifts, heaves, or thrills. RRR. No murmurs, clicks gallops or rub  Respiratory: negative, Percussion normal. Good diaphragmatic excursion. Lungs clear  Head: Normocephalic. No masses, lesions, tenderness or abnormalities  Neck: Neck supple. No adenopathy. Thyroid symmetric, normal size,, Carotids without bruits.  Abdomen: Abdomen firm, tender. BS hypoactive. No masses, organomegaly           Data:   I reviewed the patient's new clinical lab test results.   Recent Labs   Lab Test 04/21/21  0825 04/20/21  0750 04/19/21  0907   WBC 11.3* 8.0 12.6*   HGB 11.7* 11.7* 12.6*   MCV 98 97 98    160 154     Recent Labs   Lab Test 04/21/21  0825 04/20/21  0750 04/19/21  0907   POTASSIUM 4.1 3.8 4.0   CHLORIDE 108 110* 109   CO2 22 21 22   BUN 46* 45* 38*   ANIONGAP 6 6 6     Recent Labs   Lab Test 04/21/21  0825 04/20/21  0750 04/19/21  0907 04/18/21  1125 04/18/21  1022 11/23/20  0755 10/07/19  0915 10/07/19  0915   ALBUMIN 2.2* 2.1* 2.4*  --  3.1* 3.5   < >  --    BILITOTAL 3.6* 6.7* 8.7*  --  7.9* 0.4   < >  --    * 170* 127*  --  128* 24   < >  --    * 230* 139*  --  140* 26   < >  --    PROTEIN  --   --   --  600*  --  >=300*  --  >=300*   LIPASE  --  144  --   --  91  --   --   --     < > = values in this interval not displayed.       I reviewed  the patient's new imaging results.    All laboratory data reviewed  All imaging studies reviewed by me.    Lakeisha Lantigua PA-C,  4/21/2021  [facility]  Office : [phone]  Cell: [phone] (Dr. Milan)  Cell: [phone] (Lakeisha Lantigua PA-C)

## 2021-04-21 NOTE — PLAN OF CARE
[id number]: POD 0 lap herrera. AOx4. VSS on 4 L NC. Up SBA. Tolerating clear liquid diet. Denies nausea. C/o 10/10 back pain, PRN dilaudid given 1x. Capno in place. PIV infusing NS @ 75 ml/hr. 4 lap sites with liquid bandage, BETTY. Sepsis protocol fired, waiting for lactic to be drawn. Slept between cares. Surg and GI following, discharge pending.

## 2021-04-21 NOTE — PROGRESS NOTES
"General Surgery Note    Stable S/P Lap Trisha  POD1    -Transition to PO narcotics.  Reports his body doesn't respond well to oxycodone.  Will make Norco available.  Heat/ice packs.  Could try muscle relaxer, but use smallest dose and monitor breathing.  Capno in place.  Encourage IS use.  -ADAT  -Important for him to be out of bed.  Increase activity today.  Up to chair.  Ambulate 4-5 x daily.  Pt would like to be more active.  -Assuming LFTs come back improved, ok to discharge when medically stable.  -Continue abx for now.  Likely home on them as well.      nae MCNAIR, A&O  /80 (BP Location: Right arm)   Pulse 91   Temp 97.8  F (36.6  C) (Oral)   Resp 16   Ht 1.676 m (5' 6\")   Wt 86.2 kg (190 lb)   SpO2 93%   BMI 30.67 kg/m      Intake/Output Summary (Last 24 hours) at 4/21/2021 0001  Last data filed at 4/21/2021 0220  Gross per 24 hour   Intake 600 ml   Output 225 ml   Net 375 ml     Abd: rotund, soft, NT  Inc: liquid bandage.  No erythema or drainage    Labs: pending  "

## 2021-04-22 ENCOUNTER — APPOINTMENT (OUTPATIENT)
Dept: PHYSICAL THERAPY | Facility: CLINIC | Age: 86
DRG: 417 | End: 2021-04-22
Attending: INTERNAL MEDICINE
Payer: COMMERCIAL

## 2021-04-22 ENCOUNTER — DOCUMENTATION ONLY (OUTPATIENT)
Dept: ONCOLOGY | Facility: CLINIC | Age: 86
End: 2021-04-22

## 2021-04-22 VITALS
OXYGEN SATURATION: 91 % | HEIGHT: 66 IN | WEIGHT: 190 LBS | HEART RATE: 83 BPM | RESPIRATION RATE: 16 BRPM | SYSTOLIC BLOOD PRESSURE: 120 MMHG | DIASTOLIC BLOOD PRESSURE: 79 MMHG | TEMPERATURE: 96.9 F | BODY MASS INDEX: 30.53 KG/M2

## 2021-04-22 LAB
ALBUMIN SERPL-MCNC: 2 G/DL (ref 3.4–5)
ALP SERPL-CCNC: 192 U/L (ref 40–150)
ALT SERPL W P-5'-P-CCNC: 119 U/L (ref 0–70)
ANION GAP SERPL CALCULATED.3IONS-SCNC: 7 MMOL/L (ref 3–14)
AST SERPL W P-5'-P-CCNC: 72 U/L (ref 0–45)
BASOPHILS # BLD AUTO: 0 10E9/L (ref 0–0.2)
BASOPHILS NFR BLD AUTO: 0.1 %
BILIRUB SERPL-MCNC: 1.9 MG/DL (ref 0.2–1.3)
BUN SERPL-MCNC: 53 MG/DL (ref 7–30)
CALCIUM SERPL-MCNC: 8.2 MG/DL (ref 8.5–10.1)
CHLORIDE SERPL-SCNC: 108 MMOL/L (ref 94–109)
CO2 SERPL-SCNC: 23 MMOL/L (ref 20–32)
COPATH REPORT: NORMAL
CREAT SERPL-MCNC: 2.97 MG/DL (ref 0.66–1.25)
DIFFERENTIAL METHOD BLD: ABNORMAL
EOSINOPHIL # BLD AUTO: 0 10E9/L (ref 0–0.7)
EOSINOPHIL NFR BLD AUTO: 0 %
ERYTHROCYTE [DISTWIDTH] IN BLOOD BY AUTOMATED COUNT: 13.2 % (ref 10–15)
GFR SERPL CREATININE-BSD FRML MDRD: 18 ML/MIN/{1.73_M2}
GLUCOSE SERPL-MCNC: 120 MG/DL (ref 70–99)
HCT VFR BLD AUTO: 32.2 % (ref 40–53)
HGB BLD-MCNC: 10.7 G/DL (ref 13.3–17.7)
IMM GRANULOCYTES # BLD: 0.1 10E9/L (ref 0–0.4)
IMM GRANULOCYTES NFR BLD: 0.7 %
LACTATE BLD-SCNC: 1.6 MMOL/L (ref 0.7–2)
LACTATE BLD-SCNC: 2.3 MMOL/L (ref 0.7–2)
LYMPHOCYTES # BLD AUTO: 0.6 10E9/L (ref 0.8–5.3)
LYMPHOCYTES NFR BLD AUTO: 5.7 %
MCH RBC QN AUTO: 31.8 PG (ref 26.5–33)
MCHC RBC AUTO-ENTMCNC: 33.2 G/DL (ref 31.5–36.5)
MCV RBC AUTO: 96 FL (ref 78–100)
MONOCYTES # BLD AUTO: 1.2 10E9/L (ref 0–1.3)
MONOCYTES NFR BLD AUTO: 11.1 %
NEUTROPHILS # BLD AUTO: 8.9 10E9/L (ref 1.6–8.3)
NEUTROPHILS NFR BLD AUTO: 82.4 %
NRBC # BLD AUTO: 0 10*3/UL
NRBC BLD AUTO-RTO: 0 /100
PLATELET # BLD AUTO: 194 10E9/L (ref 150–450)
POTASSIUM SERPL-SCNC: 3.6 MMOL/L (ref 3.4–5.3)
PROT SERPL-MCNC: 5.9 G/DL (ref 6.8–8.8)
RBC # BLD AUTO: 3.37 10E12/L (ref 4.4–5.9)
SODIUM SERPL-SCNC: 138 MMOL/L (ref 133–144)
TROPONIN I SERPL-MCNC: <0.015 UG/L (ref 0–0.04)
WBC # BLD AUTO: 10.9 10E9/L (ref 4–11)

## 2021-04-22 PROCEDURE — 97116 GAIT TRAINING THERAPY: CPT | Mod: GP

## 2021-04-22 PROCEDURE — 97161 PT EVAL LOW COMPLEX 20 MIN: CPT | Mod: GP

## 2021-04-22 PROCEDURE — 258N000003 HC RX IP 258 OP 636: Performed by: INTERNAL MEDICINE

## 2021-04-22 PROCEDURE — 250N000011 HC RX IP 250 OP 636: Performed by: PHYSICIAN ASSISTANT

## 2021-04-22 PROCEDURE — 97530 THERAPEUTIC ACTIVITIES: CPT | Mod: GP

## 2021-04-22 PROCEDURE — 83605 ASSAY OF LACTIC ACID: CPT | Performed by: INTERNAL MEDICINE

## 2021-04-22 PROCEDURE — 250N000013 HC RX MED GY IP 250 OP 250 PS 637: Performed by: NURSE PRACTITIONER

## 2021-04-22 PROCEDURE — 99239 HOSP IP/OBS DSCHRG MGMT >30: CPT | Performed by: INTERNAL MEDICINE

## 2021-04-22 PROCEDURE — 250N000013 HC RX MED GY IP 250 OP 250 PS 637: Performed by: INTERNAL MEDICINE

## 2021-04-22 PROCEDURE — 36415 COLL VENOUS BLD VENIPUNCTURE: CPT | Performed by: SURGERY

## 2021-04-22 PROCEDURE — 36415 COLL VENOUS BLD VENIPUNCTURE: CPT | Performed by: INTERNAL MEDICINE

## 2021-04-22 PROCEDURE — C9113 INJ PANTOPRAZOLE SODIUM, VIA: HCPCS | Performed by: PHYSICIAN ASSISTANT

## 2021-04-22 PROCEDURE — 85025 COMPLETE CBC W/AUTO DIFF WBC: CPT | Performed by: SURGERY

## 2021-04-22 PROCEDURE — 80053 COMPREHEN METABOLIC PANEL: CPT | Performed by: SURGERY

## 2021-04-22 RX ORDER — IPRATROPIUM BROMIDE AND ALBUTEROL SULFATE 2.5; .5 MG/3ML; MG/3ML
3 SOLUTION RESPIRATORY (INHALATION) 4 TIMES DAILY
Qty: 84 ML | Refills: 0 | Status: SHIPPED | OUTPATIENT
Start: 2021-04-22 | End: 2024-10-03

## 2021-04-22 RX ORDER — METRONIDAZOLE 500 MG/1
500 TABLET ORAL 3 TIMES DAILY
Qty: 21 TABLET | Refills: 0 | Status: SHIPPED | OUTPATIENT
Start: 2021-04-22 | End: 2021-04-29

## 2021-04-22 RX ORDER — CIPROFLOXACIN 500 MG/1
500 TABLET, FILM COATED ORAL DAILY
Qty: 5 TABLET | Refills: 0 | Status: SHIPPED | OUTPATIENT
Start: 2021-04-22 | End: 2022-01-10

## 2021-04-22 RX ORDER — CIPROFLOXACIN 500 MG/1
500 TABLET, FILM COATED ORAL 2 TIMES DAILY
Qty: 10 TABLET | Refills: 0 | Status: SHIPPED | OUTPATIENT
Start: 2021-04-22 | End: 2021-04-22

## 2021-04-22 RX ORDER — AMOXICILLIN 250 MG
1 CAPSULE ORAL 2 TIMES DAILY PRN
Qty: 30 TABLET | Refills: 0 | Status: SHIPPED | OUTPATIENT
Start: 2021-04-22 | End: 2022-01-10

## 2021-04-22 RX ORDER — ACETAMINOPHEN 325 MG/1
650 TABLET ORAL EVERY 4 HOURS PRN
COMMUNITY
Start: 2021-04-22 | End: 2022-01-10

## 2021-04-22 RX ORDER — LISINOPRIL 40 MG/1
40 TABLET ORAL DAILY
COMMUNITY
Start: 2021-04-22 | End: 2021-08-30

## 2021-04-22 RX ADMIN — CIPROFLOXACIN 400 MG: 2 INJECTION INTRAVENOUS at 09:15

## 2021-04-22 RX ADMIN — METRONIDAZOLE 500 MG: 500 INJECTION, SOLUTION INTRAVENOUS at 02:28

## 2021-04-22 RX ADMIN — SIMETHICONE 160 MG: 80 TABLET, CHEWABLE ORAL at 09:08

## 2021-04-22 RX ADMIN — SODIUM CHLORIDE 500 ML: 9 INJECTION, SOLUTION INTRAVENOUS at 10:16

## 2021-04-22 RX ADMIN — Medication 1 SPRAY: at 05:30

## 2021-04-22 RX ADMIN — SODIUM CHLORIDE, POTASSIUM CHLORIDE, SODIUM LACTATE AND CALCIUM CHLORIDE 250 ML: 600; 310; 30; 20 INJECTION, SOLUTION INTRAVENOUS at 04:12

## 2021-04-22 RX ADMIN — PANTOPRAZOLE SODIUM 40 MG: 40 INJECTION, POWDER, FOR SOLUTION INTRAVENOUS at 09:08

## 2021-04-22 ASSESSMENT — ACTIVITIES OF DAILY LIVING (ADL)
ADLS_ACUITY_SCORE: 18

## 2021-04-22 NOTE — CONSULTS
Care Management Initial Consult    General Information  Assessment completed with: Patient, (Nitish)  Type of CM/SW Visit: Initial Assessment    Primary Care Provider verified and updated as needed: Yes   Readmission within the last 30 days: no previous admission in last 30 days      Reason for Consult: discharge planning  Advance Care Planning:            Communication Assessment  Patient's communication style: spoken language (English or Bilingual)    Hearing Difficulty or Deaf: no   Wear Glasses or Blind: yes    Cognitive  Cognitive/Neuro/Behavioral: WDL     Arousal Level: opens eyes spontaneously  Orientation: oriented x 4  Mood/Behavior: calm  Best Language: 0 - No aphasia  Speech: clear    Living Environment:   People in home: spouse     Current living Arrangements:        Able to return to prior arrangements:         Family/Social Support:  Care provided by:    Provides care for:                  Description of Support System:           Current Resources:   Patient receiving home care services: No     Community Resources:    Equipment currently used at home:    Supplies currently used at home:      Employment/Financial:  Employment Status:          Financial Concerns:             Lifestyle & Psychosocial Needs:        Socioeconomic History     Marital status:      Spouse name: Not on file     Number of children: 3     Years of education: Not on file     Highest education level: Not on file     Tobacco Use     Smoking status: Former Smoker     Packs/day: 4.00     Years: 45.00     Pack years: 180.00     Types: Cigarettes     Quit date: 1991     Years since quittin.9     Smokeless tobacco: Never Used   Substance and Sexual Activity     Alcohol use: Yes     Alcohol/week: 0.0 standard drinks     Comment: 1-2 twice per week     Drug use: No     Sexual activity: Not Currently     Partners: Female       Functional Status:  Prior to admission patient needed assistance:              Mental Health  Status:          Chemical Dependency Status:                Values/Beliefs:  Spiritual, Cultural Beliefs, Mu-ism Practices, Values that affect care:                 Additional Information:  Per consult for discharge planning, met with pt and pt's spouse Emily to discuss discharge plan.  Per patient, he is wanting to discharge home today.  He states that prior to admit, he was independent with all ADLs and was driving.  Discussed that pt will need to discharge home with home oxygen.  Patient stated understanding of this.  Informed pt that referral would be sent to [facility] for home oxygen.  Discussed PT would see pt for discharge needs.  Discussed home care and pt stated if need he would like to use [facility] for Skilled RN & PT.  Per PT, pt is ok for home and recommends home care, but patient is declining home care.  Received call from Luna from [facility] that pt has been approved for home oxygen and delivery will be within the next 2 hours to pt's bedside.  Care Management Discharge Note    Discharge Date: 04/22/21(home with HC)       Discharge Disposition: Home, Home Care    Discharge Services:      Discharge DME:      Discharge Transportation: car, drives self, family or friend will provide    Private pay costs discussed: Not applicable    PAS Confirmation Code:    Patient/family educated on Medicare website which has current facility and service quality ratings: no    Education Provided on the Discharge Plan:    Persons Notified of Discharge Plans: pt and pt's bedside RN Jack.  Patient/Family in Agreement with the Plan: yes    Handoff Referral Completed: Yes    Additional Information:  Patient discharging home today with home oxygen and PCP & Surgical follow appts.  Discussed follow-up appts with pt.  Patient's spouse will transport pt home.       Addendum: Per bedside RN, pt has neb machine ordered at discharge.  Called Luna at [facility] regarding  pt's need for neb machine.  Per Luna she will pass on to her colleague who handles neb machines and that person will call pt directly regarding the neb machine and the neb machine will be delivered to pt's bedside.  Bedside RN updated.   Lisa Estrada, RN  Lisa Estrada RN, BSN, OCN   Inpatient Care Coordination [address]  Office: [phone]

## 2021-04-22 NOTE — PROVIDER NOTIFICATION
MD Notification    Notified Person: MD    Notified Person Name: Dr. Vicente    Notification Date/Time: 4/22/21 at 0202    Notification Interaction:     Purpose of Notification: FYI patient lactic 2.3. Currently on q12hr flagyl and q24hr cipro.     Orders Received:    Comments:

## 2021-04-22 NOTE — PLAN OF CARE
POD 2 lap herrera. A/Ox4. VSS ex slightly hypertensive. Weaned from 4L NC to 2L NC. Needs encouragement to continue using duonebs and IS. Tele: SR w/ PACs. C/o pain to back but declining interventions. Scheduled simethicone for gas pain. Low fat diet, denies nausea. Up A1 + GB/W, ambulated hallways x1 during evening. Continent of bowel/bladder. Good UOP. LS w/ expiratory wheeze. BS active, passing flatus. 4 lap sites w/ liquid bandage CDI. Skin jaundiced. Lactic 2.3, given 250 ml bolus x1. PIV SL w/ int abx. Discharge pending.

## 2021-04-22 NOTE — PLAN OF CARE
Physical Therapy Discharge Summary    Reason for therapy discharge:    Discharged to home, recommended HHPT, however, patient declined.    Progress towards therapy goal(s). [facility] for goal details.  Goals partially met.  Barriers to achieving goals:   discharge from facility.    Therapy recommendation(s):    Continued therapy is recommended.  Rationale/Recommendations:  Pt declined HHPT.

## 2021-04-22 NOTE — PROGRESS NOTES
[facility]    General Surgery  Daily Post-Op Note       Assessment and Plan:   Nitish Coreas is a 86 year old male with cholecystitis and choledocholithiasis/cholangitis (s/p ERCP 4/19), S/P Procedure(s):  CHOLECYSTECTOMY, LAPAROSCOPIC, 2 Days Post-Op    -Medical management per primary team  -Diet as tolerated  -Pain control as needed  -Okay to discharge to home from surgical standpoint when deemed medically appropriate by primary team        Interval History:   Patient remained stable postoperatively.  Did have an episode of mild tachycardia yesterday afternoon/evening.  Lactate slightly elevated at that time.  Patient is now afebrile with stable vitals.  Supplemental oxygen requirements decreasing.  LFTs improved again today.  White blood cell count slightly decreased.  Patient is tolerating oral intake.  Denies nausea.  He is passing flatus.           Physical Exam:   Temp: 96.9  F (36.1  C) Temp src: Oral BP: 120/79 Pulse: 83   Resp: 16 SpO2: 91 % O2 Device: Nasal cannula Oxygen Delivery: 2 LPM    I/O last 3 completed shifts:  In: 360 [P.O.:360]  Out: 1300 [Urine:1300]      Constitutional: healthy, alert and no distress   Respiratory: Breathing nonlabored  Abdomen: Soft, slightly distended but improved from yesterday, mildly tender with palpation, incisions healing well      Data   Recent Labs   Lab 04/22/21  0737 04/21/21  2334 04/21/21  1926 04/21/21  1532 04/21/21  0825 04/21/21  0825 04/20/21  0750   WBC 10.9  --   --   --   --  11.3* 8.0   HGB 10.7*  --   --   --   --  11.7* 11.7*   MCV 96  --   --   --   --  98 97     --   --   --   --  209 160     --   --   --   --  136 137   POTASSIUM 3.6  --   --   --   --  4.1 3.8   CHLORIDE 108  --   --   --   --  108 110*   CO2 23  --   --   --   --  22 21   BUN 53*  --   --   --   --  46* 45*   CR 2.97*  --   --   --   --  2.98* 2.83*   ANIONGAP 7  --   --   --   --  6 6   HANS 8.2*  --   --   --   --  8.1* 8.0*   *  --    --   --   --  162* 91   ALBUMIN 2.0*  --   --   --   --  2.2* 2.1*   PROTTOTAL 5.9*  --   --   --   --  6.4* 5.8*   BILITOTAL 1.9*  --   --   --   --  3.6* 6.7*   ALKPHOS 192*  --   --   --   --  258* 238*   *  --   --   --   --  171* 170*   AST 72*  --   --   --   --  167* 230*   TROPI  --  <0.015 <0.015 <0.015   < >  --   --     < > = values in this interval not displayed.       Callie Inman MD

## 2021-04-22 NOTE — PROGRESS NOTES
I certify that this patient, Nitish Coreas has been under my care (or a nurse practitioner or physican's assistant working with me). This is the face-to-face encounter for oxygen medical necessity.      Nitish Coreas is now in a chronic stable state and continues to require supplemental oxygen. Patient has continued oxygen desaturation due to Chronic Bronchitis J41.0.    Alternative treatment(s) tried or considered and deemed clinically infective for treatment of Chronic Bronchitis J41.0 include nebulizers and pulmonary toileting.  If portability is ordered, is the patient mobile within the home? yes    **Patients who qualify for home O2 coverage under the CMS guidelines require ABG tests or O2 sat readings obtained closest to, but no earlier than 2 days prior to the discharge, as evidence of the need for home oxygen therapy. Testing must be performed while patient is in the chronic stable state. See notes for O2 sats.**

## 2021-04-22 NOTE — PROGRESS NOTES
Cross cover    Called about LA 2.3, noted prior LA was normal and patient received one time dose of IV lasix x 1 on 4/21 morning.    Will give 250 ml bolus of LR    Anuj Vicente MD

## 2021-04-22 NOTE — PLAN OF CARE
Pt is discharging home with belongings, and new supplies for O2/nebulizer treatments. PIV and tele removed. Writer updated AVS, med bottle, and pt/wife on Cipro PO dose frequency changes, verbalizes understanding.

## 2021-04-22 NOTE — PLAN OF CARE
sPatient has been assessed for Home Oxygen needs. Oxygen readings:    *Pulse oximetry (SpO2) = 97% on room air at rest while awake.    *SpO2 improved to 96% on 1liters/minute at rest.    *SpO2 = 86% on room air during activity/with exercise.    *SpO2 improved to 92% on 1 liters/minute during activity/with exercise.

## 2021-04-22 NOTE — DISCHARGE INSTRUCTIONS
Oxygen Provider:  Arranged through [employer] [facility], contact number [phone].  If you have any questions or concerns please call the oxygen company directly.    A follow-up appointment was scheduled for you [see above].  It is very important to go to it--bring these papers, an ID, and your insurance card with you.  At the visit, talk about your hospital stay.  Tell your doctor how you feel.  Show your new list of medications.  Your doctor will update records, make sure you are still doing OK, and decide if any tests or medication changes are needed.          [facility] - [facility]  Discharge Instructions: Post-Operative Laparoscopic Cholecystectomy    ACTIVITY    Expect to feel tired after your surgery.  This will gradually resolve.      Take frequent, short walks and increase your activity gradually.      Avoid strenuous physical activity or heavy lifting greater than 15-20 lbs. for 2-3 weeks.  You may climb stairs.    You may drive without restrictions when you are not using any prescription pain medication and feel comfortable in a car.    You may return to work/school when you are comfortable without any prescription pain medication.    WOUND CARE    You may remove your outer dressing or Band-Aids and shower 48 hours after the surgery.  Pat your incisions dry and leave them open to air.  Re-apply dressing (Band-Aids or gauze/tape) as needed for comfort or drainage.    You may have steri-strips (looks like white tape) on your incision.  You may peel off the steri-strips 2 weeks after your surgery if they have not peeled off on their own.     Do not soak your incisions in a tub or pool for 2 weeks.     Do not apply any lotions, creams, or ointments to your incisions.    A ridge under your incisions is normal and will gradually resolve.    DIET    Start with liquids, then gradually resume your regular diet as tolerated.  Avoid heavy, spicy, and greasy meals for 2-3  days.    Drink plenty of fluids to stay hydrated.    It is not uncommon to experience some loose stools or diarrhea after surgery.  This is your body's way of adapting to the bile which will slowly drain into your intestine.  A low fat diet may help with this.  This should improve over 1-2 months.    PAIN    Expect some tenderness and discomfort at the incision sites.  Use the prescribed pain medication at your discretion.  Expect gradual resolution of your pain over several days.    You may take ibuprofen with food (unless you have been told not to) or acetaminophen/Tylenol instead of or in addition to your prescribed pain medication.  If you are taking Norco or Percocet, do not take any additional acetaminophen/Tylenol.    Do not drink alcohol or drive while you are taking pain medications.    You may apply ice to your incisions in 20 minute intervals as needed for the next 48 hours.  After that time, consider switching to heat if you prefer.    EXPECTATIONS    Pain medications can cause constipation.  Limit use when possible.  Take over the counter stool softener/stimulant, such as Colace or Senna, 1-2 times a day with plenty of water.  You may take a mild over the counter laxative, such as Miralax or a suppository, as needed.  You may discontinue these medications once you are having regular bowel movements and/or are no longer taking your narcotic pain medication.      You may have shoulder or upper back discomfort due to the gas used in surgery.  This is temporary and should resolve in 48-72 hours.  Short, frequent walks may help with this.    If you are unable to urinate, or feel as though you are not emptying your bladder adequately, we recommend you call our office and/or seek care at an ER or Urgent Care facility if after hours.    FOLLOW UP    Our office will contact you in approximately 2-3 weeks to check on your progress and answer any questions you may have.  If you are doing well, you will not need to  return for a follow up appointment.  If any concerns are identified over the phone, we will help you make an appointment to see a provider.     If you have not received a phone call, have any questions or concerns, or would like to be seen, please call us at [phone] and ask to speak with our nurse.  We are located at [address] [place] [address].    CALL OUR OFFICE [phone] IF YOU HAVE:     Chills or fever above 101 F.    Increased redness, warmth, or drainage at your incisions.    Significant bleeding.    Pain not relieved by your pain medication or rest.    Increasing pain after the first 48 hours.    Any other concerns or questions.                      Revised September 2020

## 2021-04-22 NOTE — PROGRESS NOTES
Received intake call for home oxygen at 12:10PM. Reviewed patient's chart; Patient qualifies under insurance guidelines and all documentation is in the chart including a good order.   1:05PM- Called to offer choice and patient is okay with [facility] setting them up. Discussed equipment with patient and informed them that we would be to bedside with oxygen in the next 2 hours.   1:15PM- Left message with care coordinator, Lisa, confirmed we received the order, and provided them with ETA of oxygen.

## 2021-04-22 NOTE — DISCHARGE SUMMARY
[facility]    Discharge Summary  Hospitalist    Date of Admission:  4/18/2021  Date of Discharge:  4/22/2021  3:25 PM  Discharging Provider: Lori Lyons MD    Discharge Diagnoses   Choledocholithiasis  COPD exacerbation, acute respiratory failure secondary to COPD exacerbation.    History of Present Illness   Please review admission history and physical.    Hospital Course   Nitish Coreas was admitted on 4/18/2021.  The following problems were addressed during his hospitalization:    Active Problems:    Choledocholithiasis  Nitish Coreas is a 86 year old male with h/o COPD, HTN, HL and chronic back pain was brought to the ER for evaluation of nausea, vomiting and abd pain. He was admitted on 4/18/2021 for further evaluation.      Possible cholecystitis  Choledocholithiasis, dilated CBD and obstructive jaundice  Ongoing abdominal pain with nausea and vomiting and chills.  In ER, noted leukocytosis.  CT abdomen pelvis showed dilated CBD, 1.7 cm with possible obstructing choledocholithiasis. RUQ ultrasound was obtained, showed cholelithiasis and finding suggestive of acute cholecystitis.  Dilated CBD was noted but no definitive CBD stone was noted on gallbladder. Cipro and Flagyl IV, IV hydration, IV hydromorphone given in ER.  Patient was seen by GI team, underwent ERCP and EUS on 4/19 GB stone/sludge swiped and pus drained, sphincterotomy and CBD stent placed.  Patient underwent cholecystectomy on 4/20, he was continued on IV Cipro and Flagyl due to penicillin allergy, following surgery he tolerated diet, seen by surgery team, white count back to normal, LFTs are trending down, plan is to discharge home with surgery follow-up.  Acute respiratory failure  1 event on 4/21, please review notes from [facility]RT, patient suddenly developed acute shortness of breath, required up to 4 L of oxygen, he was found to be in COPD exacerbation, steroids were not started due to her postsurgical timing, he  was started on scheduled duo nebs, patient was on 2 L of oxygen at the time of discharge, I had recommended him to stay in the hospital and assess for improvement in his oxygen needs, but he insisted on going home.  So the plan is to discharge him home with home oxygen and monitor outpatient through his PCP.    Suspected Acute kidney injury on CKD stage 4  Patient with creatinine of 2.14 in November 2020.  Creatinine here at presentation is 2.42.  Suspect component of acute kidney injury given nausea vomiting.  His renal function was monitored closely here, creatinine had gone up to 2.92 at the time of discharge, since patient wanted to go home badly he was discharged home with outpatient follow-up set up.  His ACE inhibitor's were on hold.    Rest of the medical problems addressed during this admission.  HTN  HL  PTA is on lisinopril and gemfibrozil/lovastatin, Hold PTA meds.  - PRN IV hydralazine available.        Chronic back pain  - heating pad, lidocaine gel PRN  - getting narcotic for abd pain as above. 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    - aortic dilation noted in 2016 as well. No significant change in size.   - outpatient follow up               Lori Lyons MD    Significant Results and Procedures       Pending Results     Unresulted Labs Ordered in the Past 30 Days of this Admission     No orders found from 3/19/2021 to 4/19/2021.          Code Status   Full Code       Primary Care Physician   Remi Peterson    Physical Exam   Temp: 96.9  F (36.1  C) Temp src: Oral BP: 120/79 Pulse: 83   Resp: 16 SpO2: 91 % O2 Device: Nasal cannula Oxygen Delivery: 2 LPM  Vitals:    04/18/21 1000   Weight: 86.2 kg (190 lb)     Vital Signs with Ranges  Temp:  [95.6  F (35.3  C)-97.7  F (36.5  C)] 96.9  F (36.1  C)  Pulse:   [] 83  Resp:  [16] 16  BP: (120-147)/(66-91) 120/79  SpO2:  [91 %-94 %] 91 %  I/O last 3 completed shifts:  In: 360 [P.O.:360]  Out: 1300 [Urine:1300]    The patient was examined on the day of discharge.    Discharge Disposition   Discharged to home  Condition at discharge: Stable    Consultations This Hospital Stay   GASTROENTEROLOGY IP CONSULT  SURGERY GENERAL IP CONSULT  PHYSICAL THERAPY ADULT IP CONSULT  CARE MANAGEMENT / SOCIAL WORK IP CONSULT    Time Spent on this Encounter   ILori MD, personally saw the patient today and spent greater than 30 minutes discharging this patient.    Discharge Orders      Reason for your hospital stay    Copd exacerbation, choledocholithiasis     Activity    Your activity upon discharge: activity as tolerated     Discharge Instructions    Please dont continue lisinopril until creatinine improved to 2.4 or less, hold till next lab check. since you are wanting to leave and is not well completely will recommend to come back incase of worsening of shortness of breath or any new concerns.     Follow-up and recommended labs and tests     Follow up with primary care provider, Remi Peterson, within 7 days for hospital follow- up.  The following labs/tests are recommended: basic metabolic panel in 5-6 days ,creatinine is elevated .  Follow up with surgery as recommended.Repeat ERCP in 6-8 weeks for stent removal, follow up with GI regarding this .     Oxygen Adult/Peds     Miscellaneous DME Order    DME Documentation:   Describe the reason for need to support medical necessity: copd exacerbation.    I, the undersigned, certify that the above prescribed supplies are medically necessary for this patient and is both reasonable and necessary in reference to accepted standards of medical and necessary in reference to accepted standards of medical practice in the treatment of this patient's condition and is not prescribed as a convenience.     Diet    Follow this diet  upon discharge: Orders Placed This Encounter      Low Fat Diet     Discharge Medications   Current Discharge Medication List      START taking these medications    Details   acetaminophen (TYLENOL) 325 MG tablet Take 2 tablets (650 mg) by mouth every 4 hours as needed for fever (only for Temp 101 or greater)  Qty:        ciprofloxacin (CIPRO) 500 MG tablet Take 1 tablet (500 mg) by mouth 2 times daily for 5 days  Qty: 10 tablet, Refills: 0    Associated Diagnoses: Choledocholithiasis      ipratropium - albuterol 0.5 mg/2.5 mg/3 mL (DUONEB) 0.5-2.5 (3) MG/3ML neb solution Take 1 vial (3 mLs) by nebulization 4 times daily for 7 days  Qty: 84 mL, Refills: 0    Associated Diagnoses: Chronic obstructive pulmonary disease, unspecified COPD type (H)      metroNIDAZOLE (FLAGYL) 500 MG tablet Take 1 tablet (500 mg) by mouth 3 times daily for 7 days  Qty: 21 tablet, Refills: 0    Associated Diagnoses: Choledocholithiasis      senna-docusate (SENOKOT-S/PERICOLACE) 8.6-50 MG tablet Take 1 tablet by mouth 2 times daily as needed for constipation  Qty: 30 tablet, Refills: 0    Associated Diagnoses: Choledocholithiasis         CONTINUE these medications which have CHANGED    Details   lisinopril (ZESTRIL) 40 MG tablet Take 1 tablet (40 mg) by mouth daily  Qty:      Comments: Start after 2-3 days         CONTINUE these medications which have NOT CHANGED    Details   Boswellia-Glucosamine-Vit D (OSTEO BI-FLEX ONE PER DAY PO) Take 1 capsule by mouth daily      cetirizine (ZYRTEC) 10 MG tablet Take 10 mg by mouth daily      Cholecalciferol (VITAMIN D) 2000 UNITS CAPS Take 2 tablets by mouth daily       gemfibrozil (LOPID) 600 MG tablet Take 1 tablet (600 mg) by mouth 2 times daily  Qty: 180 tablet, Refills: 3    Associated Diagnoses: Hyperlipidemia LDL goal <100      Krill Oil 500 MG CAPS Take 1 capsule by mouth daily      lovastatin (MEVACOR) 40 MG tablet Take 80 mg by mouth At Bedtime      Multiple Vitamins-Minerals (MULTIVITAL)  TABS Take 1 tablet by mouth daily.      omeprazole (PRILOSEC) 20 MG DR capsule Take 20 mg by mouth daily           Allergies   Allergies   Allergen Reactions     Penicillins Anaphylaxis     Amoxicillin      Aspirin      bleeding     Benadryl [Anti-Itch]      Doesn't help in allergic reaction. Benadryl cream causes worsening rash        Ibuprofen Sodium      Esophageal bleeding     Morphine Sulfate      Data   Most Recent 3 CBC's:  Recent Labs   Lab Test 04/22/21  0737 04/21/21  0825 04/20/21  0750   WBC 10.9 11.3* 8.0   HGB 10.7* 11.7* 11.7*   MCV 96 98 97    209 160      Most Recent 3 BMP's:  Recent Labs   Lab Test 04/22/21  0737 04/21/21  0825 04/20/21  0750    136 137   POTASSIUM 3.6 4.1 3.8   CHLORIDE 108 108 110*   CO2 23 22 21   BUN 53* 46* 45*   CR 2.97* 2.98* 2.83*   ANIONGAP 7 6 6   HANS 8.2* 8.1* 8.0*   * 162* 91     Most Recent 2 LFT's:  Recent Labs   Lab Test 04/22/21  0737 04/21/21  0825   AST 72* 167*   * 171*   ALKPHOS 192* 258*   BILITOTAL 1.9* 3.6*     Most Recent INR's and Anticoagulation Dosing History:  Anticoagulation Dose History     Recent Dosing and Labs Latest Ref Rng & Units 11/11/2008    INR 0.86 - 1.14 0.99        Most Recent 3 Troponin's:  Recent Labs   Lab Test 04/21/21  2334 04/21/21  1926 04/21/21  1532   TROPI <0.015 <0.015 <0.015     Most Recent Cholesterol Panel:  Recent Labs   Lab Test 11/23/20  0755   CHOL 204*   *   HDL 39*   TRIG 240*     Most Recent 6 Bacteria Isolates From Any Culture (See EPIC Reports for Culture Details):No lab results found.  Most Recent TSH, T4 and A1c Labs:No lab results found.

## 2021-04-22 NOTE — PLAN OF CARE
POD 2 lap herrera. A/Ox4. VSS ex slightly hypertensive. Weaned to 1-2L NC.  Tele SR. Up A1 + GB/W, ambulated hallways x1 this am. Left PIV infiltrated after bolus. BM today, (+) gas. Discharge planning initiated. Pt will need home O2/neb supplies.

## 2021-04-22 NOTE — PROGRESS NOTES
04/22/21 1300   Quick Adds   Type of Visit Initial PT Evaluation   Living Environment   People in home spouse   Current Living Arrangements house   Home Accessibility stairs within home   Number of Stairs, Within Home, Primary 2   Stair Railings, Within Home, Primary railings safe and in good condition;railing on right side (ascending)   Transportation Anticipated car, drives self;family or friend will provide   Self-Care   Usual Activity Tolerance moderate   Current Activity Tolerance moderate   Regular Exercise No   Equipment Currently Used at Home cane, straight;walker, rolling   Activity/Exercise/Self-Care Comment Does not use AD at baseline   Disability/Function   Fall history within last six months no   General Information   Onset of Illness/Injury or Date of Surgery 04/18/21   Referring Physician Dr. Young   Patient/Family Therapy Goals Statement (PT) To go home   Pertinent History of Current Problem (include personal factors and/or comorbidities that impact the POC) Pt is an 86 year old male admitted for cholesctectomy.   Existing Precautions/Restrictions fall   Cognition   Orientation Status (Cognition) oriented x 4   Affect/Mental Status (Cognition) WFL   Pain Assessment   Patient Currently in Pain No   Range of Motion (ROM)   ROM Quick Adds ROM WFL   Strength   Strength Comments Gross LE strength 4/5 bilaterally   Bed Mobility   Comment (Bed Mobility) SBA   Transfers   Transfer Safety Comments CGA   Gait/Stairs (Locomotion)   Comment (Gait/Stairs) 50' IV pole CGA   Balance   Balance Comments Good in sitting, fair in standing   Clinical Impression   Criteria for Skilled Therapeutic Intervention yes, treatment indicated   PT Diagnosis (PT) Impaired ambulation   Influenced by the following impairments Decreased strength, decreased endurance   Functional limitations due to impairments Difficulty with bed mobility, transfers, ambulation, stair management   Clinical Presentation Stable/Uncomplicated    Clinical Presentation Rationale VSS, pain controlled   Clinical Decision Making (Complexity) low complexity   Therapy Frequency (PT) Daily   Predicted Duration of Therapy Intervention (days/wks) 3 days   Planned Therapy Interventions (PT) balance training;bed mobility training;patient/family education;gait training;home exercise program;strengthening;transfer training   Risk & Benefits of therapy have been explained evaluation/treatment results reviewed;risks/benefits reviewed;care plan/treatment goals reviewed;current/potential barriers reviewed;participants voiced agreement with care plan;participants included;patient;spouse/significant other   PT Discharge Planning    PT Discharge Recommendation (DC Rec) home with home care physical therapy   PT Rationale for DC Rec At baseline, pt lives with his wife in a home with 2 steps to enter and all needs met on main floor. Pt reports independence with mobility prior to admit, no use of AD. Pt does have a FWW at home from previous knee surgeries. This date, pt requires SBA for mobility, which wife is able to provide. Recommend HHPT to increase independence, mobility and endurance, however, patient adamantly declines HHPT, wife in agreement.   PT Brief overview of current status  SBA   Total Evaluation Time   Total Evaluation Time (Minutes) 10

## 2021-04-23 LAB
INTERPRETATION ECG - MUSE: NORMAL
INTERPRETATION ECG - MUSE: NORMAL

## 2021-04-27 ENCOUNTER — ANCILLARY PROCEDURE (OUTPATIENT)
Dept: GENERAL RADIOLOGY | Facility: CLINIC | Age: 86
End: 2021-04-27
Payer: COMMERCIAL

## 2021-04-27 ENCOUNTER — OFFICE VISIT (OUTPATIENT)
Dept: INTERNAL MEDICINE | Facility: CLINIC | Age: 86
End: 2021-04-27
Payer: COMMERCIAL

## 2021-04-27 VITALS
TEMPERATURE: 96.9 F | HEART RATE: 100 BPM | WEIGHT: 202.8 LBS | BODY MASS INDEX: 32.73 KG/M2 | OXYGEN SATURATION: 92 % | DIASTOLIC BLOOD PRESSURE: 86 MMHG | SYSTOLIC BLOOD PRESSURE: 128 MMHG

## 2021-04-27 DIAGNOSIS — J44.9 CHRONIC OBSTRUCTIVE PULMONARY DISEASE, UNSPECIFIED COPD TYPE (H): ICD-10-CM

## 2021-04-27 DIAGNOSIS — I10 HYPERTENSION GOAL BP (BLOOD PRESSURE) < 140/90: Chronic | ICD-10-CM

## 2021-04-27 DIAGNOSIS — Z09 ENCOUNTER FOR EXAMINATION FOLLOWING TREATMENT AT HOSPITAL: Primary | ICD-10-CM

## 2021-04-27 DIAGNOSIS — N18.4 CKD (CHRONIC KIDNEY DISEASE) STAGE 4, GFR 15-29 ML/MIN (H): ICD-10-CM

## 2021-04-27 DIAGNOSIS — K80.50 CHOLEDOCHOLITHIASIS: ICD-10-CM

## 2021-04-27 LAB
ANION GAP SERPL CALCULATED.3IONS-SCNC: 2 MMOL/L (ref 3–14)
BUN SERPL-MCNC: 31 MG/DL (ref 7–30)
CALCIUM SERPL-MCNC: 8.5 MG/DL (ref 8.5–10.1)
CHLORIDE SERPL-SCNC: 109 MMOL/L (ref 94–109)
CO2 SERPL-SCNC: 28 MMOL/L (ref 20–32)
CREAT SERPL-MCNC: 2.11 MG/DL (ref 0.66–1.25)
GFR SERPL CREATININE-BSD FRML MDRD: 27 ML/MIN/{1.73_M2}
GLUCOSE SERPL-MCNC: 106 MG/DL (ref 70–99)
POTASSIUM SERPL-SCNC: 4.5 MMOL/L (ref 3.4–5.3)
SODIUM SERPL-SCNC: 139 MMOL/L (ref 133–144)

## 2021-04-27 PROCEDURE — 36415 COLL VENOUS BLD VENIPUNCTURE: CPT | Performed by: INTERNAL MEDICINE

## 2021-04-27 PROCEDURE — 80048 BASIC METABOLIC PNL TOTAL CA: CPT | Performed by: INTERNAL MEDICINE

## 2021-04-27 PROCEDURE — 99214 OFFICE O/P EST MOD 30 MIN: CPT | Performed by: INTERNAL MEDICINE

## 2021-04-27 PROCEDURE — 71046 X-RAY EXAM CHEST 2 VIEWS: CPT | Performed by: RADIOLOGY

## 2021-04-27 NOTE — PATIENT INSTRUCTIONS
- Our team will contact you via MyChart, telephone call, or a letter with the results of today's lab tests once I have a chance to review them  - Follow up with surgery as recommended. 4/30/2021 @ 1:45 PM with Dr. Inman.  - Repeat ERCP in 6-8 weeks for stent removal, follow up with GI regarding this.

## 2021-04-27 NOTE — PROGRESS NOTES
Assessment & Plan     Encounter for examination following treatment at hospital  Choledocholithiasis  Nitish reports being improved from his hospitalization.  His surgical scars appear to be healing well.  He has follow-up with surgery on Friday.  Discussed that he will need to have his stent removed in 6 to 8 weeks per surgical recommendations.  Further recs as below.    Chronic obstructive pulmonary disease, unspecified COPD type (H)  Nitish had a hypoxic episode in the hospital that was felt to be a COPD exacerbation.  Given his recent surgery, the hospital team elected to not treat with steroids.  He tells me today that the nebulizers make him feel worse.  He actually tells me he is at his baseline oxygenation which is in the low 90s at rest.  He is not normally on oxygen.  Today in the office, it was confirmed that off of oxygen he remains in the high 80s to low 90s oxygen saturation.  He has not seen a pulmonologist in quite some time.  I encouraged him to continue using supplemental oxygen as needed, especially with exertion, and that he may need supplemental oxygen with exertion moving forward.  He agrees with this assessment.  Referral to pulmonology placed.  I will also repeat a chest x-ray today.  His lungs to auscultation sound appropriate which is reassuring.  - XR Chest 2 Views; Future  - PULMONARY MEDICINE REFERRAL    CKD (chronic kidney disease) stage 4, GFR 15-29 ml/min (H)  GFR had decreased and gone in the wrong direction at discharge, but Nitish tells me (and the hospital notes support) he was adamant about leaving the hospital early which he did do. Will recheck labs today. He has been holding his lisinopril as instructed by the hospital team.  - Basic metabolic panel    Hypertension goal BP (blood pressure) < 140/90  At goal today while off lisinopril. Will check labs as above but okay to continue to hold lisinopril (he tells me he actually takes 20mg a day and not 40mg) as long as his BP stays  at goal with active monitoring at home via home BP cuff.    F/u with surgery on Friday    Pardeep Vásquez MD  [facility]    Chad Talbert is a 86 year old who presents for the following health issues accompanied by his spouse:    [facility]   Hospital Follow-up Visit:    Hospital/Nursing Home/IP Rehab Facility: [facility]  Date of Admission: 4/18/2021  Date of Discharge: 4/22/2021  Reason(s) for Admission: COPD exacerbation       Was your hospitalization related to COVID-19? No   Problems taking medications regularly:  None  Medication changes since discharge: None  Problems adhering to non-medication therapy:  None    Summary of hospitalization:  [facility] discharge summary reviewed  Diagnostic Tests/Treatments reviewed.  Follow up needed: BMP  Other Healthcare Providers Involved in Patient s Care:         Surgical follow-up appointment - 04/30/21  Update since discharge: improved.   Post Discharge Medication Reconciliation: discharge medications reconciled, continue medications without change.  Plan of care communicated with patient and family       I am seeing Nitish today for follow-up on a recent hospitalization. My summary of the hospitalization based on talking to Nitish and reviewing the notes myself is as follows:    Nitish presented to the emergency room on April 18 with complaints of abdominal pain and emesis for the last couple of days.  Vital signs were stable.  CT chest abdomen pelvis showed dilatation of the common duct measuring 1.7 cm which was felt to possibly represent obstructing choledocholithiasis.  Right upper quadrant ultrasound was felt to show some acute cholecystitis.  White count 16 and creatinine 2.4 with  and .  He was given Cipro and Flagyl and admitted to the hospital.  GI and general surgery was consulted.  He underwent ERCP and EUS on April 19.  Dilated common bile duct was noted but no definitive common bile duct  stone was noted.  Gallbladder stones/sludge were swept and pus was drained and a common bile duct stent was placed.  He subsequently underwent a cholecystectomy with general surgery on April 20.  On the morning of April 21 a RRT was called on Nitish for some right-sided chest pain.  He was diagnosed with COPD exacerbation treated with DuoNebs with the treating team electing to not continue with steroids given he was so close to his operation.  His creatinine continued to uptrend.  On April 22 Nitish decided to leave the hospital.  His creatinine at that time was 2.92.  He was continued on antibiotics and encouraged to follow-up with primary care and surgery shortly.  He was asked to hold his ACE inhibitor.    Nitish tells me today that the nebulizers make him feel worse. He is slowly regaining his appetite. He feels his O2 needs have gone down and are improved. He feels his belly is much better than prior.    Review of Systems   Constitutional, HEENT, cardiovascular, pulmonary, gi systems are negative, except as otherwise noted.      Objective    /86   Pulse 100   Temp 96.9  F (36.1  C) (Temporal)   Wt 92 kg (202 lb 12.8 oz)   SpO2 92%   BMI 32.73 kg/m    Body mass index is 32.73 kg/m .     Physical Exam   GENERAL: alert and in no distress.  EYES: conjunctivae/corneas clear. EOMs grossly intact  HENT: NC/AT, facies symmetric.  RESP: CTAB, no w/r/r. Suppl O2 in place via NC on 2L.  CV: RRR, no m/r/g.  GI: NT, ND, without rebound tenderness or guarding. Scars examined and appear to be healing appropriately without signs of erythema or purulence.  MSK: No edema. No cyanosis or clubbing noted bilaterally in upper and/or lower extremities.  SKIN: No other significant ulcers, lesions, or rashes on the visualized portions of the skin  NEURO: Alert. Oriented. Sensation grossly WNL.

## 2021-04-30 ENCOUNTER — OFFICE VISIT (OUTPATIENT)
Dept: SURGERY | Facility: CLINIC | Age: 86
End: 2021-04-30
Payer: COMMERCIAL

## 2021-04-30 DIAGNOSIS — Z09 SURGERY FOLLOW-UP EXAMINATION: Primary | ICD-10-CM

## 2021-04-30 PROCEDURE — 99024 POSTOP FOLLOW-UP VISIT: CPT | Performed by: SURGERY

## 2021-04-30 NOTE — LETTER
[facility]    [address], [address]  [place], [place] [address]  Phone [phone]  Fax [phone] [address], [address]  [place] [address]  Phone [phone]  Fax [phone]    [email or web address]     2021    Re: Nitish Coreas  : 1934      Surgery Postoperative Note     S: Nitish presents to clinic for follow-up after laparoscopic cholecystectomy.  The patient was hospitalized with cholecystitis and choledocholithiasis/cholangitis.  He also underwent ERCP.  The patient has done very well since discharge from the hospital.  He is tolerating oral intake.  No nausea.  He is having regular bowel movements.  No significant pain at his surgical sites.  No wound concerns.     Abdomen: Soft, nondistended, nontender ; incisions healing well without evidence of infection, patient does have some mild erythema and superficial separation at the supraumbilical incision but no evidence of drainage or infection     Pathology:   FINAL DIAGNOSIS:   Gallbladder, laparoscopic cholecystectomy:   - Calculous chronic cholecystitis.   - One lymph node with no evidence of malignancy.      A/P  Nitish Coreas is recovering from a Laparoscopic Cholecystectomy. I advised him to slowly return to regular activity. I expect he will make a complete recovery without issues.  Patient may follow-up in the general surgery clinic as needed basis.     Thank you for the opportunity to help in his care.     Callie Inman MD   [facility]  [phone]

## 2021-04-30 NOTE — PROGRESS NOTES
Surgery Postoperative Note    S: Nitish presents to clinic for follow-up after laparoscopic cholecystectomy.  The patient was hospitalized with cholecystitis and choledocholithiasis/cholangitis.  He also underwent ERCP.  The patient has done very well since discharge from the hospital.  He is tolerating oral intake.  No nausea.  He is having regular bowel movements.  No significant pain at his surgical sites.  No wound concerns.    Abdomen: Soft, nondistended, nontender ; incisions healing well without evidence of infection, patient does have some mild erythema and superficial separation at the supraumbilical incision but no evidence of drainage or infection    Pathology:   FINAL DIAGNOSIS:   Gallbladder, laparoscopic cholecystectomy:   - Calculous chronic cholecystitis.   - One lymph node with no evidence of malignancy.     A/P  Nitish Coreas is recovering from a Laparoscopic Cholecystectomy. I advised him to slowly return to regular activity. I expect he will make a complete recovery without issues.  Patient may follow-up in the general surgery clinic as needed basis.    Thank you for the opportunity to help in his care.    Callie Inman MD   [facility]  [phone]    Please route or send letter to:  Primary Care Provider (PCP) and Referring Provider

## 2021-05-03 ENCOUNTER — TELEPHONE (OUTPATIENT)
Dept: INTERNAL MEDICINE | Facility: CLINIC | Age: 86
End: 2021-05-03

## 2021-05-03 NOTE — TELEPHONE ENCOUNTER
"When Niitsh was discharged from the hospital on 4/21, lisinopril was put on hold for a few days.  \"PTA is on lisinopril and gemfibrozil/lovastatin, Hold PTA meds.\"    And result notes from 4/27 with Dr. Vásquez:  \"Kidney function improved back to his baseline. Given that his blood pressure was normal in the office today and that he is recovering from a recent surgery, I would have him continue to hold his lisinopril and check his BP at home. If it creeps back up, let our clinic know for further direction.\"    Prior to hospital admission, pt was taking LISINOPRIL 40mg-- 1/2 tablet daily (20mg).   Lisinopril has been on hold. He is wondering if it is time for him to start taking it again?  His blood pressure readings have been stable ranging around the 128/86.    Please call him back at [phone].  "

## 2021-05-03 NOTE — TELEPHONE ENCOUNTER
Patient was called with provider's message. He agrees with this plan. BP has been under 130/90. He will continue daily BP checks.

## 2021-05-03 NOTE — TELEPHONE ENCOUNTER
If his BP is persistently <130/90, then I don't think he needs to add back any other anti-hypertensive medications such as lisinopril. If his BP creeps up, a CCB may be a better option for him given his CKD-IV. Would encourage him to make an appt to discuss that if his BP creeps up. IMO - okay to continue to stay off lisinopril and continue to monitor.

## 2021-06-17 ENCOUNTER — TELEPHONE (OUTPATIENT)
Dept: SURGERY | Facility: CLINIC | Age: 86
End: 2021-06-17

## 2021-06-17 NOTE — TELEPHONE ENCOUNTER
Name of caller: Patient    Reason for Call:  Patient had a stint placed during surgery and is supposed to see someone to have it removed after 6-8 weeks. Not sure where to call to schedule removal?    Surgeon:  Storm    Recent Surgery:  Yes.    If yes, when & what type:  4/20/21    CHOLECYSTECTOMY, LAPAROSCOPIC      Best phone number to reach pt at is: [phone]    Ok to leave a message with medical info? Yes.

## 2021-06-17 NOTE — TELEPHONE ENCOUNTER
Called patient's wife and provided her with the contact info for Dr. Milan's office.    Kady Craft, RN-BSN

## 2021-07-12 ENCOUNTER — OFFICE VISIT (OUTPATIENT)
Dept: INTERNAL MEDICINE | Facility: CLINIC | Age: 86
End: 2021-07-12
Payer: COMMERCIAL

## 2021-07-12 VITALS
WEIGHT: 191 LBS | TEMPERATURE: 97.6 F | HEART RATE: 78 BPM | DIASTOLIC BLOOD PRESSURE: 78 MMHG | RESPIRATION RATE: 16 BRPM | BODY MASS INDEX: 30.83 KG/M2 | OXYGEN SATURATION: 97 % | SYSTOLIC BLOOD PRESSURE: 136 MMHG

## 2021-07-12 DIAGNOSIS — R73.9 HYPERGLYCEMIA: ICD-10-CM

## 2021-07-12 DIAGNOSIS — N18.4 CKD (CHRONIC KIDNEY DISEASE) STAGE 4, GFR 15-29 ML/MIN (H): ICD-10-CM

## 2021-07-12 DIAGNOSIS — R60.0 BILATERAL LEG EDEMA: Primary | ICD-10-CM

## 2021-07-12 DIAGNOSIS — R80.1 PERSISTENT PROTEINURIA: ICD-10-CM

## 2021-07-12 LAB — HBA1C MFR BLD: 5.3 % (ref 0–5.6)

## 2021-07-12 PROCEDURE — 36415 COLL VENOUS BLD VENIPUNCTURE: CPT | Performed by: INTERNAL MEDICINE

## 2021-07-12 PROCEDURE — 99214 OFFICE O/P EST MOD 30 MIN: CPT | Performed by: INTERNAL MEDICINE

## 2021-07-12 PROCEDURE — 80053 COMPREHEN METABOLIC PANEL: CPT | Performed by: INTERNAL MEDICINE

## 2021-07-12 PROCEDURE — 83036 HEMOGLOBIN GLYCOSYLATED A1C: CPT | Performed by: INTERNAL MEDICINE

## 2021-07-12 NOTE — PROGRESS NOTES
Assessment & Plan     Bilateral leg edema  Unclear etiology. Discussed heart failure vs kidney disease vs liver disease vs lymphedema vs DVT vs infection vs other. Bilateral and chronic so infection quite low on DDX. DVT lower as well due to that. No past TTEs. Recent labs reassuring for liver disease, but he does have a history of kidney disease (Cr 2.1 in April 2021) as well as proteinuria on past U/As. Discussed compression stockings and elevation of legs. Labs as below. Will check TTE too.  - Comprehensive metabolic panel  - Echocardiogram Complete; Future  - UA with Microscopic - lab collect; Future  - Compression Sleeve/Stocking Order for DME - ONLY FOR DME    Persistent proteinuria  Seen on last two U/As. Will better quantify.  - UA with Microscopic - lab collect; Future  - Albumin Random Urine Quantitative with Creat Ratio    Hyperglycemia  Seen on most recent metabolic panels. Will better characterize with A1c.  - Hemoglobin A1c    Return in about 5 months (around 12/12/2021) for Physical Exam.    Padreep Eller MD  [facility]    Chad Talbert is a 86 year old who presents for the following health issues:    HPI   Concern - Edema  Onset: 7-8 weeks  Description: leg swelling has not resolved- worse in left leg  Intensity: moderate  Progression of Symptoms:  same  Accompanying Signs & Symptoms: uncomfortable.   Previous history of similar problem: none  Precipitating factors:        Worsened by: none  Alleviating factors:        Improved by: None  Therapies tried and outcome:  none     Legs have been swollen since his hospitalization in April 2021. Tells me they have slowly gone down but have never normalized. He is down 11 lbs since I last saw him in April 2021. No known cardiac history. He does have some SOB especially with exertion but he feels that is due to his stage 1 COPD.    Review of Systems   Constitutional, cardiovascular, pulmonary, gi and gu systems are  negative, except as otherwise noted.      Objective    /78 (BP Location: Left arm, Patient Position: Chair, Cuff Size: Adult Regular)   Pulse 78   Temp 97.6  F (36.4  C) (Temporal)   Resp 16   Wt 86.6 kg (191 lb)   SpO2 97%   BMI 30.83 kg/m    Body mass index is 30.83 kg/m .     Physical Exam   GENERAL: alert and in no distress.  EYES: conjunctivae/corneas clear. EOMs grossly intact  HENT: NC/AT, facies symmetric.  RESP: CTAB, no w/r/r.  CV: RRR, no m/r/g.  GI: NT, ND, without rebound tenderness or guarding  MSK: 2+ pitting edema. No cyanosis or clubbing noted bilaterally in upper and/or lower extremities.  SKIN: No significant ulcers, lesions, or rashes on the visualized portions of the skin  NEURO: Alert. Oriented.

## 2021-07-13 LAB
ALBUMIN SERPL-MCNC: 3.4 G/DL (ref 3.4–5)
ALP SERPL-CCNC: 140 U/L (ref 40–150)
ALT SERPL W P-5'-P-CCNC: 31 U/L
ANION GAP SERPL CALCULATED.3IONS-SCNC: 11 MMOL/L (ref 3–14)
AST SERPL W P-5'-P-CCNC: 27 U/L (ref 0–45)
BILIRUB SERPL-MCNC: 0.4 MG/DL (ref 0.2–1.3)
BUN SERPL-MCNC: 34 MG/DL (ref 7–30)
CALCIUM SERPL-MCNC: 9 MG/DL (ref 8.5–10.1)
CHLORIDE BLD-SCNC: 110 MMOL/L
CO2 SERPL-SCNC: 20 MMOL/L (ref 20–32)
CREAT SERPL-MCNC: 2.26 MG/DL
GFR SERPL CREATININE-BSD FRML MDRD: 25 ML/MIN/1.73M2
GLUCOSE BLD-MCNC: 79 MG/DL (ref 70–99)
POTASSIUM BLD-SCNC: 4.1 MMOL/L (ref 3.4–5.3)
PROT SERPL-MCNC: 7 G/DL (ref 6.8–8.8)
SODIUM SERPL-SCNC: 141 MMOL/L (ref 133–144)

## 2021-07-22 ENCOUNTER — TELEPHONE (OUTPATIENT)
Dept: INTERNAL MEDICINE | Facility: CLINIC | Age: 86
End: 2021-07-22

## 2021-07-22 NOTE — TELEPHONE ENCOUNTER
Spoke with patient re: test results. He also sated during the call that he is still having left leg swelling despite wearing compression hose. He is scheduled for ECHO on Aug. 2nd, Any additional advice for him? Deborah Hill, CMA

## 2021-07-23 NOTE — TELEPHONE ENCOUNTER
Thanks for the update. I have ordered a Duplex ultrasound of both of his legs as well to look into this persistent swelling. I recommend proceeding with the echo as well. Lastly, it would be good to the urine studies that were ordered last week completed. Once we have all of the information from these studies, we can formulate a plan. Continue compression stockings in the interim.

## 2021-07-27 ENCOUNTER — LAB (OUTPATIENT)
Dept: LAB | Facility: CLINIC | Age: 86
End: 2021-07-27
Payer: COMMERCIAL

## 2021-07-27 DIAGNOSIS — R60.0 BILATERAL LEG EDEMA: ICD-10-CM

## 2021-07-27 DIAGNOSIS — R80.1 PERSISTENT PROTEINURIA: ICD-10-CM

## 2021-07-27 LAB
ALBUMIN UR-MCNC: >=300 MG/DL
APPEARANCE UR: CLEAR
BACTERIA #/AREA URNS HPF: ABNORMAL /HPF
BILIRUB UR QL STRIP: NEGATIVE
COLOR UR AUTO: YELLOW
GLUCOSE UR STRIP-MCNC: NEGATIVE MG/DL
HGB UR QL STRIP: ABNORMAL
KETONES UR STRIP-MCNC: NEGATIVE MG/DL
LEUKOCYTE ESTERASE UR QL STRIP: NEGATIVE
NITRATE UR QL: NEGATIVE
PH UR STRIP: 6 [PH] (ref 5–7)
RBC #/AREA URNS AUTO: ABNORMAL /HPF
SP GR UR STRIP: 1.02 (ref 1–1.03)
SQUAMOUS #/AREA URNS AUTO: ABNORMAL /LPF
UROBILINOGEN UR STRIP-ACNC: 0.2 E.U./DL
WBC #/AREA URNS AUTO: ABNORMAL /HPF

## 2021-07-27 PROCEDURE — 81001 URINALYSIS AUTO W/SCOPE: CPT

## 2021-07-27 PROCEDURE — 82043 UR ALBUMIN QUANTITATIVE: CPT

## 2021-07-28 LAB
CREAT UR-MCNC: 99 MG/DL
MICROALBUMIN UR-MCNC: 2810 MG/DL
MICROALBUMIN/CREAT UR: 2838.38 MG/G CR (ref 0–17)

## 2021-07-30 ENCOUNTER — TELEPHONE (OUTPATIENT)
Dept: NEPHROLOGY | Facility: CLINIC | Age: 86
End: 2021-07-30

## 2021-07-30 DIAGNOSIS — N18.4 CKD (CHRONIC KIDNEY DISEASE) STAGE 4, GFR 15-29 ML/MIN (H): Primary | ICD-10-CM

## 2021-07-30 NOTE — TELEPHONE ENCOUNTER
Called US scheduling but they need to talk to patient to get him scheduled and to ask him further questions- conferenced patient in to call with US scheduling and he will schedule with them. Also made sure patient knows to call nephrology to get that appt scheduled and number given to call. Has ECHO Monday.    Anything else needed?

## 2021-07-30 NOTE — TELEPHONE ENCOUNTER
Patient called asking for update on both of the ordered tests/procedures.     Currently scheduled Monday 8/2/21 at 8:45am for Echo.     Patient states ISABELLA Cabrera was to work on scheduling the Duplex US of both legs to coordinate with the Echo on Monday.     Patient hasn't heard an update.   Per epic--Duplex US not scheduled yet.      Please review and follow up with patient.     [phone]  May leave detailed msg/orders on voice mail.     Ayla LUJAN, RN      July 30, 2021  11:59 AM

## 2021-07-30 NOTE — TELEPHONE ENCOUNTER
[facility] Call Center    Phone Message    May a detailed message be left on voicemail: yes     Reason for Call: Other: Question about labs     Patient schedule video appt for 8/30. We got him set up for labs at his local [facility] clinic on 8/23. He's hoping to find out exactly what kind of labs will be done that day. He said he's had some bad experiences where labs haven't done everything that's needed to be done, so he wants to be able to double check with their staff when he arrives  Action Taken: Other: Nephrology    Travel Screening: Not Applicable

## 2021-08-02 ENCOUNTER — HOSPITAL ENCOUNTER (OUTPATIENT)
Dept: ULTRASOUND IMAGING | Facility: CLINIC | Age: 86
End: 2021-08-02
Attending: INTERNAL MEDICINE
Payer: COMMERCIAL

## 2021-08-02 ENCOUNTER — HOSPITAL ENCOUNTER (OUTPATIENT)
Dept: CARDIOLOGY | Facility: CLINIC | Age: 86
End: 2021-08-02
Attending: INTERNAL MEDICINE
Payer: COMMERCIAL

## 2021-08-02 DIAGNOSIS — R60.0 BILATERAL LEG EDEMA: ICD-10-CM

## 2021-08-02 LAB — LVEF ECHO: NORMAL

## 2021-08-02 PROCEDURE — 999N000208 ECHOCARDIOGRAM COMPLETE

## 2021-08-02 PROCEDURE — 255N000002 HC RX 255 OP 636: Performed by: INTERNAL MEDICINE

## 2021-08-02 PROCEDURE — 93306 TTE W/DOPPLER COMPLETE: CPT | Mod: 26 | Performed by: INTERNAL MEDICINE

## 2021-08-02 PROCEDURE — 93970 EXTREMITY STUDY: CPT

## 2021-08-02 RX ADMIN — HUMAN ALBUMIN MICROSPHERES AND PERFLUTREN 9 ML: 10; .22 INJECTION, SOLUTION INTRAVENOUS at 09:04

## 2021-08-03 NOTE — TELEPHONE ENCOUNTER
Call to patient. Informed him that lab orders have been placed and that we are looking for urine and blood. Provided number for call back with any questions or concerns.  Nury Akhtar LPN  Nephrology  [phone]

## 2021-08-06 NOTE — TELEPHONE ENCOUNTER
RECORDS RECEIVED FROM: Internal   DATE RECEIVED: 08.30.2021    NOTES STATUS DETAILS   OFFICE NOTE from referring provider Internal 07.12.2021 Pardeep Vásquez MD   OFFICE NOTE from other specialist  Internal 11.23.2020 Remi Peterson MD   *Only VASCULITIS or LUPUS gather office notes for the following N/A    *PULMONARY   N/A    *ENT N/A    *DERMATOLOGY N/A    *RHEUMATOLOGY N/A    DISCHARGE SUMMARY from hospital N/A    DISCHARGE REPORT from the ER N/A    MEDICATION LIST Internal    IMAGING  (NEED IMAGES AND REPORTS)     KIDNEY CT SCAN N/A    KIDNEY ULTRASOUND N/A    MR ABDOMEN N/A    NUCLEAR MEDICINE RENAL N/A    LABS     CBC Internal 04.22.2021   CMP Internal 04.22.2021   BMP Internal 04.27.2021    UA Internal 07.27.2021   URINE PROTEIN Internal 07.27.2021   RENAL PANEL N/A    BIOPSY     KIDNEY BIOPSY  N/A

## 2021-08-23 ENCOUNTER — LAB (OUTPATIENT)
Dept: LAB | Facility: CLINIC | Age: 86
End: 2021-08-23
Payer: COMMERCIAL

## 2021-08-23 DIAGNOSIS — N18.4 CKD (CHRONIC KIDNEY DISEASE) STAGE 4, GFR 15-29 ML/MIN (H): ICD-10-CM

## 2021-08-23 LAB
ALBUMIN SERPL-MCNC: 3.4 G/DL (ref 3.4–5)
ALBUMIN UR-MCNC: >=300 MG/DL
ANION GAP SERPL CALCULATED.3IONS-SCNC: 3 MMOL/L (ref 3–14)
APPEARANCE UR: CLEAR
BILIRUB UR QL STRIP: NEGATIVE
BUN SERPL-MCNC: 34 MG/DL (ref 7–30)
CALCIUM SERPL-MCNC: 9.1 MG/DL (ref 8.5–10.1)
CHLORIDE BLD-SCNC: 112 MMOL/L (ref 94–109)
CO2 SERPL-SCNC: 26 MMOL/L (ref 20–32)
COLOR UR AUTO: YELLOW
CREAT SERPL-MCNC: 2.22 MG/DL (ref 0.66–1.25)
CREAT UR-MCNC: 100 MG/DL
DEPRECATED CALCIDIOL+CALCIFEROL SERPL-MC: 47 UG/L (ref 20–75)
ERYTHROCYTE [DISTWIDTH] IN BLOOD BY AUTOMATED COUNT: 14.1 % (ref 10–15)
FERRITIN SERPL-MCNC: 524 NG/ML (ref 26–388)
GFR SERPL CREATININE-BSD FRML MDRD: 26 ML/MIN/1.73M2
GLUCOSE BLD-MCNC: 102 MG/DL (ref 70–99)
GLUCOSE UR STRIP-MCNC: NEGATIVE MG/DL
HCT VFR BLD AUTO: 43.5 % (ref 40–53)
HGB BLD-MCNC: 14.6 G/DL (ref 13.3–17.7)
HGB UR QL STRIP: ABNORMAL
IRON SATN MFR SERPL: 40 % (ref 15–46)
IRON SERPL-MCNC: 117 UG/DL (ref 35–180)
KETONES UR STRIP-MCNC: NEGATIVE MG/DL
LEUKOCYTE ESTERASE UR QL STRIP: NEGATIVE
MCH RBC QN AUTO: 32.4 PG (ref 26.5–33)
MCHC RBC AUTO-ENTMCNC: 33.6 G/DL (ref 31.5–36.5)
MCV RBC AUTO: 97 FL (ref 78–100)
NITRATE UR QL: NEGATIVE
PH UR STRIP: 6 [PH] (ref 5–7)
PHOSPHATE SERPL-MCNC: 3.3 MG/DL (ref 2.5–4.5)
PLATELET # BLD AUTO: 231 10E3/UL (ref 150–450)
POTASSIUM BLD-SCNC: 4.4 MMOL/L (ref 3.4–5.3)
PROT UR-MCNC: 2.94 G/L
PROT/CREAT 24H UR: 2.94 G/G CR (ref 0–0.2)
PTH-INTACT SERPL-MCNC: 72 PG/ML (ref 18–80)
RBC # BLD AUTO: 4.5 10E6/UL (ref 4.4–5.9)
RBC #/AREA URNS AUTO: NORMAL /HPF
SODIUM SERPL-SCNC: 141 MMOL/L (ref 133–144)
SP GR UR STRIP: >=1.03 (ref 1–1.03)
TIBC SERPL-MCNC: 296 UG/DL (ref 240–430)
UROBILINOGEN UR STRIP-ACNC: 0.2 E.U./DL
WBC # BLD AUTO: 8.4 10E3/UL (ref 4–11)
WBC #/AREA URNS AUTO: NORMAL /HPF

## 2021-08-23 PROCEDURE — 82306 VITAMIN D 25 HYDROXY: CPT

## 2021-08-23 PROCEDURE — 82728 ASSAY OF FERRITIN: CPT

## 2021-08-23 PROCEDURE — 85027 COMPLETE CBC AUTOMATED: CPT

## 2021-08-23 PROCEDURE — 83970 ASSAY OF PARATHORMONE: CPT

## 2021-08-23 PROCEDURE — 81001 URINALYSIS AUTO W/SCOPE: CPT

## 2021-08-23 PROCEDURE — 84156 ASSAY OF PROTEIN URINE: CPT

## 2021-08-23 PROCEDURE — 80069 RENAL FUNCTION PANEL: CPT

## 2021-08-23 PROCEDURE — 83550 IRON BINDING TEST: CPT

## 2021-08-23 PROCEDURE — 36415 COLL VENOUS BLD VENIPUNCTURE: CPT

## 2021-08-30 ENCOUNTER — PRE VISIT (OUTPATIENT)
Dept: NEPHROLOGY | Facility: CLINIC | Age: 86
End: 2021-08-30

## 2021-08-30 ENCOUNTER — VIRTUAL VISIT (OUTPATIENT)
Dept: NEPHROLOGY | Facility: CLINIC | Age: 86
End: 2021-08-30
Attending: INTERNAL MEDICINE
Payer: COMMERCIAL

## 2021-08-30 DIAGNOSIS — R80.1 PERSISTENT PROTEINURIA: ICD-10-CM

## 2021-08-30 DIAGNOSIS — I10 BENIGN ESSENTIAL HYPERTENSION: Primary | ICD-10-CM

## 2021-08-30 DIAGNOSIS — I10 HYPERTENSION GOAL BP (BLOOD PRESSURE) < 140/90: Primary | ICD-10-CM

## 2021-08-30 DIAGNOSIS — N18.4 CKD (CHRONIC KIDNEY DISEASE) STAGE 4, GFR 15-29 ML/MIN (H): ICD-10-CM

## 2021-08-30 DIAGNOSIS — N28.1 KIDNEY CYSTS: ICD-10-CM

## 2021-08-30 PROCEDURE — 99205 OFFICE O/P NEW HI 60 MIN: CPT | Mod: 95 | Performed by: INTERNAL MEDICINE

## 2021-08-30 RX ORDER — CHLORTHALIDONE 25 MG/1
12.5 TABLET ORAL DAILY
Qty: 15 TABLET | Refills: 3 | Status: SHIPPED | OUTPATIENT
Start: 2021-08-30 | End: 2021-12-31 | Stop reason: ALTCHOICE

## 2021-08-30 RX ORDER — LISINOPRIL 40 MG/1
TABLET ORAL
Qty: 90 TABLET | OUTPATIENT
Start: 2021-08-30

## 2021-08-30 RX ORDER — LISINOPRIL 40 MG/1
40 TABLET ORAL DAILY
Qty: 90 TABLET | Refills: 0 | Status: SHIPPED | OUTPATIENT
Start: 2021-08-30 | End: 2021-10-01

## 2021-08-30 ASSESSMENT — PAIN SCALES - GENERAL: PAINLEVEL: NO PAIN (0)

## 2021-08-30 NOTE — PATIENT INSTRUCTIONS
Thank you for her visit with me today.  As we discussed, would like to control your blood pressure a little better.  The fold would like to start you on medication called Chlorthalidone which is a water pill to take in the morning half a tab per day.  Please measure your blood pressure daily with the goal to keep it lower than 130/80 mmHg.  Please try to limit salt as much as possible.  We recheck your blood test in the next 2 weeks.  Otherwise, we will see you again in the next 4 months.

## 2021-08-30 NOTE — PROGRESS NOTES
Nitish is a 86 year old who is being evaluated via a billable video visit.      How would you like to obtain your AVS? MyChart  If the video visit is dropped, the invitation should be resent by: Text to cell phone: [phone]  Will anyone else be joining your video visit? No       Video Start Time: 9.08  Video-Visit Details    Type of service:  Video Visit    Video End Time:9.38    Originating Location (pt. Location): Home    Distant Location (provider location):  [facility] [place]     Platform used for Video Visit: [employer]

## 2021-08-30 NOTE — PROGRESS NOTES
Nephrology Initial Consult  August 30, 2021      Nitish Coreas MRN:[id number] YOB: 1934  Primary care provider: Remi Peterson  Requesting physician: Gustavo Cadet MD    REASON FOR CONSULT: Chronic kidney disease stage IV    HISTORY OF PRESENT ILLNESS:  Admitting provider and nursing notes reviewed  Nitish Coreas is a 86 year old with history of COPD, choledocholithiasis s/p ERCP, sphincterotomy and CBD stent placed on 4/21, chronic hypertension, hyperlipidemia, chronic back pain, ascending aortic and infrarenal abdominal aortic dilatation and aneurysm    The patient was admitted in Apri 2021due to cholelithiasis with obstructive jaundice.  He underwent ERCP, EUS, pus draining from gallbladder, sphincterotomy and CBD stent placed.  Subsequently, gallbladder was removed.  He also received IV antibiotics with ciprofloxacin Flagyl and IV fluid.  He was also noted to have COPD exacerbation and required oxygen supplement in adjunct to home oxygen therapy.  He was also noted to have acute kidney injury with creatinine increased to 2.92 on the day of dismissal.    Upon chart review, the patient was noted to have creatinine up 1.3-1.5 since 0366-3803.  Since 2015, his creatinine has gradually increased to between 1.5-1.7.  In 2019, his creatinine has increased 2.03.  He has an ADEEL when he was admitted as mentioned above with creatinine peak 2.98.  Creatinine has been down to 2.11 on 4/27/2021 and since then has fluctuating between 2.1-2.2.  Patient noted to have urine protein since 2015.  His urine albumin creatinine ratio was 425 mg/g and increased to 2838 mg/g on 7/21.     His UA is essentially no pyuria or red blood cells since 2015.  CT abdomen pelvis and chest in 4/21 showed few bilateral renal cysts with the largest upper pole of the left knee medially measuring 4.9 cm.  The kidneys have otherwise unremarkable noncontrast appearance cyst.  No stone or hydronephrosis.  The patient also  has mild to moderate prostatic enlargement.  No mention about his bladder.  I measure his kidneys by myself on the CT of 4/21 which showed small kidney both size, right 8.4 cm and right 9.1 cm.    His parathyroid hormone is normal at 72 on 8/21 with normal vitamin D.  His hemoglobin is normal at 14.6.     Today, the patient is attending a video visit with his wife.  He could not use [employer] because his computer does not have a camera so we did [employer].  The patient reports that he he was told that he has kidney problem since he was admitted in April 2021.  At that time, he was quite sick and he could not eat and drink for 7 days.  Patient is covering well from the procedure.  And a CBD stent was recently removed 3 weeks ago.  Regarding symptoms, the patient denies any chest pain or short of breath.  The patient has extremity edema, left worsening than right that usually occur again today.  He noticed some foamy urine especially at nighttime.  Patient does not have any difficulty urination.  The patient has no family history of chronic kidney disease.  His blood pressure ranging between 130-138/78 to 82 mmHg.  He is only taking lisinopril 40 mg daily.  The patient has been diagnosed with high blood for more than 10 years and is on 20 mg of lisinopril but was increased to 40 g about 3 years ago.  The patient intermittently take NSAIDs due to lower back pain.  He does not limit salt in his diet but he tried low-salt condiment.  The patient used to weigh 222 pounds but since his admission, his weight down to 185 and he is trying to maintain his weight at 185.  The patient does not have any signs or symptoms of TONY.    We briefly discussed about dialysis for which he knows someone who is on dialysis.  He would not like to dialysis unless he really needs to.  The patient is retired .  He is  and currently living with his wife.  PAST MEDICAL HISTORY:  Reviewed with patient on 08/30/2021     Past Medical  History:   Diagnosis Date     Hyperlipidemia LDL goal < 100      Hypertension        Past Surgical History:   Procedure Laterality Date     C REPLANTATION THUMB DISTAL,COMPLETE  1962    cut off right thumb and repair     C TOTAL KNEE ARTHROPLASTY  2011, 2000    left then right     DENTAL SURGERY  1958    wisdom teeth     ENDOSCOPIC RETROGRADE CHOLANGIOPANCREATOGRAM N/A 4/19/2021    Procedure: ENDOSCOPIC RETROGRADE CHOLANGIOPANCREATOGRAPHY, WITH CALCULUS REMOVAL USING BALLOON  AND CATHETER WITH STENT PLACEMENT;  Surgeon: Tres Milan MD;  Location:  OR     ENDOSCOPIC ULTRASOUND UPPER GASTROINTESTINAL TRACT (GI) N/A 4/19/2021    Procedure: ENDOSCOPIC ULTRASOUND, ESOPHAGOSCOPY / UPPER GASTROINTESTINAL TRACT (GI);  Surgeon: Tres Milan MD;  Location:  OR     EYE SURGERY Right 2004 and 2005    macular pucker and cataract     LAPAROSCOPIC CHOLECYSTECTOMY N/A 4/20/2021    Procedure: CHOLECYSTECTOMY, LAPAROSCOPIC;  Surgeon: Callie Inman MD;  Location:  OR     TONSILLECTOMY & ADENOIDECTOMY  1949        MEDICATIONS:  PTA Meds  Prior to Admission medications    Medication Sig Last Dose Taking? Auth Provider   Boswellia-Glucosamine-Vit D (OSTEO BI-FLEX ONE PER DAY PO) Take 1 capsule by mouth 2 times daily  Taking Yes Unknown, Entered By History   cetirizine (ZYRTEC) 10 MG tablet Take 10 mg by mouth daily Taking Yes Unknown, Entered By History   Cholecalciferol (VITAMIN D) 2000 UNITS CAPS Take 2 tablets by mouth daily  Taking Yes Reported, Patient   gemfibrozil (LOPID) 600 MG tablet Take 1 tablet (600 mg) by mouth 2 times daily Taking Yes Remi Peterson MD   Krill Oil 500 MG CAPS Take 1 capsule by mouth daily Taking Yes Unknown, Entered By History   lisinopril (ZESTRIL) 40 MG tablet Take 1 tablet (40 mg) by mouth daily Taking Yes Lori Lyons MD   lovastatin (MEVACOR) 40 MG tablet Take 80 mg by mouth At Bedtime Taking Yes Unknown, Entered By History   Multiple Vitamins-Minerals  (MULTIVITAL) TABS Take 1 tablet by mouth daily. Taking Yes Reported, Patient   omeprazole (PRILOSEC) 20 MG DR capsule Take 20 mg by mouth daily Taking Yes Reported, Patient   acetaminophen (TYLENOL) 325 MG tablet Take 2 tablets (650 mg) by mouth every 4 hours as needed for fever (only for Temp 101 or greater)  Patient not taking: Reported on 2021 Not Taking  Lori Lyons MD   ciprofloxacin (CIPRO) 500 MG tablet Take 1 tablet (500 mg) by mouth daily  Patient not taking: Reported on 2021 Not Taking  Lori Lyons MD   ipratropium - albuterol 0.5 mg/2.5 mg/3 mL (DUONEB) 0.5-2.5 (3) MG/3ML neb solution Take 1 vial (3 mLs) by nebulization 4 times daily for 7 days   Lori Lyons MD   senna-docusate (SENOKOT-S/PERICOLACE) 8.6-50 MG tablet Take 1 tablet by mouth 2 times daily as needed for constipation  Patient not taking: Reported on 2021 Not Taking  Lori Lyons MD        ALLERGIES:    Allergies   Allergen Reactions     Penicillins Anaphylaxis     Amoxicillin      Aspirin      bleeding     Benadryl [Anti-Itch]      Doesn't help in allergic reaction. Benadryl cream causes worsening rash        Ibuprofen Sodium      Esophageal bleeding     Morphine Sulfate        REVIEW OF SYSTEMS:  A comprehensive of systems was negative except as noted above.    SOCIAL HISTORY:   Social History     Socioeconomic History     Marital status:      Spouse name: Not on file     Number of children: 3     Years of education: Not on file     Highest education level: Not on file   Occupational History     Not on file   Tobacco Use     Smoking status: Former Smoker     Packs/day: 4.00     Years: 45.00     Pack years: 180.00     Types: Cigarettes     Quit date: 1991     Years since quittin.3     Smokeless tobacco: Never Used   Substance and Sexual Activity     Alcohol use: Yes     Alcohol/week: 0.0 standard drinks     Comment: 1-2 twice per week     Drug use: No     Sexual activity: Not Currently      Partners: Female   Other Topics Concern     Parent/sibling w/ CABG, MI or angioplasty before 65F 55M? 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     Weight Concern No     Special Diet No     Back Care Not Asked     Exercise Yes     Comment: walking every day     Bike Helmet Not Asked     Seat Belt Yes     Self-Exams Not Asked   Social History Narrative     Not on file     Social Determinants of Health     Financial Resource Strain:      Difficulty of Paying Living Expenses:    Food Insecurity:      Worried About Running Out of Food in the Last Year:      Ran Out of Food in the Last Year:    Transportation Needs:      Lack of Transportation (Medical):      Lack of Transportation (Non-Medical):    Physical Activity:      Days of Exercise per Week:      Minutes of Exercise per Session:    Stress:      Feeling of Stress :    Social Connections:      Frequency of Communication with Friends and Family:      Frequency of Social Gatherings with Friends and Family:      Attends Syn[employer] Services:      Active Member of Clubs or Organizations:      Attends Club or Organization Meetings:      Marital Status:    Intimate Partner Violence:      Fear of Current or Ex-Partner:      Emotionally Abused:      Physically Abused:      Sexually Abused:      Reviewed with patient.    FAMILY MEDICAL HISTORY:   Family History   Problem Relation Age of Onset     Cancer Mother      Respiratory Mother      Heart Disease Father      Cerebrovascular Disease Father      Cerebrovascular Disease Sister         CVA x 2     Dementia Sister      Heart Disease Son      Breast Cancer Daughter      Reviewed with patient     PHYSICAL EXAM:      There were no vitals taken for this visit.        LABS:    CMP@LABRCNTIPR(na:4,potassium:4,chloride:4,co2:4,aniongap:4,g,bun:4,cr:4,gfrestimated:4,gfrestblack:4,sabrina:4,ma,phos:4,prottotal:4,albumin:4,bilitotal:4,alkphos:4,ast:4,alt:4)@  CBC@LABRCNTIPR(hgb:4,wbc:4,rbc:4,hct:4,mcv:4,mch:4,mchc:4,rdw:4,plt:4)@  INR@LABRCNTIPR(inr:4,ptt:4)@  ABG@LABRCNTIPR(ph:4,pco2:4,po2:4,hco3:4,baseexcess:4,o2per:4)@   URINE STUDIES  Recent Labs   Lab Test 21  0829 21  1540 21  1125 20  0755 10/07/19  0915   COLOR Yellow Yellow Dark Yellow Yellow Yellow   APPEARANCE Clear Clear Clear Clear Clear   URINEGLC Negative Negative 200* Negative Negative   URINEBILI Negative Negative Moderate* Negative Negative   URINEKETONE Negative Negative Negative Negative Negative   SG >=1.030 1.025 1.025 >1.030 1.025   UBLD Trace* Trace* Moderate* Trace* Negative   URINEPH 6.0 6.0 6.5 6.0 5.5   PROTEIN >=300* >=300* 600* >=300* >=300*   UROBILINOGEN 0.2 0.2  --  0.2 0.2   NITRITE Negative Negative Negative Negative Negative   LEUKEST Negative Negative Negative Negative Negative   RBCU 0-2 0-2 3* O - 2 O - 2   WBCU 0-5 0-5 2 0 - 5 0 - 5     Recent Labs   Lab Test 21  1325   UTPG 2.94*     PTH  Recent Labs   Lab Test 21  0827   PTHI 72     IRON STUDIES  Recent Labs   Lab Test 21  0827   IRON 117      IRONSAT 40   MARIKA 524*       IMAGING:  I reviewed CT abdomen and chest without contrast in 2021.  I personally measured his kidneys size for both kidneys.     ASSESSMENT AND RECOMMENDATIONS:   # Chronic kidney disease stage IV secondary to chronic hypertension, prior NSAID use and recent acute kidney injury; baseline creatinine 2.1-2.2  #Nonnephrotic range proteinuria  #Hypertension, not controlled  #Intermittent lower extremity edema  His chronic kidney disease is likely multifactorial in the setting of chronic hypertension, prior NSAID use and recent cute kidney injury.  His new baseline creatinine is now ranging between 2.1-2.2.  The patient does not  seem to have any other symptoms or electrolyte abnormalities noted to his CKD.  Nonetheless, his proteinuria has been progressively worse and UPCR is 2.9 g/g.  The patient does not have any nephrotic syndrome given no hypoalbuminemia nor persistent edema.  I have discussed with him about his chronic kidney disease, how to maintain his kidney function and delay progression as long as possible.  I shared with him my concern with his degree of proteinuria.  Therefore I would like to have a better control his pressure in order to improve his proteinuria.  Currently, the patient is on maximum dose of lisinopril at 40 mg daily.  Given the fact that the patient has intermittent lower extremity edema, I think patient would be benefit from low-dose diuretics.  Therefore I would like to start him on low-dose Critelli rivera 12.5 mg daily.  The patient measures blood pressure every day to make sure is not too low or too high.  We will check his BMP next 2 weeks.  I also briefly discussed with him about possibility of dialysis in the future if his kidney function continue to progress.  The patient expressed the wishes to start dialysis if it is necessary but he would like to delay it as long as possible.  I would stay with him more about the choice of renal replacement in the next visit.  #Bilateral renal cysts  The patient has bilateral renal cyst but his kidney size are small.  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  I have low suspicion for acute disease in this patient.  We will check bilateral renal ultrasound annual, next due in April 2022.  # MBD  PTH 72, vitamin D 47.  The patient is vitamin D supplement.  Calcium and phosphorus are within normal limit.  # Surveillance for anemia in CKD  He is hemoglobin is 14 which is excellent.  We will continue to monitor his hemoglobin every visit.    Follow-up in 4 monhts  I spent a total of 75 minutes in patient care on the date of the  encounter doing chart review, history and exam, documentation and further activities as noted above. 30 minutes of this visit is dedicated to direct patient interaction via video.    Gustavo Cadet MD on 8/30/2021 at 8:45 AM

## 2021-08-30 NOTE — LETTER
8/30/2021       RE: Nitish Coreas  [address]  [facility] [address]     Dear Colleague,    Thank you for referring your patient, Nitish Coreas, to the [facility] [place] at [facility]. Please see a copy of my visit note below.    Nitish is a 86 year old who is being evaluated via a billable video visit.      How would you like to obtain your AVS? MyChart  If the video visit is dropped, the invitation should be resent by: Text to cell phone: [phone]  Will anyone else be joining your video visit? No       Video Start Time: 9.08  Video-Visit Details    Type of service:  Video Visit    Video End Time:9.38    Originating Location (pt. Location): Home    Distant Location (provider location):  [facility] [place]     Platform used for Video Visit: [email or web address]        Nephrology Initial Consult  August 30, 2021      Nitish Coreas MRN:[id number] YOB: 1934  Primary care provider: Remi Peterson  Requesting physician: Gustavo Cadet MD    REASON FOR CONSULT: Chronic kidney disease stage IV    HISTORY OF PRESENT ILLNESS:  Admitting provider and nursing notes reviewed  Nitish Coreas is a 86 year old with history of COPD, choledocholithiasis s/p ERCP, sphincterotomy and CBD stent placed on 4/21, chronic hypertension, hyperlipidemia, chronic back pain, ascending aortic and infrarenal abdominal aortic dilatation and aneurysm    The patient was admitted in Apri 2021due to cholelithiasis with obstructive jaundice.  He underwent ERCP, EUS, pus draining from gallbladder, sphincterotomy and CBD stent placed.  Subsequently, gallbladder was removed.  He also received IV antibiotics with ciprofloxacin Flagyl and IV fluid.  He was also noted to have COPD exacerbation and required oxygen supplement in adjunct to home oxygen therapy.  He was also noted to have acute kidney injury with creatinine  increased to 2.92 on the day of dismissal.    Upon chart review, the patient was noted to have creatinine up 1.3-1.5 since [id number].  Since 2015, his creatinine has gradually increased to between 1.5-1.7.  In 2019, his creatinine has increased 2.03.  He has an ADEEL when he was admitted as mentioned above with creatinine peak 2.98.  Creatinine has been down to 2.11 on 4/27/2021 and since then has fluctuating between 2.1-2.2.  Patient noted to have urine protein since 2015.  His urine albumin creatinine ratio was 425 mg/g and increased to 2838 mg/g on 7/21.     His UA is essentially no pyuria or red blood cells since 2015.  CT abdomen pelvis and chest in 4/21 showed few bilateral renal cysts with the largest upper pole of the left knee medially measuring 4.9 cm.  The kidneys have otherwise unremarkable noncontrast appearance cyst.  No stone or hydronephrosis.  The patient also has mild to moderate prostatic enlargement.  No mention about his bladder.  I measure his kidneys by myself on the CT of 4/21 which showed small kidney both size, right 8.4 cm and right 9.1 cm.    His parathyroid hormone is normal at 72 on 8/21 with normal vitamin D.  His hemoglobin is normal at 14.6.     Today, the patient is attending a video visit with his wife.  He could not use Dimmi because his computer does not have a camera so we did Doximity.  The patient reports that he he was told that he has kidney problem since he was admitted in April 2021.  At that time, he was quite sick and he could not eat and drink for 7 days.  Patient is covering well from the procedure.  And a CBD stent was recently removed 3 weeks ago.  Regarding symptoms, the patient denies any chest pain or short of breath.  The patient has extremity edema, left worsening than right that usually occur again today.  He noticed some foamy urine especially at nighttime.  Patient does not have any difficulty urination.  The patient has no family history of chronic kidney  disease.  His blood pressure ranging between 130-138/78 to 82 mmHg.  He is only taking lisinopril 40 mg daily.  The patient has been diagnosed with high blood for more than 10 years and is on 20 mg of lisinopril but was increased to 40 g about 3 years ago.  The patient intermittently take NSAIDs due to lower back pain.  He does not limit salt in his diet but he tried low-salt condiment.  The patient used to weigh 222 pounds but since his admission, his weight down to 185 and he is trying to maintain his weight at 185.  The patient does not have any signs or symptoms of TONY.    We briefly discussed about dialysis for which he knows someone who is on dialysis.  He would not like to dialysis unless he really needs to.  The patient is retired .  He is  and currently living with his wife.  PAST MEDICAL HISTORY:  Reviewed with patient on 08/30/2021     Past Medical History:   Diagnosis Date     Hyperlipidemia LDL goal < 100      Hypertension        Past Surgical History:   Procedure Laterality Date     C REPLANTATION THUMB DISTAL,COMPLETE  1962    cut off right thumb and repair     C TOTAL KNEE ARTHROPLASTY  2011, 2000    left then right     DENTAL SURGERY  1958    wisdom teeth     ENDOSCOPIC RETROGRADE CHOLANGIOPANCREATOGRAM N/A 4/19/2021    Procedure: ENDOSCOPIC RETROGRADE CHOLANGIOPANCREATOGRAPHY, WITH CALCULUS REMOVAL USING BALLOON  AND CATHETER WITH STENT PLACEMENT;  Surgeon: Tres Milan MD;  Location:  OR     ENDOSCOPIC ULTRASOUND UPPER GASTROINTESTINAL TRACT (GI) N/A 4/19/2021    Procedure: ENDOSCOPIC ULTRASOUND, ESOPHAGOSCOPY / UPPER GASTROINTESTINAL TRACT (GI);  Surgeon: Tres Milan MD;  Location:  OR     EYE SURGERY Right 2004 and 2005    macular pucker and cataract     LAPAROSCOPIC CHOLECYSTECTOMY N/A 4/20/2021    Procedure: CHOLECYSTECTOMY, LAPAROSCOPIC;  Surgeon: Callie Inman MD;  Location:  OR     TONSILLECTOMY & ADENOIDECTOMY  1949        MEDICATIONS:  PTA  Meds  Prior to Admission medications    Medication Sig Last Dose Taking? Auth Provider   Boswellia-Glucosamine-Vit D (OSTEO BI-FLEX ONE PER DAY PO) Take 1 capsule by mouth 2 times daily  Taking Yes Unknown, Entered By History   cetirizine (ZYRTEC) 10 MG tablet Take 10 mg by mouth daily Taking Yes Unknown, Entered By History   Cholecalciferol (VITAMIN D) 2000 UNITS CAPS Take 2 tablets by mouth daily  Taking Yes Reported, Patient   gemfibrozil (LOPID) 600 MG tablet Take 1 tablet (600 mg) by mouth 2 times daily Taking Yes Remi Peterson MD   Krill Oil 500 MG CAPS Take 1 capsule by mouth daily Taking Yes Unknown, Entered By History   lisinopril (ZESTRIL) 40 MG tablet Take 1 tablet (40 mg) by mouth daily Taking Yes Lori Lyons MD   lovastatin (MEVACOR) 40 MG tablet Take 80 mg by mouth At Bedtime Taking Yes Unknown, Entered By History   Multiple Vitamins-Minerals (MULTIVITAL) TABS Take 1 tablet by mouth daily. Taking Yes Reported, Patient   omeprazole (PRILOSEC) 20 MG DR capsule Take 20 mg by mouth daily Taking Yes Reported, Patient   acetaminophen (TYLENOL) 325 MG tablet Take 2 tablets (650 mg) by mouth every 4 hours as needed for fever (only for Temp 101 or greater)  Patient not taking: Reported on 8/30/2021 Not Taking  Lori Lyons MD   ciprofloxacin (CIPRO) 500 MG tablet Take 1 tablet (500 mg) by mouth daily  Patient not taking: Reported on 8/30/2021 Not Taking  Lori Lyons MD   ipratropium - albuterol 0.5 mg/2.5 mg/3 mL (DUONEB) 0.5-2.5 (3) MG/3ML neb solution Take 1 vial (3 mLs) by nebulization 4 times daily for 7 days   Lori Lyons MD   senna-docusate (SENOKOT-S/PERICOLACE) 8.6-50 MG tablet Take 1 tablet by mouth 2 times daily as needed for constipation  Patient not taking: Reported on 8/30/2021 Not Taking  Lori Lyons MD        ALLERGIES:    Allergies   Allergen Reactions     Penicillins Anaphylaxis     Amoxicillin      Aspirin      bleeding     Benadryl [Anti-Itch]      Doesn't  help in allergic reaction. Benadryl cream causes worsening rash        Ibuprofen Sodium      Esophageal bleeding     Morphine Sulfate        REVIEW OF SYSTEMS:  A comprehensive of systems was negative except as noted above.    SOCIAL HISTORY:   Social History     Socioeconomic History     Marital status:      Spouse name: Not on file     Number of children: 3     Years of education: Not on file     Highest education level: Not on file   Occupational History     Not on file   Tobacco Use     Smoking status: Former Smoker     Packs/day: 4.00     Years: 45.00     Pack years: 180.00     Types: Cigarettes     Quit date: 1991     Years since quittin.3     Smokeless tobacco: Never Used   Substance and Sexual Activity     Alcohol use: Yes     Alcohol/week: 0.0 standard drinks     Comment: 1-2 twice per week     Drug use: No     Sexual activity: Not Currently     Partners: Female   Other Topics Concern     Parent/sibling w/ CABG, MI or angioplasty before 65F 55M? 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     Weight Concern No     Special Diet No     Back Care Not Asked     Exercise Yes     Comment: walking every day     Bike Helmet Not Asked     Seat Belt Yes     Self-Exams Not Asked   Social History Narrative     Not on file     Social Determinants of Health     Financial Resource Strain:      Difficulty of Paying Living Expenses:    Food Insecurity:      Worried About Running Out of Food in the Last Year:      Ran Out of Food in the Last Year:    Transportation Needs:      Lack of Transportation (Medical):      Lack of Transportation (Non-Medical):    Physical Activity:      Days of Exercise per Week:      Minutes of Exercise per Session:    Stress:      Feeling of Stress :    Social  Connections:      Frequency of Communication with Friends and Family:      Frequency of Social Gatherings with Friends and Family:      Attends Druze Services:      Active Member of Clubs or Organizations:      Attends Club or Organization Meetings:      Marital Status:    Intimate Partner Violence:      Fear of Current or Ex-Partner:      Emotionally Abused:      Physically Abused:      Sexually Abused:      Reviewed with patient.    FAMILY MEDICAL HISTORY:   Family History   Problem Relation Age of Onset     Cancer Mother      Respiratory Mother      Heart Disease Father      Cerebrovascular Disease Father      Cerebrovascular Disease Sister         CVA x 2     Dementia Sister      Heart Disease Son      Breast Cancer Daughter      Reviewed with patient     PHYSICAL EXAM:      There were no vitals taken for this visit.        LABS:   CMP@LABRCNTIPR(na:4,potassium:4,chloride:4,co2:4,aniongap:4,g,bun:4,cr:4,gfrestimated:4,gfrestblack:4,sabrina:4,ma,phos:4,prottotal:4,albumin:4,bilitotal:4,alkphos:4,ast:4,alt:4)@  CBC@LABRCNTIPR(hgb:4,wbc:4,rbc:4,hct:4,mcv:4,mch:4,mchc:4,rdw:4,plt:4)@  INR@LABRCNTIPR(inr:4,ptt:4)@  ABG@LABRCNTIPR(ph:4,pco2:4,po2:4,hco3:4,baseexcess:4,o2per:4)@   URINE STUDIES  Recent Labs   Lab Test 21  0829 21  1540 21  1125 20  0755 10/07/19  0915   COLOR Yellow Yellow Dark Yellow Yellow Yellow   APPEARANCE Clear Clear Clear Clear Clear   URINEGLC Negative Negative 200* Negative Negative   URINEBILI Negative Negative Moderate* Negative Negative   URINEKETONE Negative Negative Negative Negative Negative   SG >=1.030 1.025 1.025 >1.030 1.025   UBLD Trace* Trace* Moderate* Trace* Negative   URINEPH 6.0 6.0 6.5 6.0 5.5   PROTEIN >=300* >=300* 600* >=300* >=300*   UROBILINOGEN 0.2 0.2  --  0.2 0.2   NITRITE Negative Negative Negative Negative Negative   LEUKEST Negative Negative Negative Negative Negative   RBCU 0-2 0-2 3* O - 2 O - 2   WBCU 0-5 0-5 2 0 - 5 0 - 5      Recent Labs   Lab Test 08/23/21  1325   UTPG 2.94*     PTH  Recent Labs   Lab Test 08/23/21  0827   PTHI 72     IRON STUDIES  Recent Labs   Lab Test 08/23/21  0827   IRON 117      IRONSAT 40   MARIKA 524*       IMAGING:  I reviewed CT abdomen and chest without contrast in April 2021.  I personally measured his kidneys size for both kidneys.     ASSESSMENT AND RECOMMENDATIONS:   # Chronic kidney disease stage IV secondary to chronic hypertension, prior NSAID use and recent acute kidney injury; baseline creatinine 2.1-2.2  #Nonnephrotic range proteinuria  #Hypertension, not controlled  #Intermittent lower extremity edema  His chronic kidney disease is likely multifactorial in the setting of chronic hypertension, prior NSAID use and recent cute kidney injury.  His new baseline creatinine is now ranging between 2.1-2.2.  The patient does not seem to have any other symptoms or electrolyte abnormalities noted to his CKD.  Nonetheless, his proteinuria has been progressively worse and UPCR is 2.9 g/g.  The patient does not have any nephrotic syndrome given no hypoalbuminemia nor persistent edema.  I have discussed with him about his chronic kidney disease, how to maintain his kidney function and delay progression as long as possible.  I shared with him my concern with his degree of proteinuria.  Therefore I would like to have a better control his pressure in order to improve his proteinuria.  Currently, the patient is on maximum dose of lisinopril at 40 mg daily.  Given the fact that the patient has intermittent lower extremity edema, I think patient would be benefit from low-dose diuretics.  Therefore I would like to start him on low-dose Critelli rivera 12.5 mg daily.  The patient measures blood pressure every day to make sure is not too low or too high.  We will check his BMP next 2 weeks.  I also briefly discussed with him about possibility of dialysis in the future if his kidney function continue to progress.   The patient expressed the wishes to start dialysis if it is necessary but he would like to delay it as long as possible.  I would stay with him more about the choice of renal replacement in the next visit.  #Bilateral renal cysts  The patient has bilateral renal cyst but his kidney size are small.  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  I have low suspicion for acute disease in this patient.  We will check bilateral renal ultrasound annual, next due in April 2022.  # MBD  PTH 72, vitamin D 47.  The patient is vitamin D supplement.  Calcium and phosphorus are within normal limit.  # Surveillance for anemia in CKD  He is hemoglobin is 14 which is excellent.  We will continue to monitor his hemoglobin every visit.    Follow-up in 4 monhts  I spent a total of 75 minutes in patient care on the date of the encounter doing chart review, history and exam, documentation and further activities as noted above. 30 minutes of this visit is dedicated to direct patient interaction via video.    Gustavo Cadet MD on 8/30/2021 at 8:45 AM              Again, thank you for allowing me to participate in the care of your patient.      Sincerely,    Gustavo Cadet MD

## 2021-09-01 ENCOUNTER — TELEPHONE (OUTPATIENT)
Dept: NEPHROLOGY | Facility: CLINIC | Age: 86
End: 2021-09-01

## 2021-09-16 ENCOUNTER — LAB (OUTPATIENT)
Dept: LAB | Facility: CLINIC | Age: 86
End: 2021-09-16
Payer: COMMERCIAL

## 2021-09-16 DIAGNOSIS — N18.4 CKD (CHRONIC KIDNEY DISEASE) STAGE 4, GFR 15-29 ML/MIN (H): ICD-10-CM

## 2021-09-16 DIAGNOSIS — I10 BENIGN ESSENTIAL HYPERTENSION: ICD-10-CM

## 2021-09-16 LAB
ANION GAP SERPL CALCULATED.3IONS-SCNC: 6 MMOL/L (ref 3–14)
BUN SERPL-MCNC: 45 MG/DL (ref 7–30)
CALCIUM SERPL-MCNC: 8.9 MG/DL (ref 8.5–10.1)
CHLORIDE BLD-SCNC: 110 MMOL/L (ref 94–109)
CO2 SERPL-SCNC: 23 MMOL/L (ref 20–32)
CREAT SERPL-MCNC: 2.56 MG/DL (ref 0.66–1.25)
GFR SERPL CREATININE-BSD FRML MDRD: 22 ML/MIN/1.73M2
GLUCOSE BLD-MCNC: 114 MG/DL (ref 70–99)
POTASSIUM BLD-SCNC: 4 MMOL/L (ref 3.4–5.3)
SODIUM SERPL-SCNC: 139 MMOL/L (ref 133–144)

## 2021-09-16 PROCEDURE — 80048 BASIC METABOLIC PNL TOTAL CA: CPT

## 2021-09-16 PROCEDURE — 36415 COLL VENOUS BLD VENIPUNCTURE: CPT

## 2021-09-19 ENCOUNTER — HEALTH MAINTENANCE LETTER (OUTPATIENT)
Age: 86
End: 2021-09-19

## 2021-09-21 ENCOUNTER — TELEPHONE (OUTPATIENT)
Dept: NEPHROLOGY | Facility: CLINIC | Age: 86
End: 2021-09-21

## 2021-09-21 DIAGNOSIS — N18.4 CKD (CHRONIC KIDNEY DISEASE) STAGE 4, GFR 15-29 ML/MIN (H): Primary | ICD-10-CM

## 2021-09-21 NOTE — TELEPHONE ENCOUNTER
I called the patient to discuss the blood test result. His Cr is 2.56 from baseline of 2.2-2.3. His BP has improved. His swelling is the same. His blood pressure is better but none of the reading was below 90/60. The lowest one was 113. He has no orthostatic. I will see him again in the next 4 months.

## 2021-10-01 DIAGNOSIS — I10 HYPERTENSION GOAL BP (BLOOD PRESSURE) < 140/90: ICD-10-CM

## 2021-10-01 RX ORDER — LISINOPRIL 40 MG/1
40 TABLET ORAL DAILY
Qty: 90 TABLET | Refills: 0 | Status: SHIPPED | OUTPATIENT
Start: 2021-10-01 | End: 2021-12-14

## 2021-12-07 ENCOUNTER — TRANSFERRED RECORDS (OUTPATIENT)
Dept: HEALTH INFORMATION MANAGEMENT | Facility: CLINIC | Age: 86
End: 2021-12-07
Payer: COMMERCIAL

## 2021-12-13 DIAGNOSIS — N18.4 CKD (CHRONIC KIDNEY DISEASE) STAGE 4, GFR 15-29 ML/MIN (H): ICD-10-CM

## 2021-12-13 DIAGNOSIS — I10 BENIGN ESSENTIAL HYPERTENSION: ICD-10-CM

## 2021-12-13 DIAGNOSIS — R80.1 PERSISTENT PROTEINURIA: ICD-10-CM

## 2021-12-13 NOTE — TELEPHONE ENCOUNTER
[facility] Call Center    Phone Message    May a detailed message be left on voicemail: yes     Reason for Call: Medication Refill Request    Has the patient contacted the pharmacy for the refill? Yes   Name of medication being requested: chlorthalidone (HYGROTON) 25 MG tablet  Provider who prescribed the medication: Dr. Cadet  Pharmacy: [facility] [phone]  Date medication is needed: 12/13/21     Nitish stated he would like the medication dosage changed to 12.5mg as he stated that is what he is supposed to be taking and is having to cut the 25mg in half and destroying the whole pill. Please call Nitish back to confirm.      Action Taken: Message routed to:  [facility]    Travel Screening: Not Applicable

## 2021-12-14 DIAGNOSIS — I10 HYPERTENSION GOAL BP (BLOOD PRESSURE) < 140/90: ICD-10-CM

## 2021-12-14 RX ORDER — LISINOPRIL 40 MG/1
40 TABLET ORAL DAILY
Qty: 90 TABLET | Refills: 0 | Status: SHIPPED | OUTPATIENT
Start: 2021-12-14 | End: 2022-03-14

## 2021-12-28 NOTE — TELEPHONE ENCOUNTER
Pt is calling again about getting his refill, he would like an rx for 12.5 tablets, he is unable to cut the 25 mg tablets in half because it just turns to powder, please call pt asap as he has been waiting since 12/13/2021, thank you

## 2021-12-31 ENCOUNTER — TELEPHONE (OUTPATIENT)
Dept: NEPHROLOGY | Facility: CLINIC | Age: 86
End: 2021-12-31
Payer: COMMERCIAL

## 2021-12-31 DIAGNOSIS — N18.4 CKD (CHRONIC KIDNEY DISEASE) STAGE 4, GFR 15-29 ML/MIN (H): ICD-10-CM

## 2021-12-31 DIAGNOSIS — R80.1 PERSISTENT PROTEINURIA: ICD-10-CM

## 2021-12-31 DIAGNOSIS — I10 BENIGN ESSENTIAL HYPERTENSION: ICD-10-CM

## 2021-12-31 RX ORDER — HYDROCHLOROTHIAZIDE 12.5 MG/1
12.5 TABLET ORAL DAILY
Qty: 90 TABLET | Refills: 3 | Status: SHIPPED | OUTPATIENT
Start: 2021-12-31 | End: 2022-10-18

## 2021-12-31 NOTE — TELEPHONE ENCOUNTER
[facility] Call Center    Phone Message    May a detailed message be left on voicemail: yes     Reason for Call: Other: Nitish is calling again on this medication refill request with other calls coming from 12/13 and 12/28 with no call backs. He was looking to get his chlorthalidone (HYGROTON) 25 MG tablet prescription for a smaller dosage as he has to cut them and destroys most of them that way. He states that he has been out of this medication for 2 weeks now and needs someone to call him back. Please call Nitish back to discuss, thank you.     Action Taken: Message routed to:  [facility]    Travel Screening: Not Applicable

## 2021-12-31 NOTE — TELEPHONE ENCOUNTER
Call to patient, he reports that he has not been able to cut the chlorthalidone in half successfully as it crumbles. He reports that he has been out for a couple weeks now -120/60-70. He also reports that he has gained almost 2 lbs. Patient agreeable to switch to hydrochlorothiazide per Dr. Cadet below but did not want this to go to local pharmacy and preferred mail order. Sent in to preferred pharmacy. Encouraged him to follow up with his BP and weight and to call the clinic with any questions or concerns.  Nury Akhtar, LIANA  Nephrology  [phone]      Fredi Arrington,     If that the case, we can switch to hydrochlorothiazide. I think this comes with 12.5 mg tablet. Can you check. If it is, you can send a script to me for cosign?     Thanks,   NK

## 2022-01-07 ENCOUNTER — LAB (OUTPATIENT)
Dept: LAB | Facility: CLINIC | Age: 87
End: 2022-01-07
Payer: COMMERCIAL

## 2022-01-07 DIAGNOSIS — Z12.5 ENCOUNTER FOR SCREENING FOR MALIGNANT NEOPLASM OF PROSTATE: ICD-10-CM

## 2022-01-07 DIAGNOSIS — N18.4 CKD (CHRONIC KIDNEY DISEASE) STAGE 4, GFR 15-29 ML/MIN (H): ICD-10-CM

## 2022-01-07 DIAGNOSIS — Z13.220 SCREENING FOR HYPERLIPIDEMIA: ICD-10-CM

## 2022-01-07 DIAGNOSIS — E78.5 HYPERLIPIDEMIA LDL GOAL <100: ICD-10-CM

## 2022-01-07 DIAGNOSIS — N18.4 CHRONIC KIDNEY DISEASE, STAGE 4 (SEVERE) (H): ICD-10-CM

## 2022-01-07 LAB
ALBUMIN SERPL-MCNC: 3.7 G/DL (ref 3.4–5)
ALBUMIN UR-MCNC: 100 MG/DL
ANION GAP SERPL CALCULATED.3IONS-SCNC: 9 MMOL/L (ref 3–14)
APPEARANCE UR: CLEAR
BASOPHILS # BLD AUTO: 0 10E3/UL (ref 0–0.2)
BASOPHILS NFR BLD AUTO: 0 %
BILIRUB UR QL STRIP: NEGATIVE
BUN SERPL-MCNC: 49 MG/DL (ref 7–30)
CALCIUM SERPL-MCNC: 9.4 MG/DL (ref 8.5–10.1)
CHLORIDE BLD-SCNC: 111 MMOL/L (ref 94–109)
CO2 SERPL-SCNC: 21 MMOL/L (ref 20–32)
COLOR UR AUTO: YELLOW
CREAT SERPL-MCNC: 2.47 MG/DL (ref 0.66–1.25)
CREAT UR-MCNC: 94 MG/DL
DEPRECATED CALCIDIOL+CALCIFEROL SERPL-MC: 42 UG/L (ref 20–75)
EOSINOPHIL # BLD AUTO: 0.3 10E3/UL (ref 0–0.7)
EOSINOPHIL NFR BLD AUTO: 3 %
ERYTHROCYTE [DISTWIDTH] IN BLOOD BY AUTOMATED COUNT: 12.8 % (ref 10–15)
GFR SERPL CREATININE-BSD FRML MDRD: 25 ML/MIN/1.73M2
GLUCOSE BLD-MCNC: 104 MG/DL (ref 70–99)
GLUCOSE UR STRIP-MCNC: NEGATIVE MG/DL
HCT VFR BLD AUTO: 41 % (ref 40–53)
HGB BLD-MCNC: 13.4 G/DL (ref 13.3–17.7)
HGB UR QL STRIP: ABNORMAL
KETONES UR STRIP-MCNC: NEGATIVE MG/DL
LEUKOCYTE ESTERASE UR QL STRIP: NEGATIVE
LYMPHOCYTES # BLD AUTO: 2.1 10E3/UL (ref 0.8–5.3)
LYMPHOCYTES NFR BLD AUTO: 28 %
MCH RBC QN AUTO: 32.5 PG (ref 26.5–33)
MCHC RBC AUTO-ENTMCNC: 32.7 G/DL (ref 31.5–36.5)
MCV RBC AUTO: 100 FL (ref 78–100)
MONOCYTES # BLD AUTO: 0.8 10E3/UL (ref 0–1.3)
MONOCYTES NFR BLD AUTO: 11 %
NEUTROPHILS # BLD AUTO: 4.4 10E3/UL (ref 1.6–8.3)
NEUTROPHILS NFR BLD AUTO: 58 %
NITRATE UR QL: NEGATIVE
PH UR STRIP: 6 [PH] (ref 5–7)
PHOSPHATE SERPL-MCNC: 3.7 MG/DL (ref 2.5–4.5)
PLATELET # BLD AUTO: 247 10E3/UL (ref 150–450)
POTASSIUM BLD-SCNC: 4.3 MMOL/L (ref 3.4–5.3)
PROT UR-MCNC: 2.19 G/L
PROT/CREAT 24H UR: 2.33 G/G CR (ref 0–0.2)
PTH-INTACT SERPL-MCNC: 87 PG/ML (ref 18–80)
RBC # BLD AUTO: 4.12 10E6/UL (ref 4.4–5.9)
RBC #/AREA URNS AUTO: NORMAL /HPF
SODIUM SERPL-SCNC: 141 MMOL/L (ref 133–144)
SP GR UR STRIP: >=1.03 (ref 1–1.03)
UROBILINOGEN UR STRIP-ACNC: 0.2 E.U./DL
WBC # BLD AUTO: 7.7 10E3/UL (ref 4–11)
WBC #/AREA URNS AUTO: NORMAL /HPF

## 2022-01-07 PROCEDURE — G0103 PSA SCREENING: HCPCS

## 2022-01-07 PROCEDURE — 83970 ASSAY OF PARATHORMONE: CPT

## 2022-01-07 PROCEDURE — 36415 COLL VENOUS BLD VENIPUNCTURE: CPT

## 2022-01-07 PROCEDURE — 84156 ASSAY OF PROTEIN URINE: CPT

## 2022-01-07 PROCEDURE — 82306 VITAMIN D 25 HYDROXY: CPT

## 2022-01-07 PROCEDURE — 80069 RENAL FUNCTION PANEL: CPT

## 2022-01-07 PROCEDURE — 81001 URINALYSIS AUTO W/SCOPE: CPT

## 2022-01-07 PROCEDURE — 85025 COMPLETE CBC W/AUTO DIFF WBC: CPT

## 2022-01-08 ENCOUNTER — HEALTH MAINTENANCE LETTER (OUTPATIENT)
Age: 87
End: 2022-01-08

## 2022-01-10 ENCOUNTER — VIRTUAL VISIT (OUTPATIENT)
Dept: NEPHROLOGY | Facility: CLINIC | Age: 87
End: 2022-01-10
Attending: INTERNAL MEDICINE
Payer: COMMERCIAL

## 2022-01-10 DIAGNOSIS — N28.1 RENAL CYST: ICD-10-CM

## 2022-01-10 DIAGNOSIS — N25.81 SECONDARY HYPERPARATHYROIDISM (H): ICD-10-CM

## 2022-01-10 DIAGNOSIS — N18.4 CHRONIC KIDNEY DISEASE, STAGE 4 (SEVERE) (H): Primary | ICD-10-CM

## 2022-01-10 DIAGNOSIS — Z12.5 ENCOUNTER FOR SCREENING FOR MALIGNANT NEOPLASM OF PROSTATE: ICD-10-CM

## 2022-01-10 DIAGNOSIS — R97.20 HIGH PROSTATE SPECIFIC ANTIGEN (PSA): ICD-10-CM

## 2022-01-10 LAB — PSA SERPL-MCNC: 6.58 UG/L (ref 0–4)

## 2022-01-10 PROCEDURE — 99214 OFFICE O/P EST MOD 30 MIN: CPT | Mod: 95 | Performed by: INTERNAL MEDICINE

## 2022-01-10 ASSESSMENT — PAIN SCALES - GENERAL: PAINLEVEL: NO PAIN (0)

## 2022-01-10 NOTE — PROGRESS NOTES
Nephrology Progress Note  01/09/2022   Chief complaint: Follow-up for CKD stage IV  History of Present Illness:    Nitish Coreas is a 87 year old with history of COPD, choledocholithiasis s/p ERCP, sphincterotomy and CBD stent placed on 4/21, chronic hypertension, hyperlipidemia, prostatic enlargement and elevated PSA, chronic back pain, ascending aortic and infrarenal abdominal aortic dilatation and aneurysm     The patient was admitted in Apri 2021due to cholelithiasis with obstructive jaundice.  He underwent ERCP, EUS, pus draining from gallbladder, sphincterotomy and CBD stent placed.  Subsequently, gallbladder was removed.  He also received IV antibiotics with ciprofloxacin Flagyl and IV fluid.  He was also noted to have COPD exacerbation and required oxygen supplement in adjunct to home oxygen therapy.  He was also noted to have acute kidney injury with creatinine increased to 2.92 on the day of dismissal.     Upon chart review, the patient was noted to have creatinine up 1.3-1.5 since 8643-9125.  Since 2015, his creatinine has gradually increased to between 1.5-1.7.  In 2019, his creatinine has increased 2.03.  He has an ADEEL when he was admitted as mentioned above with creatinine peak 2.98.  Creatinine has been down to 2.11 on 4/27/2021 and since then has fluctuating between 2.1-2.2.  Patient noted to have urine protein since 2015.  His urine albumin creatinine ratio was 425 mg/g and increased to 2838 mg/g on 7/21.      His UA is essentially no pyuria or red blood cells since 2015.  CT abdomen pelvis and chest in 4/21 showed few bilateral renal cysts with the largest upper pole of the left knee medially measuring 4.9 cm.  The kidneys have otherwise unremarkable noncontrast appearance cyst.  No stone or hydronephrosis.  The patient also has mild to moderate prostatic enlargement.  No mention about his bladder.  I measure his kidneys by myself on the CT of 4/21 which showed small kidney both size, right 8.4  cm and right 9.1 cm.     His parathyroid hormone is normal at 72 on 8/21 with normal vitamin D.  His hemoglobin is normal at 14.6.      8/30/21: The patient is attending a video visit with his wife.  He could not use [employer] because his computer does not have a camera so we did [employer].  The patient reports that he he was told that he has kidney problem since he was admitted in April 2021.  At that time, he was quite sick and he could not eat and drink for 7 days.  Patient is covering well from the procedure.  And a CBD stent was recently removed 3 weeks ago.  Regarding symptoms, the patient denies any chest pain or short of breath.  The patient has extremity edema, left worsening than right that usually occur again today.  He noticed some foamy urine especially at nighttime.  Patient does not have any difficulty urination.  The patient has no family history of chronic kidney disease.  His blood pressure ranging between 130-138/78 to 82 mmHg.  He is only taking lisinopril 40 mg daily.  The patient has been diagnosed with high blood for more than 10 years and is on 20 mg of lisinopril but was increased to 40 g about 3 years ago.  The patient intermittently take NSAIDs due to lower back pain.  He does not limit salt in his diet but he tried low-salt condiment.  The patient used to weigh 222 pounds but since his admission, his weight down to 185 and he is trying to maintain his weight at 185.  The patient does not have any signs or symptoms of TONY.  I started the patient on trazodone 12.5 mg daily.     1/10/21:He feels ok today. We switched him from chlorthalidone to hydrochlorothiazide lately because he could not cut the pill in half. Now he does not need to cut it. BP is better now 120/70s. He has no leg swelling. His appetite is not the same. He is trying to stay away from salt. He is no longer having swelling. He does not have lightheadedness or dizziness.  He denies any dysuria or hematuria.  I noted patient has  elevated PSA at 5.30 back in 11/23/20 and has CT done in 4/21 which showed prostatic enlargement. Since then there is no other PSA checked.     Labs on 1/7/2022 showed creatinine 2.47, potassium 4.3, bicarb 21.  Urine protein creatinine ratio is 2.33 which has dropped from 2.94 on 8/23/2021. His PTH is 87 and vitamin D is 42.  His hemoglobin is stable at 13.4 with normal white blood cell and platelet.  His UA showed trace blood in the urine but RBCs only 0-2.      Past medical history  Past Medical History:   Diagnosis Date     Hyperlipidemia LDL goal < 100      Hypertension        Past surgical history  Past Surgical History:   Procedure Laterality Date     DENTAL SURGERY  1958    wisdom teeth     ENDOSCOPIC RETROGRADE CHOLANGIOPANCREATOGRAM N/A 4/19/2021    Procedure: ENDOSCOPIC RETROGRADE CHOLANGIOPANCREATOGRAPHY, WITH CALCULUS REMOVAL USING BALLOON  AND CATHETER WITH STENT PLACEMENT;  Surgeon: Tres Milan MD;  Location:  OR     ENDOSCOPIC ULTRASOUND UPPER GASTROINTESTINAL TRACT (GI) N/A 4/19/2021    Procedure: ENDOSCOPIC ULTRASOUND, ESOPHAGOSCOPY / UPPER GASTROINTESTINAL TRACT (GI);  Surgeon: Tres Milan MD;  Location:  OR     EYE SURGERY Right 2004 and 2005    macular pucker and cataract     LAPAROSCOPIC CHOLECYSTECTOMY N/A 4/20/2021    Procedure: CHOLECYSTECTOMY, LAPAROSCOPIC;  Surgeon: Callie Inman MD;  Location:  OR     TONSILLECTOMY & ADENOIDECTOMY  1949     [email or web address] REPLANTATION THUMB DISTAL,COMPLETE  1962    cut off right thumb and repair     [facility] [[email or web address]] TOTAL KNEE ARTHROPLASTY  2011, 2000    left then right         Review of Systems:   14 systems were reviewed and all negative except as mentioned above.   Current Medications:  Current Outpatient Medications   Medication     acetaminophen (TYLENOL) 325 MG tablet     Boswellia-Glucosamine-Vit D (OSTEO BI-FLEX ONE PER DAY PO)     cetirizine (ZYRTEC) 10 MG tablet     Cholecalciferol (VITAMIN D) 2000 UNITS CAPS     ciprofloxacin (CIPRO) 500  MG tablet     gemfibrozil (LOPID) 600 MG tablet     hydrochlorothiazide (HYDRODIURIL) 12.5 MG tablet     ipratropium - albuterol 0.5 mg/2.5 mg/3 mL (DUONEB) 0.5-2.5 (3) MG/3ML neb solution     Krill Oil 500 MG CAPS     lisinopril (ZESTRIL) 40 MG tablet     lovastatin (MEVACOR) 40 MG tablet     Multiple Vitamins-Minerals (MULTIVITAL) TABS     omeprazole (PRILOSEC) 20 MG DR capsule     senna-docusate (SENOKOT-S/PERICOLACE) 8.6-50 MG tablet     No current facility-administered medications for this visit.       Physical Exam:   There were no vitals taken for this visit.   There is no height or weight on file to calculate BMI.    No swelling per patient.     Labs:   All labs reviewed by me  Last Renal Panel:  Sodium   Date Value Ref Range Status   01/07/2022 141 133 - 144 mmol/L Final   04/27/2021 139 133 - 144 mmol/L Final     Potassium   Date Value Ref Range Status   01/07/2022 4.3 3.4 - 5.3 mmol/L Final   04/27/2021 4.5 3.4 - 5.3 mmol/L Final     Chloride   Date Value Ref Range Status   01/07/2022 111 (H) 94 - 109 mmol/L Final   04/27/2021 109 94 - 109 mmol/L Final     Carbon Dioxide   Date Value Ref Range Status   04/27/2021 28 20 - 32 mmol/L Final     Carbon Dioxide (CO2)   Date Value Ref Range Status   01/07/2022 21 20 - 32 mmol/L Final     Anion Gap   Date Value Ref Range Status   01/07/2022 9 3 - 14 mmol/L Final   04/27/2021 2 (L) 3 - 14 mmol/L Final     Glucose   Date Value Ref Range Status   01/07/2022 104 (H) 70 - 99 mg/dL Final   04/27/2021 106 (H) 70 - 99 mg/dL Final     Urea Nitrogen   Date Value Ref Range Status   01/07/2022 49 (H) 7 - 30 mg/dL Final   04/27/2021 31 (H) 7 - 30 mg/dL Final     Creatinine   Date Value Ref Range Status   01/07/2022 2.47 (H) 0.66 - 1.25 mg/dL Final   04/27/2021 2.11 (H) 0.66 - 1.25 mg/dL Final     GFR Estimate   Date Value Ref Range Status   01/07/2022 25 (L) >60 mL/min/1.73m2 Final     Comment:     Effective December 21, 2021 eGFRcr in adults is calculated using the 2021  CKD-EPI creatinine equation which includes age and gender (Iva mo al., NEJ, DOI: 10.1056/HZNDwg5939034)   04/27/2021 27 (L) >60 mL/min/[1.73_m2] Final     Comment:     Non  GFR Calc  Starting 12/18/2018, serum creatinine based estimated GFR (eGFR) will be   calculated using the Chronic Kidney Disease Epidemiology Collaboration   (CKD-EPI) equation.       Calcium   Date Value Ref Range Status   01/07/2022 9.4 8.5 - 10.1 mg/dL Final   04/27/2021 8.5 8.5 - 10.1 mg/dL Final     Phosphorus   Date Value Ref Range Status   01/07/2022 3.7 2.5 - 4.5 mg/dL Final   01/24/2011 3.6 2.5 - 4.9 mg/dL Final     Albumin   Date Value Ref Range Status   01/07/2022 3.7 3.4 - 5.0 g/dL Final   04/22/2021 2.0 (L) 3.4 - 5.0 g/dL Final       Imaging:  I reviewed imaging studies.     Assessment & Recommendations:   Problem list  # Chronic kidney disease stage IV secondary to chronic hypertension, prior NSAID use and recent acute kidney injury; baseline creatinine 2.4-2.5  #Nonnephrotic range proteinuria  #Hypertension: Controlled  #Intermittent lower extremity edema  His chronic kidney disease is likely multifactorial in the setting of chronic hypertension, prior NSAID use and recent cute kidney injury. At this time, he has no symptoms from the CKD standpoints. Electrolytes are in acceptable range and no major acid bases disturbances.  Proteinuria has improved from 2.9 compared to 2.3 g/g after blood pressure is in better control.   We discussed about dialysis during our visit in August 2000 2021. The patient expressed the wishes to start dialysis if it is necessary but he would like to delay it as long as possible.  #Bilateral renal cysts  The patient has bilateral renal cyst but his kidney size are small.  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  I ordered kidney ultrasound abdomen prior to the next visit  # MBD  PTH 87, vitamin D 42.  The patient is vitamin D supplement a total of  3000/day.  Calcium and phosphorus are within normal limit.  # Surveillance for anemia in CKD  He is hemoglobin is 13 which is excellent.  We will continue to monitor his hemoglobin every visit.  # Prostatic enlargement with elevated PSA  Previously PSA was 5.30 in 11/20 but no repeat after that. He has prostate enlargement on 4/21. I will repeat PSA by added on the blood test on 1/7/21.  If his PSA continues to increase, I would refer him to urology.    Addendum 5.07 PM: PSA elevated at 6.58 which is increased from 5.3 in 11/20. He has nocturia and urinated 13-14 times per day. He never heard of PSA before which is odd as this has been checked several time. He is ok for urology referral.      Follow-up in 4 months     I spent  37  minutes on the date of the encounter doing chart review, history and exam, documentation and further activities as noted above. 15 minutes of this visit is dedicated to direct patient interaction via video.    Gustavo Cadet MD on 1/9/2022 at 6:47 PM

## 2022-01-10 NOTE — LETTER
1/10/2022     RE: Nitish Coreas  [address]  [facility] [address]     Dear Colleague,    Thank you for referring your patient, Nitish Coreas, to the [facility]PHROLOG[facility] [place] at [facility]. Please see a copy of my visit note below.      Nephrology Progress Note  01/09/2022   Chief complaint: Follow-up for CKD stage IV  History of Present Illness:    Nitish Coreas is a 87 year old with history of COPD, choledocholithiasis s/p ERCP, sphincterotomy and CBD stent placed on 4/21, chronic hypertension, hyperlipidemia, prostatic enlargement and elevated PSA, chronic back pain, ascending aortic and infrarenal abdominal aortic dilatation and aneurysm     The patient was admitted in Apri 2021due to cholelithiasis with obstructive jaundice.  He underwent ERCP, EUS, pus draining from gallbladder, sphincterotomy and CBD stent placed.  Subsequently, gallbladder was removed.  He also received IV antibiotics with ciprofloxacin Flagyl and IV fluid.  He was also noted to have COPD exacerbation and required oxygen supplement in adjunct to home oxygen therapy.  He was also noted to have acute kidney injury with creatinine increased to 2.92 on the day of dismissal.     Upon chart review, the patient was noted to have creatinine up 1.3-1.5 since 9833-0695.  Since 2015, his creatinine has gradually increased to between 1.5-1.7.  In 2019, his creatinine has increased 2.03.  He has an ADEEL when he was admitted as mentioned above with creatinine peak 2.98.  Creatinine has been down to 2.11 on 4/27/2021 and since then has fluctuating between 2.1-2.2.  Patient noted to have urine protein since 2015.  His urine albumin creatinine ratio was 425 mg/g and increased to 2838 mg/g on 7/21.      His UA is essentially no pyuria or red blood cells since 2015.  CT abdomen pelvis and chest in 4/21 showed few bilateral renal cysts with the largest upper pole of the left  knee medially measuring 4.9 cm.  The kidneys have otherwise unremarkable noncontrast appearance cyst.  No stone or hydronephrosis.  The patient also has mild to moderate prostatic enlargement.  No mention about his bladder.  I measure his kidneys by myself on the CT of 4/21 which showed small kidney both size, right 8.4 cm and right 9.1 cm.     His parathyroid hormone is normal at 72 on 8/21 with normal vitamin D.  His hemoglobin is normal at 14.6.      8/30/21: The patient is attending a video visit with his wife.  He could not use [employer] because his computer does not have a camera so we did [employer].  The patient reports that he he was told that he has kidney problem since he was admitted in April 2021.  At that time, he was quite sick and he could not eat and drink for 7 days.  Patient is covering well from the procedure.  And a CBD stent was recently removed 3 weeks ago.  Regarding symptoms, the patient denies any chest pain or short of breath.  The patient has extremity edema, left worsening than right that usually occur again today.  He noticed some foamy urine especially at nighttime.  Patient does not have any difficulty urination.  The patient has no family history of chronic kidney disease.  His blood pressure ranging between 130-138/78 to 82 mmHg.  He is only taking lisinopril 40 mg daily.  The patient has been diagnosed with high blood for more than 10 years and is on 20 mg of lisinopril but was increased to 40 g about 3 years ago.  The patient intermittently take NSAIDs due to lower back pain.  He does not limit salt in his diet but he tried low-salt condiment.  The patient used to weigh 222 pounds but since his admission, his weight down to 185 and he is trying to maintain his weight at 185.  The patient does not have any signs or symptoms of TONY.  I started the patient on trazodone 12.5 mg daily.     1/10/21:He feels ok today. We switched him from chlorthalidone to hydrochlorothiazide lately because  he could not cut the pill in half. Now he does not need to cut it. BP is better now 120/70s. He has no leg swelling. His appetite is not the same. He is trying to stay away from salt. He is no longer having swelling. He does not have lightheadedness or dizziness.  He denies any dysuria or hematuria.  I noted patient has elevated PSA at 5.30 back in 11/23/20 and has CT done in 4/21 which showed prostatic enlargement. Since then there is no other PSA checked.     Labs on 1/7/2022 showed creatinine 2.47, potassium 4.3, bicarb 21.  Urine protein creatinine ratio is 2.33 which has dropped from 2.94 on 8/23/2021. His PTH is 87 and vitamin D is 42.  His hemoglobin is stable at 13.4 with normal white blood cell and platelet.  His UA showed trace blood in the urine but RBCs only 0-2.      Past medical history  Past Medical History:   Diagnosis Date     Hyperlipidemia LDL goal < 100      Hypertension        Past surgical history  Past Surgical History:   Procedure Laterality Date     DENTAL SURGERY  1958    wisdom teeth     ENDOSCOPIC RETROGRADE CHOLANGIOPANCREATOGRAM N/A 4/19/2021    Procedure: ENDOSCOPIC RETROGRADE CHOLANGIOPANCREATOGRAPHY, WITH CALCULUS REMOVAL USING BALLOON  AND CATHETER WITH STENT PLACEMENT;  Surgeon: Tres Milan MD;  Location:  OR     ENDOSCOPIC ULTRASOUND UPPER GASTROINTESTINAL TRACT (GI) N/A 4/19/2021    Procedure: ENDOSCOPIC ULTRASOUND, ESOPHAGOSCOPY / UPPER GASTROINTESTINAL TRACT (GI);  Surgeon: Tres Milan MD;  Location:  OR     EYE SURGERY Right 2004 and 2005    macular pucker and cataract     LAPAROSCOPIC CHOLECYSTECTOMY N/A 4/20/2021    Procedure: CHOLECYSTECTOMY, LAPAROSCOPIC;  Surgeon: Callie Inman MD;  Location:  OR     TONSILLECTOMY & ADENOIDECTOMY  1949     [facility] REPLANTATION THUMB DISTAL,COMPLETE  1962    cut off right thumb and repair     C TOTAL KNEE ARTHROPLASTY  2011, 2000    left then right         Review of Systems:   14 systems were reviewed and all  negative except as mentioned above.   Current Medications:  Current Outpatient Medications   Medication     acetaminophen (TYLENOL) 325 MG tablet     Boswellia-Glucosamine-Vit D (OSTEO BI-FLEX ONE PER DAY PO)     cetirizine (ZYRTEC) 10 MG tablet     Cholecalciferol (VITAMIN D) 2000 UNITS CAPS     ciprofloxacin (CIPRO) 500 MG tablet     gemfibrozil (LOPID) 600 MG tablet     hydrochlorothiazide (HYDRODIURIL) 12.5 MG tablet     ipratropium - albuterol 0.5 mg/2.5 mg/3 mL (DUONEB) 0.5-2.5 (3) MG/3ML neb solution     Krill Oil 500 MG CAPS     lisinopril (ZESTRIL) 40 MG tablet     lovastatin (MEVACOR) 40 MG tablet     Multiple Vitamins-Minerals (MULTIVITAL) TABS     omeprazole (PRILOSEC) 20 MG DR capsule     senna-docusate (SENOKOT-S/PERICOLACE) 8.6-50 MG tablet     No current facility-administered medications for this visit.       Physical Exam:   There were no vitals taken for this visit.   There is no height or weight on file to calculate BMI.    No swelling per patient.     Labs:   All labs reviewed by me  Last Renal Panel:  Sodium   Date Value Ref Range Status   01/07/2022 141 133 - 144 mmol/L Final   04/27/2021 139 133 - 144 mmol/L Final     Potassium   Date Value Ref Range Status   01/07/2022 4.3 3.4 - 5.3 mmol/L Final   04/27/2021 4.5 3.4 - 5.3 mmol/L Final     Chloride   Date Value Ref Range Status   01/07/2022 111 (H) 94 - 109 mmol/L Final   04/27/2021 109 94 - 109 mmol/L Final     Carbon Dioxide   Date Value Ref Range Status   04/27/2021 28 20 - 32 mmol/L Final     Carbon Dioxide (CO2)   Date Value Ref Range Status   01/07/2022 21 20 - 32 mmol/L Final     Anion Gap   Date Value Ref Range Status   01/07/2022 9 3 - 14 mmol/L Final   04/27/2021 2 (L) 3 - 14 mmol/L Final     Glucose   Date Value Ref Range Status   01/07/2022 104 (H) 70 - 99 mg/dL Final   04/27/2021 106 (H) 70 - 99 mg/dL Final     Urea Nitrogen   Date Value Ref Range Status   01/07/2022 49 (H) 7 - 30 mg/dL Final   04/27/2021 31 (H) 7 - 30 mg/dL Final      Creatinine   Date Value Ref Range Status   01/07/2022 2.47 (H) 0.66 - 1.25 mg/dL Final   04/27/2021 2.11 (H) 0.66 - 1.25 mg/dL Final     GFR Estimate   Date Value Ref Range Status   01/07/2022 25 (L) >60 mL/min/1.73m2 Final     Comment:     Effective December 21, 2021 eGFRcr in adults is calculated using the 2021 CKD-EPI creatinine equation which includes age and gender (Iva et al., NE, DOI: 10.1056/PSRErh5912635)   04/27/2021 27 (L) >60 mL/min/[1.73_m2] Final     Comment:     Non  GFR Calc  Starting 12/18/2018, serum creatinine based estimated GFR (eGFR) will be   calculated using the Chronic Kidney Disease Epidemiology Collaboration   (CKD-EPI) equation.       Calcium   Date Value Ref Range Status   01/07/2022 9.4 8.5 - 10.1 mg/dL Final   04/27/2021 8.5 8.5 - 10.1 mg/dL Final     Phosphorus   Date Value Ref Range Status   01/07/2022 3.7 2.5 - 4.5 mg/dL Final   01/24/2011 3.6 2.5 - 4.9 mg/dL Final     Albumin   Date Value Ref Range Status   01/07/2022 3.7 3.4 - 5.0 g/dL Final   04/22/2021 2.0 (L) 3.4 - 5.0 g/dL Final       Imaging:  I reviewed imaging studies.     Assessment & Recommendations:   Problem list  # Chronic kidney disease stage IV secondary to chronic hypertension, prior NSAID use and recent acute kidney injury; baseline creatinine 2.4-2.5  #Nonnephrotic range proteinuria  #Hypertension: Controlled  #Intermittent lower extremity edema  His chronic kidney disease is likely multifactorial in the setting of chronic hypertension, prior NSAID use and recent cute kidney injury. At this time, he has no symptoms from the CKD standpoints. Electrolytes are in acceptable range and no major acid bases disturbances.  Proteinuria has improved from 2.9 compared to 2.3 g/g after blood pressure is in better control.   We discussed about dialysis during our visit in August 2000 2021. The patient expressed the wishes to start dialysis if it is necessary but he would like to delay it as long as  possible.  #Bilateral renal cysts  The patient has bilateral renal cyst but his kidney size are small.  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  I ordered kidney ultrasound abdomen prior to the next visit  # MBD  PTH 87, vitamin D 42.  The patient is vitamin D supplement a total of 3000/day.  Calcium and phosphorus are within normal limit.  # Surveillance for anemia in CKD  He is hemoglobin is 13 which is excellent.  We will continue to monitor his hemoglobin every visit.  # Prostatic enlargement with elevated PSA  Previously PSA was 5.30 in 11/20 but no repeat after that. He has prostate enlargement on 4/21. I will repeat PSA by added on the blood test on 1/7/21.  If his PSA continues to increase, I would refer him to urology.    Addendum 5.07 PM: PSA elevated at 6.58 which is increased from 5.3 in 11/20. He has nocturia and urinated 13-14 times per day. He never heard of PSA before which is odd as this has been checked several time. He is ok for urology referral.      Follow-up in 4 months     I spent  37  minutes on the date of the encounter doing chart review, history and exam, documentation and further activities as noted above. 15 minutes of this visit is dedicated to direct patient interaction via video.    Gustavo Cadet MD on 1/9/2022 at 6:47 PM      Nitish is a 87 year old who is being evaluated via a billable video visit.      ** patient can do [employer] **    How would you like to obtain your AVS? MyChart  If the video visit is dropped, the invitation should be resent by: Text to cell phone: [phone]  Will anyone else be joining your video visit? No      Video Start Time: 8.32  Video-Visit Details  Type of service:  Video Visit  Video End Time:8.47  Originating Location (pt. Location): Home  Distant Location (provider location):  [facility] NEPHROLOGY [facility]   Platform used for Video Visit: [employer] works

## 2022-01-10 NOTE — PROGRESS NOTES
Nitish is a 87 year old who is being evaluated via a billable video visit.      ** patient can do [employer] **    How would you like to obtain your AVS? MyChart  If the video visit is dropped, the invitation should be resent by: Text to cell phone: [phone]  Will anyone else be joining your video visit? No      Video Start Time: 8.32  Video-Visit Details    Type of service:  Video Visit    Video End Time:8.47    Originating Location (pt. Location): Home    Distant Location (provider location):  [facility] NEPHROLOGY [facility]     Platform used for Video Visit: [employer]

## 2022-01-12 DIAGNOSIS — E78.5 HYPERLIPIDEMIA LDL GOAL <100: ICD-10-CM

## 2022-01-13 RX ORDER — LOVASTATIN 40 MG
40 TABLET ORAL AT BEDTIME
Qty: 90 TABLET | Refills: 3 | OUTPATIENT
Start: 2022-01-13

## 2022-01-13 RX ORDER — LOVASTATIN 40 MG
80 TABLET ORAL AT BEDTIME
Qty: 180 TABLET | Refills: 1 | Status: SHIPPED | OUTPATIENT
Start: 2022-01-13 | End: 2022-01-17

## 2022-01-13 RX ORDER — GEMFIBROZIL 600 MG/1
600 TABLET, FILM COATED ORAL 2 TIMES DAILY
Qty: 180 TABLET | Refills: 3 | OUTPATIENT
Start: 2022-01-13

## 2022-01-13 NOTE — TELEPHONE ENCOUNTER
Called pt to give providers response. Pt says he's been taking these mediations for 5-10 years consistently, wife (in background) says maybe even longer. Patient does not want to make appt to discuss, says he has enough dr bey for his kidneys.

## 2022-01-13 NOTE — TELEPHONE ENCOUNTER
Called pt with providers response. Patient chooses to schedule appt, says he will be out of medication in next 10 days. Scheduled pt for 1/17/22 with provider to discuss gemfibrozil.

## 2022-01-13 NOTE — TELEPHONE ENCOUNTER
Patient needs appointment to discuss. These two medications (gemfibrizol and lovastatin) shouldn't be given together, especially not at these doses, as the risk of side effects is greatly increased. Please have him make an appointment with any provider to discuss other options.

## 2022-01-13 NOTE — TELEPHONE ENCOUNTER
I encourage him to let us know if he changes his mind. I'm willing to prescribe one but not both. Script sent in for lovastatin. If he desires to re-start gemfibrozil (which I'm not sure if he needs at this point), he'd need an appointment.

## 2022-01-17 ENCOUNTER — OFFICE VISIT (OUTPATIENT)
Dept: INTERNAL MEDICINE | Facility: CLINIC | Age: 87
End: 2022-01-17
Payer: COMMERCIAL

## 2022-01-17 ENCOUNTER — TRANSFERRED RECORDS (OUTPATIENT)
Dept: INTERNAL MEDICINE | Facility: CLINIC | Age: 87
End: 2022-01-17

## 2022-01-17 VITALS
SYSTOLIC BLOOD PRESSURE: 122 MMHG | TEMPERATURE: 97.4 F | OXYGEN SATURATION: 99 % | BODY MASS INDEX: 30.62 KG/M2 | DIASTOLIC BLOOD PRESSURE: 80 MMHG | HEART RATE: 77 BPM | WEIGHT: 189.7 LBS

## 2022-01-17 DIAGNOSIS — E78.5 HYPERLIPIDEMIA LDL GOAL <100: Primary | Chronic | ICD-10-CM

## 2022-01-17 DIAGNOSIS — J44.9 CHRONIC OBSTRUCTIVE PULMONARY DISEASE, UNSPECIFIED COPD TYPE (H): ICD-10-CM

## 2022-01-17 LAB
CHOLEST SERPL-MCNC: 178 MG/DL
FASTING STATUS PATIENT QL REPORTED: YES
HDLC SERPL-MCNC: 45 MG/DL
LDLC SERPL CALC-MCNC: 86 MG/DL
NONHDLC SERPL-MCNC: 133 MG/DL
TRIGL SERPL-MCNC: 235 MG/DL

## 2022-01-17 PROCEDURE — 80061 LIPID PANEL: CPT | Performed by: INTERNAL MEDICINE

## 2022-01-17 PROCEDURE — 36415 COLL VENOUS BLD VENIPUNCTURE: CPT | Performed by: INTERNAL MEDICINE

## 2022-01-17 PROCEDURE — 99213 OFFICE O/P EST LOW 20 MIN: CPT | Performed by: INTERNAL MEDICINE

## 2022-01-17 RX ORDER — LOVASTATIN 40 MG
40 TABLET ORAL AT BEDTIME
Qty: 90 TABLET | Refills: 3 | Status: SHIPPED | OUTPATIENT
Start: 2022-01-17 | End: 2022-10-18

## 2022-01-17 NOTE — PATIENT INSTRUCTIONS
- STOP gemfibrozil  - DECREASE lovastatin from 80mg daily to 40mg daily  - I will send you a message on [employer] when I am able to look at the results of your cholesterol tests from today

## 2022-01-17 NOTE — PROGRESS NOTES
Assessment & Plan     Hyperlipidemia LDL goal <100  It looks like Nitish was placed on lovastatin and gemfibrozil years ago (possible decades ago). At some point, his lovastatin was also increased to 80mg daily. I reviewed with him today that lovastatin's maximum dose is usually 40mg and that gemfibrozil doesn't interact well with statins in general. I recommend repeat lipid panel today. Decrease lovastatin down to 40mg daily. STOP gemfibrozil. F/u lipid panel at [facility] in 6 months and if TGs elevated again then consider fenofibrate.   - Lipid panel reflex to direct LDL Non-fasting; Future  - lovastatin (MEVACOR) 40 MG tablet; Take 1 tablet (40 mg) by mouth At Bedtime    Chronic obstructive pulmonary disease, unspecified COPD type (H)  Known issue that I take into account for their medical decisions, no current exacerbations or new concerns. Follows with [facility]. Defer to them.    Return in about 6 months (around 7/17/2022) for Annual Wellness Exam.    Pardeep Eller MD  [facility]    Subjective   Nitish is a 87 year old who presents for a med check. It looks like Nitish was placed on lovastatin and gemfibrozil years ago (possible decades ago). At some point, his lovastatin was also increased to 80mg daily. I recently received a refill request for these which I declined due to the dosage and interactions. Recommended office visit to discuss further which brings him here today. He tells me he's been on them for 20+ years and has never had an issue.    Review of Systems   Constitutional, gi systems are negative, except as otherwise noted.      Objective    /80   Pulse 77   Temp 97.4  F (36.3  C) (Tympanic)   Wt 86 kg (189 lb 11.2 oz)   SpO2 99%   BMI 30.62 kg/m    Body mass index is 30.62 kg/m .     Physical Exam   GENERAL: alert and in no distress.  EYES: conjunctivae/corneas clear. EOMs grossly intact  HENT: Facies symmetric.  RESP: No iWOB.  CV: RRR to DP.  MSK: Moves all four  extremities freely  SKIN: No significant ulcers, lesions, or rashes on the visualized portions of the skin  NEURO: Alert. Oriented.

## 2022-01-21 ENCOUNTER — TELEPHONE (OUTPATIENT)
Dept: INTERNAL MEDICINE | Facility: CLINIC | Age: 87
End: 2022-01-21
Payer: COMMERCIAL

## 2022-01-21 NOTE — TELEPHONE ENCOUNTER
Pharmacy calling in to clarify dosing on lovastatin.     Advised per notes from Dr. Vásquez on 1/17/22 visit that Lovastatin was changed from 80 mg daily to 40 mg daily. Pt to also stop Gemfibrozil    Yu Gunderson RN

## 2022-02-24 ENCOUNTER — OFFICE VISIT (OUTPATIENT)
Dept: UROLOGY | Facility: CLINIC | Age: 87
End: 2022-02-24
Attending: INTERNAL MEDICINE
Payer: COMMERCIAL

## 2022-02-24 VITALS
HEIGHT: 66 IN | SYSTOLIC BLOOD PRESSURE: 130 MMHG | BODY MASS INDEX: 29.25 KG/M2 | WEIGHT: 182 LBS | DIASTOLIC BLOOD PRESSURE: 70 MMHG

## 2022-02-24 DIAGNOSIS — R97.20 HIGH PROSTATE SPECIFIC ANTIGEN (PSA): ICD-10-CM

## 2022-02-24 PROCEDURE — 99203 OFFICE O/P NEW LOW 30 MIN: CPT | Performed by: UROLOGY

## 2022-02-24 ASSESSMENT — PAIN SCALES - GENERAL: PAINLEVEL: NO PAIN (0)

## 2022-02-24 NOTE — LETTER
2/24/2022       RE: Nitish Coreas  [address]  [facility] [address]     Dear Colleague,    Thank you for referring your patient, Nitish Coreas, to the [facility] at [facility]. Please see a copy of my visit note below.          Chief Complaint:    Elevated PSA (Prostate Specific Antigen)         Consult or Referral:     Nitish Coreas is a 87 year old male seen at the request of Dr. Cadet.         History of Present Illness:    Nitish Coreas is a very pleasant 87 year old male who presents with a history of Elevated PSA (Prostate Specific Antigen).  PSA found to be elevated to 6.58 on recent annual screening. He reports no bothersome or significant urinary symptoms. No previous urologic workup. No known family history of prostate cancer.    PSA  1/7/2022 - 6.58  11/23/2020 - 5.30  10/7/2019 - 5.00  8/10/2018 - 3.53         Past Medical History:     Past Medical History:   Diagnosis Date     Hyperlipidemia LDL goal < 100      Hypertension    COPD  CKD         Past Surgical History:     Past Surgical History:   Procedure Laterality Date     DENTAL SURGERY  1958    wisdom teeth     ENDOSCOPIC RETROGRADE CHOLANGIOPANCREATOGRAM N/A 4/19/2021    Procedure: ENDOSCOPIC RETROGRADE CHOLANGIOPANCREATOGRAPHY, WITH CALCULUS REMOVAL USING BALLOON  AND CATHETER WITH STENT PLACEMENT;  Surgeon: Tres Milan MD;  Location:  OR     ENDOSCOPIC ULTRASOUND UPPER GASTROINTESTINAL TRACT (GI) N/A 4/19/2021    Procedure: ENDOSCOPIC ULTRASOUND, ESOPHAGOSCOPY / UPPER GASTROINTESTINAL TRACT (GI);  Surgeon: Tres Milan MD;  Location:  OR     EYE SURGERY Right 2004 and 2005    macular pucker and cataract     LAPAROSCOPIC CHOLECYSTECTOMY N/A 4/20/2021    Procedure: CHOLECYSTECTOMY, LAPAROSCOPIC;  Surgeon: Callie Inman MD;  Location:  OR     TONSILLECTOMY & ADENOIDECTOMY  1949     RUST REPLANTATION THUMB DISTAL,COMPLETE  1962     cut off right thumb and repair     ZZC TOTAL KNEE ARTHROPLASTY  2000    left then right            Medications     Current Outpatient Medications   Medication     Boswellia-Glucosamine-Vit D (OSTEO BI-FLEX ONE PER DAY PO)     Cholecalciferol (VITAMIN D) 2000 UNITS CAPS     hydrochlorothiazide (HYDRODIURIL) 12.5 MG tablet     Krill Oil 500 MG CAPS     lisinopril (ZESTRIL) 40 MG tablet     lovastatin (MEVACOR) 40 MG tablet     Multiple Vitamins-Minerals (MULTIVITAL) TABS     omeprazole (PRILOSEC) 20 MG DR capsule     cetirizine (ZYRTEC) 10 MG tablet     ipratropium - albuterol 0.5 mg/2.5 mg/3 mL (DUONEB) 0.5-2.5 (3) MG/3ML neb solution     No current facility-administered medications for this visit.          Family History:     Family History   Problem Relation Age of Onset     Cancer Mother      Respiratory Mother      Heart Disease Father      Cerebrovascular Disease Father      Cerebrovascular Disease Sister         CVA x 2     Dementia Sister      Heart Disease Son      Breast Cancer Daughter      No known family history of  malignancy.         Social History:     Social History     Socioeconomic History     Marital status:      Spouse name: Not on file     Number of children: 3     Years of education: Not on file     Highest education level: Not on file   Occupational History     Not on file   Tobacco Use     Smoking status: Former Smoker     Packs/day: 4.00     Years: 45.00     Pack years: 180.00     Types: Cigarettes     Quit date: 1991     Years since quittin.8     Smokeless tobacco: Never Used   Substance and Sexual Activity     Alcohol use: Yes     Alcohol/week: 0.0 standard drinks     Comment: 1-2 twice per week     Drug use: No     Sexual activity: Not Currently     Partners: Female   Other Topics Concern     Parent/sibling w/ CABG, MI or angioplasty before 65F 55M? 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     Weight Concern No     Special Diet No     Back Care Not Asked     Exercise Yes     Comment: walking every day     Bike Helmet Not Asked     Seat Belt Yes     Self-Exams Not Asked   Social History Narrative     Not on file     Social Determinants of Health     Financial Resource Strain: Not on file   Food Insecurity: Not on file   Transportation Needs: Not on file   Physical Activity: Not on file   Stress: Not on file   Social Connections: Not on file   Intimate Partner Violence: Not on file   Housing Stability: Not on file          Allergies:   Penicillins, Amoxicillin, Aspirin, Benadryl [anti-itch], Ibuprofen sodium, and Morphine sulfate         Review of Systems:  From intake questionnaire     Skin: negative  Eyes: negative  Ears/Nose/Throat: negative  Respiratory: No shortness of breath, dyspnea on exertion, cough, or hemoptysis  Cardiovascular: No chest pain or palpitations  Gastrointestinal: negative; no nausea/vomiting, constipation or diarrhea  Genitourinary: as per HPI  Musculoskeletal: negative  Neurologic: negative  Psychiatric: negative  Hematologic/Lymphatic/Immunologic: negative  Endocrine: negative         Physical Exam:     Patient is a 87 year old  male   Vitals: Blood pressure 130/70, height 1.676 m (5' 6\"), weight 82.6 kg (182 lb).  Constitutional: Body mass index is 29.38 kg/m .  Alert, no acute distress, oriented, conversant  Eyes: no scleral icterus; extraocular muscles intact, moist conjunctivae  Respiratory: no respiratory distress, or pursed lip breathing  Cardiovascular: pulses strong and intact; no obvious jugular venous distension present  Gastrointestinal: soft, nontender, no organomegaly or masses,   Musculoskeletal: extremities normal, no peripheral edema  Skin: no suspicious lesions or rashes  Neuro: Alert, oriented, " speech and mentation normal  Psych: affect and mood normal, alert and oriented to person, place and time  Gait: Normal      Labs and Pathology:    I reviewed all applicable laboratory and pathology data and went over findings with patient  Significant for   Lab Results   Component Value Date    CR 2.47 01/07/2022    CR 2.56 09/16/2021    CR 2.22 08/23/2021    CR 2.26 07/12/2021    CR 2.11 04/27/2021    CR 2.97 04/22/2021    CR 2.98 04/21/2021    CR 2.83 04/20/2021    CR 2.50 04/19/2021    CR 2.42 04/18/2021    CR 2.14 11/23/2020    CR 1.93 11/07/2019    CR 2.03 10/07/2019    CR 1.55 03/31/2018     PSA   Date Value Ref Range Status   11/23/2020 5.30 (H) 0 - 4 ug/L Final     Comment:     Assay Method:  Chemiluminescence using Siemens Vista analyzer   10/07/2019 5.00 (H) 0 - 4 ug/L Final     Comment:     Assay Method:  Chemiluminescence using Siemens Vista analyzer   08/10/2015 3.53 0 - 4 ug/L Final   03/21/2014 3.57 0 - 4 ug/L Final   08/20/2013 3.49 0 - 4 ug/L Final   01/24/2013 4.01 (H) 0 - 4 ug/L Final   01/16/2012 3.11 0.00 - 4.00 ng/ml Final   01/24/2011 3.36 0.00 - 4.00 ng/ml Final     Prostate Specific Antigen Screen   Date Value Ref Range Status   01/07/2022 6.58 (H) 0.00 - 4.00 ug/L Final       Outside and Past Medical records:    Review of the result(s) of each unique test - PSA, creat         Assessment and Plan:     Assessment: 87 year old male with elevated PSA to 6.58. We had a long discussion about the utility of PSA screening.  We talked about the Pros and Cons.  We discussed the findings of the PLCO and EORTC studies.  We discussed the results of the [place] trial of treatment vs. observation in clinically detected prostate cancer as well as the results of the PIVOT trial in the context of screen-detected cancer.  We discussed the recommendations by the AUA, and USPSTF. We also discussed the significant (2-6%) and rising risk of biopsy associated sepsis.    We also discussed the emerging role of  MRI in the diagnosis of prostate cancer and the potential for performing MRI-Ultrasound Fusion biopsy if suspicious lesions are noted.    In this context, we discussed that a mild elevation of PSA, the context of his age and co-morbidities, is unlikely to represent a clinically significant process or prostate cancer that would warrant treatment. As such, there is no clear benefit to further workup at this time. We discussed that if he would like, he certainly can continue PSA monitoring with further workup if significant rise is noted in the future, but would be reasonable to defer any further prostate cancer screening. He will discuss this with his primary care provider at his next visit and will contact me if additional questions or concerns.    Plan:  Follow-up with urology, as needed    Robby Paris MD  Urology  [facility]

## 2022-02-24 NOTE — PROGRESS NOTES
Chief Complaint:    Elevated PSA (Prostate Specific Antigen)         Consult or Referral:     Nitish Coreas is a 87 year old male seen at the request of Dr. Cadet.         History of Present Illness:    Nitish Coreas is a very pleasant 87 year old male who presents with a history of Elevated PSA (Prostate Specific Antigen).  PSA found to be elevated to 6.58 on recent annual screening. He reports no bothersome or significant urinary symptoms. No previous urologic workup. No known family history of prostate cancer.    PSA  1/7/2022 - 6.58  11/23/2020 - 5.30  10/7/2019 - 5.00  8/10/2018 - 3.53         Past Medical History:     Past Medical History:   Diagnosis Date     Hyperlipidemia LDL goal < 100      Hypertension    COPD  CKD         Past Surgical History:     Past Surgical History:   Procedure Laterality Date     DENTAL SURGERY  1958    wisdom teeth     ENDOSCOPIC RETROGRADE CHOLANGIOPANCREATOGRAM N/A 4/19/2021    Procedure: ENDOSCOPIC RETROGRADE CHOLANGIOPANCREATOGRAPHY, WITH CALCULUS REMOVAL USING BALLOON  AND CATHETER WITH STENT PLACEMENT;  Surgeon: Tres Milan MD;  Location:  OR     ENDOSCOPIC ULTRASOUND UPPER GASTROINTESTINAL TRACT (GI) N/A 4/19/2021    Procedure: ENDOSCOPIC ULTRASOUND, ESOPHAGOSCOPY / UPPER GASTROINTESTINAL TRACT (GI);  Surgeon: Tres Milan MD;  Location:  OR     EYE SURGERY Right 2004 and 2005    macular pucker and cataract     LAPAROSCOPIC CHOLECYSTECTOMY N/A 4/20/2021    Procedure: CHOLECYSTECTOMY, LAPAROSCOPIC;  Surgeon: Callie Inman MD;  Location:  OR     TONSILLECTOMY & ADENOIDECTOMY  1949     [facility] REPLANTATION THUMB DISTAL,COMPLETE  1962    cut off right thumb and repair     ZC TOTAL KNEE ARTHROPLASTY  2011, 2000    left then right            Medications     Current Outpatient Medications   Medication     Boswellia-Glucosamine-Vit D (OSTEO BI-FLEX ONE PER DAY PO)     Cholecalciferol (VITAMIN D) 2000 UNITS CAPS     hydrochlorothiazide  (HYDRODIURIL) 12.5 MG tablet     Krill Oil 500 MG CAPS     lisinopril (ZESTRIL) 40 MG tablet     lovastatin (MEVACOR) 40 MG tablet     Multiple Vitamins-Minerals (MULTIVITAL) TABS     omeprazole (PRILOSEC) 20 MG DR capsule     cetirizine (ZYRTEC) 10 MG tablet     ipratropium - albuterol 0.5 mg/2.5 mg/3 mL (DUONEB) 0.5-2.5 (3) MG/3ML neb solution     No current facility-administered medications for this visit.          Family History:     Family History   Problem Relation Age of Onset     Cancer Mother      Respiratory Mother      Heart Disease Father      Cerebrovascular Disease Father      Cerebrovascular Disease Sister         CVA x 2     Dementia Sister      Heart Disease Son      Breast Cancer Daughter      No known family history of  malignancy.         Social History:     Social History     Socioeconomic History     Marital status:      Spouse name: Not on file     Number of children: 3     Years of education: Not on file     Highest education level: Not on file   Occupational History     Not on file   Tobacco Use     Smoking status: Former Smoker     Packs/day: 4.00     Years: 45.00     Pack years: 180.00     Types: Cigarettes     Quit date: 1991     Years since quittin.8     Smokeless tobacco: Never Used   Substance and Sexual Activity     Alcohol use: Yes     Alcohol/week: 0.0 standard drinks     Comment: 1-2 twice per week     Drug use: No     Sexual activity: Not Currently     Partners: Female   Other Topics Concern     Parent/sibling w/ CABG, MI or angioplasty before 65F 55M? 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     Weight Concern No     Special Diet No     Back Care Not Asked     Exercise Yes     Comment: walking every day     Bike Helmet Not Asked     Seat  "Belt Yes     Self-Exams Not Asked   Social History Narrative     Not on file     Social Determinants of Health     Financial Resource Strain: Not on file   Food Insecurity: Not on file   Transportation Needs: Not on file   Physical Activity: Not on file   Stress: Not on file   Social Connections: Not on file   Intimate Partner Violence: Not on file   Housing Stability: Not on file          Allergies:   Penicillins, Amoxicillin, Aspirin, Benadryl [anti-itch], Ibuprofen sodium, and Morphine sulfate         Review of Systems:  From intake questionnaire     Skin: negative  Eyes: negative  Ears/Nose/Throat: negative  Respiratory: No shortness of breath, dyspnea on exertion, cough, or hemoptysis  Cardiovascular: No chest pain or palpitations  Gastrointestinal: negative; no nausea/vomiting, constipation or diarrhea  Genitourinary: as per HPI  Musculoskeletal: negative  Neurologic: negative  Psychiatric: negative  Hematologic/Lymphatic/Immunologic: negative  Endocrine: negative         Physical Exam:     Patient is a 87 year old  male   Vitals: Blood pressure 130/70, height 1.676 m (5' 6\"), weight 82.6 kg (182 lb).  Constitutional: Body mass index is 29.38 kg/m .  Alert, no acute distress, oriented, conversant  Eyes: no scleral icterus; extraocular muscles intact, moist conjunctivae  Respiratory: no respiratory distress, or pursed lip breathing  Cardiovascular: pulses strong and intact; no obvious jugular venous distension present  Gastrointestinal: soft, nontender, no organomegaly or masses,   Musculoskeletal: extremities normal, no peripheral edema  Skin: no suspicious lesions or rashes  Neuro: Alert, oriented, speech and mentation normal  Psych: affect and mood normal, alert and oriented to person, place and time  Gait: Normal      Labs and Pathology:    I reviewed all applicable laboratory and pathology data and went over findings with patient  Significant for   Lab Results   Component Value Date    CR 2.47 01/07/2022 "    CR 2.56 09/16/2021    CR 2.22 08/23/2021    CR 2.26 07/12/2021    CR 2.11 04/27/2021    CR 2.97 04/22/2021    CR 2.98 04/21/2021    CR 2.83 04/20/2021    CR 2.50 04/19/2021    CR 2.42 04/18/2021    CR 2.14 11/23/2020    CR 1.93 11/07/2019    CR 2.03 10/07/2019    CR 1.55 03/31/2018     PSA   Date Value Ref Range Status   11/23/2020 5.30 (H) 0 - 4 ug/L Final     Comment:     Assay Method:  Chemiluminescence using Siemens Vista analyzer   10/07/2019 5.00 (H) 0 - 4 ug/L Final     Comment:     Assay Method:  Chemiluminescence using Siemens Vista analyzer   08/10/2015 3.53 0 - 4 ug/L Final   03/21/2014 3.57 0 - 4 ug/L Final   08/20/2013 3.49 0 - 4 ug/L Final   01/24/2013 4.01 (H) 0 - 4 ug/L Final   01/16/2012 3.11 0.00 - 4.00 ng/ml Final   01/24/2011 3.36 0.00 - 4.00 ng/ml Final     Prostate Specific Antigen Screen   Date Value Ref Range Status   01/07/2022 6.58 (H) 0.00 - 4.00 ug/L Final       Outside and Past Medical records:    Review of the result(s) of each unique test - PSA, creat         Assessment and Plan:     Assessment: 87 year old male with elevated PSA to 6.58. We had a long discussion about the utility of PSA screening.  We talked about the Pros and Cons.  We discussed the findings of the PLCO and EORTC studies.  We discussed the results of the [place] trial of treatment vs. observation in clinically detected prostate cancer as well as the results of the PIVOT trial in the context of screen-detected cancer.  We discussed the recommendations by the AUA, and USPSTF. We also discussed the significant (2-6%) and rising risk of biopsy associated sepsis.    We also discussed the emerging role of MRI in the diagnosis of prostate cancer and the potential for performing MRI-Ultrasound Fusion biopsy if suspicious lesions are noted.    In this context, we discussed that a mild elevation of PSA, the context of his age and co-morbidities, is unlikely to represent a clinically significant process or prostate  cancer that would warrant treatment. As such, there is no clear benefit to further workup at this time. We discussed that if he would like, he certainly can continue PSA monitoring with further workup if significant rise is noted in the future, but would be reasonable to defer any further prostate cancer screening. He will discuss this with his primary care provider at his next visit and will contact me if additional questions or concerns.    Plan:  Follow-up with urology, as needed    Robby Paris MD  Urology  [facility]

## 2022-03-14 DIAGNOSIS — I10 HYPERTENSION GOAL BP (BLOOD PRESSURE) < 140/90: ICD-10-CM

## 2022-03-14 RX ORDER — LISINOPRIL 40 MG/1
40 TABLET ORAL DAILY
Qty: 90 TABLET | Refills: 1 | Status: SHIPPED | OUTPATIENT
Start: 2022-03-14 | End: 2022-09-13

## 2022-05-12 ENCOUNTER — HOSPITAL ENCOUNTER (OUTPATIENT)
Dept: ULTRASOUND IMAGING | Facility: CLINIC | Age: 87
Discharge: HOME | End: 2022-05-12
Attending: INTERNAL MEDICINE | Admitting: INTERNAL MEDICINE
Payer: COMMERCIAL

## 2022-05-12 DIAGNOSIS — N28.1 RENAL CYST: ICD-10-CM

## 2022-05-12 PROCEDURE — 76770 US EXAM ABDO BACK WALL COMP: CPT

## 2022-05-13 ENCOUNTER — LAB (OUTPATIENT)
Dept: LAB | Facility: CLINIC | Age: 87
End: 2022-05-13
Payer: COMMERCIAL

## 2022-05-13 DIAGNOSIS — N18.4 CHRONIC KIDNEY DISEASE, STAGE 4 (SEVERE) (H): ICD-10-CM

## 2022-05-13 DIAGNOSIS — N25.81 SECONDARY HYPERPARATHYROIDISM (H): ICD-10-CM

## 2022-05-13 DIAGNOSIS — N18.4 CKD (CHRONIC KIDNEY DISEASE) STAGE 4, GFR 15-29 ML/MIN (H): Primary | ICD-10-CM

## 2022-05-13 LAB
ALBUMIN SERPL-MCNC: 3.7 G/DL (ref 3.4–5)
ALBUMIN UR-MCNC: 100 MG/DL
ANION GAP SERPL CALCULATED.3IONS-SCNC: 8 MMOL/L (ref 3–14)
APPEARANCE UR: CLEAR
BACTERIA #/AREA URNS HPF: ABNORMAL /HPF
BASOPHILS # BLD AUTO: 0 10E3/UL (ref 0–0.2)
BASOPHILS NFR BLD AUTO: 0 %
BILIRUB UR QL STRIP: NEGATIVE
BUN SERPL-MCNC: 49 MG/DL (ref 7–30)
CALCIUM SERPL-MCNC: 9 MG/DL (ref 8.5–10.1)
CHLORIDE BLD-SCNC: 110 MMOL/L (ref 94–109)
CO2 SERPL-SCNC: 23 MMOL/L (ref 20–32)
COLOR UR AUTO: YELLOW
CREAT SERPL-MCNC: 2.51 MG/DL (ref 0.66–1.25)
CREAT UR-MCNC: 100 MG/DL
DEPRECATED CALCIDIOL+CALCIFEROL SERPL-MC: 56 UG/L (ref 20–75)
EOSINOPHIL # BLD AUTO: 0.3 10E3/UL (ref 0–0.7)
EOSINOPHIL NFR BLD AUTO: 4 %
ERYTHROCYTE [DISTWIDTH] IN BLOOD BY AUTOMATED COUNT: 12.6 % (ref 10–15)
GFR SERPL CREATININE-BSD FRML MDRD: 24 ML/MIN/1.73M2
GLUCOSE BLD-MCNC: 114 MG/DL (ref 70–99)
GLUCOSE UR STRIP-MCNC: NEGATIVE MG/DL
HCT VFR BLD AUTO: 44.2 % (ref 40–53)
HGB BLD-MCNC: 14.5 G/DL (ref 13.3–17.7)
HGB UR QL STRIP: NEGATIVE
KETONES UR STRIP-MCNC: NEGATIVE MG/DL
LEUKOCYTE ESTERASE UR QL STRIP: NEGATIVE
LYMPHOCYTES # BLD AUTO: 2.4 10E3/UL (ref 0.8–5.3)
LYMPHOCYTES NFR BLD AUTO: 29 %
MCH RBC QN AUTO: 32.3 PG (ref 26.5–33)
MCHC RBC AUTO-ENTMCNC: 32.8 G/DL (ref 31.5–36.5)
MCV RBC AUTO: 98 FL (ref 78–100)
MONOCYTES # BLD AUTO: 0.7 10E3/UL (ref 0–1.3)
MONOCYTES NFR BLD AUTO: 9 %
NEUTROPHILS # BLD AUTO: 4.9 10E3/UL (ref 1.6–8.3)
NEUTROPHILS NFR BLD AUTO: 58 %
NITRATE UR QL: NEGATIVE
PH UR STRIP: 5.5 [PH] (ref 5–7)
PHOSPHATE SERPL-MCNC: 3.3 MG/DL (ref 2.5–4.5)
PLATELET # BLD AUTO: 186 10E3/UL (ref 150–450)
POTASSIUM BLD-SCNC: 4.2 MMOL/L (ref 3.4–5.3)
PROT UR-MCNC: 1.23 G/L
PROT/CREAT 24H UR: 1.23 G/G CR (ref 0–0.2)
PTH-INTACT SERPL-MCNC: 64 PG/ML (ref 18–80)
RBC # BLD AUTO: 4.49 10E6/UL (ref 4.4–5.9)
RBC #/AREA URNS AUTO: ABNORMAL /HPF
SODIUM SERPL-SCNC: 141 MMOL/L (ref 133–144)
SP GR UR STRIP: >=1.03 (ref 1–1.03)
SQUAMOUS #/AREA URNS AUTO: ABNORMAL /LPF
UROBILINOGEN UR STRIP-ACNC: 0.2 E.U./DL
WBC # BLD AUTO: 8.3 10E3/UL (ref 4–11)
WBC #/AREA URNS AUTO: ABNORMAL /HPF

## 2022-05-13 PROCEDURE — 83970 ASSAY OF PARATHORMONE: CPT

## 2022-05-13 PROCEDURE — 81001 URINALYSIS AUTO W/SCOPE: CPT

## 2022-05-13 PROCEDURE — 85025 COMPLETE CBC W/AUTO DIFF WBC: CPT

## 2022-05-13 PROCEDURE — 84156 ASSAY OF PROTEIN URINE: CPT

## 2022-05-13 PROCEDURE — 36415 COLL VENOUS BLD VENIPUNCTURE: CPT

## 2022-05-13 PROCEDURE — 80069 RENAL FUNCTION PANEL: CPT

## 2022-05-13 PROCEDURE — 82306 VITAMIN D 25 HYDROXY: CPT

## 2022-05-16 ENCOUNTER — VIRTUAL VISIT (OUTPATIENT)
Dept: NEPHROLOGY | Facility: CLINIC | Age: 87
End: 2022-05-16
Attending: INTERNAL MEDICINE
Payer: COMMERCIAL

## 2022-05-16 VITALS
HEIGHT: 66 IN | WEIGHT: 185 LBS | BODY MASS INDEX: 29.73 KG/M2 | SYSTOLIC BLOOD PRESSURE: 123 MMHG | DIASTOLIC BLOOD PRESSURE: 67 MMHG

## 2022-05-16 DIAGNOSIS — N18.4 CKD STAGE 4 SECONDARY TO HYPERTENSION (H): Primary | ICD-10-CM

## 2022-05-16 DIAGNOSIS — I12.9 CKD STAGE 4 SECONDARY TO HYPERTENSION (H): Primary | ICD-10-CM

## 2022-05-16 PROCEDURE — 99214 OFFICE O/P EST MOD 30 MIN: CPT | Mod: 95 | Performed by: INTERNAL MEDICINE

## 2022-05-16 ASSESSMENT — PAIN SCALES - GENERAL: PAINLEVEL: NO PAIN (0)

## 2022-05-16 NOTE — LETTER
5/16/2022       RE: Nitish Coreas  [address]  [facility] [address]     Dear Colleague,    Thank you for referring your patient, Nitish Coreas, to the [facility] [place] [facility]. Please see a copy of my visit note below.      Nephrology Progress Note  05/16/2022   Chief complaint: Follow-up for CKD stage IV  History of Present Illness:    Nitish Coreas is a 87 year old with history of COPD, choledocholithiasis s/p ERCP, sphincterotomy and CBD stent placed on 4/21, chronic hypertension, hyperlipidemia, prostatic enlargement and elevated PSA, chronic back pain, ascending aortic and infrarenal abdominal aortic dilatation and aneurysm     The patient was admitted in Apri 2021due to cholelithiasis with obstructive jaundice.  He underwent ERCP, EUS, pus draining from gallbladder, sphincterotomy and CBD stent placed.  Subsequently, gallbladder was removed.  He also received IV antibiotics with ciprofloxacin Flagyl and IV fluid.  He was also noted to have COPD exacerbation and required oxygen supplement in adjunct to home oxygen therapy.  He was also noted to have acute kidney injury with creatinine increased to 2.92 on the day of dismissal.     Upon chart review, the patient was noted to have creatinine up 1.3-1.5 since [id number].  Since 2015, his creatinine has gradually increased to between 1.5-1.7.  In 2019, his creatinine has increased 2.03.  He has an ADEEL when he was admitted as mentioned above with creatinine peak 2.98.  Creatinine has been down to 2.11 on 4/27/2021 and since then has fluctuating between 2.1-2.2.  Patient noted to have urine protein since 2015.  His urine albumin creatinine ratio was 425 mg/g and increased to 2838 mg/g on 7/21.      His UA is essentially no pyuria or red blood cells since 2015.  CT abdomen pelvis and chest in 4/21 showed few bilateral renal cysts with the largest upper pole of the left  knee medially measuring 4.9 cm.  The kidneys have otherwise unremarkable noncontrast appearance cyst.  No stone or hydronephrosis.  The patient also has mild to moderate prostatic enlargement.  No mention about his bladder.  I measure his kidneys by myself on the CT of 4/21 which showed small kidney both size, right 8.4 cm and right 9.1 cm.     His parathyroid hormone is normal at 72 on 8/21 with normal vitamin D.  His hemoglobin is normal at 14.6.      8/30/21: The patient is attending a video visit with his wife.  He could not use [employer] because his computer does not have a camera so we did [employer].  The patient reports that he he was told that he has kidney problem since he was admitted in April 2021.  At that time, he was quite sick and he could not eat and drink for 7 days.  Patient is covering well from the procedure.  And a CBD stent was recently removed 3 weeks ago.  Regarding symptoms, the patient denies any chest pain or short of breath.  The patient has extremity edema, left worsening than right that usually occur again today.  He noticed some foamy urine especially at nighttime.  Patient does not have any difficulty urination.  The patient has no family history of chronic kidney disease.  His blood pressure ranging between 130-138/78 to 82 mmHg.  He is only taking lisinopril 40 mg daily.  The patient has been diagnosed with high blood for more than 10 years and is on 20 mg of lisinopril but was increased to 40 g about 3 years ago.  The patient intermittently take NSAIDs due to lower back pain.  He does not limit salt in his diet but he tried low-salt condiment.  The patient used to weigh 222 pounds but since his admission, his weight down to 185 and he is trying to maintain his weight at 185.  The patient does not have any signs or symptoms of TONY.  I started the patient on trazodone 12.5 mg daily.   1/10/22:He feels ok today. We switched him from chlorthalidone to hydrochlorothiazide lately because he  could not cut the pill in half. Now he does not need to cut it. BP is better now 120/70s. He has no leg swelling. His appetite is not the same. He is trying to stay away from salt. He is no longer having swelling. He does not have lightheadedness or dizziness.  He denies any dysuria or hematuria.  I noted patient has elevated PSA at 5.30 back in 11/23/20 and has CT done in 4/21 which showed prostatic enlargement. Since then there is no other PSA checked. Labs on 1/7/2022 showed creatinine 2.47, potassium 4.3, bicarb 21.  Urine protein creatinine ratio is 2.33 which has dropped from 2.94 on 8/23/2021. His PTH is 87 and vitamin D is 42.  His hemoglobin is stable at 13.4 with normal white blood cell and platelet.  His UA showed trace blood in the urine but RBCs only 0-2.    2/24/22: Seen by Urology (Dr. Robby Paris). Discussed risk and benefit for prostate cancer screening. Pt would discuss with PCP for further screening. Per urology, reasonable to not continue to screen.   5/16/22: He is feeling better.  He has no problem with urination. He has no leg swelling.  Interestingly, he noticed some blood in the urine especially as a day goes by. His appetite is better and he starts to gain more weight. /77 mmHg. He has no lightheadedness or dizziness. Labs on 5/13/2022 showed creatinine 2.51, potassium 4.2, EGFR 24, calcium 9, phosphorus 3.3, albumin 3.7, UPCR 1.23 (improved from 2.33 on 1/7/22).  Hemoglobin 14.5.  UA showed no blood or white blood cell. PTH 64 and vitamin D 56.    Past medical history  Past Medical History:   Diagnosis Date     Hyperlipidemia LDL goal < 100      Hypertension      Past surgical history  Past Surgical History:   Procedure Laterality Date     DENTAL SURGERY  1958    wisdom teeth     ENDOSCOPIC RETROGRADE CHOLANGIOPANCREATOGRAM N/A 4/19/2021    Procedure: ENDOSCOPIC RETROGRADE CHOLANGIOPANCREATOGRAPHY, WITH CALCULUS REMOVAL USING BALLOON  AND CATHETER WITH STENT PLACEMENT;  Surgeon:  Tres Milan MD;  Location:  OR     ENDOSCOPIC ULTRASOUND UPPER GASTROINTESTINAL TRACT (GI) N/A 4/19/2021    Procedure: ENDOSCOPIC ULTRASOUND, ESOPHAGOSCOPY / UPPER GASTROINTESTINAL TRACT (GI);  Surgeon: Tres Milan MD;  Location:  OR     EYE SURGERY Right 2004 and 2005    macular pucker and cataract     LAPAROSCOPIC CHOLECYSTECTOMY N/A 4/20/2021    Procedure: CHOLECYSTECTOMY, LAPAROSCOPIC;  Surgeon: Callie Inman MD;  Location:  OR     TONSILLECTOMY & ADENOIDECTOMY  1949     ZZC REPLANTATION THUMB DISTAL,COMPLETE  1962    cut off right thumb and repair     ZZC TOTAL KNEE ARTHROPLASTY  2011, 2000    left then right     Review of Systems:   14 systems were reviewed and all negative except as mentioned above.   Current Medications:  Current Outpatient Medications   Medication     Boswellia-Glucosamine-Vit D (OSTEO BI-FLEX ONE PER DAY PO)     cetirizine (ZYRTEC) 10 MG tablet     Cholecalciferol (VITAMIN D) 2000 UNITS CAPS     hydrochlorothiazide (HYDRODIURIL) 12.5 MG tablet     ipratropium - albuterol 0.5 mg/2.5 mg/3 mL (DUONEB) 0.5-2.5 (3) MG/3ML neb solution     Krill Oil 500 MG CAPS     lisinopril (ZESTRIL) 40 MG tablet     lovastatin (MEVACOR) 40 MG tablet     Multiple Vitamins-Minerals (MULTIVITAL) TABS     omeprazole (PRILOSEC) 20 MG DR capsule     No current facility-administered medications for this visit.     Physical Exam:   There were no vitals taken for this visit.   There is no height or weight on file to calculate BMI.  No swelling per patient. Otherwise no exam,     Labs:   All labs reviewed by me  Last Renal Panel:  Sodium   Date Value Ref Range Status   05/13/2022 141 133 - 144 mmol/L Final   04/27/2021 139 133 - 144 mmol/L Final     Potassium   Date Value Ref Range Status   05/13/2022 4.2 3.4 - 5.3 mmol/L Final   04/27/2021 4.5 3.4 - 5.3 mmol/L Final     Chloride   Date Value Ref Range Status   05/13/2022 110 (H) 94 - 109 mmol/L Final   04/27/2021 109 94 - 109 mmol/L  Final     Carbon Dioxide   Date Value Ref Range Status   04/27/2021 28 20 - 32 mmol/L Final     Carbon Dioxide (CO2)   Date Value Ref Range Status   05/13/2022 23 20 - 32 mmol/L Final     Anion Gap   Date Value Ref Range Status   05/13/2022 8 3 - 14 mmol/L Final   04/27/2021 2 (L) 3 - 14 mmol/L Final     Glucose   Date Value Ref Range Status   05/13/2022 114 (H) 70 - 99 mg/dL Final   04/27/2021 106 (H) 70 - 99 mg/dL Final     Urea Nitrogen   Date Value Ref Range Status   05/13/2022 49 (H) 7 - 30 mg/dL Final   04/27/2021 31 (H) 7 - 30 mg/dL Final     Creatinine   Date Value Ref Range Status   05/13/2022 2.51 (H) 0.66 - 1.25 mg/dL Final   04/27/2021 2.11 (H) 0.66 - 1.25 mg/dL Final     GFR Estimate   Date Value Ref Range Status   05/13/2022 24 (L) >60 mL/min/1.73m2 Final     Comment:     Effective December 21, 2021 eGFRcr in adults is calculated using the 2021 CKD-EPI creatinine equation which includes age and gender (Iva et al., NEJM, DOI: 10.1056/ZWDZxp9278432)   04/27/2021 27 (L) >60 mL/min/[1.73_m2] Final     Comment:     Non  GFR Calc  Starting 12/18/2018, serum creatinine based estimated GFR (eGFR) will be   calculated using the Chronic Kidney Disease Epidemiology Collaboration   (CKD-EPI) equation.       Calcium   Date Value Ref Range Status   05/13/2022 9.0 8.5 - 10.1 mg/dL Final   04/27/2021 8.5 8.5 - 10.1 mg/dL Final     Phosphorus   Date Value Ref Range Status   05/13/2022 3.3 2.5 - 4.5 mg/dL Final   01/24/2011 3.6 2.5 - 4.9 mg/dL Final     Albumin   Date Value Ref Range Status   05/13/2022 3.7 3.4 - 5.0 g/dL Final   04/22/2021 2.0 (L) 3.4 - 5.0 g/dL Final       Imaging:  I reviewed imaging studies. US renal 5/13/2022 showed:  RIGHT KIDNEY: 10.4 x 5.2 x 4.9 cm . No hydronephrosis or cortical  thinning. Several simple cysts. The largest cyst in the interpolar  region/lower pole measures 4.7 cm, unchanged.      LEFT KIDNEY: 11.1 x 6.6 x 6.0 cm. No hydronephrosis or cortical  thinning.  Several simple cysts. The largest cyst at the upper pole  measures 4.9 cm, unchanged.      BLADDER: Nearly empty. Mild prostatomegaly with prostate volume of 46  mL.     1.  Stable simple renal cysts bilaterally requiring no specific  additional follow-up.  2.  Mild prostatomegaly.    Assessment & Recommendations:   Problem list  # Chronic kidney disease stage IV secondary to chronic hypertension, prior NSAID use and recent acute kidney injury; baseline creatinine 2.4-2.5  #Nonnephrotic range proteinuria; improved  His chronic kidney disease is likely multifactorial in the setting of chronic hypertension, prior NSAID use and prior ADEEL. At this time, he has no symptoms from the CKD standpoints. Electrolytes are in acceptable range and no major acid bases disturbances.  Proteinuria now has improved to 1.23 from maximum of 2.94 after improvement in his blood pressure control.  The patient expressed the wishes to start dialysis if it is necessary but he would like to delay it as long as possible.  #Bilateral renal cysts; stable  The patient has bilateral renal cyst but his kidney size are small.  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  The most recent ultrasound on 5/13/2022 showed stable bilateral renal cysts.  #Hypertension: Controlled  He is currently on lisinopril 40 mg daily and hydrochlorothiazide 12.5 mg daily.  # MBD  PTH 64, vitamin D 56.  The patient is vitamin D supplement a total of 3000 U/day.  Calcium and phosphorus are within normal limit.  # Surveillance for anemia in CKD  He is hemoglobin is 14.5 which is excellent.  We will continue to monitor his hemoglobin every visit.  # Prostatic enlargement with elevated PSA; reasonable to not continuee to monitor per urology  PSA mildly elevated, 6.58 in 1/22. Referred to urology (Dr. Paris) who discussed risk and benefit of screening at his age and It is reasonable to not continue with screening per   [place].     Follow-up in 4 months with labs    I spent  30  minutes on the date of the encounter doing chart review, history and exam, documentation and further activities as noted above. 15 minutes of this visit is dedicated to direct patient interaction via telephone    Gustavo Cadet MD on 05/16/22        This is a telephone visit because the patient cannot do video.  Total time on the phone as 15 minutes.      Again, thank you for allowing me to participate in the care of your patient.      Sincerely,    Gustavo Cadet MD

## 2022-05-16 NOTE — PROGRESS NOTES
This is a telephone visit because the patient cannot do video.  Total time on the phone as 15 minutes.

## 2022-05-16 NOTE — PROGRESS NOTES
Nephrology Progress Note  05/16/2022   Chief complaint: Follow-up for CKD stage IV  History of Present Illness:    Nitish Coreas is a 87 year old with history of COPD, choledocholithiasis s/p ERCP, sphincterotomy and CBD stent placed on 4/21, chronic hypertension, hyperlipidemia, prostatic enlargement and elevated PSA, chronic back pain, ascending aortic and infrarenal abdominal aortic dilatation and aneurysm     The patient was admitted in Apri 2021due to cholelithiasis with obstructive jaundice.  He underwent ERCP, EUS, pus draining from gallbladder, sphincterotomy and CBD stent placed.  Subsequently, gallbladder was removed.  He also received IV antibiotics with ciprofloxacin Flagyl and IV fluid.  He was also noted to have COPD exacerbation and required oxygen supplement in adjunct to home oxygen therapy.  He was also noted to have acute kidney injury with creatinine increased to 2.92 on the day of dismissal.     Upon chart review, the patient was noted to have creatinine up 1.3-1.5 since [id number].  Since 2015, his creatinine has gradually increased to between 1.5-1.7.  In 2019, his creatinine has increased 2.03.  He has an ADEEL when he was admitted as mentioned above with creatinine peak 2.98.  Creatinine has been down to 2.11 on 4/27/2021 and since then has fluctuating between 2.1-2.2.  Patient noted to have urine protein since 2015.  His urine albumin creatinine ratio was 425 mg/g and increased to 2838 mg/g on 7/21.      His UA is essentially no pyuria or red blood cells since 2015.  CT abdomen pelvis and chest in 4/21 showed few bilateral renal cysts with the largest upper pole of the left knee medially measuring 4.9 cm.  The kidneys have otherwise unremarkable noncontrast appearance cyst.  No stone or hydronephrosis.  The patient also has mild to moderate prostatic enlargement.  No mention about his bladder.  I measure his kidneys by myself on the CT of 4/21 which showed small kidney both size, right 8.4  cm and right 9.1 cm.     His parathyroid hormone is normal at 72 on 8/21 with normal vitamin D.  His hemoglobin is normal at 14.6.      8/30/21: The patient is attending a video visit with his wife.  He could not use [employer] because his computer does not have a camera so we did [employer].  The patient reports that he he was told that he has kidney problem since he was admitted in April 2021.  At that time, he was quite sick and he could not eat and drink for 7 days.  Patient is covering well from the procedure.  And a CBD stent was recently removed 3 weeks ago.  Regarding symptoms, the patient denies any chest pain or short of breath.  The patient has extremity edema, left worsening than right that usually occur again today.  He noticed some foamy urine especially at nighttime.  Patient does not have any difficulty urination.  The patient has no family history of chronic kidney disease.  His blood pressure ranging between 130-138/78 to 82 mmHg.  He is only taking lisinopril 40 mg daily.  The patient has been diagnosed with high blood for more than 10 years and is on 20 mg of lisinopril but was increased to 40 g about 3 years ago.  The patient intermittently take NSAIDs due to lower back pain.  He does not limit salt in his diet but he tried low-salt condiment.  The patient used to weigh 222 pounds but since his admission, his weight down to 185 and he is trying to maintain his weight at 185.  The patient does not have any signs or symptoms of TONY.  I started the patient on trazodone 12.5 mg daily.   1/10/22:He feels ok today. We switched him from chlorthalidone to hydrochlorothiazide lately because he could not cut the pill in half. Now he does not need to cut it. BP is better now 120/70s. He has no leg swelling. His appetite is not the same. He is trying to stay away from salt. He is no longer having swelling. He does not have lightheadedness or dizziness.  He denies any dysuria or hematuria.  I noted patient has  elevated PSA at 5.30 back in 11/23/20 and has CT done in 4/21 which showed prostatic enlargement. Since then there is no other PSA checked. Labs on 1/7/2022 showed creatinine 2.47, potassium 4.3, bicarb 21.  Urine protein creatinine ratio is 2.33 which has dropped from 2.94 on 8/23/2021. His PTH is 87 and vitamin D is 42.  His hemoglobin is stable at 13.4 with normal white blood cell and platelet.  His UA showed trace blood in the urine but RBCs only 0-2.    2/24/22: Seen by Urology (Dr. Robby Paris). Discussed risk and benefit for prostate cancer screening. Pt would discuss with PCP for further screening. Per urology, reasonable to not continue to screen.   5/16/22: He is feeling better.  He has no problem with urination. He has no leg swelling.  Interestingly, he noticed some blood in the urine especially as a day goes by. His appetite is better and he starts to gain more weight. /77 mmHg. He has no lightheadedness or dizziness. Labs on 5/13/2022 showed creatinine 2.51, potassium 4.2, EGFR 24, calcium 9, phosphorus 3.3, albumin 3.7, UPCR 1.23 (improved from 2.33 on 1/7/22).  Hemoglobin 14.5.  UA showed no blood or white blood cell. PTH 64 and vitamin D 56.    Past medical history  Past Medical History:   Diagnosis Date     Hyperlipidemia LDL goal < 100      Hypertension      Past surgical history  Past Surgical History:   Procedure Laterality Date     DENTAL SURGERY  1958    wisdom teeth     ENDOSCOPIC RETROGRADE CHOLANGIOPANCREATOGRAM N/A 4/19/2021    Procedure: ENDOSCOPIC RETROGRADE CHOLANGIOPANCREATOGRAPHY, WITH CALCULUS REMOVAL USING BALLOON  AND CATHETER WITH STENT PLACEMENT;  Surgeon: Tres Milan MD;  Location:  OR     ENDOSCOPIC ULTRASOUND UPPER GASTROINTESTINAL TRACT (GI) N/A 4/19/2021    Procedure: ENDOSCOPIC ULTRASOUND, ESOPHAGOSCOPY / UPPER GASTROINTESTINAL TRACT (GI);  Surgeon: Tres Milan MD;  Location:  OR     EYE SURGERY Right 2004 and 2005    macular pucker and  cataract     LAPAROSCOPIC CHOLECYSTECTOMY N/A 4/20/2021    Procedure: CHOLECYSTECTOMY, LAPAROSCOPIC;  Surgeon: Callie Inman MD;  Location: [facility] OR     TONSILLECTOMY & ADENOIDECTOMY  1949     [facility] REPLANTATION THUMB DISTAL,COMPLETE  1962    cut off right thumb and repair     ZC TOTAL KNEE ARTHROPLASTY  2011, 2000    left then right     Review of Systems:   14 systems were reviewed and all negative except as mentioned above.   Current Medications:  Current Outpatient Medications   Medication     Boswellia-Glucosamine-Vit D (OSTEO BI-FLEX ONE PER DAY PO)     cetirizine (ZYRTEC) 10 MG tablet     Cholecalciferol (VITAMIN D) 2000 UNITS CAPS     hydrochlorothiazide (HYDRODIURIL) 12.5 MG tablet     ipratropium - albuterol 0.5 mg/2.5 mg/3 mL (DUONEB) 0.5-2.5 (3) MG/3ML neb solution     Krill Oil 500 MG CAPS     lisinopril (ZESTRIL) 40 MG tablet     lovastatin (MEVACOR) 40 MG tablet     Multiple Vitamins-Minerals (MULTIVITAL) TABS     omeprazole (PRILOSEC) 20 MG DR capsule     No current facility-administered medications for this visit.     Physical Exam:   There were no vitals taken for this visit.   There is no height or weight on file to calculate BMI.  No swelling per patient. Otherwise no exam,     Labs:   All labs reviewed by me  Last Renal Panel:  Sodium   Date Value Ref Range Status   05/13/2022 141 133 - 144 mmol/L Final   04/27/2021 139 133 - 144 mmol/L Final     Potassium   Date Value Ref Range Status   05/13/2022 4.2 3.4 - 5.3 mmol/L Final   04/27/2021 4.5 3.4 - 5.3 mmol/L Final     Chloride   Date Value Ref Range Status   05/13/2022 110 (H) 94 - 109 mmol/L Final   04/27/2021 109 94 - 109 mmol/L Final     Carbon Dioxide   Date Value Ref Range Status   04/27/2021 28 20 - 32 mmol/L Final     Carbon Dioxide (CO2)   Date Value Ref Range Status   05/13/2022 23 20 - 32 mmol/L Final     Anion Gap   Date Value Ref Range Status   05/13/2022 8 3 - 14 mmol/L Final   04/27/2021 2 (L) 3 - 14 mmol/L Final     Glucose   Date  Value Ref Range Status   05/13/2022 114 (H) 70 - 99 mg/dL Final   04/27/2021 106 (H) 70 - 99 mg/dL Final     Urea Nitrogen   Date Value Ref Range Status   05/13/2022 49 (H) 7 - 30 mg/dL Final   04/27/2021 31 (H) 7 - 30 mg/dL Final     Creatinine   Date Value Ref Range Status   05/13/2022 2.51 (H) 0.66 - 1.25 mg/dL Final   04/27/2021 2.11 (H) 0.66 - 1.25 mg/dL Final     GFR Estimate   Date Value Ref Range Status   05/13/2022 24 (L) >60 mL/min/1.73m2 Final     Comment:     Effective December 21, 2021 eGFRcr in adults is calculated using the 2021 CKD-EPI creatinine equation which includes age and gender (Iva et al., NEJ, DOI: 10.1056/QBRReu3560171)   04/27/2021 27 (L) >60 mL/min/[1.73_m2] Final     Comment:     Non  GFR Calc  Starting 12/18/2018, serum creatinine based estimated GFR (eGFR) will be   calculated using the Chronic Kidney Disease Epidemiology Collaboration   (CKD-EPI) equation.       Calcium   Date Value Ref Range Status   05/13/2022 9.0 8.5 - 10.1 mg/dL Final   04/27/2021 8.5 8.5 - 10.1 mg/dL Final     Phosphorus   Date Value Ref Range Status   05/13/2022 3.3 2.5 - 4.5 mg/dL Final   01/24/2011 3.6 2.5 - 4.9 mg/dL Final     Albumin   Date Value Ref Range Status   05/13/2022 3.7 3.4 - 5.0 g/dL Final   04/22/2021 2.0 (L) 3.4 - 5.0 g/dL Final       Imaging:  I reviewed imaging studies. US renal 5/13/2022 showed:  RIGHT KIDNEY: 10.4 x 5.2 x 4.9 cm . No hydronephrosis or cortical  thinning. Several simple cysts. The largest cyst in the interpolar  region/lower pole measures 4.7 cm, unchanged.      LEFT KIDNEY: 11.1 x 6.6 x 6.0 cm. No hydronephrosis or cortical  thinning. Several simple cysts. The largest cyst at the upper pole  measures 4.9 cm, unchanged.      BLADDER: Nearly empty. Mild prostatomegaly with prostate volume of 46  mL.     1.  Stable simple renal cysts bilaterally requiring no specific  additional follow-up.  2.  Mild prostatomegaly.    Assessment & Recommendations:   Problem  list  # Chronic kidney disease stage IV secondary to chronic hypertension, prior NSAID use and recent acute kidney injury; baseline creatinine 2.4-2.5  #Nonnephrotic range proteinuria; improved  His chronic kidney disease is likely multifactorial in the setting of chronic hypertension, prior NSAID use and prior ADEEL. At this time, he has no symptoms from the CKD standpoints. Electrolytes are in acceptable range and no major acid bases disturbances.  Proteinuria now has improved to 1.23 from maximum of 2.94 after improvement in his blood pressure control.  The patient expressed the wishes to start dialysis if it is necessary but he would like to delay it as long as possible.  #Bilateral renal cysts; stable  The patient has bilateral renal cyst but his kidney size are small.  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  The most recent ultrasound on 5/13/2022 showed stable bilateral renal cysts.  #Hypertension: Controlled  He is currently on lisinopril 40 mg daily and hydrochlorothiazide 12.5 mg daily.  # MBD  PTH 64, vitamin D 56.  The patient is vitamin D supplement a total of 3000 U/day.  Calcium and phosphorus are within normal limit.  # Surveillance for anemia in CKD  He is hemoglobin is 14.5 which is excellent.  We will continue to monitor his hemoglobin every visit.  # Prostatic enlargement with elevated PSA; reasonable to not continuee to monitor per urology  PSA mildly elevated, 6.58 in 1/22. Referred to urology (Dr. Paris) who discussed risk and benefit of screening at his age and It is reasonable to not continue with screening per Dr. Paris.     Follow-up in 4 months with labs    I spent  30  minutes on the date of the encounter doing chart review, history and exam, documentation and further activities as noted above. 15 minutes of this visit is dedicated to direct patient interaction via telephone    Gustavo Cadet MD on 05/16/22

## 2022-05-16 NOTE — PATIENT INSTRUCTIONS
Thank you for having a visit with me today.  Your numbers are stable.  We will continue to watch your number an assess the need for renal replacement therapy.   See you again in 4 months.  Please continue low-salt diet and stay well-hydrated.  Stay away from NSAIDs.

## 2022-05-19 ENCOUNTER — TELEPHONE (OUTPATIENT)
Dept: INTERNAL MEDICINE | Facility: CLINIC | Age: 87
End: 2022-05-19
Payer: COMMERCIAL

## 2022-05-19 DIAGNOSIS — G89.29 CHRONIC BILATERAL LOW BACK PAIN, UNSPECIFIED WHETHER SCIATICA PRESENT: Primary | ICD-10-CM

## 2022-05-19 DIAGNOSIS — M54.50 CHRONIC BILATERAL LOW BACK PAIN, UNSPECIFIED WHETHER SCIATICA PRESENT: Primary | ICD-10-CM

## 2022-05-19 NOTE — TELEPHONE ENCOUNTER
Referral signed. Please let patient know that the pain clinic may need to update the imaging of his back prior to pursuing injections.

## 2022-05-19 NOTE — TELEPHONE ENCOUNTER
Dr. Vásquez, patient looking for Pain Clinic referral for back pain  epidural .     Order pended.     Sharonda Diggs RN  [facility]

## 2022-05-20 ENCOUNTER — TELEPHONE (OUTPATIENT)
Dept: MULTI SPECIALTY CLINIC | Facility: CLINIC | Age: 87
End: 2022-05-20

## 2022-05-20 NOTE — TELEPHONE ENCOUNTER
Patient called to relay below message. Per pt he does not want to go downtown to pain clinic. Had injection 5-6 years ago at a clinic in [place] (unable to find in chart), does not think it was [facility]. Advised pt to follow up if he would need a new referral to be faxed to this clinic if he figures out where he has had previous injections or if he would like a pain referral through [facility] to a closer pain clinic. Pt verbalized understanding.

## 2022-05-27 ENCOUNTER — TRANSFERRED RECORDS (OUTPATIENT)
Dept: HEALTH INFORMATION MANAGEMENT | Facility: CLINIC | Age: 87
End: 2022-05-27
Payer: COMMERCIAL

## 2022-06-13 ENCOUNTER — TRANSFERRED RECORDS (OUTPATIENT)
Dept: HEALTH INFORMATION MANAGEMENT | Facility: CLINIC | Age: 87
End: 2022-06-13

## 2022-07-07 ENCOUNTER — TELEPHONE (OUTPATIENT)
Dept: INTERNAL MEDICINE | Facility: CLINIC | Age: 87
End: 2022-07-07

## 2022-07-07 DIAGNOSIS — I71.43 ANEURYSM OF INFRARENAL ABDOMINAL AORTA (H): Primary | ICD-10-CM

## 2022-07-07 NOTE — TELEPHONE ENCOUNTER
I'm unclear what MRI or aneurysm this is even referring to. Need more info before I can give an educated response.

## 2022-07-07 NOTE — TELEPHONE ENCOUNTER
[facility] called, stated that patient's aneurysm grew and patient requests referral to a cardiologist (patient does not want to see [facility] cardiologists) to get in as soon as possible. Please advise.     If more information is needed please contact [facility] at [phone].

## 2022-07-07 NOTE — TELEPHONE ENCOUNTER
Called and spoke to Claudia from [facility]. Patient went to the pain clinic today and a MRI was completed-the MRI showed that the patient's aortic aneurysm is bigger and has grown since the patient's last MRI.     Pain clinic recommended patient reach out to PCP for cardiology referral. Patient does not want to see a cardiologist within the [facility] system.     Routing to PCP for possible cardiology referral (referral pended)? Ok for patient to wait until August or September to be seen or does patient need urgent referral? Do you want appointment with the patient first to discuss-does look like patient is due for annual wellness based on recommendations from last office visit?    Fabiola Ashley RN

## 2022-07-07 NOTE — TELEPHONE ENCOUNTER
Called and spoke to the patient. Patient states he had an MRI completed at [facility] in [place] and has been working with Jason Pink. Jason Pink is comparing the MRI the patient had a couple days ago to the MRI completed in 2018.     Called [facility] in [place] who transferred triage nurse to [facility] in [place] where Jason Pink's care coordinator, Lyssa, works. Phone number for [facility] [place]: [phone].    Left voicemail for Lyssa to call [facility] back and speak to a triage nurse. Upon call back, need to obtain MRI results from 2018 and most recent for Dr. Vásquez to review prior to placing a cardiology referral.     Fabiola Ashley RN

## 2022-07-11 NOTE — TELEPHONE ENCOUNTER
Routing to PCP for further recommendations. Unable to reach [facility] Care Coordinator to obtain MRI results from 2018 and Recent.       Patient Contact    Attempt # 3    Was call answered?  No.  Left message on voicemail with information to call me back.  Upon call back, need to obtain MRI results from 2018 and most recent for Dr. Vásquez to review prior to placing a cardiology referral.

## 2022-07-11 NOTE — TELEPHONE ENCOUNTER
Again, these notes do not discuss where this aneurysm is in the body. I really can't comment on the next best steps with such limited information. I recommend he discuss these issues directly with the provider who appears to be actively managing this issue. Happy to help however I'm able to but am unable to with this limited information. Lastly, if a referral to a cardiology IS the next best step and this patient has made it clear that he is not interested in seeing a [facility] cardiologist, then he may be best served by getting a referral to cardiology in another healthcare system from the provider who's actively managing this issue.

## 2022-07-12 ENCOUNTER — TELEPHONE (OUTPATIENT)
Dept: OTHER | Facility: CLINIC | Age: 87
End: 2022-07-12

## 2022-07-12 DIAGNOSIS — I71.40 ABDOMINAL AORTIC ANEURYSM (AAA) 3.0 CM TO 5.5 CM IN DIAMETER IN MALE (H): Primary | ICD-10-CM

## 2022-07-12 NOTE — TELEPHONE ENCOUNTER
Called pt to relay Dr. Vásquez's message. Pt agrees to plan. Pt will call vascular in a couple days if he doesn't hear anything from them.

## 2022-07-12 NOTE — TELEPHONE ENCOUNTER
Called pt to relay Dr. Vásquez's message. Pt stated nobody is following him for this issue and Jason Pink from [facility] that noticed it. Pt stated he will see a cardiologist in [facility] system he just wont go to the  of  area. Routing to PCP for other recommendations.

## 2022-07-12 NOTE — TELEPHONE ENCOUNTER
Pt referred to [facility] by Pardeep Vásquez MD for Aneurysm of infrarenal abdominal aorta   Per review of 7/7/22 encounter notes from referring provider patient had MRI showing AAA.  No MRI located in chart or care everywhere or under media tab?     Routing to referring provider to provide more information as to where MRI results are viewable and size of aneurysm.    Yu LUJAN, RN    [facility]  Office: [phone]  Fax: [phone]

## 2022-07-12 NOTE — TELEPHONE ENCOUNTER
I have reviewed the MRI results and it appears this is an abdominal aortic aneurysm. Vascular referral would be next step, not cardiology. Vascular referral placed.

## 2022-07-19 NOTE — TELEPHONE ENCOUNTER
Please schedule patient for A/I US and consult with Vascular Surgery at next available.    Appt note:  Ref by Dr. Pardeep Vásquez for AAA for 4.5cm AAA as seen on lumbar spine MRI; aorto/iliac US to be scheduled prior.    Елена Enriquez, RADHAN, RN-[facility]

## 2022-07-20 NOTE — TELEPHONE ENCOUNTER
Future Appointments   Date Time Provider Department Center   7/27/2022  8:00 AM [id number]   7/27/2022  9:30 AM Elizabeth Durand MD [facility]   9/8/2022  8:30 AM [facility]    9/12/2022  8:30 AM Gustavo Cadet MD [facility]

## 2022-07-27 ENCOUNTER — HOSPITAL ENCOUNTER (OUTPATIENT)
Dept: ULTRASOUND IMAGING | Facility: CLINIC | Age: 87
Discharge: HOME | End: 2022-07-27
Attending: SURGERY
Payer: COMMERCIAL

## 2022-07-27 ENCOUNTER — OFFICE VISIT (OUTPATIENT)
Dept: OTHER | Facility: CLINIC | Age: 87
End: 2022-07-27
Attending: INTERNAL MEDICINE
Payer: COMMERCIAL

## 2022-07-27 VITALS
HEART RATE: 60 BPM | BODY MASS INDEX: 29.09 KG/M2 | DIASTOLIC BLOOD PRESSURE: 80 MMHG | WEIGHT: 181 LBS | SYSTOLIC BLOOD PRESSURE: 138 MMHG | HEIGHT: 66 IN

## 2022-07-27 DIAGNOSIS — I71.43 ANEURYSM OF INFRARENAL ABDOMINAL AORTA (H): Primary | ICD-10-CM

## 2022-07-27 DIAGNOSIS — I71.40 ABDOMINAL AORTIC ANEURYSM (AAA) 3.0 CM TO 5.5 CM IN DIAMETER IN MALE (H): ICD-10-CM

## 2022-07-27 PROCEDURE — 93978 VASCULAR STUDY: CPT

## 2022-07-27 PROCEDURE — 99205 OFFICE O/P NEW HI 60 MIN: CPT | Performed by: SURGERY

## 2022-07-27 PROCEDURE — G0463 HOSPITAL OUTPT CLINIC VISIT: HCPCS

## 2022-07-27 PROCEDURE — 93978 VASCULAR STUDY: CPT | Mod: 26 | Performed by: SURGERY

## 2022-07-27 NOTE — PROGRESS NOTES
"[facility]        Patient is here for a  follow up.     Pt is currently taking Statin.    /80 (BP Location: Left arm, Patient Position: Chair, Cuff Size: Adult Regular)   Pulse 60   Ht 5' 6\" (1.676 m)   Wt 181 lb (82.1 kg)   BMI 29.21 kg/m      The provider has been notified that the patient has no concerns.     Questions patient would like addressed today are: N/A.    Refills are needed: N/A    Has homecare services and agency name:  Jyothi Macario MA    "

## 2022-07-27 NOTE — PROGRESS NOTES
Vascular Surgery Clinic     Nitish Coreas MRN# [id number]   YOB: 1934 Age: 87 year old        Reason for Clinic Visit: AAA              History of Present Illness:   Nitish Coreas is an 87 year old male who presents for evaluation of an abdominal aortic aneurysm.     He has some chronic back pain and limits his walking from this and from dyspnea. 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 He can walk perhaps 1000' before feeling short of breath and can only walk one stairstep at a time to avoid getting short of breath. He is also cautious with his legs, as his right knee replacement seems less stable to him. He has a chair lift at home to get up and down a flight of stairs.     He has no history of chest pain, no lightheadedness. Wore oxygen after his hospitalization for cholecystitis; he is not currently using supplemental oxygen--did not need it after October 2021. He feels like his right foot has been a little painful since he received an epidural injection. He gets nocturnal cramps but does not otherwise get cramps in his legs with walking or exercise. He has no history of nonhealing wounds. No abdominal pain.        Mr. Coreas was initially evaluated by [facility] for back pain in 2017, when he underwent MR imaging for the back that also revealed the abdominal aortic aneurysm. He had a secondary MR this year. The aneurysm was noted to have progressed from 4.5 to 4.8 cm in the interim.           Past Medical History:   I have personally reviewed the following:   Past Medical History:   Diagnosis Date     Hyperlipidemia LDL goal < 100      Hypertension    COPD  Chronic back pain  Cholelithiasis s/p sphincterotomy and common bile duct stent placement 04/2021, delayed laparoscopic cholecystectomy  CKD stage IV  BPH; hx of elevated PSA, decided to forego further screening/intervention after discussion with Urology           Past Surgical History:   I have personally reviewed the following:   Past Surgical History:   Procedure Laterality Date     DENTAL SURGERY      wisdom teeth     ENDOSCOPIC RETROGRADE CHOLANGIOPANCREATOGRAM N/A 2021    Procedure: ENDOSCOPIC RETROGRADE CHOLANGIOPANCREATOGRAPHY, WITH CALCULUS REMOVAL USING BALLOON  AND CATHETER WITH STENT PLACEMENT;  Surgeon: Tres Milan MD;  Location:  OR     ENDOSCOPIC ULTRASOUND UPPER GASTROINTESTINAL TRACT (GI) N/A 2021    Procedure: ENDOSCOPIC ULTRASOUND, ESOPHAGOSCOPY / UPPER GASTROINTESTINAL TRACT (GI);  Surgeon: Tres Milan MD;  Location:  OR     EYE SURGERY Right  and     macular pucker and cataract     LAPAROSCOPIC CHOLECYSTECTOMY N/A 2021    Procedure: CHOLECYSTECTOMY, LAPAROSCOPIC;  Surgeon: Callie Inman MD;  Location:  OR     TONSILLECTOMY & ADENOIDECTOMY       ZZ REPLANTATION THUMB DISTAL,COMPLETE      cut off right thumb and repair     ZC TOTAL KNEE ARTHROPLASTY  2000    left then right            Social History:   I have personally reviewed the following:   Social History     Tobacco Use     Smoking status: Former Smoker     Packs/day: 4.00     Years: 45.00     Pack years: 180.00     Types: Cigarettes     Quit date: 1991     Years since quittin.2     Smokeless tobacco: Never Used   Substance Use Topics     Alcohol use: Yes     Alcohol/week: 0.0 standard drinks     Comment: 1-2 twice per week     Smoked 45 years at > 2ppd, up to 4-5 packs a day; quit cold-turkey May 5 1991. Drinks up to 2-3 cocktails in a week. No illicit drugs.   Professional  for 67 years.           Family History:     Family History   Problem Relation Age of Onset     Cancer Mother      Respiratory Mother      Heart Disease Father      Cerebrovascular Disease Father      Cerebrovascular Disease Sister         CVA x 2     Dementia Sister      Heart Disease Son      Breast Cancer Daughter      Son had  a thoracoabdominal abdominal aortic aneurysm repaired at [place] with stent-grafts; no other known aneurysms in the family.          Allergies:     Allergies   Allergen Reactions     Penicillins Anaphylaxis     Amoxicillin      Aspirin      bleeding     Benadryl [Anti-Itch]      Doesn't help in allergic reaction. Benadryl cream causes worsening rash        Ibuprofen Sodium      Esophageal bleeding     Morphine Sulfate              Medications:     Current Outpatient Medications Ordered in Epic   Medication     B[place]-Glucosamine-Vit D (OSTEO BI-FLEX ONE PER DAY PO)     Cholecalciferol (VITAMIN D) 2000 UNITS CAPS     hydrochlorothiazide (HYDRODIURIL) 12.5 MG tablet     ipratropium - albuterol 0.5 mg/2.5 mg/3 mL (DUONEB) 0.5-2.5 (3) MG/3ML neb solution     Krill Oil 500 MG CAPS     lisinopril (ZESTRIL) 40 MG tablet     lovastatin (MEVACOR) 40 MG tablet     Multiple Vitamins-Minerals (MULTIVITAL) TABS     omeprazole (PRILOSEC) 20 MG DR capsule     No current Epic-ordered facility-administered medications on file.             Review of Systems:   The 10 point Review of Systems is negative other than noted in the HPI          Physical Exam:   Vitals were reviewed      BP: 138/80 Pulse: 60              General: sitting comfortably on exam table, NAD. Accompanied by his wife.   Neuro/Psych: A&O x 4, pleasantly conversant   HENT: EOMI and conjugate. Moist mucous membranes. Wears glasses, mildly hoarse voice.   Cardiac: Regular rate and rhythm, no m/g appreciated but very distant cardiac sounds  Pulm: Lungs CTAB, no w/r/r.  Abd: Soft, non-distended, no tenderness to palpation. Centripetal obesity.  Extrem: Grossly normal and symmetric ROM in all four extremities. No edema.  Skin: Clean, dry, no rashes or wounds.  Vasc: palpable right DP, unable to feel right PT well; palpable left PT, unable to feel left DP very well.           Data:   Labs:       Lab Results   Component Value Date     05/13/2022     04/27/2021  "   Lab Results   Component Value Date    CHLORIDE 110 05/13/2022    CHLORIDE 109 04/27/2021    Lab Results   Component Value Date    BUN 49 05/13/2022    BUN 31 04/27/2021      Lab Results   Component Value Date    POTASSIUM 4.2 05/13/2022    POTASSIUM 4.5 04/27/2021    Lab Results   Component Value Date    CO2 23 05/13/2022    CO2 28 04/27/2021    Lab Results   Component Value Date    CR 2.51 05/13/2022    CR 2.11 04/27/2021        Lab Results   Component Value Date    WBC 8.3 05/13/2022    HGB 14.5 05/13/2022    HCT 44.2 05/13/2022    MCV 98 05/13/2022     05/13/2022       Echocardiogram (8/2/21): \"Left ventricular systolic function is normal. The visual ejection fraction is 55-60%. No regional wall motion abnormalities noted. The ascending aorta is Moderately dilated.(4.7cm) Mild aortic root dilatation.(4.2cm) There is mild (1+) aortic regurgitation. The study was technically difficult. There is no comparison study available.\"    I have reviewed the following images:  Duplex Aorta (7/27/22): \"Infrarenal abdominal aortic aneurysm measuring approximately 4.57 x 4.78 cm, not significantly changed in size or morphology compared to prior CT imaging of 2021. Widely patent bilateral iliac arteries without aneurysmal degeneration.\"    CT Chest/Abd/Pelvis (4/18/21): \"1.  Dilatation of the common duct measures 1.7 cm, with subtle increased density in the region of the inferior common duct possibly representing obstructing choledocholithiasis. MRCP or ERCP should be considered for further evaluation. 2.  Infrarenal abdominal aortic aneurysm measures up to 4.8 cm AP. 3.  Aneurysmal dilatation of the ascending thoracic aorta measures 4.8 cm in diameter. 4.  Mild gallbladder distention. If there is clinical suspicion for cholecystitis, gallbladder ultrasound should be considered for further evaluation. 5.  Trace amount of nonspecific free fluid in the pelvis. 6.  Scattered colonic diverticulosis, without convincing " "evidence for diverticulitis. 7.  Emphysema.\"   AAA was 48.7 x 47.6 mm in maximal diameter.               Assessment and Plan:   Mr. Coreas is an 87 year old male with history of COPD, HTN, DL, CKD stage IV, who presents with an infrarenal abdominal aortic aneurysm, approximately 4.8 cm.       Reviewed the imaging with Mr. Coreas and his wife.     Discussed that repair would be considered at a size of > 5.5 - 6 cm. Explained that the purpose of repair is to prevent rupture.      The rate of growth has been relatively slow over time, and I believe he may never require repair.     Will plan to see him in 1 year with a repeat aortic duplex US (annual imaging recommended for AAA between 4-4.9 cm), or sooner if any questions or concerns arise.    Elizabeth Durand MD    Total time spent on the date of this encounter doing: chart review, review of test results, patient visit, physical exam, education, counseling, developing plan of care, and documenting = 60 minutes    "

## 2022-09-11 DIAGNOSIS — I10 HYPERTENSION GOAL BP (BLOOD PRESSURE) < 140/90: ICD-10-CM

## 2022-09-13 RX ORDER — LISINOPRIL 40 MG/1
40 TABLET ORAL DAILY
Qty: 90 TABLET | Refills: 0 | Status: SHIPPED | OUTPATIENT
Start: 2022-09-13 | End: 2022-10-18

## 2022-09-22 ENCOUNTER — LAB (OUTPATIENT)
Dept: LAB | Facility: CLINIC | Age: 87
End: 2022-09-22
Payer: COMMERCIAL

## 2022-09-22 DIAGNOSIS — I12.9 CKD STAGE 4 SECONDARY TO HYPERTENSION (H): ICD-10-CM

## 2022-09-22 DIAGNOSIS — N18.4 CKD STAGE 4 SECONDARY TO HYPERTENSION (H): ICD-10-CM

## 2022-09-22 LAB
ALBUMIN MFR UR ELPH: 124 MG/DL
ALBUMIN SERPL-MCNC: 3.4 G/DL (ref 3.4–5)
ALBUMIN UR-MCNC: 100 MG/DL
ANION GAP SERPL CALCULATED.3IONS-SCNC: 6 MMOL/L (ref 3–14)
APPEARANCE UR: CLEAR
BACTERIA #/AREA URNS HPF: ABNORMAL /HPF
BASOPHILS # BLD AUTO: 0 10E3/UL (ref 0–0.2)
BASOPHILS NFR BLD AUTO: 0 %
BILIRUB UR QL STRIP: NEGATIVE
BUN SERPL-MCNC: 48 MG/DL (ref 7–30)
CALCIUM SERPL-MCNC: 9 MG/DL (ref 8.5–10.1)
CHLORIDE BLD-SCNC: 110 MMOL/L (ref 94–109)
CO2 SERPL-SCNC: 23 MMOL/L (ref 20–32)
COLOR UR AUTO: YELLOW
CREAT SERPL-MCNC: 2.36 MG/DL (ref 0.66–1.25)
CREAT UR-MCNC: 97.9 MG/DL
DEPRECATED CALCIDIOL+CALCIFEROL SERPL-MC: 55 UG/L (ref 20–75)
EOSINOPHIL # BLD AUTO: 0.4 10E3/UL (ref 0–0.7)
EOSINOPHIL NFR BLD AUTO: 4 %
ERYTHROCYTE [DISTWIDTH] IN BLOOD BY AUTOMATED COUNT: 12.8 % (ref 10–15)
GFR SERPL CREATININE-BSD FRML MDRD: 26 ML/MIN/1.73M2
GLUCOSE BLD-MCNC: 115 MG/DL (ref 70–99)
GLUCOSE UR STRIP-MCNC: NEGATIVE MG/DL
HCT VFR BLD AUTO: 41.6 % (ref 40–53)
HGB BLD-MCNC: 14.2 G/DL (ref 13.3–17.7)
HGB UR QL STRIP: ABNORMAL
KETONES UR STRIP-MCNC: NEGATIVE MG/DL
LEUKOCYTE ESTERASE UR QL STRIP: NEGATIVE
LYMPHOCYTES # BLD AUTO: 2.4 10E3/UL (ref 0.8–5.3)
LYMPHOCYTES NFR BLD AUTO: 31 %
MCH RBC QN AUTO: 32.6 PG (ref 26.5–33)
MCHC RBC AUTO-ENTMCNC: 34.1 G/DL (ref 31.5–36.5)
MCV RBC AUTO: 95 FL (ref 78–100)
MONOCYTES # BLD AUTO: 0.7 10E3/UL (ref 0–1.3)
MONOCYTES NFR BLD AUTO: 9 %
MUCOUS THREADS #/AREA URNS LPF: PRESENT /LPF
NEUTROPHILS # BLD AUTO: 4.4 10E3/UL (ref 1.6–8.3)
NEUTROPHILS NFR BLD AUTO: 56 %
NITRATE UR QL: NEGATIVE
PH UR STRIP: 5.5 [PH] (ref 5–7)
PHOSPHATE SERPL-MCNC: 3.5 MG/DL (ref 2.5–4.5)
PLATELET # BLD AUTO: 177 10E3/UL (ref 150–450)
POTASSIUM BLD-SCNC: 4.7 MMOL/L (ref 3.4–5.3)
PROT/CREAT 24H UR: 1.27 MG/MG CR (ref 0–0.2)
PTH-INTACT SERPL-MCNC: 57 PG/ML (ref 15–65)
RBC # BLD AUTO: 4.36 10E6/UL (ref 4.4–5.9)
RBC #/AREA URNS AUTO: ABNORMAL /HPF
SODIUM SERPL-SCNC: 139 MMOL/L (ref 133–144)
SP GR UR STRIP: 1.02 (ref 1–1.03)
SQUAMOUS #/AREA URNS AUTO: ABNORMAL /LPF
UROBILINOGEN UR STRIP-ACNC: 0.2 E.U./DL
WBC # BLD AUTO: 8 10E3/UL (ref 4–11)
WBC #/AREA URNS AUTO: ABNORMAL /HPF

## 2022-09-22 PROCEDURE — 84156 ASSAY OF PROTEIN URINE: CPT

## 2022-09-22 PROCEDURE — 81001 URINALYSIS AUTO W/SCOPE: CPT

## 2022-09-22 PROCEDURE — 83970 ASSAY OF PARATHORMONE: CPT

## 2022-09-22 PROCEDURE — 80069 RENAL FUNCTION PANEL: CPT

## 2022-09-22 PROCEDURE — 85025 COMPLETE CBC W/AUTO DIFF WBC: CPT

## 2022-09-22 PROCEDURE — 36415 COLL VENOUS BLD VENIPUNCTURE: CPT

## 2022-09-22 PROCEDURE — 82306 VITAMIN D 25 HYDROXY: CPT

## 2022-09-26 ENCOUNTER — VIRTUAL VISIT (OUTPATIENT)
Dept: NEPHROLOGY | Facility: CLINIC | Age: 87
End: 2022-09-26
Attending: INTERNAL MEDICINE
Payer: COMMERCIAL

## 2022-09-26 DIAGNOSIS — I12.9 CKD STAGE 4 SECONDARY TO HYPERTENSION (H): Primary | ICD-10-CM

## 2022-09-26 DIAGNOSIS — R80.1 PERSISTENT PROTEINURIA: ICD-10-CM

## 2022-09-26 DIAGNOSIS — I10 HYPERTENSION GOAL BP (BLOOD PRESSURE) < 140/90: Chronic | ICD-10-CM

## 2022-09-26 DIAGNOSIS — N18.4 CKD STAGE 4 SECONDARY TO HYPERTENSION (H): Primary | ICD-10-CM

## 2022-09-26 PROCEDURE — 99214 OFFICE O/P EST MOD 30 MIN: CPT | Mod: 95 | Performed by: INTERNAL MEDICINE

## 2022-09-26 NOTE — LETTER
9/26/2022       RE: Nitish Coreas  [address]  [facility] [address]     Dear Colleague,    Thank you for referring your patient, Nitish Coreas, to the [facility] [place] at [facility]. Please see a copy of my visit note below.      Nephrology Progress Note  09/26/2022   Chief complaint: Follow-up for CKD stage IV  History of Present Illness:    Nitish Coreas is a 87 year old with history of COPD, choledocholithiasis s/p ERCP, sphincterotomy and CBD stent placed on 4/21, chronic hypertension, hyperlipidemia, prostatic enlargement and elevated PSA, chronic back pain, ascending aortic and infrarenal abdominal aortic dilatation and aneurysm     The patient was admitted in Apri 2021due to cholelithiasis with obstructive jaundice.  He underwent ERCP, EUS, pus draining from gallbladder, sphincterotomy and CBD stent placed.  Subsequently, gallbladder was removed.  He also received IV antibiotics with ciprofloxacin Flagyl and IV fluid.  He was also noted to have COPD exacerbation and required oxygen supplement in adjunct to home oxygen therapy.  He was also noted to have acute kidney injury with creatinine increased to 2.92 on the day of dismissal.     Upon chart review, the patient was noted to have creatinine up 1.3-1.5 since [id number].  Since 2015, his creatinine has gradually increased to between 1.5-1.7.  In 2019, his creatinine has increased 2.03.  He has an ADEEL when he was admitted as mentioned above with creatinine peak 2.98.  Creatinine has been down to 2.11 on 4/27/2021 and since then has fluctuating between 2.1-2.2.  Patient noted to have urine protein since 2015.  His urine albumin creatinine ratio was 425 mg/g and increased to 2838 mg/g on 7/21.      His UA is essentially no pyuria or red blood cells since 2015.  CT abdomen pelvis and chest in 4/21 showed few bilateral renal cysts with the largest upper pole of the left  knee medially measuring 4.9 cm.  The kidneys have otherwise unremarkable noncontrast appearance cyst.  No stone or hydronephrosis.  The patient also has mild to moderate prostatic enlargement.  No mention about his bladder.  I measure his kidneys by myself on the CT of 4/21 which showed small kidney both size, right 8.4 cm and right 9.1 cm.     His parathyroid hormone is normal at 72 on 8/21 with normal vitamin D.  His hemoglobin is normal at 14.6.      8/30/21: The patient is attending a video visit with his wife.  He could not use [employer] because his computer does not have a camera so we did [employer].  The patient reports that he he was told that he has kidney problem since he was admitted in April 2021.  At that time, he was quite sick and he could not eat and drink for 7 days.  Patient is covering well from the procedure.  And a CBD stent was recently removed 3 weeks ago.  Regarding symptoms, the patient denies any chest pain or short of breath.  The patient has extremity edema, left worsening than right that usually occur again today.  He noticed some foamy urine especially at nighttime.  Patient does not have any difficulty urination.  The patient has no family history of chronic kidney disease.  His blood pressure ranging between 130-138/78 to 82 mmHg.  He is only taking lisinopril 40 mg daily.  The patient has been diagnosed with high blood for more than 10 years and is on 20 mg of lisinopril but was increased to 40 g about 3 years ago.  The patient intermittently take NSAIDs due to lower back pain.  He does not limit salt in his diet but he tried low-salt condiment.  The patient used to weigh 222 pounds but since his admission, his weight down to 185 and he is trying to maintain his weight at 185.  The patient does not have any signs or symptoms of TONY.  I started the patient on trazodone 12.5 mg daily.   1/10/22:He feels ok today. We switched him from chlorthalidone to hydrochlorothiazide lately because he  could not cut the pill in half. Now he does not need to cut it. BP is better now 120/70s. He has no leg swelling. His appetite is not the same. He is trying to stay away from salt. He is no longer having swelling. He does not have lightheadedness or dizziness.  He denies any dysuria or hematuria.  I noted patient has elevated PSA at 5.30 back in 11/23/20 and has CT done in 4/21 which showed prostatic enlargement. Since then there is no other PSA checked. Labs on 1/7/2022 showed creatinine 2.47, potassium 4.3, bicarb 21.  Urine protein creatinine ratio is 2.33 which has dropped from 2.94 on 8/23/2021. His PTH is 87 and vitamin D is 42.  His hemoglobin is stable at 13.4 with normal white blood cell and platelet.  His UA showed trace blood in the urine but RBCs only 0-2.    2/24/22: Seen by Urology (Dr. Robby Paris). Discussed risk and benefit for prostate cancer screening. Pt would discuss with PCP for further screening. Per urology, reasonable to not continue to screen.   5/16/22: He is feeling better.  He has no problem with urination. He has no leg swelling.  Interestingly, he noticed some blood in the urine especially as a day goes by. His appetite is better and he starts to gain more weight. /77 mmHg. He has no lightheadedness or dizziness. Labs on 5/13/2022 showed creatinine 2.51, potassium 4.2, EGFR 24, calcium 9, phosphorus 3.3, albumin 3.7, UPCR 1.23 (improved from 2.33 on 1/7/22).  Hemoglobin 14.5.  UA showed no blood or white blood cell. PTH 64 and vitamin D 56.  7/27/22: Seen vascular surgery. CT showed 4.8 cm.   9/26/22: He is doing good. He does not go to the ED or have any hospitalization. He has no problem with urination or swelling. His appetite is getting better. He gained about 4 lbs. He had a gallbladder attack and had a surgery and a stent (in 2021). He was 220 Lbs then went down to 180 when he was having GB problem. Now he is 184 Lbs. BP this morning is 135/76 mmHg. UPCR 1.27 g/g. He will  visit [place] in February and prefers to have a visit again in 3/22.     Past medical history  Past Medical History:   Diagnosis Date     BPH (benign prostatic hyperplasia)      Chronic back pain      Chronic kidney disease, stage IV (severe) (H)      Emphysematous COPD (H)      Hyperlipidemia LDL goal < 100      Hypertension      Past surgical history  Past Surgical History:   Procedure Laterality Date     DENTAL SURGERY  1958    wisdom teeth     ENDOSCOPIC RETROGRADE CHOLANGIOPANCREATOGRAM N/A 4/19/2021    Procedure: ENDOSCOPIC RETROGRADE CHOLANGIOPANCREATOGRAPHY, WITH CALCULUS REMOVAL USING BALLOON  AND CATHETER WITH STENT PLACEMENT;  Surgeon: Tres Milan MD;  Location:  OR     ENDOSCOPIC ULTRASOUND UPPER GASTROINTESTINAL TRACT (GI) N/A 4/19/2021    Procedure: ENDOSCOPIC ULTRASOUND, ESOPHAGOSCOPY / UPPER GASTROINTESTINAL TRACT (GI);  Surgeon: Tres Milan MD;  Location:  OR     EYE SURGERY Right 2004 and 2005    macular pucker and cataract     LAPAROSCOPIC CHOLECYSTECTOMY N/A 4/20/2021    Procedure: CHOLECYSTECTOMY, LAPAROSCOPIC;  Surgeon: Callie Inman MD;  Location:  OR     TONSILLECTOMY & ADENOIDECTOMY  1949     ZZC REPLANTATION THUMB DISTAL,COMPLETE  1962    cut off right thumb and repair     ZC TOTAL KNEE ARTHROPLASTY  2011, 2000    left then right     Review of Systems:   14 systems were reviewed and all negative except as mentioned above.   Current Medications:  Current Outpatient Medications   Medication     Boswellia-Glucosamine-Vit D (OSTEO BI-FLEX ONE PER DAY PO)     Cholecalciferol (VITAMIN D) 2000 UNITS CAPS     hydrochlorothiazide (HYDRODIURIL) 12.5 MG tablet     ipratropium - albuterol 0.5 mg/2.5 mg/3 mL (DUONEB) 0.5-2.5 (3) MG/3ML neb solution     Krill Oil 500 MG CAPS     lisinopril (ZESTRIL) 40 MG tablet     lovastatin (MEVACOR) 40 MG tablet     Multiple Vitamins-Minerals (MULTIVITAL) TABS     omeprazole (PRILOSEC) 20 MG DR capsule     No current  facility-administered medications for this visit.     Physical Exam:   There were no vitals taken for this visit.   There is no height or weight on file to calculate BMI.  No swelling per patient. Otherwise no exam,     Labs:   All labs reviewed by me  Last Renal Panel:  Sodium   Date Value Ref Range Status   09/22/2022 139 133 - 144 mmol/L Final   04/27/2021 139 133 - 144 mmol/L Final     Potassium   Date Value Ref Range Status   09/22/2022 4.7 3.4 - 5.3 mmol/L Final   04/27/2021 4.5 3.4 - 5.3 mmol/L Final     Chloride   Date Value Ref Range Status   09/22/2022 110 (H) 94 - 109 mmol/L Final   04/27/2021 109 94 - 109 mmol/L Final     Carbon Dioxide   Date Value Ref Range Status   04/27/2021 28 20 - 32 mmol/L Final     Carbon Dioxide (CO2)   Date Value Ref Range Status   09/22/2022 23 20 - 32 mmol/L Final     Anion Gap   Date Value Ref Range Status   09/22/2022 6 3 - 14 mmol/L Final   04/27/2021 2 (L) 3 - 14 mmol/L Final     Glucose   Date Value Ref Range Status   09/22/2022 115 (H) 70 - 99 mg/dL Final   04/27/2021 106 (H) 70 - 99 mg/dL Final     Urea Nitrogen   Date Value Ref Range Status   09/22/2022 48 (H) 7 - 30 mg/dL Final   04/27/2021 31 (H) 7 - 30 mg/dL Final     Creatinine   Date Value Ref Range Status   09/22/2022 2.36 (H) 0.66 - 1.25 mg/dL Final   04/27/2021 2.11 (H) 0.66 - 1.25 mg/dL Final     GFR Estimate   Date Value Ref Range Status   09/22/2022 26 (L) >60 mL/min/1.73m2 Final     Comment:     Effective December 21, 2021 eGFRcr in adults is calculated using the 2021 CKD-EPI creatinine equation which includes age and gender (Iva mo al., NEJ, DOI: 10.1056/YHPZmh5539312)   04/27/2021 27 (L) >60 mL/min/[1.73_m2] Final     Comment:     Non  GFR Calc  Starting 12/18/2018, serum creatinine based estimated GFR (eGFR) will be   calculated using the Chronic Kidney Disease Epidemiology Collaboration   (CKD-EPI) equation.       Calcium   Date Value Ref Range Status   09/22/2022 9.0 8.5 - 10.1  mg/dL Final   04/27/2021 8.5 8.5 - 10.1 mg/dL Final     Phosphorus   Date Value Ref Range Status   09/22/2022 3.5 2.5 - 4.5 mg/dL Final   01/24/2011 3.6 2.5 - 4.9 mg/dL Final     Albumin   Date Value Ref Range Status   09/22/2022 3.4 3.4 - 5.0 g/dL Final   04/22/2021 2.0 (L) 3.4 - 5.0 g/dL Final       Imaging:  I reviewed imaging studies. US renal 5/13/2022 showed:  RIGHT KIDNEY: 10.4 x 5.2 x 4.9 cm . No hydronephrosis or cortical  thinning. Several simple cysts. The largest cyst in the interpolar  region/lower pole measures 4.7 cm, unchanged.      LEFT KIDNEY: 11.1 x 6.6 x 6.0 cm. No hydronephrosis or cortical  thinning. Several simple cysts. The largest cyst at the upper pole  measures 4.9 cm, unchanged.      BLADDER: Nearly empty. Mild prostatomegaly with prostate volume of 46  mL.     1.  Stable simple renal cysts bilaterally requiring no specific  additional follow-up.  2.  Mild prostatomegaly.    Assessment & Recommendations:   Problem list  # Chronic kidney disease stage IV secondary to chronic hypertension, prior NSAID use and prior acute kidney injury; baseline creatinine 2.4-2.5  # Nonnephrotic range proteinuria; stable; UPCR 1.2 g/g (peak at 2.94 on 8/23/21)  His chronic kidney disease is likely multifactorial in the setting of chronic hypertension, prior NSAID use and prior ADEEL. At this time, he has no symptoms from the CKD standpoints. Electrolytes are in acceptable range and no major acid bases disturbances.  Proteinuria now has improved to 1.27 from maximum of 2.94 after improvement in his blood pressure control. The patient expressed the wishes to start dialysis if it is necessary but he would like to delay it as long as possible.  #Bilateral renal cysts; stable ast US 5/13/22  The patient has bilateral renal cyst but his kidney size are small.  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  The most recent ultrasound on 5/13/2022 showed stable bilateral renal cysts,  largest cyst in the interpolar region of right kidney is 4.7 cm and upper pole of the left kidney 4.9 cm due for another one in 5/23.   #Hypertension: Controlled  He is currently on lisinopril 40 mg daily and hydrochlorothiazide 12.5 mg daily. Home BP is 135/70s.   # MBD  PTH 57, vitamin D is pending.  The patient is on vitamin D supplement a total of 4000 U/day.  Calcium and phosphorus are within normal limit.  # Surveillance for anemia in CKD  He is hemoglobin is 14.2 which is excellent.  We will continue to monitor his hemoglobin every visit.  # Prostatic enlargement with elevated PSA; reasonable to not continuee to monitor per urology  PSA mildly elevated, 6.58 in 1/22. Referred to urology (Dr. Paris) who discussed risk and benefit of screening at his age and It is reasonable to not continue with screening per Dr. Paris.     Follow-up in 6 months with labs    I spent  30  minutes on the date of the encounter doing chart review, history and exam, documentation and further activities as noted above. 15 minutes of this visit is dedicated to direct patient interaction via phone.    Gustavo Cadet MD on 09/26/2022

## 2022-09-26 NOTE — PROGRESS NOTES
Nitish is a 88 year old who is being evaluated via a billable telephone visit.      What phone number would you like to be contacted at? [phone]  How would you like to obtain your AVS? Mail a copy  Phone call duration: 15 (10.02-10.17) minutes      Nephrology Progress Note  09/26/2022   Chief complaint: Follow-up for CKD stage IV  History of Present Illness:    Nitish Coreas is a 87 year old with history of COPD, choledocholithiasis s/p ERCP, sphincterotomy and CBD stent placed on 4/21, chronic hypertension, hyperlipidemia, prostatic enlargement and elevated PSA, chronic back pain, ascending aortic and infrarenal abdominal aortic dilatation and aneurysm     The patient was admitted in Apri 2021due to cholelithiasis with obstructive jaundice.  He underwent ERCP, EUS, pus draining from gallbladder, sphincterotomy and CBD stent placed.  Subsequently, gallbladder was removed.  He also received IV antibiotics with ciprofloxacin Flagyl and IV fluid.  He was also noted to have COPD exacerbation and required oxygen supplement in adjunct to home oxygen therapy.  He was also noted to have acute kidney injury with creatinine increased to 2.92 on the day of dismissal.     Upon chart review, the patient was noted to have creatinine up 1.3-1.5 since [id number].  Since 2015, his creatinine has gradually increased to between 1.5-1.7.  In 2019, his creatinine has increased 2.03.  He has an ADEEL when he was admitted as mentioned above with creatinine peak 2.98.  Creatinine has been down to 2.11 on 4/27/2021 and since then has fluctuating between 2.1-2.2.  Patient noted to have urine protein since 2015.  His urine albumin creatinine ratio was 425 mg/g and increased to 2838 mg/g on 7/21.      His UA is essentially no pyuria or red blood cells since 2015.  CT abdomen pelvis and chest in 4/21 showed few bilateral renal cysts with the largest upper pole of the left knee medially measuring 4.9 cm.  The kidneys have otherwise unremarkable  noncontrast appearance cyst.  No stone or hydronephrosis.  The patient also has mild to moderate prostatic enlargement.  No mention about his bladder.  I measure his kidneys by myself on the CT of 4/21 which showed small kidney both size, right 8.4 cm and right 9.1 cm.     His parathyroid hormone is normal at 72 on 8/21 with normal vitamin D.  His hemoglobin is normal at 14.6.      8/30/21: The patient is attending a video visit with his wife.  He could not use [employer] because his computer does not have a camera so we did [employer].  The patient reports that he he was told that he has kidney problem since he was admitted in April 2021.  At that time, he was quite sick and he could not eat and drink for 7 days.  Patient is covering well from the procedure.  And a CBD stent was recently removed 3 weeks ago.  Regarding symptoms, the patient denies any chest pain or short of breath.  The patient has extremity edema, left worsening than right that usually occur again today.  He noticed some foamy urine especially at nighttime.  Patient does not have any difficulty urination.  The patient has no family history of chronic kidney disease.  His blood pressure ranging between 130-138/78 to 82 mmHg.  He is only taking lisinopril 40 mg daily.  The patient has been diagnosed with high blood for more than 10 years and is on 20 mg of lisinopril but was increased to 40 g about 3 years ago.  The patient intermittently take NSAIDs due to lower back pain.  He does not limit salt in his diet but he tried low-salt condiment.  The patient used to weigh 222 pounds but since his admission, his weight down to 185 and he is trying to maintain his weight at 185.  The patient does not have any signs or symptoms of TONY.  I started the patient on trazodone 12.5 mg daily.   1/10/22:He feels ok today. We switched him from chlorthalidone to hydrochlorothiazide lately because he could not cut the pill in half. Now he does not need to cut it. BP is  better now 120/70s. He has no leg swelling. His appetite is not the same. He is trying to stay away from salt. He is no longer having swelling. He does not have lightheadedness or dizziness.  He denies any dysuria or hematuria.  I noted patient has elevated PSA at 5.30 back in 11/23/20 and has CT done in 4/21 which showed prostatic enlargement. Since then there is no other PSA checked. Labs on 1/7/2022 showed creatinine 2.47, potassium 4.3, bicarb 21.  Urine protein creatinine ratio is 2.33 which has dropped from 2.94 on 8/23/2021. His PTH is 87 and vitamin D is 42.  His hemoglobin is stable at 13.4 with normal white blood cell and platelet.  His UA showed trace blood in the urine but RBCs only 0-2.    2/24/22: Seen by Urology (Dr. Robby Paris). Discussed risk and benefit for prostate cancer screening. Pt would discuss with PCP for further screening. Per urology, reasonable to not continue to screen.   5/16/22: He is feeling better.  He has no problem with urination. He has no leg swelling.  Interestingly, he noticed some blood in the urine especially as a day goes by. His appetite is better and he starts to gain more weight. /77 mmHg. He has no lightheadedness or dizziness. Labs on 5/13/2022 showed creatinine 2.51, potassium 4.2, EGFR 24, calcium 9, phosphorus 3.3, albumin 3.7, UPCR 1.23 (improved from 2.33 on 1/7/22).  Hemoglobin 14.5.  UA showed no blood or white blood cell. PTH 64 and vitamin D 56.  7/27/22: Seen vascular surgery. CT showed 4.8 cm.   9/26/22: He is doing good. He does not go to the ED or have any hospitalization. He has no problem with urination or swelling. His appetite is getting better. He gained about 4 lbs. He had a gallbladder attack and had a surgery and a stent (in 2021). He was 220 Lbs then went down to 180 when he was having GB problem. Now he is 184 Lbs. BP this morning is 135/76 mmHg. UPCR 1.27 g/g. He will visit [place] in February and prefers to have a visit again in 3/22.      Past medical history  Past Medical History:   Diagnosis Date     BPH (benign prostatic hyperplasia)      Chronic back pain      Chronic kidney disease, stage IV (severe) (H)      Emphysematous COPD (H)      Hyperlipidemia LDL goal < 100      Hypertension      Past surgical history  Past Surgical History:   Procedure Laterality Date     DENTAL SURGERY  1958    wisdom teeth     ENDOSCOPIC RETROGRADE CHOLANGIOPANCREATOGRAM N/A 4/19/2021    Procedure: ENDOSCOPIC RETROGRADE CHOLANGIOPANCREATOGRAPHY, WITH CALCULUS REMOVAL USING BALLOON  AND CATHETER WITH STENT PLACEMENT;  Surgeon: Tres Milan MD;  Location:  OR     ENDOSCOPIC ULTRASOUND UPPER GASTROINTESTINAL TRACT (GI) N/A 4/19/2021    Procedure: ENDOSCOPIC ULTRASOUND, ESOPHAGOSCOPY / UPPER GASTROINTESTINAL TRACT (GI);  Surgeon: Tres Milan MD;  Location:  OR     EYE SURGERY Right 2004 and 2005    macular pucker and cataract     LAPAROSCOPIC CHOLECYSTECTOMY N/A 4/20/2021    Procedure: CHOLECYSTECTOMY, LAPAROSCOPIC;  Surgeon: Callie Inman MD;  Location:  OR     TONSILLECTOMY & ADENOIDECTOMY  1949     Z REPLANTATION THUMB DISTAL,COMPLETE  1962    cut off right thumb and repair     ZC TOTAL KNEE ARTHROPLASTY  2011, 2000    left then right     Review of Systems:   14 systems were reviewed and all negative except as mentioned above.   Current Medications:  Current Outpatient Medications   Medication     Boswellia-Glucosamine-Vit D (OSTEO BI-FLEX ONE PER DAY PO)     Cholecalciferol (VITAMIN D) 2000 UNITS CAPS     hydrochlorothiazide (HYDRODIURIL) 12.5 MG tablet     ipratropium - albuterol 0.5 mg/2.5 mg/3 mL (DUONEB) 0.5-2.5 (3) MG/3ML neb solution     Krill Oil 500 MG CAPS     lisinopril (ZESTRIL) 40 MG tablet     lovastatin (MEVACOR) 40 MG tablet     Multiple Vitamins-Minerals (MULTIVITAL) TABS     omeprazole (PRILOSEC) 20 MG DR capsule     No current facility-administered medications for this visit.     Physical Exam:   There were no  vitals taken for this visit.   There is no height or weight on file to calculate BMI.  No swelling per patient. Otherwise no exam,     Labs:   All labs reviewed by me  Last Renal Panel:  Sodium   Date Value Ref Range Status   09/22/2022 139 133 - 144 mmol/L Final   04/27/2021 139 133 - 144 mmol/L Final     Potassium   Date Value Ref Range Status   09/22/2022 4.7 3.4 - 5.3 mmol/L Final   04/27/2021 4.5 3.4 - 5.3 mmol/L Final     Chloride   Date Value Ref Range Status   09/22/2022 110 (H) 94 - 109 mmol/L Final   04/27/2021 109 94 - 109 mmol/L Final     Carbon Dioxide   Date Value Ref Range Status   04/27/2021 28 20 - 32 mmol/L Final     Carbon Dioxide (CO2)   Date Value Ref Range Status   09/22/2022 23 20 - 32 mmol/L Final     Anion Gap   Date Value Ref Range Status   09/22/2022 6 3 - 14 mmol/L Final   04/27/2021 2 (L) 3 - 14 mmol/L Final     Glucose   Date Value Ref Range Status   09/22/2022 115 (H) 70 - 99 mg/dL Final   04/27/2021 106 (H) 70 - 99 mg/dL Final     Urea Nitrogen   Date Value Ref Range Status   09/22/2022 48 (H) 7 - 30 mg/dL Final   04/27/2021 31 (H) 7 - 30 mg/dL Final     Creatinine   Date Value Ref Range Status   09/22/2022 2.36 (H) 0.66 - 1.25 mg/dL Final   04/27/2021 2.11 (H) 0.66 - 1.25 mg/dL Final     GFR Estimate   Date Value Ref Range Status   09/22/2022 26 (L) >60 mL/min/1.73m2 Final     Comment:     Effective December 21, 2021 eGFRcr in adults is calculated using the 2021 CKD-EPI creatinine equation which includes age and gender (Iva et al., NEJM, DOI: 10.1056/YNDYkl0462809)   04/27/2021 27 (L) >60 mL/min/[1.73_m2] Final     Comment:     Non  GFR Calc  Starting 12/18/2018, serum creatinine based estimated GFR (eGFR) will be   calculated using the Chronic Kidney Disease Epidemiology Collaboration   (CKD-EPI) equation.       Calcium   Date Value Ref Range Status   09/22/2022 9.0 8.5 - 10.1 mg/dL Final   04/27/2021 8.5 8.5 - 10.1 mg/dL Final     Phosphorus   Date Value Ref Range  Status   09/22/2022 3.5 2.5 - 4.5 mg/dL Final   01/24/2011 3.6 2.5 - 4.9 mg/dL Final     Albumin   Date Value Ref Range Status   09/22/2022 3.4 3.4 - 5.0 g/dL Final   04/22/2021 2.0 (L) 3.4 - 5.0 g/dL Final       Imaging:  I reviewed imaging studies. US renal 5/13/2022 showed:  RIGHT KIDNEY: 10.4 x 5.2 x 4.9 cm . No hydronephrosis or cortical  thinning. Several simple cysts. The largest cyst in the interpolar  region/lower pole measures 4.7 cm, unchanged.      LEFT KIDNEY: 11.1 x 6.6 x 6.0 cm. No hydronephrosis or cortical  thinning. Several simple cysts. The largest cyst at the upper pole  measures 4.9 cm, unchanged.      BLADDER: Nearly empty. Mild prostatomegaly with prostate volume of 46  mL.     1.  Stable simple renal cysts bilaterally requiring no specific  additional follow-up.  2.  Mild prostatomegaly.    Assessment & Recommendations:   Problem list  # Chronic kidney disease stage IV secondary to chronic hypertension, prior NSAID use and prior acute kidney injury; baseline creatinine 2.4-2.5  # Nonnephrotic range proteinuria; stable; UPCR 1.2 g/g (peak at 2.94 on 8/23/21)  His chronic kidney disease is likely multifactorial in the setting of chronic hypertension, prior NSAID use and prior ADEEL. At this time, he has no symptoms from the CKD standpoints. Electrolytes are in acceptable range and no major acid bases disturbances.  Proteinuria now has improved to 1.27 from maximum of 2.94 after improvement in his blood pressure control. The patient expressed the wishes to start dialysis if it is necessary but he would like to delay it as long as possible.  #Bilateral renal cysts; stable ast US 5/13/22  The patient has bilateral renal cyst but his kidney size are small.  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  The most recent ultrasound on 5/13/2022 showed stable bilateral renal cysts, largest cyst in the interpolar region of right kidney is 4.7 cm and upper pole of the  left kidney 4.9 cm due for another one in 5/23.   #Hypertension: Controlled  He is currently on lisinopril 40 mg daily and hydrochlorothiazide 12.5 mg daily. Home BP is 135/70s.   # MBD  PTH 57, vitamin D is pending.  The patient is on vitamin D supplement a total of 4000 U/day.  Calcium and phosphorus are within normal limit.  # Surveillance for anemia in CKD  He is hemoglobin is 14.2 which is excellent.  We will continue to monitor his hemoglobin every visit.  # Prostatic enlargement with elevated PSA; reasonable to not continuee to monitor per urology  PSA mildly elevated, 6.58 in 1/22. Referred to urology (Dr. Paris) who discussed risk and benefit of screening at his age and It is reasonable to not continue with screening per Dr. Paris.     Follow-up in 6 months with labs    I spent  30  minutes on the date of the encounter doing chart review, history and exam, documentation and further activities as noted above. 15 minutes of this visit is dedicated to direct patient interaction via phone.    Gustavo Cadet MD on 09/26/2022

## 2022-09-26 NOTE — PATIENT INSTRUCTIONS
Thank you for having a visit with me today.  Am glad that you are still feeling good.  Your lab has been stable.  Your blood pressure is excellent.  I do not have any plan to change any of your medication at this time.  We will continue to monitor labs and symptoms every 4 to 6 months.

## 2022-10-18 ENCOUNTER — OFFICE VISIT (OUTPATIENT)
Dept: INTERNAL MEDICINE | Facility: CLINIC | Age: 87
End: 2022-10-18
Payer: COMMERCIAL

## 2022-10-18 VITALS
TEMPERATURE: 97 F | SYSTOLIC BLOOD PRESSURE: 116 MMHG | DIASTOLIC BLOOD PRESSURE: 72 MMHG | BODY MASS INDEX: 30.68 KG/M2 | OXYGEN SATURATION: 98 % | HEART RATE: 73 BPM | HEIGHT: 66 IN | WEIGHT: 190.9 LBS

## 2022-10-18 DIAGNOSIS — Z23 ENCOUNTER FOR IMMUNIZATION: ICD-10-CM

## 2022-10-18 DIAGNOSIS — I10 ESSENTIAL HYPERTENSION: ICD-10-CM

## 2022-10-18 DIAGNOSIS — E78.5 HYPERLIPIDEMIA LDL GOAL <100: Chronic | ICD-10-CM

## 2022-10-18 DIAGNOSIS — N18.4 CKD (CHRONIC KIDNEY DISEASE) STAGE 4, GFR 15-29 ML/MIN (H): ICD-10-CM

## 2022-10-18 DIAGNOSIS — Z00.00 MEDICARE ANNUAL WELLNESS VISIT, SUBSEQUENT: Primary | ICD-10-CM

## 2022-10-18 PROBLEM — N25.81 SECONDARY HYPERPARATHYROIDISM (H): Status: RESOLVED | Noted: 2022-10-18 | Resolved: 2022-10-18

## 2022-10-18 PROBLEM — N25.81 SECONDARY HYPERPARATHYROIDISM (H): Status: ACTIVE | Noted: 2022-10-18

## 2022-10-18 PROCEDURE — 90662 IIV NO PRSV INCREASED AG IM: CPT | Performed by: INTERNAL MEDICINE

## 2022-10-18 PROCEDURE — 91312 COVID-19,PF,PFIZER BOOSTER BIVALENT: CPT | Performed by: INTERNAL MEDICINE

## 2022-10-18 PROCEDURE — G0008 ADMIN INFLUENZA VIRUS VAC: HCPCS | Performed by: INTERNAL MEDICINE

## 2022-10-18 PROCEDURE — 99213 OFFICE O/P EST LOW 20 MIN: CPT | Mod: 25 | Performed by: INTERNAL MEDICINE

## 2022-10-18 PROCEDURE — G0438 PPPS, INITIAL VISIT: HCPCS | Performed by: INTERNAL MEDICINE

## 2022-10-18 PROCEDURE — 0124A COVID-19,PF,PFIZER BOOSTER BIVALENT: CPT | Performed by: INTERNAL MEDICINE

## 2022-10-18 RX ORDER — LISINOPRIL 40 MG/1
40 TABLET ORAL DAILY
Qty: 90 TABLET | Refills: 3 | Status: SHIPPED | OUTPATIENT
Start: 2022-10-18 | End: 2023-12-22

## 2022-10-18 RX ORDER — HYDROCHLOROTHIAZIDE 12.5 MG/1
12.5 TABLET ORAL DAILY
Qty: 90 TABLET | Refills: 3 | Status: SHIPPED | OUTPATIENT
Start: 2022-10-18 | End: 2023-12-22

## 2022-10-18 RX ORDER — LOVASTATIN 40 MG
40 TABLET ORAL AT BEDTIME
Qty: 90 TABLET | Refills: 3 | Status: SHIPPED | OUTPATIENT
Start: 2022-10-18 | End: 2023-12-22

## 2022-10-18 ASSESSMENT — ACTIVITIES OF DAILY LIVING (ADL): CURRENT_FUNCTION: NO ASSISTANCE NEEDED

## 2022-10-18 NOTE — PROGRESS NOTES
"SUBJECTIVE:   Nitish is a 88 year old who presents for Preventive Visit.    Patient has been advised of split billing requirements and indicates understanding: Yes     Are you in the first 12 months of your Medicare coverage?  No    Healthy Habits:    In general, how would you rate your overall health?  Good    Frequency of exercise:  None    Duration of exercise:  Other    Do you usually eat at least 4 servings of fruit and vegetables a day, include whole grains    & fiber and avoid regularly eating high fat or \"junk\" foods?  No    Taking medications regularly:  Yes    Barriers to taking medications:  None    Medication side effects:  None    Ability to successfully perform activities of daily living:  No assistance needed    Home Safety:  No safety concerns identified    Hearing Impairment:  Need to ask people to speak up or repeat themselves    In the past 6 months, have you been bothered by leaking of urine?  No    In general, how would you rate your overall mental or emotional health?  Good      PHQ-2 Total Score:    Additional concerns today:  No    Do you feel safe in your environment? Yes    Have you ever done Advance Care Planning? (For example, a Health Directive, POLST, or a discussion with a medical provider or your loved ones about your wishes): No, advance care planning information given to patient to review.  Patient declined advance care planning discussion at this time.    Fall risk  Fallen 2 or more times in the past year?: No  Any fall with injury in the past year?: No    Cognitive Screening - PT REFUSED      Mini-CogTM Copyright HUGO Nieto. Licensed by the author for use in [facility]; reprinted with permission ([email or web address]). All rights reserved.      Do you have sleep apnea, excessive snoring or daytime drowsiness?: no    Reviewed and updated as needed this visit by clinical staff   Tobacco  Allergies  Meds  Problems  Med Hx  Surg Hx  Fam Hx        Reviewed and updated as " needed this visit by Provider   Tobacco  Allergies  Meds  Problems  Med Hx  Surg Hx  Fam Hx         Social History     Tobacco Use     Smoking status: Former     Packs/day: 4.00     Years: 45.00     Pack years: 180.00     Types: Cigarettes     Quit date: 1991     Years since quittin.4     Smokeless tobacco: Never   Substance Use Topics     Alcohol use: Yes     Alcohol/week: 0.0 standard drinks     Comment: 1-2 twice per week     If you drink alcohol do you typically have >3 drinks per day or >7 drinks per week? No    Alcohol Use 10/18/2022   Prescreen: >3 drinks/day or >7 drinks/week? -   Prescreen: >3 drinks/day or >7 drinks/week? No     Current providers sharing in care for this patient include:   Patient Care Team:  No Ref-Primary, Physician as PCP - Callie Daniel MD as Assigned Surgical Provider  Pardeep Vásquez MD as Assigned PCP  Gustavo Cadet MD as MD (Nephrology)  Gustavo Cadet MD as Assigned Nephrology Provider  Robby Paris MD as MD (Urology)  Robby Paris MD as Assigned Cancer Care Provider  Elizabeth Durand MD as Assigned Heart and Vascular Provider    The following health maintenance items are reviewed in Epic and correct as of today:  Health Maintenance   Topic Date Due     HEPATITIS B IMMUNIZATION (1 of 3 - 3-dose series) Never done     ZOSTER IMMUNIZATION (1 of 2) Never done     Pneumococcal Vaccine: 65+ Years (2 - PCV) 2005     MEDICARE ANNUAL WELLNESS VISIT  2021     COVID-19 Vaccine (4 - Booster for Pfizer series) 01/10/2022     ANNUAL REVIEW OF HM ORDERS  2022     INFLUENZA VACCINE (1) 2022     BMP  2022     MICROALBUMIN  2022     LIPID  2023     HEMOGLOBIN  2023     FALL RISK ASSESSMENT  10/18/2023     ADVANCE CARE PLANNING  10/18/2027     DTAP/TDAP/TD IMMUNIZATION (3 - Td or Tdap) 2029     PARATHYROID  Completed     PHOSPHORUS  Completed     SPIROMETRY   "Completed     COPD ACTION PLAN  Completed     PHQ-2 (once per calendar year)  Completed     URINALYSIS  Completed     ALK PHOS  Completed     IPV IMMUNIZATION  Aged Out     MENINGITIS IMMUNIZATION  Aged Out     Nitish presents today for an AWV. We also discussed his chronic back pain.    Review of Systems  10pt ROS reviewed and all other systems negative unless otherwise stated above.    OBJECTIVE:   /72   Pulse 73   Temp 97  F (36.1  C) (Tympanic)   Ht 1.676 m (5' 6\")   Wt 86.6 kg (190 lb 14.4 oz)   SpO2 98%   BMI 30.81 kg/m   Estimated body mass index is 30.81 kg/m  as calculated from the following:    Height as of this encounter: 1.676 m (5' 6\").    Weight as of this encounter: 86.6 kg (190 lb 14.4 oz).     Physical Exam  GENERAL: alert and in no distress.  EYES: conjunctivae/corneas clear. EOMs grossly intact  HENT: Facies symmetric.  RESP: CTAB, no w/r/r.  CV: RRR, no m/r/g.  GI: NT, ND, without rebound tenderness or guarding  MSK: No edema. Moves all four extremities freely.  SKIN: No significant ulcers, lesions, or rashes on the visualized portions of the skin  NEURO: CN II-XII grossly intact.    Diagnostic Test Results: Labs reviewed in Epic    ASSESSMENT / PLAN:   Medicare annual wellness visit, subsequent  Reviewed PMH. Discussed healthcare maintenance issues.    Essential hypertension  BP at goal today. Reviewed nephrology notes. Continue.  - lisinopril (ZESTRIL) 40 MG tablet; Take 1 tablet (40 mg) by mouth daily  - hydrochlorothiazide (HYDRODIURIL) 12.5 MG tablet; Take 1 tablet (12.5 mg) by mouth daily    Hyperlipidemia LDL goal <100  Reviewed most recent lipid panel. Refilled.  - lovastatin (MEVACOR) 40 MG tablet; Take 1 tablet (40 mg) by mouth At Bedtime    CKD (chronic kidney disease) stage 4, GFR 15-29 ml/min (H)  Following with nephrology. Continue cares per them.    Encounter for immunization  - INFLUENZA, QUAD, HIGH DOSE, PF, 65YR + (FLUZONE HD)  - COVID-19,PF,PFIZER BOOSTER BIVALENT " "12+Yrs    COUNSELING:  Reviewed preventive health counseling, as reflected in patient instructions    Estimated body mass index is 30.81 kg/m  as calculated from the following:    Height as of this encounter: 1.676 m (5' 6\").    Weight as of this encounter: 86.6 kg (190 lb 14.4 oz).    He reports that he quit smoking about 31 years ago. His smoking use included cigarettes. He has a 180.00 pack-year smoking history. He has never used smokeless tobacco.    Appropriate preventive services were discussed with this patient, including applicable screening as appropriate for cardiovascular disease, diabetes, osteopenia/osteoporosis, and glaucoma.  As appropriate for age/gender, discussed screening for colorectal cancer, prostate cancer, breast cancer, and cervical cancer. Checklist reviewing preventive services available has been given to the patient.    Reviewed patients plan of care and provided an AVS. The Intermediate Care Plan ( asthma action plan, low back pain action plan, and migraine action plan) for Nitish meets the Care Plan requirement. This Care Plan has been established and reviewed with the Patient.    Pardeep Eller MD  [facility]    Identified Health Risks:  "

## 2022-10-18 NOTE — PATIENT INSTRUCTIONS
- Make an appointment with our pharmacy downstairs or stop by your preferred pharmacy to discuss obtaining the Shingrix (shingles) vaccine series

## 2023-05-01 ENCOUNTER — NURSE TRIAGE (OUTPATIENT)
Dept: INTERNAL MEDICINE | Facility: CLINIC | Age: 88
End: 2023-05-01
Payer: COMMERCIAL

## 2023-05-01 ENCOUNTER — OFFICE VISIT (OUTPATIENT)
Dept: URGENT CARE | Facility: URGENT CARE | Age: 88
End: 2023-05-01
Payer: COMMERCIAL

## 2023-05-01 VITALS
OXYGEN SATURATION: 98 % | DIASTOLIC BLOOD PRESSURE: 80 MMHG | SYSTOLIC BLOOD PRESSURE: 134 MMHG | TEMPERATURE: 97 F | HEART RATE: 68 BPM | RESPIRATION RATE: 18 BRPM

## 2023-05-01 DIAGNOSIS — N18.4 CKD (CHRONIC KIDNEY DISEASE) STAGE 4, GFR 15-29 ML/MIN (H): ICD-10-CM

## 2023-05-01 DIAGNOSIS — B96.89 BACTERIAL TONSILLITIS: Primary | ICD-10-CM

## 2023-05-01 DIAGNOSIS — J03.80 BACTERIAL TONSILLITIS: Primary | ICD-10-CM

## 2023-05-01 DIAGNOSIS — J44.9 CHRONIC OBSTRUCTIVE PULMONARY DISEASE, UNSPECIFIED COPD TYPE (H): ICD-10-CM

## 2023-05-01 LAB
DEPRECATED S PYO AG THROAT QL EIA: NEGATIVE
GROUP A STREP BY PCR: NOT DETECTED

## 2023-05-01 PROCEDURE — 99214 OFFICE O/P EST MOD 30 MIN: CPT | Performed by: PHYSICIAN ASSISTANT

## 2023-05-01 PROCEDURE — 87651 STREP A DNA AMP PROBE: CPT | Performed by: PHYSICIAN ASSISTANT

## 2023-05-01 RX ORDER — AZITHROMYCIN 250 MG/1
TABLET, FILM COATED ORAL
Qty: 6 TABLET | Refills: 0 | Status: SHIPPED | OUTPATIENT
Start: 2023-05-01 | End: 2023-05-06

## 2023-05-01 NOTE — TELEPHONE ENCOUNTER
Pt complains of sore throat and has pain with swallowing. He also has a cough but is chronic. Denies , fever, ear pain, chest pain or acute breathing problems. States he has COPD and wants to be seen. Triage advised he see UC today. Pt verbalized agreement with plan.     Reason for Disposition    SEVERE sore throat pain    Additional Information    Negative: SEVERE difficulty breathing (e.g., struggling for each breath, speaks in single words)    Negative: Sounds like a life-threatening emergency to the triager    Negative: Throat culture results, call about    Negative: Productive cough is main symptom    Negative: Runny nose is main symptom    Negative: Drooling or spitting out saliva (because can't swallow)    Negative: Unable to open mouth completely    Negative: Drinking very little and has signs of dehydration (e.g., no urine > 12 hours, very dry mouth, very lightheaded)    Negative: Patient sounds very sick or weak to the triager    Negative: Difficulty breathing (per caller) but not severe    Negative: Fever > 103 F (39.4 C)    Negative: Refuses to drink anything for > 12 hours    Protocols used: SORE THROAT-A-OH

## 2023-05-01 NOTE — PROGRESS NOTES
"  Assessment & Plan     Bacterial tonsillitis    Bacterial tonsillitis is a contagious illness. It's spread by coughing, kissing, sharing glasses or eating utensils, or by touching others after touching your mouth or nose. Symptoms include throat pain that is worse with swallowing, aching all over, headache, swollen lymph nodes at the front of the neck, and red swollen tonsils sometimes with white patches and fever. It's treated with antibiotic medicine. This should help you start to feel better in 1 to 2 days.     - Streptococcus A Rapid Screen w/Reflex to PCR  - Group A Streptococcus PCR Throat Swab  - azithromycin (ZITHROMAX) 250 MG tablet; Take 2 tablets (500 mg) by mouth daily for 1 day, THEN 1 tablet (250 mg) daily for 4 days.    CKD (chronic kidney disease) stage 4, GFR 15-29 ml/min (H)    Creatinine clearance is 20     Chronic obstructive pulmonary disease, unspecified COPD type (H)    Monitor for any worsening symptoms  Continue COPD medications  zpak for exacerbation      Review of external notes as documented elsewhere in note       BMI:   Estimated body mass index is 30.81 kg/m  as calculated from the following:    Height as of 10/18/22: 1.676 m (5' 6\").    Weight as of 10/18/22: 86.6 kg (190 lb 14.4 oz).     At today's visit with Nitish Coreas , we discussed results, diagnosis, medications and formulated a plan.  We also discussed red flags for immediate return to clinic/ER, as well as indications for follow up with PCP if not improved in 3 days. Patient understood and agreed to plan. Nitish Coreas was discharged with stable vitals and has no further questions.       No follow-ups on file.    Jason Leyva, [facility], PA-C  [facility] U[facility]    Chad Talbert is a 88 year old, presenting for the following health issues:  Pharyngitis (Sore throat X 1 day-hard to swallow )         View : No data to display.              HPI   Review of Systems   Constitutional, HEENT, " cardiovascular, pulmonary, GI, , musculoskeletal, neuro, skin, endocrine and psych systems are negative, except as otherwise noted.      Objective    /80   Pulse 68   Temp 97  F (36.1  C) (Tympanic)   Resp 18   SpO2 98%   There is no height or weight on file to calculate BMI.  Physical Exam   GENERAL: healthy, alert and no distress  EYES: Eyes grossly normal to inspection, PERRL and conjunctivae and sclerae normal  HENT: normal cephalic/atraumatic, ear canals and TM's normal, nose and mouth without ulcers or lesions, oropharynx clear, oral mucous membranes moist, tonsillar hypertrophy and tonsillar erythema  NECK: cervical adenopathy anterior and thyroid normal to palpation  RESP: lungs clear to auscultation - no rales, rhonchi or wheezes  CV: regular rate and rhythm, normal S1 S2, no S3 or S4, no murmur, click or rub, no peripheral edema and peripheral pulses strong  MS: no gross musculoskeletal defects noted, no edema  SKIN: no suspicious lesions or rashes  NEURO: Normal strength and tone, mentation intact and speech normal  PSYCH: mentation appears normal, affect normal/bright        Results for orders placed or performed in visit on 05/01/23   Streptococcus A Rapid Screen w/Reflex to PCR     Status: Normal    Specimen: Throat; Swab   Result Value Ref Range    Group A Strep antigen Negative Negative

## 2023-05-03 ENCOUNTER — LAB (OUTPATIENT)
Dept: LAB | Facility: CLINIC | Age: 88
End: 2023-05-03
Payer: COMMERCIAL

## 2023-05-03 DIAGNOSIS — I12.9 CKD STAGE 4 SECONDARY TO HYPERTENSION (H): ICD-10-CM

## 2023-05-03 DIAGNOSIS — N18.4 CKD STAGE 4 SECONDARY TO HYPERTENSION (H): ICD-10-CM

## 2023-05-03 LAB
ALBUMIN MFR UR ELPH: 284 MG/DL (ref 1–14)
ALBUMIN SERPL BCG-MCNC: 4 G/DL (ref 3.5–5.2)
ALBUMIN UR-MCNC: >=300 MG/DL
ANION GAP SERPL CALCULATED.3IONS-SCNC: 10 MMOL/L (ref 7–15)
APPEARANCE UR: CLEAR
BACTERIA #/AREA URNS HPF: ABNORMAL /HPF
BASOPHILS # BLD AUTO: 0 10E3/UL (ref 0–0.2)
BASOPHILS NFR BLD AUTO: 0 %
BILIRUB UR QL STRIP: NEGATIVE
BUN SERPL-MCNC: 42.8 MG/DL (ref 8–23)
CALCIUM SERPL-MCNC: 9.4 MG/DL (ref 8.8–10.2)
CHLORIDE SERPL-SCNC: 107 MMOL/L (ref 98–107)
COLOR UR AUTO: YELLOW
CREAT SERPL-MCNC: 2.48 MG/DL (ref 0.67–1.17)
CREAT UR-MCNC: 118 MG/DL
DEPRECATED HCO3 PLAS-SCNC: 24 MMOL/L (ref 22–29)
EOSINOPHIL # BLD AUTO: 0.2 10E3/UL (ref 0–0.7)
EOSINOPHIL NFR BLD AUTO: 3 %
ERYTHROCYTE [DISTWIDTH] IN BLOOD BY AUTOMATED COUNT: 12.9 % (ref 10–15)
GFR SERPL CREATININE-BSD FRML MDRD: 24 ML/MIN/1.73M2
GLUCOSE SERPL-MCNC: 121 MG/DL (ref 70–99)
GLUCOSE UR STRIP-MCNC: NEGATIVE MG/DL
HCT VFR BLD AUTO: 45.6 % (ref 40–53)
HGB BLD-MCNC: 15.4 G/DL (ref 13.3–17.7)
HGB UR QL STRIP: ABNORMAL
HYALINE CASTS #/AREA URNS LPF: ABNORMAL /LPF
KETONES UR STRIP-MCNC: NEGATIVE MG/DL
LEUKOCYTE ESTERASE UR QL STRIP: NEGATIVE
LYMPHOCYTES # BLD AUTO: 2 10E3/UL (ref 0.8–5.3)
LYMPHOCYTES NFR BLD AUTO: 25 %
MCH RBC QN AUTO: 32.7 PG (ref 26.5–33)
MCHC RBC AUTO-ENTMCNC: 33.8 G/DL (ref 31.5–36.5)
MCV RBC AUTO: 97 FL (ref 78–100)
MONOCYTES # BLD AUTO: 1.2 10E3/UL (ref 0–1.3)
MONOCYTES NFR BLD AUTO: 15 %
NEUTROPHILS # BLD AUTO: 4.7 10E3/UL (ref 1.6–8.3)
NEUTROPHILS NFR BLD AUTO: 57 %
NITRATE UR QL: NEGATIVE
PH UR STRIP: 6 [PH] (ref 5–7)
PHOSPHATE SERPL-MCNC: 3.5 MG/DL (ref 2.5–4.5)
PLATELET # BLD AUTO: 167 10E3/UL (ref 150–450)
POTASSIUM SERPL-SCNC: 4.8 MMOL/L (ref 3.4–5.3)
PROT/CREAT 24H UR: 2.41 MG/MG CR (ref 0–0.2)
PTH-INTACT SERPL-MCNC: 65 PG/ML (ref 15–65)
RBC # BLD AUTO: 4.71 10E6/UL (ref 4.4–5.9)
RBC #/AREA URNS AUTO: ABNORMAL /HPF
SODIUM SERPL-SCNC: 141 MMOL/L (ref 136–145)
SP GR UR STRIP: >=1.03 (ref 1–1.03)
SQUAMOUS #/AREA URNS AUTO: ABNORMAL /LPF
UROBILINOGEN UR STRIP-ACNC: 0.2 E.U./DL
WBC # BLD AUTO: 8.2 10E3/UL (ref 4–11)
WBC #/AREA URNS AUTO: ABNORMAL /HPF

## 2023-05-03 PROCEDURE — 82306 VITAMIN D 25 HYDROXY: CPT

## 2023-05-03 PROCEDURE — 85025 COMPLETE CBC W/AUTO DIFF WBC: CPT

## 2023-05-03 PROCEDURE — 84156 ASSAY OF PROTEIN URINE: CPT

## 2023-05-03 PROCEDURE — 80069 RENAL FUNCTION PANEL: CPT

## 2023-05-03 PROCEDURE — 36415 COLL VENOUS BLD VENIPUNCTURE: CPT

## 2023-05-03 PROCEDURE — 83970 ASSAY OF PARATHORMONE: CPT

## 2023-05-03 PROCEDURE — 81001 URINALYSIS AUTO W/SCOPE: CPT

## 2023-05-04 LAB — DEPRECATED CALCIDIOL+CALCIFEROL SERPL-MC: 57 UG/L (ref 20–75)

## 2023-05-09 ENCOUNTER — VIRTUAL VISIT (OUTPATIENT)
Dept: NEPHROLOGY | Facility: CLINIC | Age: 88
End: 2023-05-09
Attending: INTERNAL MEDICINE
Payer: COMMERCIAL

## 2023-05-09 VITALS — BODY MASS INDEX: 29.54 KG/M2 | WEIGHT: 183 LBS

## 2023-05-09 DIAGNOSIS — I10 HYPERTENSION GOAL BP (BLOOD PRESSURE) < 140/90: ICD-10-CM

## 2023-05-09 DIAGNOSIS — R80.1 PERSISTENT PROTEINURIA: ICD-10-CM

## 2023-05-09 DIAGNOSIS — I12.9 CKD STAGE 4 SECONDARY TO HYPERTENSION (H): Primary | ICD-10-CM

## 2023-05-09 DIAGNOSIS — N18.4 CKD STAGE 4 SECONDARY TO HYPERTENSION (H): Primary | ICD-10-CM

## 2023-05-09 DIAGNOSIS — N28.1 RENAL CYST: ICD-10-CM

## 2023-05-09 PROCEDURE — 99214 OFFICE O/P EST MOD 30 MIN: CPT | Mod: 95 | Performed by: INTERNAL MEDICINE

## 2023-05-09 ASSESSMENT — PAIN SCALES - GENERAL: PAINLEVEL: MILD PAIN (2)

## 2023-05-09 NOTE — LETTER
5/9/2023       RE: Nitish Coreas  [address]  [place] [address]     Dear Colleague,    Thank you for referring your patient, Nitish Coreas, to the [facility] [place] at [facility]. Please see a copy of my visit note below.    Virtual Visit Details    Phone time 21 minutes    Nitish is a 88 year old who is being evaluated via a billable telephone visit.      What phone number would you like to be contacted at? [phone]  How would you like to obtain your AVS? Mail a copy  Phone call duration: 15 (10.02-10.17) minutes      Nephrology Progress Note  05/09/2023   Chief complaint: Follow-up for CKD stage IV  History of Present Illness:    Nitish Coreas is a 87 year old with history of COPD, choledocholithiasis s/p ERCP, sphincterotomy and CBD stent placed on 4/21, chronic hypertension, hyperlipidemia, prostatic enlargement and elevated PSA, chronic back pain, ascending aortic and infrarenal abdominal aortic dilatation and aneurysm     The patient was admitted in Apri 2021due to cholelithiasis with obstructive jaundice.  He underwent ERCP, EUS, pus draining from gallbladder, sphincterotomy and CBD stent placed.  Subsequently, gallbladder was removed.  He also received IV antibiotics with ciprofloxacin Flagyl and IV fluid.  He was also noted to have COPD exacerbation and required oxygen supplement in adjunct to home oxygen therapy.  He was also noted to have acute kidney injury with creatinine increased to 2.92 on the day of dismissal.     Upon chart review, the patient was noted to have creatinine up 1.3-1.5 since [id number].  Since 2015, his creatinine has gradually increased to between 1.5-1.7.  In 2019, his creatinine has increased 2.03.  He has an ADEEL when he was admitted as mentioned above with creatinine peak 2.98.  Creatinine has been down to 2.11 on 4/27/2021 and since then has fluctuating between 2.1-2.2.  Patient noted  to have urine protein since 2015.  His urine albumin creatinine ratio was 425 mg/g and increased to 2838 mg/g on 7/21.      His UA is essentially no pyuria or red blood cells since 2015.  CT abdomen pelvis and chest in 4/21 showed few bilateral renal cysts with the largest upper pole of the left knee medially measuring 4.9 cm.  The kidneys have otherwise unremarkable noncontrast appearance cyst.  No stone or hydronephrosis.  The patient also has mild to moderate prostatic enlargement.  No mention about his bladder.  I measure his kidneys by myself on the CT of 4/21 which showed small kidney both size, right 8.4 cm and right 9.1 cm.     His parathyroid hormone is normal at 72 on 8/21 with normal vitamin D.  His hemoglobin is normal at 14.6.      8/30/21: The patient is attending a video visit with his wife.  He could not use [facility] because his computer does not have a camera so we did [employer].  The patient reports that he he was told that he has kidney problem since he was admitted in April 2021.  At that time, he was quite sick and he could not eat and drink for 7 days.  Patient is covering well from the procedure.  And a CBD stent was recently removed 3 weeks ago.  Regarding symptoms, the patient denies any chest pain or short of breath.  The patient has extremity edema, left worsening than right that usually occur again today.  He noticed some foamy urine especially at nighttime.  Patient does not have any difficulty urination.  The patient has no family history of chronic kidney disease.  His blood pressure ranging between 130-138/78 to 82 mmHg.  He is only taking lisinopril 40 mg daily.  The patient has been diagnosed with high blood for more than 10 years and is on 20 mg of lisinopril but was increased to 40 g about 3 years ago.  The patient intermittently take NSAIDs due to lower back pain.  He does not limit salt in his diet but he tried low-salt condiment.  The patient used to weigh 222 pounds but since his  admission, his weight down to 185 and he is trying to maintain his weight at 185.  The patient does not have any signs or symptoms of TONY.  I started the patient on trazodone 12.5 mg daily.   1/10/22:He feels ok today. We switched him from chlorthalidone to hydrochlorothiazide lately because he could not cut the pill in half. Now he does not need to cut it. BP is better now 120/70s. He has no leg swelling. His appetite is not the same. He is trying to stay away from salt. He is no longer having swelling. He does not have lightheadedness or dizziness.  He denies any dysuria or hematuria.  I noted patient has elevated PSA at 5.30 back in 11/23/20 and has CT done in 4/21 which showed prostatic enlargement. Since then there is no other PSA checked. Labs on 1/7/2022 showed creatinine 2.47, potassium 4.3, bicarb 21.  Urine protein creatinine ratio is 2.33 which has dropped from 2.94 on 8/23/2021. His PTH is 87 and vitamin D is 42.  His hemoglobin is stable at 13.4 with normal white blood cell and platelet.  His UA showed trace blood in the urine but RBCs only 0-2.    2/24/22: Seen by Urology (Dr. Robby Paris). Discussed risk and benefit for prostate cancer screening. Pt would discuss with PCP for further screening. Per urology, reasonable to not continue to screen.   5/16/22: He is feeling better.  He has no problem with urination. He has no leg swelling.  Interestingly, he noticed some blood in the urine especially as a day goes by. His appetite is better and he starts to gain more weight. /77 mmHg. He has no lightheadedness or dizziness. Labs on 5/13/2022 showed creatinine 2.51, potassium 4.2, EGFR 24, calcium 9, phosphorus 3.3, albumin 3.7, UPCR 1.23 (improved from 2.33 on 1/7/22).  Hemoglobin 14.5.  UA showed no blood or white blood cell. PTH 64 and vitamin D 56.  7/27/22: Seen vascular surgery. CT showed 4.8 cm.   9/26/22: He is doing good. He does not go to the ED or have any hospitalization. He has no  problem with urination or swelling. His appetite is getting better. He gained about 4 lbs. He had a gallbladder attack and had a surgery and a stent (in 2021). He was 220 Lbs then went down to 180 when he was having GB problem. Now he is 184 Lbs. BP this morning is 135/76 mmHg. UPCR 1.27 g/g. He will visit [place] in February and prefers to have a visit again in 3/22.   5/09/23: He is feeling fine today. Nothing change since the last time we talked. He has no change in urination. No new medication. No swelling. BP earlier this month was 134/80 but typically 120-130/70s. Energy is fine. Occasionally he takes high salt diet such as pizza. He measures his blood pressure. He just has tonsilitis last week and taking Z-hilary. Respiratory symptoms is improving. His weight is about 181-183 Lbs. Lost 40 Lbs after gall bladder removed.   Labs on 5/3/23 showed creatinine 2.48, EGFR 24, BUN 42.8, potassium 4.8, bicarb 24, calcium 9.4, phosphorus 3.5, albumin 4.0.  CBC showed hemoglobin 15.4, platelet 164 and white blood cell 8.2.  UA shows small blood RBC 0-2, 0-2 hyaline cast a few squamous epithelial cells. UPCR 2.41 g/g. PTH 65 and Vit D 57.     Past medical history  Past Medical History:   Diagnosis Date     BPH (benign prostatic hyperplasia)      Chronic back pain      Chronic kidney disease, stage IV (severe) (H)      Emphysematous COPD (H)      Hyperlipidemia LDL goal < 100      Hypertension      Past surgical history  Past Surgical History:   Procedure Laterality Date     DENTAL SURGERY  1958    wisdom teeth     ENDOSCOPIC RETROGRADE CHOLANGIOPANCREATOGRAM N/A 4/19/2021    Procedure: ENDOSCOPIC RETROGRADE CHOLANGIOPANCREATOGRAPHY, WITH CALCULUS REMOVAL USING BALLOON  AND CATHETER WITH STENT PLACEMENT;  Surgeon: Tres Milan MD;  Location:  OR     ENDOSCOPIC ULTRASOUND UPPER GASTROINTESTINAL TRACT (GI) N/A 4/19/2021    Procedure: ENDOSCOPIC ULTRASOUND, ESOPHAGOSCOPY / UPPER GASTROINTESTINAL TRACT (GI);   Surgeon: Tres Milan MD;  Location:  OR     EYE SURGERY Right 2004 and 2005    macular pucker and cataract     LAPAROSCOPIC CHOLECYSTECTOMY N/A 4/20/2021    Procedure: CHOLECYSTECTOMY, LAPAROSCOPIC;  Surgeon: Callie Inman MD;  Location:  OR     TONSILLECTOMY & ADENOIDECTOMY  1949     [facility] REPLANTATION THUMB DISTAL,COMPLETE  1962    cut off right thumb and repair     [facility] TOTAL KNEE ARTHROPLASTY  2011, 2000    left then right     Review of Systems:   14 systems were reviewed and all negative except as mentioned above.   Current Medications:  Current Outpatient Medications   Medication     Boswellia-Glucosamine-Vit D (OSTEO BI-FLEX ONE PER DAY PO)     Cholecalciferol (VITAMIN D) 2000 UNITS CAPS     hydrochlorothiazide (HYDRODIURIL) 12.5 MG tablet     Krill Oil 500 MG CAPS     lisinopril (ZESTRIL) 40 MG tablet     lovastatin (MEVACOR) 40 MG tablet     Multiple Vitamins-Minerals (MULTIVITAL) TABS     omeprazole (PRILOSEC) 20 MG DR capsule     ipratropium - albuterol 0.5 mg/2.5 mg/3 mL (DUONEB) 0.5-2.5 (3) MG/3ML neb solution     No current facility-administered medications for this visit.     Physical Exam:   Wt 83 kg (183 lb)   BMI 29.54 kg/m     Body mass index is 29.54 kg/m .  No swelling per patient. Otherwise no exam,     Labs:   All labs reviewed by me  Last Renal Panel:  Sodium   Date Value Ref Range Status   05/03/2023 141 136 - 145 mmol/L Final   04/27/2021 139 133 - 144 mmol/L Final     Potassium   Date Value Ref Range Status   05/03/2023 4.8 3.4 - 5.3 mmol/L Final   09/22/2022 4.7 3.4 - 5.3 mmol/L Final   04/27/2021 4.5 3.4 - 5.3 mmol/L Final     Chloride   Date Value Ref Range Status   05/03/2023 107 98 - 107 mmol/L Final   09/22/2022 110 (H) 94 - 109 mmol/L Final   04/27/2021 109 94 - 109 mmol/L Final     Carbon Dioxide   Date Value Ref Range Status   04/27/2021 28 20 - 32 mmol/L Final     Carbon Dioxide (CO2)   Date Value Ref Range Status   05/03/2023 24 22 - 29 mmol/L Final   09/22/2022  23 20 - 32 mmol/L Final     Anion Gap   Date Value Ref Range Status   05/03/2023 10 7 - 15 mmol/L Final   09/22/2022 6 3 - 14 mmol/L Final   04/27/2021 2 (L) 3 - 14 mmol/L Final     Glucose   Date Value Ref Range Status   05/03/2023 121 (H) 70 - 99 mg/dL Final   09/22/2022 115 (H) 70 - 99 mg/dL Final   04/27/2021 106 (H) 70 - 99 mg/dL Final     Urea Nitrogen   Date Value Ref Range Status   05/03/2023 42.8 (H) 8.0 - 23.0 mg/dL Final   09/22/2022 48 (H) 7 - 30 mg/dL Final   04/27/2021 31 (H) 7 - 30 mg/dL Final     Creatinine   Date Value Ref Range Status   05/03/2023 2.48 (H) 0.67 - 1.17 mg/dL Final   04/27/2021 2.11 (H) 0.66 - 1.25 mg/dL Final     GFR Estimate   Date Value Ref Range Status   05/03/2023 24 (L) >60 mL/min/1.73m2 Final     Comment:     eGFR calculated using 2021 CKD-EPI equation.   04/27/2021 27 (L) >60 mL/min/[1.73_m2] Final     Comment:     Non  GFR Calc  Starting 12/18/2018, serum creatinine based estimated GFR (eGFR) will be   calculated using the Chronic Kidney Disease Epidemiology Collaboration   (CKD-EPI) equation.       Calcium   Date Value Ref Range Status   05/03/2023 9.4 8.8 - 10.2 mg/dL Final   04/27/2021 8.5 8.5 - 10.1 mg/dL Final     Phosphorus   Date Value Ref Range Status   05/03/2023 3.5 2.5 - 4.5 mg/dL Final   01/24/2011 3.6 2.5 - 4.9 mg/dL Final     Albumin   Date Value Ref Range Status   05/03/2023 4.0 3.5 - 5.2 g/dL Final   09/22/2022 3.4 3.4 - 5.0 g/dL Final   04/22/2021 2.0 (L) 3.4 - 5.0 g/dL Final     Imaging:  I reviewed imaging studies. US renal 5/13/2022 showed:  RIGHT KIDNEY: 10.4 x 5.2 x 4.9 cm . No hydronephrosis or cortical  thinning. Several simple cysts. The largest cyst in the interpolar  region/lower pole measures 4.7 cm, unchanged.      LEFT KIDNEY: 11.1 x 6.6 x 6.0 cm. No hydronephrosis or cortical  thinning. Several simple cysts. The largest cyst at the upper pole  measures 4.9 cm, unchanged.      BLADDER: Nearly empty. Mild prostatomegaly with  prostate volume of 46  mL.     1.  Stable simple renal cysts bilaterally requiring no specific  additional follow-up.  2.  Mild prostatomegaly.    Assessment & Recommendations:   Problem list  # Chronic kidney disease stage IV secondary to chronic hypertension, prior NSAID use and prior acute kidney injury; baseline creatinine 2.4-2.5  # Nonnephrotic range proteinuria; stable; UPCR 1.2 g/g (peak at 2.94 on 8/23/21)  His chronic kidney disease is likely multifactorial in the setting of chronic hypertension, prior NSAID use and prior ADEEL. At this time, he has no symptoms from the CKD standpoints. Electrolytes are in acceptable range and no major acid bases disturbances.  Proteinuria worsens from 1.27 g/g  to 2.47 g.g. Cr today is 2.48 with eGFR 24, stable.  This was collected by the time he has tonsilitis so it might affect the protein level.  Moreover his blood pressures seem to be a bit higher than his normal last office blood pressure was 134/80. I asked him to monitor his BP and let me know in 2 weeks. The patient expressed the wishes to start dialysis if it is necessary but he would like to delay it as long as possible. He declines referral to kidney class.   #Bilateral renal cysts; stable at US 5/13/22  The patient has bilateral renal cyst but his kidney size are small.  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  The most recent ultrasound on 5/13/2022 showed stable bilateral renal cysts, largest cyst in the interpolar region of right kidney is 4.7 cm and upper pole of the left kidney 4.9 cm per radiology, no need for further follow-up.  #Hypertension: Controlled?  He is currently on lisinopril 40 mg daily and hydrochlorothiazide 12.5 mg daily. Home BP is 135/70s. I asked him to measure BP and let me know in 2 weeks.  # MBD  PTH 65, vitamin D is 57.  The patient is on vitamin D supplement a total of 4000 U/day.  Calcium and phosphorus are within normal limit.  # Surveillance  for anemia in CKD  He is hemoglobin is 15.4 which is excellent.  We will continue to monitor his hemoglobin every visit.  # Prostatic enlargement with elevated PSA; reasonable to not continuee to monitor per urology  PSA mildly elevated, 6.58 in 1/22. Referred to urology (Dr. Paris) who discussed risk and benefit of screening at his age and It is reasonable to not continue with screening per Dr. Prais.  # COPD       Follow-up in 6 months with labs    I spent  30  minutes on the date of the encounter doing chart review, history and exam, documentation and further activities as noted above. 21 minutes of this visit is dedicated to direct patient interaction via phone.    Gustavo Cadet MD on 05/09/2023              Again, thank you for allowing me to participate in the care of your patient.      Sincerely,    Gustavo Cadet MD

## 2023-05-09 NOTE — PROGRESS NOTES
Nitish is a 88 year old who is being evaluated via a billable telephone visit.      What phone number would you like to be contacted at? [phone]  How would you like to obtain your AVS? Mail a copy  Phone call duration: 15 (10.02-10.17) minutes      Nephrology Progress Note  05/09/2023   Chief complaint: Follow-up for CKD stage IV  History of Present Illness:    Nitish Coreas is a 87 year old with history of COPD, choledocholithiasis s/p ERCP, sphincterotomy and CBD stent placed on 4/21, chronic hypertension, hyperlipidemia, prostatic enlargement and elevated PSA, chronic back pain, ascending aortic and infrarenal abdominal aortic dilatation and aneurysm     The patient was admitted in Apri 2021due to cholelithiasis with obstructive jaundice.  He underwent ERCP, EUS, pus draining from gallbladder, sphincterotomy and CBD stent placed.  Subsequently, gallbladder was removed.  He also received IV antibiotics with ciprofloxacin Flagyl and IV fluid.  He was also noted to have COPD exacerbation and required oxygen supplement in adjunct to home oxygen therapy.  He was also noted to have acute kidney injury with creatinine increased to 2.92 on the day of dismissal.     Upon chart review, the patient was noted to have creatinine up 1.3-1.5 since [id number].  Since 2015, his creatinine has gradually increased to between 1.5-1.7.  In 2019, his creatinine has increased 2.03.  He has an ADEEL when he was admitted as mentioned above with creatinine peak 2.98.  Creatinine has been down to 2.11 on 4/27/2021 and since then has fluctuating between 2.1-2.2.  Patient noted to have urine protein since 2015.  His urine albumin creatinine ratio was 425 mg/g and increased to 2838 mg/g on 7/21.      His UA is essentially no pyuria or red blood cells since 2015.  CT abdomen pelvis and chest in 4/21 showed few bilateral renal cysts with the largest upper pole of the left knee medially measuring 4.9 cm.  The kidneys have otherwise unremarkable  noncontrast appearance cyst.  No stone or hydronephrosis.  The patient also has mild to moderate prostatic enlargement.  No mention about his bladder.  I measure his kidneys by myself on the CT of 4/21 which showed small kidney both size, right 8.4 cm and right 9.1 cm.     His parathyroid hormone is normal at 72 on 8/21 with normal vitamin D.  His hemoglobin is normal at 14.6.      8/30/21: The patient is attending a video visit with his wife.  He could not use [employer] because his computer does not have a camera so we did [employer].  The patient reports that he he was told that he has kidney problem since he was admitted in April 2021.  At that time, he was quite sick and he could not eat and drink for 7 days.  Patient is covering well from the procedure.  And a CBD stent was recently removed 3 weeks ago.  Regarding symptoms, the patient denies any chest pain or short of breath.  The patient has extremity edema, left worsening than right that usually occur again today.  He noticed some foamy urine especially at nighttime.  Patient does not have any difficulty urination.  The patient has no family history of chronic kidney disease.  His blood pressure ranging between 130-138/78 to 82 mmHg.  He is only taking lisinopril 40 mg daily.  The patient has been diagnosed with high blood for more than 10 years and is on 20 mg of lisinopril but was increased to 40 g about 3 years ago.  The patient intermittently take NSAIDs due to lower back pain.  He does not limit salt in his diet but he tried low-salt condiment.  The patient used to weigh 222 pounds but since his admission, his weight down to 185 and he is trying to maintain his weight at 185.  The patient does not have any signs or symptoms of TONY.  I started the patient on trazodone 12.5 mg daily.   1/10/22:He feels ok today. We switched him from chlorthalidone to hydrochlorothiazide lately because he could not cut the pill in half. Now he does not need to cut it. BP is  better now 120/70s. He has no leg swelling. His appetite is not the same. He is trying to stay away from salt. He is no longer having swelling. He does not have lightheadedness or dizziness.  He denies any dysuria or hematuria.  I noted patient has elevated PSA at 5.30 back in 11/23/20 and has CT done in 4/21 which showed prostatic enlargement. Since then there is no other PSA checked. Labs on 1/7/2022 showed creatinine 2.47, potassium 4.3, bicarb 21.  Urine protein creatinine ratio is 2.33 which has dropped from 2.94 on 8/23/2021. His PTH is 87 and vitamin D is 42.  His hemoglobin is stable at 13.4 with normal white blood cell and platelet.  His UA showed trace blood in the urine but RBCs only 0-2.    2/24/22: Seen by Urology (Dr. Robby Paris). Discussed risk and benefit for prostate cancer screening. Pt would discuss with PCP for further screening. Per urology, reasonable to not continue to screen.   5/16/22: He is feeling better.  He has no problem with urination. He has no leg swelling.  Interestingly, he noticed some blood in the urine especially as a day goes by. His appetite is better and he starts to gain more weight. /77 mmHg. He has no lightheadedness or dizziness. Labs on 5/13/2022 showed creatinine 2.51, potassium 4.2, EGFR 24, calcium 9, phosphorus 3.3, albumin 3.7, UPCR 1.23 (improved from 2.33 on 1/7/22).  Hemoglobin 14.5.  UA showed no blood or white blood cell. PTH 64 and vitamin D 56.  7/27/22: Seen vascular surgery. CT showed 4.8 cm.   9/26/22: He is doing good. He does not go to the ED or have any hospitalization. He has no problem with urination or swelling. His appetite is getting better. He gained about 4 lbs. He had a gallbladder attack and had a surgery and a stent (in 2021). He was 220 Lbs then went down to 180 when he was having GB problem. Now he is 184 Lbs. BP this morning is 135/76 mmHg. UPCR 1.27 g/g. He will visit [place] in February and prefers to have a visit again in 3/22.    5/09/23: He is feeling fine today. Nothing change since the last time we talked. He has no change in urination. No new medication. No swelling. BP earlier this month was 134/80 but typically 120-130/70s. Energy is fine. Occasionally he takes high salt diet such as pizza. He measures his blood pressure. He just has tonsilitis last week and taking Z-hilary. Respiratory symptoms is improving. His weight is about 181-183 Lbs. Lost 40 Lbs after gall bladder removed.   Labs on 5/3/23 showed creatinine 2.48, EGFR 24, BUN 42.8, potassium 4.8, bicarb 24, calcium 9.4, phosphorus 3.5, albumin 4.0.  CBC showed hemoglobin 15.4, platelet 164 and white blood cell 8.2.  UA shows small blood RBC 0-2, 0-2 hyaline cast a few squamous epithelial cells. UPCR 2.41 g/g. PTH 65 and Vit D 57.     Past medical history  Past Medical History:   Diagnosis Date     BPH (benign prostatic hyperplasia)      Chronic back pain      Chronic kidney disease, stage IV (severe) (H)      Emphysematous COPD (H)      Hyperlipidemia LDL goal < 100      Hypertension      Past surgical history  Past Surgical History:   Procedure Laterality Date     DENTAL SURGERY  1958    wisdom teeth     ENDOSCOPIC RETROGRADE CHOLANGIOPANCREATOGRAM N/A 4/19/2021    Procedure: ENDOSCOPIC RETROGRADE CHOLANGIOPANCREATOGRAPHY, WITH CALCULUS REMOVAL USING BALLOON  AND CATHETER WITH STENT PLACEMENT;  Surgeon: Tres Milan MD;  Location:  OR     ENDOSCOPIC ULTRASOUND UPPER GASTROINTESTINAL TRACT (GI) N/A 4/19/2021    Procedure: ENDOSCOPIC ULTRASOUND, ESOPHAGOSCOPY / UPPER GASTROINTESTINAL TRACT (GI);  Surgeon: Tres Milan MD;  Location:  OR     EYE SURGERY Right 2004 and 2005    macular pucker and cataract     LAPAROSCOPIC CHOLECYSTECTOMY N/A 4/20/2021    Procedure: CHOLECYSTECTOMY, LAPAROSCOPIC;  Surgeon: Callie Inman MD;  Location:  OR     TONSILLECTOMY & ADENOIDECTOMY  1949     ZZC REPLANTATION THUMB DISTAL,COMPLETE  1962    cut off right thumb and  repair     [facility] TOTAL KNEE ARTHROPLASTY  2011, 2000    left then right     Review of Systems:   14 systems were reviewed and all negative except as mentioned above.   Current Medications:  Current Outpatient Medications   Medication     Boswellia-Glucosamine-Vit D (OSTEO BI-FLEX ONE PER DAY PO)     Cholecalciferol (VITAMIN D) 2000 UNITS CAPS     hydrochlorothiazide (HYDRODIURIL) 12.5 MG tablet     Krill Oil 500 MG CAPS     lisinopril (ZESTRIL) 40 MG tablet     lovastatin (MEVACOR) 40 MG tablet     Multiple Vitamins-Minerals (MULTIVITAL) TABS     omeprazole (PRILOSEC) 20 MG DR capsule     ipratropium - albuterol 0.5 mg/2.5 mg/3 mL (DUONEB) 0.5-2.5 (3) MG/3ML neb solution     No current facility-administered medications for this visit.     Physical Exam:   Wt 83 kg (183 lb)   BMI 29.54 kg/m     Body mass index is 29.54 kg/m .  No swelling per patient. Otherwise no exam,     Labs:   All labs reviewed by me  Last Renal Panel:  Sodium   Date Value Ref Range Status   05/03/2023 141 136 - 145 mmol/L Final   04/27/2021 139 133 - 144 mmol/L Final     Potassium   Date Value Ref Range Status   05/03/2023 4.8 3.4 - 5.3 mmol/L Final   09/22/2022 4.7 3.4 - 5.3 mmol/L Final   04/27/2021 4.5 3.4 - 5.3 mmol/L Final     Chloride   Date Value Ref Range Status   05/03/2023 107 98 - 107 mmol/L Final   09/22/2022 110 (H) 94 - 109 mmol/L Final   04/27/2021 109 94 - 109 mmol/L Final     Carbon Dioxide   Date Value Ref Range Status   04/27/2021 28 20 - 32 mmol/L Final     Carbon Dioxide (CO2)   Date Value Ref Range Status   05/03/2023 24 22 - 29 mmol/L Final   09/22/2022 23 20 - 32 mmol/L Final     Anion Gap   Date Value Ref Range Status   05/03/2023 10 7 - 15 mmol/L Final   09/22/2022 6 3 - 14 mmol/L Final   04/27/2021 2 (L) 3 - 14 mmol/L Final     Glucose   Date Value Ref Range Status   05/03/2023 121 (H) 70 - 99 mg/dL Final   09/22/2022 115 (H) 70 - 99 mg/dL Final   04/27/2021 106 (H) 70 - 99 mg/dL Final     Urea Nitrogen   Date Value  Ref Range Status   05/03/2023 42.8 (H) 8.0 - 23.0 mg/dL Final   09/22/2022 48 (H) 7 - 30 mg/dL Final   04/27/2021 31 (H) 7 - 30 mg/dL Final     Creatinine   Date Value Ref Range Status   05/03/2023 2.48 (H) 0.67 - 1.17 mg/dL Final   04/27/2021 2.11 (H) 0.66 - 1.25 mg/dL Final     GFR Estimate   Date Value Ref Range Status   05/03/2023 24 (L) >60 mL/min/1.73m2 Final     Comment:     eGFR calculated using 2021 CKD-EPI equation.   04/27/2021 27 (L) >60 mL/min/[1.73_m2] Final     Comment:     Non  GFR Calc  Starting 12/18/2018, serum creatinine based estimated GFR (eGFR) will be   calculated using the Chronic Kidney Disease Epidemiology Collaboration   (CKD-EPI) equation.       Calcium   Date Value Ref Range Status   05/03/2023 9.4 8.8 - 10.2 mg/dL Final   04/27/2021 8.5 8.5 - 10.1 mg/dL Final     Phosphorus   Date Value Ref Range Status   05/03/2023 3.5 2.5 - 4.5 mg/dL Final   01/24/2011 3.6 2.5 - 4.9 mg/dL Final     Albumin   Date Value Ref Range Status   05/03/2023 4.0 3.5 - 5.2 g/dL Final   09/22/2022 3.4 3.4 - 5.0 g/dL Final   04/22/2021 2.0 (L) 3.4 - 5.0 g/dL Final     Imaging:  I reviewed imaging studies. US renal 5/13/2022 showed:  RIGHT KIDNEY: 10.4 x 5.2 x 4.9 cm . No hydronephrosis or cortical  thinning. Several simple cysts. The largest cyst in the interpolar  region/lower pole measures 4.7 cm, unchanged.      LEFT KIDNEY: 11.1 x 6.6 x 6.0 cm. No hydronephrosis or cortical  thinning. Several simple cysts. The largest cyst at the upper pole  measures 4.9 cm, unchanged.      BLADDER: Nearly empty. Mild prostatomegaly with prostate volume of 46  mL.     1.  Stable simple renal cysts bilaterally requiring no specific  additional follow-up.  2.  Mild prostatomegaly.    Assessment & Recommendations:   Problem list  # Chronic kidney disease stage IV secondary to chronic hypertension, prior NSAID use and prior acute kidney injury; baseline creatinine 2.4-2.5  # Nonnephrotic range proteinuria; stable;  UPCR 1.2 g/g (peak at 2.94 on 8/23/21)  His chronic kidney disease is likely multifactorial in the setting of chronic hypertension, prior NSAID use and prior ADEEL. At this time, he has no symptoms from the CKD standpoints. Electrolytes are in acceptable range and no major acid bases disturbances.  Proteinuria worsens from 1.27 g/g  to 2.47 g.g. Cr today is 2.48 with eGFR 24, stable.  This was collected by the time he has tonsilitis so it might affect the protein level.  Moreover his blood pressures seem to be a bit higher than his normal last office blood pressure was 134/80. I asked him to monitor his BP and let me know in 2 weeks. The patient expressed the wishes to start dialysis if it is necessary but he would like to delay it as long as possible. He declines referral to kidney class.   #Bilateral renal cysts; stable at US 5/13/22  The patient has bilateral renal cyst but his kidney size are small.  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  The most recent ultrasound on 5/13/2022 showed stable bilateral renal cysts, largest cyst in the interpolar region of right kidney is 4.7 cm and upper pole of the left kidney 4.9 cm per radiology, no need for further follow-up.  #Hypertension: Controlled?  He is currently on lisinopril 40 mg daily and hydrochlorothiazide 12.5 mg daily. Home BP is 135/70s. I asked him to measure BP and let me know in 2 weeks.  # MBD  PTH 65, vitamin D is 57.  The patient is on vitamin D supplement a total of 4000 U/day.  Calcium and phosphorus are within normal limit.  # Surveillance for anemia in CKD  He is hemoglobin is 15.4 which is excellent.  We will continue to monitor his hemoglobin every visit.  # Prostatic enlargement with elevated PSA; reasonable to not continuee to monitor per urology  PSA mildly elevated, 6.58 in 1/22. Referred to urology (Dr. Paris) who discussed risk and benefit of screening at his age and It is reasonable to not continue  with screening per Dr. Paris.  # COPD       Follow-up in 6 months with labs    I spent  30  minutes on the date of the encounter doing chart review, history and exam, documentation and further activities as noted above. 21 minutes of this visit is dedicated to direct patient interaction via phone.    Gustavo Cadet MD on 05/09/2023

## 2023-05-09 NOTE — NURSING NOTE
Is the patient currently in the state of [place]? YES    Visit mode:TELEPHONE    If the visit is dropped, the patient can be reconnected by: TELEPHONE VISIT: Phone number: [phone]    Will anyone else be joining the visit? NO      How would you like to obtain your AVS? MyChart    Are changes needed to the allergy or medication list? NO    Reason for visit: No chief complaint on file.

## 2023-05-09 NOTE — PATIENT INSTRUCTIONS
Please monitor blood pressure and let me know in 2 weeks  Continue to monitor signs and symptoms of kidney disease such as swelling, low energy, nausea/vomiting  We will see you again in 6 months with labs

## 2023-05-11 ENCOUNTER — TELEPHONE (OUTPATIENT)
Dept: MULTI SPECIALTY CLINIC | Facility: CLINIC | Age: 88
End: 2023-05-11
Payer: COMMERCIAL

## 2023-05-11 NOTE — TELEPHONE ENCOUNTER
"\"no solutions found without over ruling\" sent message to pool.     Appointment type: 6 month follow up telephone  Provider: Dr. Gustavo Cadet  Return date: November 2023  Specialty phone number: n/a  Additional appointment(s) needed: lab appointment one week prior to follow up is needed.   Additonal Notes: n/a    "

## 2023-06-01 ENCOUNTER — PATIENT OUTREACH (OUTPATIENT)
Dept: NEPHROLOGY | Facility: CLINIC | Age: 88
End: 2023-06-01
Payer: COMMERCIAL

## 2023-06-01 NOTE — PROGRESS NOTES
Gustavo Cadet MD Botts, Brittany, RN  Fredi Ford,     His blood pressure are really good! No adjustment from my end. Please continue to monitor 2-3 times weekly. Please in form him.     Thanks   ---------------------------------------------------------------------  Called to inform patient no changes at this time and to continue to monitor blood pressures 2-3 times weekly. Did not need any medication filled and no other concerns at this time.     Siobhan White RN, BSN   Nephrology RN Care Coordinator   [facility]

## 2023-09-13 ENCOUNTER — OFFICE VISIT (OUTPATIENT)
Dept: URGENT CARE | Facility: URGENT CARE | Age: 88
End: 2023-09-13
Payer: COMMERCIAL

## 2023-09-13 VITALS
TEMPERATURE: 97.2 F | SYSTOLIC BLOOD PRESSURE: 137 MMHG | HEART RATE: 65 BPM | BODY MASS INDEX: 30.18 KG/M2 | DIASTOLIC BLOOD PRESSURE: 76 MMHG | WEIGHT: 187 LBS | OXYGEN SATURATION: 96 %

## 2023-09-13 DIAGNOSIS — J44.1 ACUTE EXACERBATION OF CHRONIC OBSTRUCTIVE PULMONARY DISEASE (COPD) (H): Primary | ICD-10-CM

## 2023-09-13 PROCEDURE — 99213 OFFICE O/P EST LOW 20 MIN: CPT | Performed by: PHYSICIAN ASSISTANT

## 2023-09-13 RX ORDER — AZITHROMYCIN 250 MG/1
TABLET, FILM COATED ORAL
Qty: 6 TABLET | Refills: 0 | Status: SHIPPED | OUTPATIENT
Start: 2023-09-13 | End: 2023-09-18

## 2023-09-13 RX ORDER — PREDNISONE 20 MG/1
20 TABLET ORAL DAILY
Qty: 5 TABLET | Refills: 0 | Status: SHIPPED | OUTPATIENT
Start: 2023-09-13 | End: 2023-09-18

## 2023-09-13 NOTE — PATIENT INSTRUCTIONS
(J44.1) Acute exacerbation of chronic obstructive pulmonary disease (COPD) (H)  (primary encounter diagnosis)  Comment:   Plan: azithromycin (ZITHROMAX) 250 MG tablet,         predniSONE (DELTASONE) 20 MG tablet            Follow up with primary clinic should symptoms persist or worsen

## 2023-09-13 NOTE — PROGRESS NOTES
Patient presents with:  Cough: X 3 days and a lot of mucus.      (J44.1) Acute exacerbation of chronic obstructive pulmonary disease (COPD) (H)  (primary encounter diagnosis)  Comment:   Plan: azithromycin (ZITHROMAX) 250 MG tablet,         predniSONE (DELTASONE) 20 MG tablet            Follow up with primary clinic should symptoms persist or worsen        SUBJECTIVE:   Nitish Coreas is a 89 year old male who presents today with a productive cough for the past 3 to 4 days.  He has a history of COPD, denies any fevers.  He does not use any inhalers as he does not feel that they offer him any relief.    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 (Patient not taking: Reported on 10/19/2020) 30 tablet 7     Social History     Tobacco Use    Smoking status: Never Smoker    Smokeless tobacco: Never Used   Substance Use Topics    Alcohol use: Not on file     Family History   Problem Relation Age of Onset    Diabetes Mother     Diabetes Father          ROS:    10 point ROS of systems including Constitutional, Eyes, Respiratory, Cardiovascular, Gastroenterology, Genitourinary, Integumentary, Muscularskeletal, Psychiatric ,neurological were all negative except for pertinent positives noted in my HPI       OBJECTIVE:  /76   Pulse 65   Temp 97.2  F (36.2  C) (Tympanic)   Wt 84.8 kg (187 lb)   SpO2 96%   BMI 30.18 kg/m    Physical Exam:  GENERAL APPEARANCE: healthy, alert and no distress  EYES: EOMI,  PERRL, conjunctiva clear  HENT: ear canals and TM's normal.  Nose and mouth without ulcers, erythema or lesions  NECK:  supple, nontender, no lymphadenopathy  RESP: Rhonchi and scattered wheezes which improved but did not clear with cough   CV: regular rates and rhythm, normal S1 S2, no murmur noted  SKIN: no suspicious lesions or rashes

## 2023-09-20 ENCOUNTER — NURSE TRIAGE (OUTPATIENT)
Dept: INTERNAL MEDICINE | Facility: CLINIC | Age: 88
End: 2023-09-20
Payer: COMMERCIAL

## 2023-09-20 NOTE — TELEPHONE ENCOUNTER
Azithromycin and prednisone is an appropriate treatment course for an acute COPD exacerbation (what he was diagnosed with in ). Dr. Salamanca's open virtual slots today are double booked and ought to remain virtual. I have a 1 PM same day slot open tomorrow patient can be booked into if he'd like, but if he would like to be seen in-person today then Urgent Care or another clinic are his only 2 options.

## 2023-09-20 NOTE — TELEPHONE ENCOUNTER
Pt complains of productive cough. Notes Hx of COPD and frequent episodes of bronchitis. He was seen at  09/13 and prescribed a different  abx than what is is used to-.: azithromycin (ZITHROMAX) 250 MG tablet & predniSONE (DELTASONE) 20 MG tablet. Pt took the medication but has no symptom improvement. Pt continues to have cough and wheezing, SOB with exertion.  He was advised to follow up with PCP, but pt is unable to find an appt. Pt refuses to return to . Team provider doctor Salamanca has a virtual appts 05:00 and 5:30 PM today.    Routing message to providers for advice. Does PCP approve overbook OV today? Or does Doctor Salamanca approve in clinic appt today using virtual appt at 5:00 PM Or 5:30 PM?      Pt needs a call back with providers advice.       Reason for Disposition   Known COPD or other severe lung disease (i.e., bronchiectasis, cystic fibrosis, lung surgery) and worsening symptoms (i.e., increased sputum purulence or amount, increased breathing difficulty)   SEVERE coughing spells (e.g., whooping sound after coughing, vomiting after coughing)    Additional Information   Negative: Bluish (or gray) lips or face   Negative: SEVERE difficulty breathing (e.g., struggling for each breath, speaks in single words)   Negative: Rapid onset of cough and has hives   Negative: Coughing started suddenly after medicine, an allergic food or bee sting   Negative: Difficulty breathing after exposure to flames, smoke, or fumes   Negative: Sounds like a life-threatening emergency to the triager   Negative: Previous asthma attacks and this feels like asthma attack   Negative: Dry cough (non-productive; no sputum or minimal clear sputum) and within 14 days of COVID-19 Exposure   Negative: MODERATE difficulty breathing (e.g., speaks in phrases, SOB even at rest, pulse 100-120) and still present when not coughing   Negative: Chest pain present when not coughing   Negative: Passed out (i.e., fainted, collapsed and was not  responding)    Protocols used: Cough-A-OH

## 2023-09-20 NOTE — TELEPHONE ENCOUNTER
Called and spoke to the patient. Relayed message from the provider. Assisted in getting the patient scheduled for an appointment with Dr. Vásquez tomorrow.     Appointments in Next Year      Sep 21, 2023  1:00 PM  (Arrive by 12:40 PM)  Provider Visit with Pardeep Vásquez MD  [facility] ([facility] ) [phone]     Patient had no additional questions at this time.    Fabiola Ashley RN

## 2023-09-21 ENCOUNTER — OFFICE VISIT (OUTPATIENT)
Dept: INTERNAL MEDICINE | Facility: CLINIC | Age: 88
End: 2023-09-21
Payer: COMMERCIAL

## 2023-09-21 ENCOUNTER — ANCILLARY PROCEDURE (OUTPATIENT)
Dept: GENERAL RADIOLOGY | Facility: CLINIC | Age: 88
End: 2023-09-21
Attending: INTERNAL MEDICINE
Payer: COMMERCIAL

## 2023-09-21 VITALS
TEMPERATURE: 98 F | BODY MASS INDEX: 30.62 KG/M2 | OXYGEN SATURATION: 96 % | DIASTOLIC BLOOD PRESSURE: 70 MMHG | HEIGHT: 66 IN | HEART RATE: 73 BPM | SYSTOLIC BLOOD PRESSURE: 134 MMHG | RESPIRATION RATE: 18 BRPM | WEIGHT: 190.5 LBS

## 2023-09-21 DIAGNOSIS — J44.89 CHRONIC BRONCHITIS WITH EMPHYSEMA (H): Primary | ICD-10-CM

## 2023-09-21 DIAGNOSIS — I71.43 INFRARENAL ABDOMINAL AORTIC ANEURYSM (AAA) WITHOUT RUPTURE (H): ICD-10-CM

## 2023-09-21 DIAGNOSIS — J44.89 CHRONIC BRONCHITIS WITH EMPHYSEMA (H): ICD-10-CM

## 2023-09-21 PROCEDURE — 99214 OFFICE O/P EST MOD 30 MIN: CPT | Performed by: INTERNAL MEDICINE

## 2023-09-21 PROCEDURE — 71046 X-RAY EXAM CHEST 2 VIEWS: CPT | Mod: TC | Performed by: RADIOLOGY

## 2023-09-21 RX ORDER — DOXYCYCLINE 100 MG/1
100 CAPSULE ORAL 2 TIMES DAILY
Qty: 14 CAPSULE | Refills: 0 | Status: SHIPPED | OUTPATIENT
Start: 2023-09-21 | End: 2024-10-03

## 2023-09-21 NOTE — PATIENT INSTRUCTIONS
- Let's try a second round of antibiotics. If that doesn't work, see a lung doctor to discuss treatment for chronic bronchitis. [facility] [phone]

## 2023-09-21 NOTE — PROGRESS NOTES
"Assessment & Plan   Chronic bronchitis with emphysema (H)  Discussed anatomy and pathophysiology of this condition. He did not improve with azithromycin (his 'normal' treatment for acute bronchitis) or prednisone. Discussed my concern that he's developing chronic bronchitis with his known emphysema. He has seen [facility] in the past but not for 2+ years. He has failed all inhalers and is not interested in trying them again. Will check CXR today. Empiric course of doxycycline. If no improvement with doxycycline, he needs to see his pulmonologist. Contact info for that clinic given to him today.  - XR Chest 2 Views; Future  - doxycycline hyclate (VIBRAMYCIN) 100 MG capsule; Take 1 capsule (100 mg) by mouth 2 times daily    Infrarenal abdominal aortic aneurysm (AAA) without rupture (H)  Seen on past imaging. Has seen vascular to discuss treatment/monitoring, defer to them.    Pardeep Eller MD  [facility]    Chad Talbert is a 89 year old who presents for a next day acute care visit with chief concern of:  UC Follow-Up    HPI   ED/UC Followup:  Facility:  [facility]  Date of visit: 9/13/2023  Reason for visit: Acute exacerbation of COPD    Improved but 'didn't fully kick it'. Denies f/c. Coughing up 'gunk' at night.    Review of Systems   Constitutional, pulmonary systems are negative, except as otherwise noted.      Objective    /70   Pulse 73   Temp 98  F (36.7  C) (Tympanic)   Resp 18   Ht 1.676 m (5' 6\")   Wt 86.4 kg (190 lb 8 oz)   SpO2 96%   BMI 30.75 kg/m    Body mass index is 30.75 kg/m .    Physical Exam   GENERAL: alert and in no distress.  EYES: conjunctivae/corneas clear. EOMs grossly intact  HENT: Facies symmetric.  RESP: CTAB, no w/r/r. Diminished sounds throughout.  MSK: Moves all four extremities freely.  SKIN: No significant ulcers, lesions, or rashes on the visualized portions of the skin  NEURO: CN II-XII grossly intact.  "

## 2023-10-31 ENCOUNTER — TELEPHONE (OUTPATIENT)
Dept: NEPHROLOGY | Facility: CLINIC | Age: 88
End: 2023-10-31
Payer: COMMERCIAL

## 2023-10-31 NOTE — TELEPHONE ENCOUNTER
Called patient with a appointment reminder for Mon. 11/13/23 @ 8:30 am video with Dr. Cadet and a lab draw [facility]    Hipolito Pitt on 10/31/2023 at 12:14 PM

## 2023-11-06 ENCOUNTER — LAB (OUTPATIENT)
Dept: LAB | Facility: CLINIC | Age: 88
End: 2023-11-06
Attending: INTERNAL MEDICINE
Payer: COMMERCIAL

## 2023-11-06 DIAGNOSIS — I12.9 CKD STAGE 4 SECONDARY TO HYPERTENSION (H): ICD-10-CM

## 2023-11-06 DIAGNOSIS — N18.4 CKD STAGE 4 SECONDARY TO HYPERTENSION (H): ICD-10-CM

## 2023-11-06 LAB
ALBUMIN MFR UR ELPH: 228 MG/DL
ALBUMIN SERPL BCG-MCNC: 3.8 G/DL (ref 3.5–5.2)
ALBUMIN UR-MCNC: >=300 MG/DL
ANION GAP SERPL CALCULATED.3IONS-SCNC: 12 MMOL/L (ref 7–15)
APPEARANCE UR: CLEAR
BASOPHILS # BLD AUTO: 0 10E3/UL (ref 0–0.2)
BASOPHILS NFR BLD AUTO: 1 %
BILIRUB UR QL STRIP: NEGATIVE
BUN SERPL-MCNC: 54.3 MG/DL (ref 8–23)
CALCIUM SERPL-MCNC: 9.2 MG/DL (ref 8.8–10.2)
CHLORIDE SERPL-SCNC: 109 MMOL/L (ref 98–107)
COLOR UR AUTO: YELLOW
CREAT SERPL-MCNC: 2.84 MG/DL (ref 0.67–1.17)
CREAT UR-MCNC: 133 MG/DL
DEPRECATED HCO3 PLAS-SCNC: 20 MMOL/L (ref 22–29)
EGFRCR SERPLBLD CKD-EPI 2021: 21 ML/MIN/1.73M2
EOSINOPHIL # BLD AUTO: 0.3 10E3/UL (ref 0–0.7)
EOSINOPHIL NFR BLD AUTO: 4 %
ERYTHROCYTE [DISTWIDTH] IN BLOOD BY AUTOMATED COUNT: 12.5 % (ref 10–15)
GLUCOSE SERPL-MCNC: 114 MG/DL (ref 70–99)
GLUCOSE UR STRIP-MCNC: NEGATIVE MG/DL
GRAN CASTS #/AREA URNS LPF: ABNORMAL /LPF
HCT VFR BLD AUTO: 42.7 % (ref 40–53)
HGB BLD-MCNC: 14.4 G/DL (ref 13.3–17.7)
HGB UR QL STRIP: ABNORMAL
HYALINE CASTS #/AREA URNS LPF: ABNORMAL /LPF
IMM GRANULOCYTES # BLD: 0 10E3/UL
IMM GRANULOCYTES NFR BLD: 0 %
KETONES UR STRIP-MCNC: NEGATIVE MG/DL
LEUKOCYTE ESTERASE UR QL STRIP: NEGATIVE
LYMPHOCYTES # BLD AUTO: 2.4 10E3/UL (ref 0.8–5.3)
LYMPHOCYTES NFR BLD AUTO: 28 %
MCH RBC QN AUTO: 32.4 PG (ref 26.5–33)
MCHC RBC AUTO-ENTMCNC: 33.7 G/DL (ref 31.5–36.5)
MCV RBC AUTO: 96 FL (ref 78–100)
MONOCYTES # BLD AUTO: 0.7 10E3/UL (ref 0–1.3)
MONOCYTES NFR BLD AUTO: 8 %
NEUTROPHILS # BLD AUTO: 5.2 10E3/UL (ref 1.6–8.3)
NEUTROPHILS NFR BLD AUTO: 60 %
NITRATE UR QL: NEGATIVE
PH UR STRIP: 6 [PH] (ref 5–7)
PHOSPHATE SERPL-MCNC: 3.6 MG/DL (ref 2.5–4.5)
PLATELET # BLD AUTO: 163 10E3/UL (ref 150–450)
POTASSIUM SERPL-SCNC: 4.2 MMOL/L (ref 3.4–5.3)
PROT/CREAT 24H UR: 1.71 MG/MG CR (ref 0–0.2)
PTH-INTACT SERPL-MCNC: 49 PG/ML (ref 15–65)
RBC # BLD AUTO: 4.45 10E6/UL (ref 4.4–5.9)
RBC #/AREA URNS AUTO: ABNORMAL /HPF
SODIUM SERPL-SCNC: 141 MMOL/L (ref 135–145)
SP GR UR STRIP: >=1.03 (ref 1–1.03)
SQUAMOUS #/AREA URNS AUTO: ABNORMAL /LPF
UROBILINOGEN UR STRIP-ACNC: 0.2 E.U./DL
VIT D+METAB SERPL-MCNC: 42 NG/ML (ref 20–50)
WBC # BLD AUTO: 8.7 10E3/UL (ref 4–11)
WBC #/AREA URNS AUTO: ABNORMAL /HPF

## 2023-11-06 PROCEDURE — 80069 RENAL FUNCTION PANEL: CPT

## 2023-11-06 PROCEDURE — 84156 ASSAY OF PROTEIN URINE: CPT

## 2023-11-06 PROCEDURE — 82306 VITAMIN D 25 HYDROXY: CPT

## 2023-11-06 PROCEDURE — 81001 URINALYSIS AUTO W/SCOPE: CPT

## 2023-11-06 PROCEDURE — 36415 COLL VENOUS BLD VENIPUNCTURE: CPT

## 2023-11-06 PROCEDURE — 85025 COMPLETE CBC W/AUTO DIFF WBC: CPT

## 2023-11-06 PROCEDURE — 83970 ASSAY OF PARATHORMONE: CPT

## 2023-11-13 ENCOUNTER — VIRTUAL VISIT (OUTPATIENT)
Dept: NEPHROLOGY | Facility: CLINIC | Age: 88
End: 2023-11-13
Attending: INTERNAL MEDICINE
Payer: COMMERCIAL

## 2023-11-13 VITALS — WEIGHT: 184 LBS | HEIGHT: 66 IN | BODY MASS INDEX: 29.57 KG/M2

## 2023-11-13 DIAGNOSIS — N18.4 CKD STAGE 4 SECONDARY TO HYPERTENSION (H): Primary | ICD-10-CM

## 2023-11-13 DIAGNOSIS — N28.1 RENAL CYST: ICD-10-CM

## 2023-11-13 DIAGNOSIS — R80.1 PERSISTENT PROTEINURIA: ICD-10-CM

## 2023-11-13 DIAGNOSIS — I12.9 CKD STAGE 4 SECONDARY TO HYPERTENSION (H): Primary | ICD-10-CM

## 2023-11-13 DIAGNOSIS — I10 HYPERTENSION GOAL BP (BLOOD PRESSURE) < 140/90: ICD-10-CM

## 2023-11-13 PROCEDURE — 99214 OFFICE O/P EST MOD 30 MIN: CPT | Mod: 95 | Performed by: INTERNAL MEDICINE

## 2023-11-13 RX ORDER — SODIUM BICARBONATE 650 MG/1
650 TABLET ORAL 2 TIMES DAILY
Qty: 60 TABLET | Refills: 6 | Status: SHIPPED | OUTPATIENT
Start: 2023-11-13 | End: 2024-08-22

## 2023-11-13 NOTE — PATIENT INSTRUCTIONS
Start sodium bicarbonate 650 mg BID  Discuss about dialysis  If next time,kidney function continues to decrease then will refer to vascular access consult  See you in 3 months

## 2023-11-13 NOTE — NURSING NOTE
Is the patient currently in the state of [place]? YES    Visit mode:TELEPHONE    If the visit is dropped, the patient can be reconnected by: TELEPHONE VISIT: Phone number: [phone]    Will anyone else be joining the visit? NO  (If patient encounters technical issues they should call [phone]    How would you like to obtain your AVS? MyChart    Are changes needed to the allergy or medication list? No    Reason for visit: JOREG LEARY

## 2023-11-13 NOTE — LETTER
11/13/2023       RE: Nitish Coreas  [address]  [facility] [address]     Dear Colleague,    Thank you for referring your patient, Nitish Coreas, to the [facility] [place] at [facility]. Please see a copy of my visit note below.    Virtual Visit Details    Type of service:  Telephone Visit   Phone call duration: 21 minutes     Nitish is a 89 year old who is being evaluated via a billable telephone visit.      What phone number would you like to be contacted at? [phone]  How would you like to obtain your AVS? Mail a copy  Phone call duration: 21 minutes      Nephrology Progress Note  11/13/2023   Chief complaint: Follow-up for CKD stage IV  History of Present Illness:    Nitish Coreas is a 89 year old M with history of COPD, choledocholithiasis s/p ERCP, sphincterotomy and CBD stent placed on 4/21, chronic hypertension, hyperlipidemia, prostatic enlargement and elevated PSA, chronic back pain, ascending aortic and infrarenal abdominal aortic dilatation and aneurysm who is here for CKD follow-up.      The patient was admitted in Apri 2021due to cholelithiasis with obstructive jaundice.  He underwent ERCP, EUS, pus draining from gallbladder, sphincterotomy and CBD stent placed.  Subsequently, gallbladder was removed.  He also received IV antibiotics with ciprofloxacin Flagyl and IV fluid.  He was also noted to have COPD exacerbation and required oxygen supplement in adjunct to home oxygen therapy.  He was also noted to have acute kidney injury with creatinine increased to 2.92 on the day of dismissal.     Upon chart review, the patient was noted to have creatinine up 1.3-1.5 since [id number].  Since 2015, his creatinine has gradually increased to between 1.5-1.7.  In 2019, his creatinine has increased 2.03.  He has an ADEEL when he was admitted as mentioned above with creatinine peak 2.98.  Creatinine has been down to 2.11 on  4/27/2021 and since then has fluctuating between 2.1-2.2.  Patient noted to have urine protein since 2015.  His urine albumin creatinine ratio was 425 mg/g and increased to 2838 mg/g on 7/21.      His UA is essentially no pyuria or red blood cells since 2015.  CT abdomen pelvis and chest in 4/21 showed few bilateral renal cysts with the largest upper pole of the left knee medially measuring 4.9 cm.  The kidneys have otherwise unremarkable noncontrast appearance cyst.  No stone or hydronephrosis.  The patient also has mild to moderate prostatic enlargement.  No mention about his bladder.  I measure his kidneys by myself on the CT of 4/21 which showed small kidney both size, right 8.4 cm and right 9.1 cm.     His parathyroid hormone is normal at 72 on 8/21 with normal vitamin D.  His hemoglobin is normal at 14.6.      8/30/21: The patient is attending a video visit with his wife.  He could not use [employer] because his computer does not have a camera so we did Doximity.  The patient reports that he he was told that he has kidney problem since he was admitted in April 2021.  At that time, he was quite sick and he could not eat and drink for 7 days.  Patient is covering well from the procedure.  And a CBD stent was recently removed 3 weeks ago.  Regarding symptoms, the patient denies any chest pain or short of breath.  The patient has extremity edema, left worsening than right that usually occur again today.  He noticed some foamy urine especially at nighttime.  Patient does not have any difficulty urination.  The patient has no family history of chronic kidney disease.  His blood pressure ranging between 130-138/78 to 82 mmHg.  He is only taking lisinopril 40 mg daily.  The patient has been diagnosed with high blood for more than 10 years and is on 20 mg of lisinopril but was increased to 40 g about 3 years ago.  The patient intermittently take NSAIDs due to lower back pain.  He does not limit salt in his diet but he  tried low-salt condiment.  The patient used to weigh 222 pounds but since his admission, his weight down to 185 and he is trying to maintain his weight at 185.  The patient does not have any signs or symptoms of TONY.  I started the patient on trazodone 12.5 mg daily.   1/10/22:He feels ok today. We switched him from chlorthalidone to hydrochlorothiazide lately because he could not cut the pill in half. Now he does not need to cut it. BP is better now 120/70s. He has no leg swelling. His appetite is not the same. He is trying to stay away from salt. He is no longer having swelling. He does not have lightheadedness or dizziness.  He denies any dysuria or hematuria.  I noted patient has elevated PSA at 5.30 back in 11/23/20 and has CT done in 4/21 which showed prostatic enlargement. Since then there is no other PSA checked. Labs on 1/7/2022 showed creatinine 2.47, potassium 4.3, bicarb 21.  Urine protein creatinine ratio is 2.33 which has dropped from 2.94 on 8/23/2021. His PTH is 87 and vitamin D is 42.  His hemoglobin is stable at 13.4 with normal white blood cell and platelet.  His UA showed trace blood in the urine but RBCs only 0-2.    2/24/22: Seen by Urology (Dr. Robby Paris). Discussed risk and benefit for prostate cancer screening. Pt would discuss with PCP for further screening. Per urology, reasonable to not continue to screen.   5/16/22: He is feeling better.  He has no problem with urination. He has no leg swelling.  Interestingly, he noticed some blood in the urine especially as a day goes by. His appetite is better and he starts to gain more weight. /77 mmHg. He has no lightheadedness or dizziness. Labs on 5/13/2022 showed creatinine 2.51, potassium 4.2, EGFR 24, calcium 9, phosphorus 3.3, albumin 3.7, UPCR 1.23 (improved from 2.33 on 1/7/22).  Hemoglobin 14.5.  UA showed no blood or white blood cell. PTH 64 and vitamin D 56.  7/27/22: Seen vascular surgery. CT showed 4.8 cm.   9/26/22: He is  doing good. He does not go to the ED or have any hospitalization. He has no problem with urination or swelling. His appetite is getting better. He gained about 4 lbs. He had a gallbladder attack and had a surgery and a stent (in 2021). He was 220 Lbs then went down to 180 when he was having GB problem. Now he is 184 Lbs. BP this morning is 135/76 mmHg. UPCR 1.27 g/g. He will visit [place] in February and prefers to have a visit again in 3/22.   5/09/23: He is feeling fine today. Nothing change since the last time we talked. He has no change in urination. No new medication. No swelling. BP earlier this month was 134/80 but typically 120-130/70s. Energy is fine. Occasionally he takes high salt diet such as pizza. He measures his blood pressure. He just has tonsilitis last week and taking Z-hilary. Respiratory symptoms is improving. His weight is about 181-183 Lbs. Lost 40 Lbs after gall bladder removed.   Labs on 5/3/23 showed creatinine 2.48, EGFR 24, BUN 42.8, potassium 4.8, bicarb 24, calcium 9.4, phosphorus 3.5, albumin 4.0.  CBC showed hemoglobin 15.4, platelet 164 and white blood cell 8.2.  UA shows small blood RBC 0-2, 0-2 hyaline cast a few squamous epithelial cells. UPCR 2.41 g/g. PTH 65 and Vit D 57.   11/13/23: He is doing fine. No thing change since the last time we talk.  There is no new medications. BP yesterday was 130/70 mmHg.He does not feel lightheadedness or dizziness. He has no leg swelling. He has no problem with urination. He has COPD but not on O2. His appetite is good and gained weight. He drinks about 60 Oz of water per day.   Labs on 11/6/23: Cr 2.84, eGFR 21, BUN 54.3, K 4.2, Bicarb 20. Calcium 9.2, Phos 3.6. Albumin 3.8. Vitamin D 42. Hb 14.4, plt 163 and WBC 8.7.     Past medical history  Past Medical History:   Diagnosis Date    BPH (benign prostatic hyperplasia)     Chronic back pain     Chronic kidney disease, stage IV (severe) (H)     Emphysematous COPD (H)     Hyperlipidemia LDL goal <  "100     Hypertension      Past surgical history  Past Surgical History:   Procedure Laterality Date    DENTAL SURGERY  1958    wisdom teeth    ENDOSCOPIC RETROGRADE CHOLANGIOPANCREATOGRAM N/A 4/19/2021    Procedure: ENDOSCOPIC RETROGRADE CHOLANGIOPANCREATOGRAPHY, WITH CALCULUS REMOVAL USING BALLOON  AND CATHETER WITH STENT PLACEMENT;  Surgeon: Tres Milan MD;  Location:  OR    ENDOSCOPIC ULTRASOUND UPPER GASTROINTESTINAL TRACT (GI) N/A 4/19/2021    Procedure: ENDOSCOPIC ULTRASOUND, ESOPHAGOSCOPY / UPPER GASTROINTESTINAL TRACT (GI);  Surgeon: Tres Milan MD;  Location:  OR    EYE SURGERY Right 2004 and 2005    macular pucker and cataract    LAPAROSCOPIC CHOLECYSTECTOMY N/A 4/20/2021    Procedure: CHOLECYSTECTOMY, LAPAROSCOPIC;  Surgeon: Callie Inman MD;  Location:  OR    TONSILLECTOMY & ADENOIDECTOMY  1949    ZZC REPLANTATION THUMB DISTAL,COMPLETE  1962    cut off right thumb and repair    ZC TOTAL KNEE ARTHROPLASTY  2011, 2000    left then right     Review of Systems:   14 systems were reviewed and all negative except as mentioned above.   Current Medications:  Current Outpatient Medications   Medication    Boswellia-Glucosamine-Vit D (OSTEO BI-FLEX ONE PER DAY PO)    Cholecalciferol (VITAMIN D) 2000 UNITS CAPS    doxycycline hyclate (VIBRAMYCIN) 100 MG capsule    hydrochlorothiazide (HYDRODIURIL) 12.5 MG tablet    Krill Oil 500 MG CAPS    lisinopril (ZESTRIL) 40 MG tablet    lovastatin (MEVACOR) 40 MG tablet    Multiple Vitamins-Minerals (MULTIVITAL) TABS    omeprazole (PRILOSEC) 20 MG DR capsule    ipratropium - albuterol 0.5 mg/2.5 mg/3 mL (DUONEB) 0.5-2.5 (3) MG/3ML neb solution     No current facility-administered medications for this visit.     Physical Exam:   Ht 1.676 m (5' 6\")   Wt 83.5 kg (184 lb)   BMI 29.70 kg/m     Body mass index is 29.7 kg/m .  No swelling per patient. Otherwise no exam,     Labs:   All labs reviewed by me  Last Renal Panel:  Sodium   Date Value Ref " Range Status   11/06/2023 141 135 - 145 mmol/L Final     Comment:     Reference intervals for this test were updated on 09/26/2023 to more accurately reflect our healthy population. There may be differences in the flagging of prior results with similar values performed with this method. Interpretation of those prior results can be made in the context of the updated reference intervals.    04/27/2021 139 133 - 144 mmol/L Final     Potassium   Date Value Ref Range Status   11/06/2023 4.2 3.4 - 5.3 mmol/L Final   09/22/2022 4.7 3.4 - 5.3 mmol/L Final   04/27/2021 4.5 3.4 - 5.3 mmol/L Final     Chloride   Date Value Ref Range Status   11/06/2023 109 (H) 98 - 107 mmol/L Final   09/22/2022 110 (H) 94 - 109 mmol/L Final   04/27/2021 109 94 - 109 mmol/L Final     Carbon Dioxide   Date Value Ref Range Status   04/27/2021 28 20 - 32 mmol/L Final     Carbon Dioxide (CO2)   Date Value Ref Range Status   11/06/2023 20 (L) 22 - 29 mmol/L Final   09/22/2022 23 20 - 32 mmol/L Final     Anion Gap   Date Value Ref Range Status   11/06/2023 12 7 - 15 mmol/L Final   09/22/2022 6 3 - 14 mmol/L Final   04/27/2021 2 (L) 3 - 14 mmol/L Final     Glucose   Date Value Ref Range Status   11/06/2023 114 (H) 70 - 99 mg/dL Final   09/22/2022 115 (H) 70 - 99 mg/dL Final   04/27/2021 106 (H) 70 - 99 mg/dL Final     Urea Nitrogen   Date Value Ref Range Status   11/06/2023 54.3 (H) 8.0 - 23.0 mg/dL Final   09/22/2022 48 (H) 7 - 30 mg/dL Final   04/27/2021 31 (H) 7 - 30 mg/dL Final     Creatinine   Date Value Ref Range Status   11/06/2023 2.84 (H) 0.67 - 1.17 mg/dL Final   04/27/2021 2.11 (H) 0.66 - 1.25 mg/dL Final     GFR Estimate   Date Value Ref Range Status   11/06/2023 21 (L) >60 mL/min/1.73m2 Final   04/27/2021 27 (L) >60 mL/min/[1.73_m2] Final     Comment:     Non  GFR Calc  Starting 12/18/2018, serum creatinine based estimated GFR (eGFR) will be   calculated using the Chronic Kidney Disease Epidemiology Collaboration    (CKD-EPI) equation.       Calcium   Date Value Ref Range Status   11/06/2023 9.2 8.8 - 10.2 mg/dL Final   04/27/2021 8.5 8.5 - 10.1 mg/dL Final     Phosphorus   Date Value Ref Range Status   11/06/2023 3.6 2.5 - 4.5 mg/dL Final   01/24/2011 3.6 2.5 - 4.9 mg/dL Final     Albumin   Date Value Ref Range Status   11/06/2023 3.8 3.5 - 5.2 g/dL Final   09/22/2022 3.4 3.4 - 5.0 g/dL Final   04/22/2021 2.0 (L) 3.4 - 5.0 g/dL Final     Imaging:  I reviewed imaging studies. US renal 5/13/2022 showed:  RIGHT KIDNEY: 10.4 x 5.2 x 4.9 cm . No hydronephrosis or cortical  thinning. Several simple cysts. The largest cyst in the interpolar  region/lower pole measures 4.7 cm, unchanged.      LEFT KIDNEY: 11.1 x 6.6 x 6.0 cm. No hydronephrosis or cortical  thinning. Several simple cysts. The largest cyst at the upper pole  measures 4.9 cm, unchanged.      BLADDER: Nearly empty. Mild prostatomegaly with prostate volume of 46  mL.     1.  Stable simple renal cysts bilaterally requiring no specific  additional follow-up.  2.  Mild prostatomegaly.    Assessment & Recommendations:   Problem list  # Chronic kidney disease stage IV secondary to chronic hypertension, prior NSAID use and prior acute kidney injury; baseline creatinine 2.4-2.5  # Nonnephrotic range proteinuria; stable; UPCR 1.7 g/g 11/6/23 (peak at 2.94 on 8/23/21)  # Interested in PD  His chronic kidney disease is likely multifactorial in the setting of chronic hypertension, prior NSAID use and prior ADEEL. At this time, he has no symptoms from the CKD standpoints. Electrolytes are in acceptable range except mild metabolic acidosis. Cr 2.84 with eGFR 21. UPCR 1.71 g/g, improving from 2.4 g/g.  Given worsening kidney function, I discussed with him about dialysis. He preferred PD. He would like to do it at home. He has Hx of GB surgery in the past, LC. I doubt that this is a contraindication for PD. If Cr next visit worsens, then will refer him for vascular access. He declines  referral to kidney class.   #Bilateral renal cysts; stable at US 5/13/22  The patient has bilateral renal cyst but his kidney size are small.  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  The most recent ultrasound on 5/13/2022 showed stable bilateral renal cysts, largest cyst in the interpolar region of right kidney is 4.7 cm and upper pole of the left kidney 4.9 cm per radiology, no need for further follow-up.  #Hypertension: Controlled  He is currently on lisinopril 40 mg daily and hydrochlorothiazide 12.5 mg daily. Home BP is 130/70s.   # Metabolic acidosis  Bicarb 20 so will start   # MBD  PTH 49, vitamin D is 42.  The patient is on vitamin D supplement a total of 4000 U/day.  Calcium and phosphorus are within normal limit.  # Surveillance for anemia in CKD  He is hemoglobin is 15.4 which is excellent.  We will continue to monitor his hemoglobin every visit.  # Prostatic enlargement with elevated PSA; reasonable to not continuee to monitor per urology  PSA mildly elevated, 6.58 in 1/22. Referred to urology (Dr. Paris) who discussed risk and benefit of screening at his age and It is reasonable to not continue with screening per Dr. Paris.  # COPD    Follow-up in 3 months with labs    I spent  30  minutes on the date of the encounter doing chart review, history and exam, documentation and further activities as noted above. 21 minutes of this visit is dedicated to direct patient interaction via phone.    Gustavo Cadet MD on 11/13/2023

## 2023-11-13 NOTE — PROGRESS NOTES
Nitish is a 89 year old who is being evaluated via a billable telephone visit.      What phone number would you like to be contacted at? [phone]  How would you like to obtain your AVS? Mail a copy  Phone call duration: 21 minutes      Nephrology Progress Note  11/13/2023   Chief complaint: Follow-up for CKD stage IV  History of Present Illness:    Nitish Coreas is a 89 year old M with history of COPD, choledocholithiasis s/p ERCP, sphincterotomy and CBD stent placed on 4/21, chronic hypertension, hyperlipidemia, prostatic enlargement and elevated PSA, chronic back pain, ascending aortic and infrarenal abdominal aortic dilatation and aneurysm who is here for CKD follow-up.      The patient was admitted in Apri 2021due to cholelithiasis with obstructive jaundice.  He underwent ERCP, EUS, pus draining from gallbladder, sphincterotomy and CBD stent placed.  Subsequently, gallbladder was removed.  He also received IV antibiotics with ciprofloxacin Flagyl and IV fluid.  He was also noted to have COPD exacerbation and required oxygen supplement in adjunct to home oxygen therapy.  He was also noted to have acute kidney injury with creatinine increased to 2.92 on the day of dismissal.     Upon chart review, the patient was noted to have creatinine up 1.3-1.5 since 2831-1613.  Since 2015, his creatinine has gradually increased to between 1.5-1.7.  In 2019, his creatinine has increased 2.03.  He has an ADEEL when he was admitted as mentioned above with creatinine peak 2.98.  Creatinine has been down to 2.11 on 4/27/2021 and since then has fluctuating between 2.1-2.2.  Patient noted to have urine protein since 2015.  His urine albumin creatinine ratio was 425 mg/g and increased to 2838 mg/g on 7/21.      His UA is essentially no pyuria or red blood cells since 2015.  CT abdomen pelvis and chest in 4/21 showed few bilateral renal cysts with the largest upper pole of the left knee medially measuring 4.9 cm.  The kidneys have  otherwise unremarkable noncontrast appearance cyst.  No stone or hydronephrosis.  The patient also has mild to moderate prostatic enlargement.  No mention about his bladder.  I measure his kidneys by myself on the CT of 4/21 which showed small kidney both size, right 8.4 cm and right 9.1 cm.     His parathyroid hormone is normal at 72 on 8/21 with normal vitamin D.  His hemoglobin is normal at 14.6.      8/30/21: The patient is attending a video visit with his wife.  He could not use [employer] because his computer does not have a camera so we did [employer].  The patient reports that he he was told that he has kidney problem since he was admitted in April 2021.  At that time, he was quite sick and he could not eat and drink for 7 days.  Patient is covering well from the procedure.  And a CBD stent was recently removed 3 weeks ago.  Regarding symptoms, the patient denies any chest pain or short of breath.  The patient has extremity edema, left worsening than right that usually occur again today.  He noticed some foamy urine especially at nighttime.  Patient does not have any difficulty urination.  The patient has no family history of chronic kidney disease.  His blood pressure ranging between 130-138/78 to 82 mmHg.  He is only taking lisinopril 40 mg daily.  The patient has been diagnosed with high blood for more than 10 years and is on 20 mg of lisinopril but was increased to 40 g about 3 years ago.  The patient intermittently take NSAIDs due to lower back pain.  He does not limit salt in his diet but he tried low-salt condiment.  The patient used to weigh 222 pounds but since his admission, his weight down to 185 and he is trying to maintain his weight at 185.  The patient does not have any signs or symptoms of TONY.  I started the patient on trazodone 12.5 mg daily.   1/10/22:He feels ok today. We switched him from chlorthalidone to hydrochlorothiazide lately because he could not cut the pill in half. Now he does not  need to cut it. BP is better now 120/70s. He has no leg swelling. His appetite is not the same. He is trying to stay away from salt. He is no longer having swelling. He does not have lightheadedness or dizziness.  He denies any dysuria or hematuria.  I noted patient has elevated PSA at 5.30 back in 11/23/20 and has CT done in 4/21 which showed prostatic enlargement. Since then there is no other PSA checked. Labs on 1/7/2022 showed creatinine 2.47, potassium 4.3, bicarb 21.  Urine protein creatinine ratio is 2.33 which has dropped from 2.94 on 8/23/2021. His PTH is 87 and vitamin D is 42.  His hemoglobin is stable at 13.4 with normal white blood cell and platelet.  His UA showed trace blood in the urine but RBCs only 0-2.    2/24/22: Seen by Urology (Dr. Robby Paris). Discussed risk and benefit for prostate cancer screening. Pt would discuss with PCP for further screening. Per urology, reasonable to not continue to screen.   5/16/22: He is feeling better.  He has no problem with urination. He has no leg swelling.  Interestingly, he noticed some blood in the urine especially as a day goes by. His appetite is better and he starts to gain more weight. /77 mmHg. He has no lightheadedness or dizziness. Labs on 5/13/2022 showed creatinine 2.51, potassium 4.2, EGFR 24, calcium 9, phosphorus 3.3, albumin 3.7, UPCR 1.23 (improved from 2.33 on 1/7/22).  Hemoglobin 14.5.  UA showed no blood or white blood cell. PTH 64 and vitamin D 56.  7/27/22: Seen vascular surgery. CT showed 4.8 cm.   9/26/22: He is doing good. He does not go to the ED or have any hospitalization. He has no problem with urination or swelling. His appetite is getting better. He gained about 4 lbs. He had a gallbladder attack and had a surgery and a stent (in 2021). He was 220 Lbs then went down to 180 when he was having GB problem. Now he is 184 Lbs. BP this morning is 135/76 mmHg. UPCR 1.27 g/g. He will visit [place] in February and prefers to have a  visit again in 3/22.   5/09/23: He is feeling fine today. Nothing change since the last time we talked. He has no change in urination. No new medication. No swelling. BP earlier this month was 134/80 but typically 120-130/70s. Energy is fine. Occasionally he takes high salt diet such as pizza. He measures his blood pressure. He just has tonsilitis last week and taking Z-hilary. Respiratory symptoms is improving. His weight is about 181-183 Lbs. Lost 40 Lbs after gall bladder removed.   Labs on 5/3/23 showed creatinine 2.48, EGFR 24, BUN 42.8, potassium 4.8, bicarb 24, calcium 9.4, phosphorus 3.5, albumin 4.0.  CBC showed hemoglobin 15.4, platelet 164 and white blood cell 8.2.  UA shows small blood RBC 0-2, 0-2 hyaline cast a few squamous epithelial cells. UPCR 2.41 g/g. PTH 65 and Vit D 57.   11/13/23: He is doing fine. No thing change since the last time we talk.  There is no new medications. BP yesterday was 130/70 mmHg.He does not feel lightheadedness or dizziness. He has no leg swelling. He has no problem with urination. He has COPD but not on O2. His appetite is good and gained weight. He drinks about 60 Oz of water per day.   Labs on 11/6/23: Cr 2.84, eGFR 21, BUN 54.3, K 4.2, Bicarb 20. Calcium 9.2, Phos 3.6. Albumin 3.8. Vitamin D 42. Hb 14.4, plt 163 and WBC 8.7.     Past medical history  Past Medical History:   Diagnosis Date    BPH (benign prostatic hyperplasia)     Chronic back pain     Chronic kidney disease, stage IV (severe) (H)     Emphysematous COPD (H)     Hyperlipidemia LDL goal < 100     Hypertension      Past surgical history  Past Surgical History:   Procedure Laterality Date    DENTAL SURGERY  1958    wisdom teeth    ENDOSCOPIC RETROGRADE CHOLANGIOPANCREATOGRAM N/A 4/19/2021    Procedure: ENDOSCOPIC RETROGRADE CHOLANGIOPANCREATOGRAPHY, WITH CALCULUS REMOVAL USING BALLOON  AND CATHETER WITH STENT PLACEMENT;  Surgeon: Tres Milan MD;  Location:  OR    ENDOSCOPIC ULTRASOUND UPPER  "GASTROINTESTINAL TRACT (GI) N/A 4/19/2021    Procedure: ENDOSCOPIC ULTRASOUND, ESOPHAGOSCOPY / UPPER GASTROINTESTINAL TRACT (GI);  Surgeon: Tres Milan MD;  Location:  OR    EYE SURGERY Right 2004 and 2005    macular pucker and cataract    LAPAROSCOPIC CHOLECYSTECTOMY N/A 4/20/2021    Procedure: CHOLECYSTECTOMY, LAPAROSCOPIC;  Surgeon: Callie Inman MD;  Location:  OR    TONSILLECTOMY & ADENOIDECTOMY  1949    [facility] REPLANTATION THUMB DISTAL,COMPLETE  1962    cut off right thumb and repair    [facility] TOTAL KNEE ARTHROPLASTY  2011, 2000    left then right     Review of Systems:   14 systems were reviewed and all negative except as mentioned above.   Current Medications:  Current Outpatient Medications   Medication    Boswellia-Glucosamine-Vit D (OSTEO BI-FLEX ONE PER DAY PO)    Cholecalciferol (VITAMIN D) 2000 UNITS CAPS    doxycycline hyclate (VIBRAMYCIN) 100 MG capsule    hydrochlorothiazide (HYDRODIURIL) 12.5 MG tablet    Krill Oil 500 MG CAPS    lisinopril (ZESTRIL) 40 MG tablet    lovastatin (MEVACOR) 40 MG tablet    Multiple Vitamins-Minerals (MULTIVITAL) TABS    omeprazole (PRILOSEC) 20 MG DR capsule    ipratropium - albuterol 0.5 mg/2.5 mg/3 mL (DUONEB) 0.5-2.5 (3) MG/3ML neb solution     No current facility-administered medications for this visit.     Physical Exam:   Ht 1.676 m (5' 6\")   Wt 83.5 kg (184 lb)   BMI 29.70 kg/m     Body mass index is 29.7 kg/m .  No swelling per patient. Otherwise no exam,     Labs:   All labs reviewed by me  Last Renal Panel:  Sodium   Date Value Ref Range Status   11/06/2023 141 135 - 145 mmol/L Final     Comment:     Reference intervals for this test were updated on 09/26/2023 to more accurately reflect our healthy population. There may be differences in the flagging of prior results with similar values performed with this method. Interpretation of those prior results can be made in the context of the updated reference intervals.    04/27/2021 139 133 - 144 " mmol/L Final     Potassium   Date Value Ref Range Status   11/06/2023 4.2 3.4 - 5.3 mmol/L Final   09/22/2022 4.7 3.4 - 5.3 mmol/L Final   04/27/2021 4.5 3.4 - 5.3 mmol/L Final     Chloride   Date Value Ref Range Status   11/06/2023 109 (H) 98 - 107 mmol/L Final   09/22/2022 110 (H) 94 - 109 mmol/L Final   04/27/2021 109 94 - 109 mmol/L Final     Carbon Dioxide   Date Value Ref Range Status   04/27/2021 28 20 - 32 mmol/L Final     Carbon Dioxide (CO2)   Date Value Ref Range Status   11/06/2023 20 (L) 22 - 29 mmol/L Final   09/22/2022 23 20 - 32 mmol/L Final     Anion Gap   Date Value Ref Range Status   11/06/2023 12 7 - 15 mmol/L Final   09/22/2022 6 3 - 14 mmol/L Final   04/27/2021 2 (L) 3 - 14 mmol/L Final     Glucose   Date Value Ref Range Status   11/06/2023 114 (H) 70 - 99 mg/dL Final   09/22/2022 115 (H) 70 - 99 mg/dL Final   04/27/2021 106 (H) 70 - 99 mg/dL Final     Urea Nitrogen   Date Value Ref Range Status   11/06/2023 54.3 (H) 8.0 - 23.0 mg/dL Final   09/22/2022 48 (H) 7 - 30 mg/dL Final   04/27/2021 31 (H) 7 - 30 mg/dL Final     Creatinine   Date Value Ref Range Status   11/06/2023 2.84 (H) 0.67 - 1.17 mg/dL Final   04/27/2021 2.11 (H) 0.66 - 1.25 mg/dL Final     GFR Estimate   Date Value Ref Range Status   11/06/2023 21 (L) >60 mL/min/1.73m2 Final   04/27/2021 27 (L) >60 mL/min/[1.73_m2] Final     Comment:     Non  GFR Calc  Starting 12/18/2018, serum creatinine based estimated GFR (eGFR) will be   calculated using the Chronic Kidney Disease Epidemiology Collaboration   (CKD-EPI) equation.       Calcium   Date Value Ref Range Status   11/06/2023 9.2 8.8 - 10.2 mg/dL Final   04/27/2021 8.5 8.5 - 10.1 mg/dL Final     Phosphorus   Date Value Ref Range Status   11/06/2023 3.6 2.5 - 4.5 mg/dL Final   01/24/2011 3.6 2.5 - 4.9 mg/dL Final     Albumin   Date Value Ref Range Status   11/06/2023 3.8 3.5 - 5.2 g/dL Final   09/22/2022 3.4 3.4 - 5.0 g/dL Final   04/22/2021 2.0 (L) 3.4 - 5.0 g/dL  Final     Imaging:  I reviewed imaging studies. US renal 5/13/2022 showed:  RIGHT KIDNEY: 10.4 x 5.2 x 4.9 cm . No hydronephrosis or cortical  thinning. Several simple cysts. The largest cyst in the interpolar  region/lower pole measures 4.7 cm, unchanged.      LEFT KIDNEY: 11.1 x 6.6 x 6.0 cm. No hydronephrosis or cortical  thinning. Several simple cysts. The largest cyst at the upper pole  measures 4.9 cm, unchanged.      BLADDER: Nearly empty. Mild prostatomegaly with prostate volume of 46  mL.     1.  Stable simple renal cysts bilaterally requiring no specific  additional follow-up.  2.  Mild prostatomegaly.    Assessment & Recommendations:   Problem list  # Chronic kidney disease stage IV secondary to chronic hypertension, prior NSAID use and prior acute kidney injury; baseline creatinine 2.4-2.5  # Nonnephrotic range proteinuria; stable; UPCR 1.7 g/g 11/6/23 (peak at 2.94 on 8/23/21)  # Interested in PD  His chronic kidney disease is likely multifactorial in the setting of chronic hypertension, prior NSAID use and prior ADEEL. At this time, he has no symptoms from the CKD standpoints. Electrolytes are in acceptable range except mild metabolic acidosis. Cr 2.84 with eGFR 21. UPCR 1.71 g/g, improving from 2.4 g/g.  Given worsening kidney function, I discussed with him about dialysis. He preferred PD. He would like to do it at home. He has Hx of GB surgery in the past, LC. I doubt that this is a contraindication for PD. If Cr next visit worsens, then will refer him for vascular access. He declines referral to kidney class.   #Bilateral renal cysts; stable at US 5/13/22  The patient has bilateral renal cyst but his kidney size are small.  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  The most recent ultrasound on 5/13/2022 showed stable bilateral renal cysts, largest cyst in the interpolar region of right kidney is 4.7 cm and upper pole of the left kidney 4.9 cm per radiology, no  need for further follow-up.  #Hypertension: Controlled  He is currently on lisinopril 40 mg daily and hydrochlorothiazide 12.5 mg daily. Home BP is 130/70s.   # Metabolic acidosis  Bicarb 20 so will start   # MBD  PTH 49, vitamin D is 42.  The patient is on vitamin D supplement a total of 4000 U/day.  Calcium and phosphorus are within normal limit.  # Surveillance for anemia in CKD  He is hemoglobin is 15.4 which is excellent.  We will continue to monitor his hemoglobin every visit.  # Prostatic enlargement with elevated PSA; reasonable to not continuee to monitor per urology  PSA mildly elevated, 6.58 in 1/22. Referred to urology (Dr. Paris) who discussed risk and benefit of screening at his age and It is reasonable to not continue with screening per Dr. Paris.  # COPD    Follow-up in 3 months with labs    I spent  30  minutes on the date of the encounter doing chart review, history and exam, documentation and further activities as noted above. 21 minutes of this visit is dedicated to direct patient interaction via phone.    Gustavo Cadet MD on 11/13/2023

## 2023-11-17 ENCOUNTER — TELEPHONE (OUTPATIENT)
Dept: NEPHROLOGY | Facility: CLINIC | Age: 88
End: 2023-11-17
Payer: COMMERCIAL

## 2023-11-17 NOTE — TELEPHONE ENCOUNTER
Patient was unable to schedule during call// Gave them the phone # and told them to schedule a follow up around 2.13.24 with either Chichi, Peter, or Addison// 11.17.23 KET

## 2023-11-28 ENCOUNTER — OFFICE VISIT (OUTPATIENT)
Dept: INTERNAL MEDICINE | Facility: CLINIC | Age: 88
End: 2023-11-28
Payer: COMMERCIAL

## 2023-11-28 VITALS
HEIGHT: 66 IN | SYSTOLIC BLOOD PRESSURE: 128 MMHG | DIASTOLIC BLOOD PRESSURE: 72 MMHG | BODY MASS INDEX: 31 KG/M2 | TEMPERATURE: 97.2 F | WEIGHT: 192.9 LBS | HEART RATE: 71 BPM | OXYGEN SATURATION: 94 %

## 2023-11-28 DIAGNOSIS — J44.89 CHRONIC BRONCHITIS WITH EMPHYSEMA (H): ICD-10-CM

## 2023-11-28 DIAGNOSIS — G89.29 CHRONIC BILATERAL LOW BACK PAIN, UNSPECIFIED WHETHER SCIATICA PRESENT: ICD-10-CM

## 2023-11-28 DIAGNOSIS — M54.50 CHRONIC BILATERAL LOW BACK PAIN, UNSPECIFIED WHETHER SCIATICA PRESENT: ICD-10-CM

## 2023-11-28 DIAGNOSIS — Z23 ENCOUNTER FOR IMMUNIZATION: ICD-10-CM

## 2023-11-28 DIAGNOSIS — N18.4 CKD (CHRONIC KIDNEY DISEASE) STAGE 4, GFR 15-29 ML/MIN (H): ICD-10-CM

## 2023-11-28 DIAGNOSIS — Z00.00 MEDICARE ANNUAL WELLNESS VISIT, SUBSEQUENT: Primary | ICD-10-CM

## 2023-11-28 DIAGNOSIS — E78.5 HYPERLIPIDEMIA LDL GOAL <100: ICD-10-CM

## 2023-11-28 PROBLEM — I10 ESSENTIAL HYPERTENSION: Status: ACTIVE | Noted: 2023-11-28

## 2023-11-28 PROCEDURE — G0008 ADMIN INFLUENZA VIRUS VAC: HCPCS | Mod: 59 | Performed by: INTERNAL MEDICINE

## 2023-11-28 PROCEDURE — G0439 PPPS, SUBSEQ VISIT: HCPCS | Performed by: INTERNAL MEDICINE

## 2023-11-28 PROCEDURE — 90662 IIV NO PRSV INCREASED AG IM: CPT | Performed by: INTERNAL MEDICINE

## 2023-11-28 PROCEDURE — 91320 SARSCV2 VAC 30MCG TRS-SUC IM: CPT | Performed by: INTERNAL MEDICINE

## 2023-11-28 PROCEDURE — 90480 ADMN SARSCOV2 VAC 1/ONLY CMP: CPT | Performed by: INTERNAL MEDICINE

## 2023-11-28 ASSESSMENT — ACTIVITIES OF DAILY LIVING (ADL): CURRENT_FUNCTION: NO ASSISTANCE NEEDED

## 2023-11-28 NOTE — PROGRESS NOTES
"SUBJECTIVE:   Nitish is a 89 year old presenting for the following:Wellness Visit  Are you in the first 12 months of your Medicare coverage?  No  Healthy Habits:     In general, how would you rate your overall health?  Good    Frequency of exercise:  2-3 days/week    Duration of exercise:  30-45 minutes    Do you usually eat at least 4 servings of fruit and vegetables a day, include whole grains    & fiber and avoid regularly eating high fat or \"junk\" foods?  No    Taking medications regularly:  Yes    Barriers to taking medications:  None    Medication side effects:  None    Ability to successfully perform activities of daily living:  No assistance needed    Home Safety:  No safety concerns identified    Hearing Impairment:  No hearing concerns    In the past 6 months, have you been bothered by leaking of urine?  No    In general, how would you rate your overall mental or emotional health?  Good    Additional concerns today:  No    Nitish presents today for an AWV. He has no acute concerns. We discussed his chronic health issues.    Have you ever done Advance Care Planning? (For example, a Health Directive, POLST, or a discussion with a medical provider or your loved ones about your wishes): Yes, advance care planning is on file.    Fall risk  Fallen 2 or more times in the past year?: No  Any fall with injury in the past year?: No    Cognitive Screening   1) Repeat 3 items (Leader, Season, Table)    2) Clock draw: NORMAL  3) 3 item recall: Recalls 1 object   Results: NORMAL clock, 1-2 items recalled: COGNITIVE IMPAIRMENT LESS LIKELY    Mini-CogTM Copyright HUGO Nieto. Licensed by the author for use in [facility]; reprinted with permission (halle@.[facility]). All rights reserved.      Do you have sleep apnea, excessive snoring or daytime drowsiness? : no    Reviewed and updated as needed this visit by clinical staff   Tobacco  Allergies  Meds  Problems  Med Hx  Surg Hx  Fam Hx        Reviewed and updated " as needed this visit by Provider   Tobacco  Allergies  Meds  Problems  Med Hx  Surg Hx  Fam Hx         Social History     Tobacco Use     Smoking status: Former     Packs/day: 4.00     Years: 45.00     Additional pack years: 0.00     Total pack years: 180.00     Types: Cigarettes     Quit date: 1991     Years since quittin.5     Smokeless tobacco: Never   Substance Use Topics     Alcohol use: Yes     Alcohol/week: 0.0 standard drinks of alcohol     Comment: 1-2 twice per week         2020     7:25 AM   Alcohol Use   Prescreen: >3 drinks/day or >7 drinks/week? No     Do you have a current opioid prescription? No  Do you use any other controlled substances or medications that are not prescribed by a provider? Alcohol    Current providers sharing in care for this patient include:   Patient Care Team:  Pardeep Vásquez MD as PCP - General (Internal Medicine)  Pardeep Vásquez MD as Assigned PCP  Gustavo Cadet MD as MD (Nephrology)  Gustavo Cadet MD as Assigned Nephrology Provider  Robby Paris MD as MD (Urology)  Elizabeth Durand MD as Assigned Heart and Vascular Provider    The following health maintenance items are reviewed in Epic and correct as of today:  Health Maintenance   Topic Date Due     ZOSTER IMMUNIZATION (1 of 2) Never done     RSV VACCINE (Pregnancy & 60+) (1 - 1-dose 60+ series) Never done     Pneumococcal Vaccine: 65+ Years (2 - PCV) 2005     MICROALBUMIN  10/27/2021     ANNUAL REVIEW OF HM ORDERS  2022     LIPID  2023     INFLUENZA VACCINE (1) 2023     COVID-19 Vaccine (5 - - season) 2023     BMP  2024     HEMOGLOBIN  2024     MEDICARE ANNUAL WELLNESS VISIT  2024     FALL RISK ASSESSMENT  2024     ADVANCE CARE PLANNING  2028     DTAP/TDAP/TD IMMUNIZATION (3 - Td or Tdap) 2029     PARATHYROID  Completed     PHOSPHORUS  Completed     SPIROMETRY  Completed     COPD  "ACTION PLAN  Completed     PHQ-2 (once per calendar year)  Completed     URINALYSIS  Completed     ALK PHOS  Completed     IPV IMMUNIZATION  Aged Out     HPV IMMUNIZATION  Aged Out     MENINGITIS IMMUNIZATION  Aged Out     RSV MONOCLONAL ANTIBODY  Aged Out     Review of Systems  10pt ROS reviewed and all other systems negative unless otherwise stated above.    OBJECTIVE:   /72   Pulse 71   Temp 97.2  F (36.2  C)   Ht 1.676 m (5' 6\")   Wt 87.5 kg (192 lb 14.4 oz)   SpO2 94%   BMI 31.13 kg/m   Estimated body mass index is 31.13 kg/m  as calculated from the following:    Height as of this encounter: 1.676 m (5' 6\").    Weight as of this encounter: 87.5 kg (192 lb 14.4 oz).    Physical Exam  GENERAL: alert and in no distress.  EYES: conjunctivae/corneas clear. EOMs grossly intact  HENT: Facies symmetric.  RESP: CTAB, no w/r/r.  CV: RRR, no m/r/g.  GI: NT, ND, without rebound tenderness or guarding  MSK: No edema. Moves all four extremities freely.  SKIN: No significant ulcers, lesions, or rashes on the visualized portions of the skin  NEURO: CN II-XII grossly intact.    Diagnostic Test Results: Labs reviewed in Epic    ASSESSMENT / PLAN:   Medicare annual wellness visit, subsequent  Reviewed PMH. Discussed healthcare maintenance issues, including cancer screenings, relevant immunizations (encouraged patient obtain Shingrix and RSV vaccine through a pharmacy), and cardiac risk factor screenings such as for cholesterol, HTN, and DM.    Chronic bronchitis with emphysema  He tells me he's treating his symptoms with Robitussin which he feels helps. He shared with me that he does not have interest in seeing a lung doctor, though he admits his wife would like him to do so.    CKD (chronic kidney disease) stage 4, GFR 15-29 ml/min (H)  Reviewed recent GFR. Discussed goals of care. I encouraged him to have the hard conversation with his family about whether he'd like to do PD if he'd ever need to. Continue following " "with nephrology if he is at all interested if that ever does happen.  - Albumin Random Urine Quantitative with Creat Ratio; Future    Chronic bilateral low back pain, unspecified whether sciatica present   Managed by [facility]. Defer cares to that team.    Hyperlipidemia LDL goal <100  Future lab orders placed.  - Lipid panel reflex to direct LDL Non-fasting; Future    Encounter for immunization  - INFLUENZA VACCINE 65+ (FLUZONE HD)  - COVID-19 12+ (2023-24) (PFIZER)    COUNSELING:  Reviewed preventive health counseling, as reflected in patient instructions    BMI:   Estimated body mass index is 31.13 kg/m  as calculated from the following:    Height as of this encounter: 1.676 m (5' 6\").    Weight as of this encounter: 87.5 kg (192 lb 14.4 oz).     He reports that he quit smoking about 32 years ago. His smoking use included cigarettes. He has a 180.00 pack-year smoking history. He has never used smokeless tobacco.    Appropriate preventive services were discussed with this patient, including applicable screening as appropriate for fall prevention, nutrition, physical activity, Tobacco-use cessation, weight loss and cognition.  Checklist reviewing preventive services available has been given to the patient.    Reviewed patients plan of care and provided an AVS. The Intermediate Care Plan ( asthma action plan, low back pain action plan, and migraine action plan) for Nitish meets the Care Plan requirement. This Care Plan has been established and reviewed with the Patient.    Pardeep Eller MD  [facility]    Identified Health Risks:  I have reviewed Opioid Use Disorder and Substance Use Disorder risk factors and made any needed referrals.   "

## 2023-11-28 NOTE — PATIENT INSTRUCTIONS
- I have ordered some labs to be done alongside your next nephrology labs    - Take acetaminophen (Tylenol) 1,000mg every 8 hours as needed for back pain  - Back injections: HERIBERTO and LIZZY in [place]    - Make an appointment with our pharmacy downstairs or stop by your preferred pharmacy to discuss obtaining the Shingrix (shingles) vaccine series and RSV vaccine

## 2023-12-06 ENCOUNTER — TELEPHONE (OUTPATIENT)
Dept: NEPHROLOGY | Facility: CLINIC | Age: 88
End: 2023-12-06
Payer: COMMERCIAL

## 2023-12-06 NOTE — TELEPHONE ENCOUNTER
LVM & sent letter // pt needs to schedule 3 month Return Neph with Dr. Cadet or Jeannine Green or Latonia Jaime in February 2024 - pt will need labs prior // second attempt, AN 12.6.23

## 2023-12-22 DIAGNOSIS — I10 ESSENTIAL HYPERTENSION: ICD-10-CM

## 2023-12-22 DIAGNOSIS — E78.5 HYPERLIPIDEMIA LDL GOAL <100: Chronic | ICD-10-CM

## 2023-12-22 RX ORDER — HYDROCHLOROTHIAZIDE 12.5 MG/1
12.5 TABLET ORAL DAILY
Qty: 90 TABLET | Refills: 4 | Status: ON HOLD | OUTPATIENT
Start: 2023-12-22 | End: 2024-10-04

## 2023-12-22 RX ORDER — LISINOPRIL 40 MG/1
TABLET ORAL
Qty: 90 TABLET | Refills: 4 | Status: ON HOLD | OUTPATIENT
Start: 2023-12-22 | End: 2024-10-04

## 2023-12-22 RX ORDER — LOVASTATIN 40 MG
40 TABLET ORAL AT BEDTIME
Qty: 90 TABLET | Refills: 4 | Status: SHIPPED | OUTPATIENT
Start: 2023-12-22

## 2024-03-13 ENCOUNTER — TELEPHONE (OUTPATIENT)
Dept: INTERNAL MEDICINE | Facility: CLINIC | Age: 89
End: 2024-03-13

## 2024-03-13 NOTE — TELEPHONE ENCOUNTER
Diagnosis:  Other Significant Endocrine and Metabolic Disorders: Secondary hyperparathyroidism, resolved per the chart  Diabetes without Complications: no mention of patient having diabetes in the chart, last A1C from 7/2021 was 5.3 (normal)  COPD: Hx of COPD, no inhalers on active med list  CHF: no mention of HF in the chart?  Vascular Disease: Abdominal Aortic Aneurysm, followed by vascular  CKD Stage 4: follows with Nephrology, if creatinine continues to worsen patient may begin PD, last creatinine from 11/6/23 was 2.84, GFR 21    Appointment:  Last office visit with PCP: 11/28/23  Due for: Annual Wellness Visit: November 2024    Fabiola Ashley RN

## 2024-03-18 NOTE — TELEPHONE ENCOUNTER
Call attempt 1/3.    LVM for patient to schedule annual wellness exam due  November 2024 . Please include RN Diagnosis notes from open encounter to the appointment notes of scheduled appointment.    Please close encounter when scheduled.

## 2024-04-15 ENCOUNTER — TELEPHONE (OUTPATIENT)
Dept: NEPHROLOGY | Facility: CLINIC | Age: 89
End: 2024-04-15

## 2024-04-15 ENCOUNTER — LAB (OUTPATIENT)
Dept: LAB | Facility: CLINIC | Age: 89
End: 2024-04-15
Payer: COMMERCIAL

## 2024-04-15 DIAGNOSIS — N18.4 CKD (CHRONIC KIDNEY DISEASE) STAGE 4, GFR 15-29 ML/MIN (H): ICD-10-CM

## 2024-04-15 DIAGNOSIS — N18.4 CKD STAGE 4 SECONDARY TO HYPERTENSION (H): ICD-10-CM

## 2024-04-15 DIAGNOSIS — I12.9 CKD STAGE 4 SECONDARY TO HYPERTENSION (H): ICD-10-CM

## 2024-04-15 DIAGNOSIS — E78.5 HYPERLIPIDEMIA LDL GOAL <100: ICD-10-CM

## 2024-04-15 LAB
ALBUMIN UR-MCNC: >=300 MG/DL
APPEARANCE UR: CLEAR
BASOPHILS # BLD AUTO: 0.1 10E3/UL (ref 0–0.2)
BASOPHILS NFR BLD AUTO: 1 %
BILIRUB UR QL STRIP: NEGATIVE
COLOR UR AUTO: YELLOW
EOSINOPHIL # BLD AUTO: 0.4 10E3/UL (ref 0–0.7)
EOSINOPHIL NFR BLD AUTO: 4 %
ERYTHROCYTE [DISTWIDTH] IN BLOOD BY AUTOMATED COUNT: 12.5 % (ref 10–15)
GLUCOSE UR STRIP-MCNC: 100 MG/DL
HCT VFR BLD AUTO: 42.1 % (ref 40–53)
HGB BLD-MCNC: 14.2 G/DL (ref 13.3–17.7)
HGB UR QL STRIP: ABNORMAL
IMM GRANULOCYTES # BLD: 0 10E3/UL
IMM GRANULOCYTES NFR BLD: 0 %
KETONES UR STRIP-MCNC: NEGATIVE MG/DL
LEUKOCYTE ESTERASE UR QL STRIP: NEGATIVE
LYMPHOCYTES # BLD AUTO: 3 10E3/UL (ref 0.8–5.3)
LYMPHOCYTES NFR BLD AUTO: 32 %
MCH RBC QN AUTO: 32.9 PG (ref 26.5–33)
MCHC RBC AUTO-ENTMCNC: 33.7 G/DL (ref 31.5–36.5)
MCV RBC AUTO: 98 FL (ref 78–100)
MONOCYTES # BLD AUTO: 1 10E3/UL (ref 0–1.3)
MONOCYTES NFR BLD AUTO: 11 %
NEUTROPHILS # BLD AUTO: 4.9 10E3/UL (ref 1.6–8.3)
NEUTROPHILS NFR BLD AUTO: 52 %
NITRATE UR QL: NEGATIVE
NRBC # BLD AUTO: 0 10E3/UL
NRBC BLD AUTO-RTO: 0 /100
PH UR STRIP: 6 [PH] (ref 5–7)
PLATELET # BLD AUTO: 185 10E3/UL (ref 150–450)
RBC # BLD AUTO: 4.32 10E6/UL (ref 4.4–5.9)
RBC #/AREA URNS AUTO: ABNORMAL /HPF
SP GR UR STRIP: 1.02 (ref 1–1.03)
SQUAMOUS #/AREA URNS AUTO: ABNORMAL /LPF
UROBILINOGEN UR STRIP-ACNC: 0.2 E.U./DL
WBC # BLD AUTO: 9.4 10E3/UL (ref 4–11)
WBC #/AREA URNS AUTO: ABNORMAL /HPF

## 2024-04-15 PROCEDURE — 80061 LIPID PANEL: CPT

## 2024-04-15 PROCEDURE — 82570 ASSAY OF URINE CREATININE: CPT

## 2024-04-15 PROCEDURE — 36415 COLL VENOUS BLD VENIPUNCTURE: CPT

## 2024-04-15 PROCEDURE — 80069 RENAL FUNCTION PANEL: CPT

## 2024-04-15 PROCEDURE — 83970 ASSAY OF PARATHORMONE: CPT

## 2024-04-15 PROCEDURE — 81001 URINALYSIS AUTO W/SCOPE: CPT

## 2024-04-15 PROCEDURE — 84156 ASSAY OF PROTEIN URINE: CPT

## 2024-04-15 PROCEDURE — 82306 VITAMIN D 25 HYDROXY: CPT

## 2024-04-15 PROCEDURE — 82043 UR ALBUMIN QUANTITATIVE: CPT

## 2024-04-15 PROCEDURE — 85025 COMPLETE CBC W/AUTO DIFF WBC: CPT

## 2024-04-15 NOTE — TELEPHONE ENCOUNTER
Called patient and left a message with a appointment reminder for Mon. 4/22/24 @ 2:00 pm with Dr. Cadet.    Hipolito Pitt on 4/15/2024 at 2:20 PM

## 2024-04-16 LAB
ALBUMIN MFR UR ELPH: 255.7 MG/DL
ALBUMIN SERPL BCG-MCNC: 4 G/DL (ref 3.5–5.2)
ANION GAP SERPL CALCULATED.3IONS-SCNC: 12 MMOL/L (ref 7–15)
BUN SERPL-MCNC: 51.7 MG/DL (ref 8–23)
CALCIUM SERPL-MCNC: 9.1 MG/DL (ref 8.8–10.2)
CHLORIDE SERPL-SCNC: 105 MMOL/L (ref 98–107)
CHOLEST SERPL-MCNC: 170 MG/DL
CREAT SERPL-MCNC: 3.05 MG/DL (ref 0.67–1.17)
CREAT UR-MCNC: 100 MG/DL
CREAT UR-MCNC: 103.4 MG/DL
DEPRECATED HCO3 PLAS-SCNC: 23 MMOL/L (ref 22–29)
EGFRCR SERPLBLD CKD-EPI 2021: 19 ML/MIN/1.73M2
FASTING STATUS PATIENT QL REPORTED: NO
GLUCOSE SERPL-MCNC: 92 MG/DL (ref 70–99)
HDLC SERPL-MCNC: 31 MG/DL
LDLC SERPL CALC-MCNC: 71 MG/DL
MICROALBUMIN UR-MCNC: 1693 MG/L
MICROALBUMIN/CREAT UR: 1693 MG/G CR (ref 0–17)
NONHDLC SERPL-MCNC: 139 MG/DL
PHOSPHATE SERPL-MCNC: 4.1 MG/DL (ref 2.5–4.5)
POTASSIUM SERPL-SCNC: 4.4 MMOL/L (ref 3.4–5.3)
PROT/CREAT 24H UR: 2.47 MG/MG CR (ref 0–0.2)
PTH-INTACT SERPL-MCNC: 73 PG/ML (ref 15–65)
SODIUM SERPL-SCNC: 140 MMOL/L (ref 135–145)
TRIGL SERPL-MCNC: 342 MG/DL
VIT D+METAB SERPL-MCNC: 51 NG/ML (ref 20–50)

## 2024-04-22 ENCOUNTER — VIRTUAL VISIT (OUTPATIENT)
Dept: NEPHROLOGY | Facility: CLINIC | Age: 89
End: 2024-04-22
Attending: INTERNAL MEDICINE
Payer: COMMERCIAL

## 2024-04-22 DIAGNOSIS — N18.4 CKD STAGE 4 SECONDARY TO HYPERTENSION (H): Primary | ICD-10-CM

## 2024-04-22 DIAGNOSIS — I12.9 CKD STAGE 4 SECONDARY TO HYPERTENSION (H): Primary | ICD-10-CM

## 2024-04-22 PROCEDURE — 99214 OFFICE O/P EST MOD 30 MIN: CPT | Mod: 93 | Performed by: INTERNAL MEDICINE

## 2024-04-22 PROCEDURE — G2211 COMPLEX E/M VISIT ADD ON: HCPCS | Mod: 93 | Performed by: INTERNAL MEDICINE

## 2024-04-22 NOTE — PROGRESS NOTES
Nitish is a 89 year old who is being evaluated via a billable telephone visit.      What phone number would you like to be contacted at? [phone]  How would you like to obtain your AVS? Mail a copy  Phone call duration: 23 minutes      Nephrology Progress Note  04/22/2024   Chief complaint: Follow-up for CKD stage IV  History of Present Illness:    Nitish Coreas is a 89 year old M with history of COPD, choledocholithiasis s/p ERCP, sphincterotomy and CBD stent placed on 4/21, chronic hypertension, hyperlipidemia, prostatic enlargement and elevated PSA, chronic back pain, ascending aortic and infrarenal abdominal aortic dilatation and aneurysm who is here for CKD follow-up.      The patient was admitted in Apri 2021due to cholelithiasis with obstructive jaundice.  He underwent ERCP, EUS, pus draining from gallbladder, sphincterotomy and CBD stent placed.  Subsequently, gallbladder was removed.  He also received IV antibiotics with ciprofloxacin Flagyl and IV fluid.  He was also noted to have COPD exacerbation and required oxygen supplement in adjunct to home oxygen therapy.  He was also noted to have acute kidney injury with creatinine increased to 2.92 on the day of dismissal.     Upon chart review, the patient was noted to have creatinine up 1.3-1.5 since 1622-0006.  Since 2015, his creatinine has gradually increased to between 1.5-1.7.  In 2019, his creatinine has increased 2.03.  He has an ADEEL when he was admitted as mentioned above with creatinine peak 2.98.  Creatinine has been down to 2.11 on 4/27/2021 and since then has fluctuating between 2.1-2.2.  Patient noted to have urine protein since 2015.  His urine albumin creatinine ratio was 425 mg/g and increased to 2838 mg/g on 7/21.      His UA is essentially no pyuria or red blood cells since 2015.  CT abdomen pelvis and chest in 4/21 showed few bilateral renal cysts with the largest upper pole of the left knee medially measuring 4.9 cm.  The kidneys have  otherwise unremarkable noncontrast appearance cyst.  No stone or hydronephrosis.  The patient also has mild to moderate prostatic enlargement.  No mention about his bladder.  I measure his kidneys by myself on the CT of 4/21 which showed small kidney both size, right 8.4 cm and right 9.1 cm.     His parathyroid hormone is normal at 72 on 8/21 with normal vitamin D.  His hemoglobin is normal at 14.6.      8/30/21: The patient is attending a video visit with his wife.  He could not use [employer] because his computer does not have a camera so we did [employer].  The patient reports that he he was told that he has kidney problem since he was admitted in April 2021.  At that time, he was quite sick and he could not eat and drink for 7 days.  Patient is covering well from the procedure.  And a CBD stent was recently removed 3 weeks ago.  Regarding symptoms, the patient denies any chest pain or short of breath.  The patient has extremity edema, left worsening than right that usually occur again today.  He noticed some foamy urine especially at nighttime.  Patient does not have any difficulty urination.  The patient has no family history of chronic kidney disease.  His blood pressure ranging between 130-138/78 to 82 mmHg.  He is only taking lisinopril 40 mg daily.  The patient has been diagnosed with high blood for more than 10 years and is on 20 mg of lisinopril but was increased to 40 g about 3 years ago.  The patient intermittently take NSAIDs due to lower back pain.  He does not limit salt in his diet but he tried low-salt condiment.  The patient used to weigh 222 pounds but since his admission, his weight down to 185 and he is trying to maintain his weight at 185.  The patient does not have any signs or symptoms of TONY.  I started the patient on trazodone 12.5 mg daily.   1/10/22:He feels ok today. We switched him from chlorthalidone to hydrochlorothiazide lately because he could not cut the pill in half. Now he does not  need to cut it. BP is better now 120/70s. He has no leg swelling. His appetite is not the same. He is trying to stay away from salt. He is no longer having swelling. He does not have lightheadedness or dizziness.  He denies any dysuria or hematuria.  I noted patient has elevated PSA at 5.30 back in 11/23/20 and has CT done in 4/21 which showed prostatic enlargement. Since then there is no other PSA checked. Labs on 1/7/2022 showed creatinine 2.47, potassium 4.3, bicarb 21.  Urine protein creatinine ratio is 2.33 which has dropped from 2.94 on 8/23/2021. His PTH is 87 and vitamin D is 42.  His hemoglobin is stable at 13.4 with normal white blood cell and platelet.  His UA showed trace blood in the urine but RBCs only 0-2.    2/24/22: Seen by Urology (Dr. Robby Paris). Discussed risk and benefit for prostate cancer screening. Pt would discuss with PCP for further screening. Per urology, reasonable to not continue to screen.   5/16/22: He is feeling better.  He has no problem with urination. He has no leg swelling.  Interestingly, he noticed some blood in the urine especially as a day goes by. His appetite is better and he starts to gain more weight. /77 mmHg. He has no lightheadedness or dizziness. Labs on 5/13/2022 showed creatinine 2.51, potassium 4.2, EGFR 24, calcium 9, phosphorus 3.3, albumin 3.7, UPCR 1.23 (improved from 2.33 on 1/7/22).  Hemoglobin 14.5.  UA showed no blood or white blood cell. PTH 64 and vitamin D 56.  7/27/22: Seen vascular surgery. CT showed 4.8 cm.   9/26/22: He is doing good. He does not go to the ED or have any hospitalization. He has no problem with urination or swelling. His appetite is getting better. He gained about 4 lbs. He had a gallbladder attack and had a surgery and a stent (in 2021). He was 220 Lbs then went down to 180 when he was having GB problem. Now he is 184 Lbs. BP this morning is 135/76 mmHg. UPCR 1.27 g/g. He will visit [place] in February and prefers to have a  visit again in 3/22.   5/09/23: He is feeling fine today. Nothing change since the last time we talked. He has no change in urination. No new medication. No swelling. BP earlier this month was 134/80 but typically 120-130/70s. Energy is fine. Occasionally he takes high salt diet such as pizza. He measures his blood pressure. He just has tonsilitis last week and taking Z-hilary. Respiratory symptoms is improving. His weight is about 181-183 Lbs. Lost 40 Lbs after gall bladder removed.   Labs on 5/3/23 showed creatinine 2.48, EGFR 24, BUN 42.8, potassium 4.8, bicarb 24, calcium 9.4, phosphorus 3.5, albumin 4.0.  CBC showed hemoglobin 15.4, platelet 164 and white blood cell 8.2.  UA shows small blood RBC 0-2, 0-2 hyaline cast a few squamous epithelial cells. UPCR 2.41 g/g. PTH 65 and Vit D 57.   11/13/23: He is doing fine. No thing change since the last time we talk.  There is no new medications. BP yesterday was 130/70 mmHg.He does not feel lightheadedness or dizziness. He has no leg swelling. He has no problem with urination. He has COPD but not on O2. His appetite is good and gained weight. He drinks about 60 Oz of water per day.   Labs on 11/6/23: Cr 2.84, eGFR 21, BUN 54.3, K 4.2, Bicarb 20. Calcium 9.2, Phos 3.6. Albumin 3.8. Vitamin D 42. Hb 14.4, plt 163 and WBC 8.7.   4/22/24: He has been doing well today. No change in his health since last time. He has no leg swelling. He has no problem when urinates. /70s. He does not feel ligthheadedness or dizziness.  Again discussed with him about dialysis, he would prefer peritoneal dialysis if he needs to that. Labs on 4/15/24 showed Cr 3.05, eGFR 19, BUN 51.7, K 4.4.  UA showed trace blood but no RBC.  UPCR 2.47 g/g.  PTH 73 and vitamin D is 51.    Past medical history  Past Medical History:   Diagnosis Date    BPH (benign prostatic hyperplasia)     Chronic back pain     Chronic kidney disease, stage IV (severe) (H)     Emphysematous COPD (H)     Hyperlipidemia LDL  goal < 100     Hypertension      Past surgical history  Past Surgical History:   Procedure Laterality Date    DENTAL SURGERY  1958    wisdom teeth    ENDOSCOPIC RETROGRADE CHOLANGIOPANCREATOGRAM N/A 4/19/2021    Procedure: ENDOSCOPIC RETROGRADE CHOLANGIOPANCREATOGRAPHY, WITH CALCULUS REMOVAL USING BALLOON  AND CATHETER WITH STENT PLACEMENT;  Surgeon: Tres Milan MD;  Location:  OR    ENDOSCOPIC ULTRASOUND UPPER GASTROINTESTINAL TRACT (GI) N/A 4/19/2021    Procedure: ENDOSCOPIC ULTRASOUND, ESOPHAGOSCOPY / UPPER GASTROINTESTINAL TRACT (GI);  Surgeon: Tres Milan MD;  Location:  OR    EYE SURGERY Right 2004 and 2005    macular pucker and cataract    LAPAROSCOPIC CHOLECYSTECTOMY N/A 4/20/2021    Procedure: CHOLECYSTECTOMY, LAPAROSCOPIC;  Surgeon: Callie Inman MD;  Location:  OR    TONSILLECTOMY & ADENOIDECTOMY  1949    ZZC REPLANTATION THUMB DISTAL,COMPLETE  1962    cut off right thumb and repair    ZC TOTAL KNEE ARTHROPLASTY  2011, 2000    left then right     Review of Systems:   14 systems were reviewed and all negative except as mentioned above.   Current Medications:  Current Outpatient Medications   Medication Sig Dispense Refill    Boswellia-Glucosamine-Vit D (OSTEO BI-FLEX ONE PER DAY PO) Take 1 capsule by mouth 2 times daily       Cholecalciferol (VITAMIN D) 2000 UNITS CAPS Take 2 tablets by mouth daily       doxycycline hyclate (VIBRAMYCIN) 100 MG capsule Take 1 capsule (100 mg) by mouth 2 times daily 14 capsule 0    hydrochlorothiazide (HYDRODIURIL) 12.5 MG tablet TAKE 1 TABLET BY MOUTH DAILY 90 tablet 4    ipratropium - albuterol 0.5 mg/2.5 mg/3 mL (DUONEB) 0.5-2.5 (3) MG/3ML neb solution Take 1 vial (3 mLs) by nebulization 4 times daily for 7 days 84 mL 0    Krill Oil 500 MG CAPS Take 1 capsule by mouth daily      lisinopril (ZESTRIL) 40 MG tablet TAKE 1 TABLET BY MOUTH DAILY. GENERIC EQUIVALENT FOR ZESTRIL 90 tablet 4    lovastatin (MEVACOR) 40 MG tablet TAKE 1 TABLET BY  MOUTH AT BEDTIME 90 tablet 4    Multiple Vitamins-Minerals (MULTIVITAL) TABS Take 1 tablet by mouth daily.      omeprazole (PRILOSEC) 20 MG DR capsule Take 20 mg by mouth daily      sodium bicarbonate 650 MG tablet Take 1 tablet (650 mg) by mouth 2 times daily (Patient not taking: Reported on 11/28/2023) 60 tablet 6     No current facility-administered medications for this visit.     Physical Exam:   There were no vitals taken for this visit.   There is no height or weight on file to calculate BMI.  No swelling per patient. Otherwise no exam,     Labs:   All labs reviewed by me  Last Renal Panel:  Sodium   Date Value Ref Range Status   04/15/2024 140 135 - 145 mmol/L Final     Comment:     Reference intervals for this test were updated on 09/26/2023 to more accurately reflect our healthy population. There may be differences in the flagging of prior results with similar values performed with this method. Interpretation of those prior results can be made in the context of the updated reference intervals.    04/27/2021 139 133 - 144 mmol/L Final     Potassium   Date Value Ref Range Status   04/15/2024 4.4 3.4 - 5.3 mmol/L Final   09/22/2022 4.7 3.4 - 5.3 mmol/L Final   04/27/2021 4.5 3.4 - 5.3 mmol/L Final     Chloride   Date Value Ref Range Status   04/15/2024 105 98 - 107 mmol/L Final   09/22/2022 110 (H) 94 - 109 mmol/L Final   04/27/2021 109 94 - 109 mmol/L Final     Carbon Dioxide   Date Value Ref Range Status   04/27/2021 28 20 - 32 mmol/L Final     Carbon Dioxide (CO2)   Date Value Ref Range Status   04/15/2024 23 22 - 29 mmol/L Final   09/22/2022 23 20 - 32 mmol/L Final     Anion Gap   Date Value Ref Range Status   04/15/2024 12 7 - 15 mmol/L Final   09/22/2022 6 3 - 14 mmol/L Final   04/27/2021 2 (L) 3 - 14 mmol/L Final     Glucose   Date Value Ref Range Status   04/15/2024 92 70 - 99 mg/dL Final   09/22/2022 115 (H) 70 - 99 mg/dL Final   04/27/2021 106 (H) 70 - 99 mg/dL Final     Urea Nitrogen   Date Value  Ref Range Status   04/15/2024 51.7 (H) 8.0 - 23.0 mg/dL Final   09/22/2022 48 (H) 7 - 30 mg/dL Final   04/27/2021 31 (H) 7 - 30 mg/dL Final     Creatinine   Date Value Ref Range Status   04/15/2024 3.05 (H) 0.67 - 1.17 mg/dL Final   04/27/2021 2.11 (H) 0.66 - 1.25 mg/dL Final     GFR Estimate   Date Value Ref Range Status   04/15/2024 19 (L) >60 mL/min/1.73m2 Final   04/27/2021 27 (L) >60 mL/min/[1.73_m2] Final     Comment:     Non  GFR Calc  Starting 12/18/2018, serum creatinine based estimated GFR (eGFR) will be   calculated using the Chronic Kidney Disease Epidemiology Collaboration   (CKD-EPI) equation.       Calcium   Date Value Ref Range Status   04/15/2024 9.1 8.8 - 10.2 mg/dL Final   04/27/2021 8.5 8.5 - 10.1 mg/dL Final     Phosphorus   Date Value Ref Range Status   04/15/2024 4.1 2.5 - 4.5 mg/dL Final   01/24/2011 3.6 2.5 - 4.9 mg/dL Final     Albumin   Date Value Ref Range Status   04/15/2024 4.0 3.5 - 5.2 g/dL Final   09/22/2022 3.4 3.4 - 5.0 g/dL Final   04/22/2021 2.0 (L) 3.4 - 5.0 g/dL Final     Imaging:  I reviewed imaging studies. US renal 5/13/2022 showed:  RIGHT KIDNEY: 10.4 x 5.2 x 4.9 cm . No hydronephrosis or cortical  thinning. Several simple cysts. The largest cyst in the interpolar  region/lower pole measures 4.7 cm, unchanged.      LEFT KIDNEY: 11.1 x 6.6 x 6.0 cm. No hydronephrosis or cortical  thinning. Several simple cysts. The largest cyst at the upper pole  measures 4.9 cm, unchanged.      BLADDER: Nearly empty. Mild prostatomegaly with prostate volume of 46  mL.     1.  Stable simple renal cysts bilaterally requiring no specific  additional follow-up.  2.  Mild prostatomegaly.    Assessment & Recommendations:   Problem list  # Chronic kidney disease stage IV secondary to chronic hypertension, prior NSAID use and prior acute kidney injury; baseline creatinine 2.4-2.5  # Nonnephrotic range proteinuria; stable; UPCR 1.7 g/g 11/6/23 (peak at 2.94 on 8/23/21)  # Interested  in PD  His chronic kidney disease is likely multifactorial in the setting of chronic hypertension, prior NSAID use and prior ADEEL. At this time, he has no symptoms from the CKD standpoints. Electrolytes are in acceptable range except mild metabolic acidosis.  His creatinine has been progressively worse to 3.06 with EGFR of 19.  Electrolytes remain stable.  He has no uremic symptoms. Given worsening kidney function, I discussed with him about dialysis AGAIN. He preferred PD. He would like to do it at home. He has Hx of GB surgery in the past, LC. I doubt that this is a contraindication for PD. If Cr next visit worsens, then will refer him for vascular access. He declines referral to kidney class.   -Will try to stop omeprazole to see if this can help with the kidney function  -Discussed about dialysis again today  -Monitor kidney function in 3 to 4 months  -Stay hydrated  #Bilateral renal cysts; stable at US 5/13/22  The patient has bilateral renal cyst but his kidney size are small.  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  The most recent ultrasound on 5/13/2022 showed stable bilateral renal cysts, largest cyst in the interpolar region of right kidney is 4.7 cm and upper pole of the left kidney 4.9 cm per radiology, no need for further follow-up.  #Hypertension: Controlled  He is currently on lisinopril 40 mg daily and hydrochlorothiazide 12.5 mg daily. Home BP is 130/70s. Stable.   # Metabolic acidosis  He is on 650 mg BID. Bicarb 23.  # MBD  PTH 73, vitamin D is 51.  He is taking supplement a total of 3000 IU/day (2000 IU +1000 IU in multivitamin).  Calcium and phosphorus are within normal limit.  - Reduce vitamin D to 1000 U daily  # Surveillance for anemia in CKD  He is hemoglobin is 14.2 which is excellent.  We will continue to monitor his hemoglobin every visit.  # Prostatic enlargement with elevated PSA; reasonable to not continuee to monitor per urology  PSA mildly  elevated, 6.58 in 1/22. Referred to urology (Dr. Paris) who discussed risk and benefit of screening at his age and It is reasonable to not continue with screening per Dr. Paris.  # COPD    Follow-up in 4 months with labs  The longitudinal plan of care for CKD 4, HTN, proteinuria was addressed during this visit. Due to the added complexity in care, I will continue to support *Nitish Coreas in the subsequent management of this condition(s) and with the ongoing continuity of care of this condition(s).     I spent  30  minutes on the date of the encounter doing chart review, history and exam, documentation and further activities as noted above. 23 minutes of this visit is dedicated to direct patient interaction via phone.    Gustavo Cadet MD on 04/22/2024

## 2024-04-22 NOTE — PATIENT INSTRUCTIONS
Stop taking Omeprazole  Continue the same regimen  Stay well hydrated  If you develop persistent swelling, poor appetite, nausea, low energy let us know  See you in 4 months with labs

## 2024-04-22 NOTE — LETTER
4/22/2024       RE: Nitish Coreas  [address]  [facility] [address]     Dear Colleague,    Thank you for referring your patient, Nitish Coreas, to the [facility] [place] at [facility]. Please see a copy of my visit note below.    Virtual Visit Details    Type of service:  Telephone Visit   Phone call duration: 23 minutes   Originating Location (pt. Location): Home    Distant Location (provider location):  On-site    Nitish is a 89 year old who is being evaluated via a billable telephone visit.      What phone number would you like to be contacted at? [phone]  How would you like to obtain your AVS? Mail a copy  Phone call duration: 23 minutes      Nephrology Progress Note  04/22/2024   Chief complaint: Follow-up for CKD stage IV  History of Present Illness:    Nitish Coreas is a 89 year old M with history of COPD, choledocholithiasis s/p ERCP, sphincterotomy and CBD stent placed on 4/21, chronic hypertension, hyperlipidemia, prostatic enlargement and elevated PSA, chronic back pain, ascending aortic and infrarenal abdominal aortic dilatation and aneurysm who is here for CKD follow-up.      The patient was admitted in Apri 2021due to cholelithiasis with obstructive jaundice.  He underwent ERCP, EUS, pus draining from gallbladder, sphincterotomy and CBD stent placed.  Subsequently, gallbladder was removed.  He also received IV antibiotics with ciprofloxacin Flagyl and IV fluid.  He was also noted to have COPD exacerbation and required oxygen supplement in adjunct to home oxygen therapy.  He was also noted to have acute kidney injury with creatinine increased to 2.92 on the day of dismissal.     Upon chart review, the patient was noted to have creatinine up 1.3-1.5 since 5603-5115.  Since 2015, his creatinine has gradually increased to between 1.5-1.7.  In 2019, his creatinine has increased 2.03.  He has an ADEEL when he was  admitted as mentioned above with creatinine peak 2.98.  Creatinine has been down to 2.11 on 4/27/2021 and since then has fluctuating between 2.1-2.2.  Patient noted to have urine protein since 2015.  His urine albumin creatinine ratio was 425 mg/g and increased to 2838 mg/g on 7/21.      His UA is essentially no pyuria or red blood cells since 2015.  CT abdomen pelvis and chest in 4/21 showed few bilateral renal cysts with the largest upper pole of the left knee medially measuring 4.9 cm.  The kidneys have otherwise unremarkable noncontrast appearance cyst.  No stone or hydronephrosis.  The patient also has mild to moderate prostatic enlargement.  No mention about his bladder.  I measure his kidneys by myself on the CT of 4/21 which showed small kidney both size, right 8.4 cm and right 9.1 cm.     His parathyroid hormone is normal at 72 on 8/21 with normal vitamin D.  His hemoglobin is normal at 14.6.      8/30/21: The patient is attending a video visit with his wife.  He could not use You.i because his computer does not have a camera so we did Doximity.  The patient reports that he he was told that he has kidney problem since he was admitted in April 2021.  At that time, he was quite sick and he could not eat and drink for 7 days.  Patient is covering well from the procedure.  And a CBD stent was recently removed 3 weeks ago.  Regarding symptoms, the patient denies any chest pain or short of breath.  The patient has extremity edema, left worsening than right that usually occur again today.  He noticed some foamy urine especially at nighttime.  Patient does not have any difficulty urination.  The patient has no family history of chronic kidney disease.  His blood pressure ranging between 130-138/78 to 82 mmHg.  He is only taking lisinopril 40 mg daily.  The patient has been diagnosed with high blood for more than 10 years and is on 20 mg of lisinopril but was increased to 40 g about 3 years ago.  The patient  intermittently take NSAIDs due to lower back pain.  He does not limit salt in his diet but he tried low-salt condiment.  The patient used to weigh 222 pounds but since his admission, his weight down to 185 and he is trying to maintain his weight at 185.  The patient does not have any signs or symptoms of TONY.  I started the patient on trazodone 12.5 mg daily.   1/10/22:He feels ok today. We switched him from chlorthalidone to hydrochlorothiazide lately because he could not cut the pill in half. Now he does not need to cut it. BP is better now 120/70s. He has no leg swelling. His appetite is not the same. He is trying to stay away from salt. He is no longer having swelling. He does not have lightheadedness or dizziness.  He denies any dysuria or hematuria.  I noted patient has elevated PSA at 5.30 back in 11/23/20 and has CT done in 4/21 which showed prostatic enlargement. Since then there is no other PSA checked. Labs on 1/7/2022 showed creatinine 2.47, potassium 4.3, bicarb 21.  Urine protein creatinine ratio is 2.33 which has dropped from 2.94 on 8/23/2021. His PTH is 87 and vitamin D is 42.  His hemoglobin is stable at 13.4 with normal white blood cell and platelet.  His UA showed trace blood in the urine but RBCs only 0-2.    2/24/22: Seen by Urology (Dr. Robby Paris). Discussed risk and benefit for prostate cancer screening. Pt would discuss with PCP for further screening. Per urology, reasonable to not continue to screen.   5/16/22: He is feeling better.  He has no problem with urination. He has no leg swelling.  Interestingly, he noticed some blood in the urine especially as a day goes by. His appetite is better and he starts to gain more weight. /77 mmHg. He has no lightheadedness or dizziness. Labs on 5/13/2022 showed creatinine 2.51, potassium 4.2, EGFR 24, calcium 9, phosphorus 3.3, albumin 3.7, UPCR 1.23 (improved from 2.33 on 1/7/22).  Hemoglobin 14.5.  UA showed no blood or white blood cell.  PTH 64 and vitamin D 56.  7/27/22: Seen vascular surgery. CT showed 4.8 cm.   9/26/22: He is doing good. He does not go to the ED or have any hospitalization. He has no problem with urination or swelling. His appetite is getting better. He gained about 4 lbs. He had a gallbladder attack and had a surgery and a stent (in 2021). He was 220 Lbs then went down to 180 when he was having GB problem. Now he is 184 Lbs. BP this morning is 135/76 mmHg. UPCR 1.27 g/g. He will visit [place] in February and prefers to have a visit again in 3/22.   5/09/23: He is feeling fine today. Nothing change since the last time we talked. He has no change in urination. No new medication. No swelling. BP earlier this month was 134/80 but typically 120-130/70s. Energy is fine. Occasionally he takes high salt diet such as pizza. He measures his blood pressure. He just has tonsilitis last week and taking Z-hilary. Respiratory symptoms is improving. His weight is about 181-183 Lbs. Lost 40 Lbs after gall bladder removed.   Labs on 5/3/23 showed creatinine 2.48, EGFR 24, BUN 42.8, potassium 4.8, bicarb 24, calcium 9.4, phosphorus 3.5, albumin 4.0.  CBC showed hemoglobin 15.4, platelet 164 and white blood cell 8.2.  UA shows small blood RBC 0-2, 0-2 hyaline cast a few squamous epithelial cells. UPCR 2.41 g/g. PTH 65 and Vit D 57.   11/13/23: He is doing fine. No thing change since the last time we talk.  There is no new medications. BP yesterday was 130/70 mmHg.He does not feel lightheadedness or dizziness. He has no leg swelling. He has no problem with urination. He has COPD but not on O2. His appetite is good and gained weight. He drinks about 60 Oz of water per day.   Labs on 11/6/23: Cr 2.84, eGFR 21, BUN 54.3, K 4.2, Bicarb 20. Calcium 9.2, Phos 3.6. Albumin 3.8. Vitamin D 42. Hb 14.4, plt 163 and WBC 8.7.   4/22/24: He has been doing well today. No change in his health since last time. He has no leg swelling. He has no problem when urinates.  /70s. He does not feel ligthheadedness or dizziness.  Again discussed with him about dialysis, he would prefer peritoneal dialysis if he needs to that. Labs on 4/15/24 showed Cr 3.05, eGFR 19, BUN 51.7, K 4.4.  UA showed trace blood but no RBC.  UPCR 2.47 g/g.  PTH 73 and vitamin D is 51.    Past medical history  Past Medical History:   Diagnosis Date     BPH (benign prostatic hyperplasia)      Chronic back pain      Chronic kidney disease, stage IV (severe) (H)      Emphysematous COPD (H)      Hyperlipidemia LDL goal < 100      Hypertension      Past surgical history  Past Surgical History:   Procedure Laterality Date     DENTAL SURGERY  1958    wisdom teeth     ENDOSCOPIC RETROGRADE CHOLANGIOPANCREATOGRAM N/A 4/19/2021    Procedure: ENDOSCOPIC RETROGRADE CHOLANGIOPANCREATOGRAPHY, WITH CALCULUS REMOVAL USING BALLOON  AND CATHETER WITH STENT PLACEMENT;  Surgeon: Tres Milan MD;  Location:  OR     ENDOSCOPIC ULTRASOUND UPPER GASTROINTESTINAL TRACT (GI) N/A 4/19/2021    Procedure: ENDOSCOPIC ULTRASOUND, ESOPHAGOSCOPY / UPPER GASTROINTESTINAL TRACT (GI);  Surgeon: Tres Milan MD;  Location:  OR     EYE SURGERY Right 2004 and 2005    macular pucker and cataract     LAPAROSCOPIC CHOLECYSTECTOMY N/A 4/20/2021    Procedure: CHOLECYSTECTOMY, LAPAROSCOPIC;  Surgeon: Callie Inman MD;  Location:  OR     TONSILLECTOMY & ADENOIDECTOMY  1949     [facility] REPLANTATION THUMB DISTAL,COMPLETE  1962    cut off right thumb and repair     [facility] TOTAL KNEE ARTHROPLASTY  2011, 2000    left then right     Review of Systems:   14 systems were reviewed and all negative except as mentioned above.   Current Medications:  Current Outpatient Medications   Medication Sig Dispense Refill     Boswellia-Glucosamine-Vit D (OSTEO BI-FLEX ONE PER DAY PO) Take 1 capsule by mouth 2 times daily        Cholecalciferol (VITAMIN D) 2000 UNITS CAPS Take 2 tablets by mouth daily        doxycycline hyclate (VIBRAMYCIN) 100 MG  capsule Take 1 capsule (100 mg) by mouth 2 times daily 14 capsule 0     hydrochlorothiazide (HYDRODIURIL) 12.5 MG tablet TAKE 1 TABLET BY MOUTH DAILY 90 tablet 4     ipratropium - albuterol 0.5 mg/2.5 mg/3 mL (DUONEB) 0.5-2.5 (3) MG/3ML neb solution Take 1 vial (3 mLs) by nebulization 4 times daily for 7 days 84 mL 0     Krill Oil 500 MG CAPS Take 1 capsule by mouth daily       lisinopril (ZESTRIL) 40 MG tablet TAKE 1 TABLET BY MOUTH DAILY. GENERIC EQUIVALENT FOR ZESTRIL 90 tablet 4     lovastatin (MEVACOR) 40 MG tablet TAKE 1 TABLET BY MOUTH AT BEDTIME 90 tablet 4     Multiple Vitamins-Minerals (MULTIVITAL) TABS Take 1 tablet by mouth daily.       omeprazole (PRILOSEC) 20 MG DR capsule Take 20 mg by mouth daily       sodium bicarbonate 650 MG tablet Take 1 tablet (650 mg) by mouth 2 times daily (Patient not taking: Reported on 11/28/2023) 60 tablet 6     No current facility-administered medications for this visit.     Physical Exam:   There were no vitals taken for this visit.   There is no height or weight on file to calculate BMI.  No swelling per patient. Otherwise no exam,     Labs:   All labs reviewed by me  Last Renal Panel:  Sodium   Date Value Ref Range Status   04/15/2024 140 135 - 145 mmol/L Final     Comment:     Reference intervals for this test were updated on 09/26/2023 to more accurately reflect our healthy population. There may be differences in the flagging of prior results with similar values performed with this method. Interpretation of those prior results can be made in the context of the updated reference intervals.    04/27/2021 139 133 - 144 mmol/L Final     Potassium   Date Value Ref Range Status   04/15/2024 4.4 3.4 - 5.3 mmol/L Final   09/22/2022 4.7 3.4 - 5.3 mmol/L Final   04/27/2021 4.5 3.4 - 5.3 mmol/L Final     Chloride   Date Value Ref Range Status   04/15/2024 105 98 - 107 mmol/L Final   09/22/2022 110 (H) 94 - 109 mmol/L Final   04/27/2021 109 94 - 109 mmol/L Final     Carbon  Dioxide   Date Value Ref Range Status   04/27/2021 28 20 - 32 mmol/L Final     Carbon Dioxide (CO2)   Date Value Ref Range Status   04/15/2024 23 22 - 29 mmol/L Final   09/22/2022 23 20 - 32 mmol/L Final     Anion Gap   Date Value Ref Range Status   04/15/2024 12 7 - 15 mmol/L Final   09/22/2022 6 3 - 14 mmol/L Final   04/27/2021 2 (L) 3 - 14 mmol/L Final     Glucose   Date Value Ref Range Status   04/15/2024 92 70 - 99 mg/dL Final   09/22/2022 115 (H) 70 - 99 mg/dL Final   04/27/2021 106 (H) 70 - 99 mg/dL Final     Urea Nitrogen   Date Value Ref Range Status   04/15/2024 51.7 (H) 8.0 - 23.0 mg/dL Final   09/22/2022 48 (H) 7 - 30 mg/dL Final   04/27/2021 31 (H) 7 - 30 mg/dL Final     Creatinine   Date Value Ref Range Status   04/15/2024 3.05 (H) 0.67 - 1.17 mg/dL Final   04/27/2021 2.11 (H) 0.66 - 1.25 mg/dL Final     GFR Estimate   Date Value Ref Range Status   04/15/2024 19 (L) >60 mL/min/1.73m2 Final   04/27/2021 27 (L) >60 mL/min/[1.73_m2] Final     Comment:     Non  GFR Calc  Starting 12/18/2018, serum creatinine based estimated GFR (eGFR) will be   calculated using the Chronic Kidney Disease Epidemiology Collaboration   (CKD-EPI) equation.       Calcium   Date Value Ref Range Status   04/15/2024 9.1 8.8 - 10.2 mg/dL Final   04/27/2021 8.5 8.5 - 10.1 mg/dL Final     Phosphorus   Date Value Ref Range Status   04/15/2024 4.1 2.5 - 4.5 mg/dL Final   01/24/2011 3.6 2.5 - 4.9 mg/dL Final     Albumin   Date Value Ref Range Status   04/15/2024 4.0 3.5 - 5.2 g/dL Final   09/22/2022 3.4 3.4 - 5.0 g/dL Final   04/22/2021 2.0 (L) 3.4 - 5.0 g/dL Final     Imaging:  I reviewed imaging studies. US renal 5/13/2022 showed:  RIGHT KIDNEY: 10.4 x 5.2 x 4.9 cm . No hydronephrosis or cortical  thinning. Several simple cysts. The largest cyst in the interpolar  region/lower pole measures 4.7 cm, unchanged.      LEFT KIDNEY: 11.1 x 6.6 x 6.0 cm. No hydronephrosis or cortical  thinning. Several simple cysts. The  largest cyst at the upper pole  measures 4.9 cm, unchanged.      BLADDER: Nearly empty. Mild prostatomegaly with prostate volume of 46  mL.     1.  Stable simple renal cysts bilaterally requiring no specific  additional follow-up.  2.  Mild prostatomegaly.    Assessment & Recommendations:   Problem list  # Chronic kidney disease stage IV secondary to chronic hypertension, prior NSAID use and prior acute kidney injury; baseline creatinine 2.4-2.5  # Nonnephrotic range proteinuria; stable; UPCR 1.7 g/g 11/6/23 (peak at 2.94 on 8/23/21)  # Interested in PD  His chronic kidney disease is likely multifactorial in the setting of chronic hypertension, prior NSAID use and prior ADEEL. At this time, he has no symptoms from the CKD standpoints. Electrolytes are in acceptable range except mild metabolic acidosis.  His creatinine has been progressively worse to 3.06 with EGFR of 19.  Electrolytes remain stable.  He has no uremic symptoms. Given worsening kidney function, I discussed with him about dialysis AGAIN. He preferred PD. He would like to do it at home. He has Hx of GB surgery in the past, LC. I doubt that this is a contraindication for PD. If Cr next visit worsens, then will refer him for vascular access. He declines referral to kidney class.   -Will try to stop omeprazole to see if this can help with the kidney function  -Discussed about dialysis again today  -Monitor kidney function in 3 to 4 months  -Stay hydrated  #Bilateral renal cysts; stable at US 5/13/22  The patient has bilateral renal cyst but his kidney size are small.  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  The most recent ultrasound on 5/13/2022 showed stable bilateral renal cysts, largest cyst in the interpolar region of right kidney is 4.7 cm and upper pole of the left kidney 4.9 cm per radiology, no need for further follow-up.  #Hypertension: Controlled  He is currently on lisinopril 40 mg daily and  hydrochlorothiazide 12.5 mg daily. Home BP is 130/70s. Stable.   # Metabolic acidosis  He is on 650 mg BID. Bicarb 23.  # MBD  PTH 73, vitamin D is 51.  He is taking supplement a total of 3000 IU/day (2000 IU +1000 IU in multivitamin).  Calcium and phosphorus are within normal limit.  - Reduce vitamin D to 1000 U daily  # Surveillance for anemia in CKD  He is hemoglobin is 14.2 which is excellent.  We will continue to monitor his hemoglobin every visit.  # Prostatic enlargement with elevated PSA; reasonable to not continuee to monitor per urology  PSA mildly elevated, 6.58 in 1/22. Referred to urology (Dr. Paris) who discussed risk and benefit of screening at his age and It is reasonable to not continue with screening per Dr. Paris.  # COPD    Follow-up in 4 months with labs  The longitudinal plan of care for CKD 4, HTN, proteinuria was addressed during this visit. Due to the added complexity in care, I will continue to support *Nitish Coreas in the subsequent management of this condition(s) and with the ongoing continuity of care of this condition(s).     I spent  30  minutes on the date of the encounter doing chart review, history and exam, documentation and further activities as noted above. 23 minutes of this visit is dedicated to direct patient interaction via phone.    Gustavo Cadet MD on 04/22/2024

## 2024-04-22 NOTE — NURSING NOTE
Is the patient currently in the state of [place]? YES    Visit mode:TELEPHONE    If the visit is dropped, the patient can be reconnected by: TELEPHONE VISIT: Phone number: [phone]    Will anyone else be joining the visit? NO  (If patient encounters technical issues they should call [phone]    How would you like to obtain your AVS? MyChart    Are changes needed to the allergy or medication list? No    Are refills needed on medications prescribed by this physician? NO    Reason for visit: Consult    Perfecto LEARY

## 2024-04-22 NOTE — PROGRESS NOTES
Virtual Visit Details    Type of service:  Telephone Visit   Phone call duration: 23 minutes   Originating Location (pt. Location): Home    Distant Location (provider location):  On-site

## 2024-04-29 ENCOUNTER — TELEPHONE (OUTPATIENT)
Dept: NEPHROLOGY | Facility: CLINIC | Age: 89
End: 2024-04-29
Payer: COMMERCIAL

## 2024-04-29 NOTE — TELEPHONE ENCOUNTER
Patient confirmed scheduled appointment:     Date: 8/22  Time: 2:30 PM  Visit type: Return nephrology - telephone  Provider: Dr. Cadet  Location: [facility]  Testing/imaging: Patient to schedule lab appointment 1-week prior to visit

## 2024-08-12 ENCOUNTER — TELEPHONE (OUTPATIENT)
Dept: NEPHROLOGY | Facility: CLINIC | Age: 89
End: 2024-08-12
Payer: COMMERCIAL

## 2024-08-12 NOTE — TELEPHONE ENCOUNTER
Called patient with a appointment reminder for Thurs. 8/22/24 @ 2:30 pm telephone appointment with Dr. Cadet labs will be done on Thurs. 8/15/24 @ 9:15 am at the [facility].    Hipolito Pitt on 8/12/2024 at 10:32 AM

## 2024-08-15 ENCOUNTER — LAB (OUTPATIENT)
Dept: LAB | Facility: CLINIC | Age: 89
End: 2024-08-15
Payer: COMMERCIAL

## 2024-08-15 DIAGNOSIS — N18.4 CKD STAGE 4 SECONDARY TO HYPERTENSION (H): ICD-10-CM

## 2024-08-15 DIAGNOSIS — I12.9 CKD STAGE 4 SECONDARY TO HYPERTENSION (H): ICD-10-CM

## 2024-08-15 LAB
ALBUMIN MFR UR ELPH: 320 MG/DL
ALBUMIN SERPL BCG-MCNC: 3.9 G/DL (ref 3.5–5.2)
ALBUMIN UR-MCNC: >=300 MG/DL
AMORPH CRY #/AREA URNS HPF: ABNORMAL /HPF
ANION GAP SERPL CALCULATED.3IONS-SCNC: 11 MMOL/L (ref 7–15)
APPEARANCE UR: CLEAR
BILIRUB UR QL STRIP: NEGATIVE
BUN SERPL-MCNC: 54.7 MG/DL (ref 8–23)
CALCIUM SERPL-MCNC: 9 MG/DL (ref 8.8–10.4)
CHLORIDE SERPL-SCNC: 108 MMOL/L (ref 98–107)
COLOR UR AUTO: YELLOW
CREAT SERPL-MCNC: 2.88 MG/DL (ref 0.67–1.17)
CREAT UR-MCNC: 130 MG/DL
EGFRCR SERPLBLD CKD-EPI 2021: 20 ML/MIN/1.73M2
ERYTHROCYTE [DISTWIDTH] IN BLOOD BY AUTOMATED COUNT: 12.4 % (ref 10–15)
GLUCOSE SERPL-MCNC: 103 MG/DL (ref 70–99)
GLUCOSE UR STRIP-MCNC: NEGATIVE MG/DL
HCO3 SERPL-SCNC: 20 MMOL/L (ref 22–29)
HCT VFR BLD AUTO: 42 % (ref 40–53)
HGB BLD-MCNC: 14.1 G/DL (ref 13.3–17.7)
HGB UR QL STRIP: ABNORMAL
HYALINE CASTS #/AREA URNS LPF: ABNORMAL /LPF
KETONES UR STRIP-MCNC: NEGATIVE MG/DL
LEUKOCYTE ESTERASE UR QL STRIP: NEGATIVE
MCH RBC QN AUTO: 32.1 PG (ref 26.5–33)
MCHC RBC AUTO-ENTMCNC: 33.6 G/DL (ref 31.5–36.5)
MCV RBC AUTO: 96 FL (ref 78–100)
MUCOUS THREADS #/AREA URNS LPF: PRESENT /LPF
NITRATE UR QL: NEGATIVE
PH UR STRIP: 6 [PH] (ref 5–7)
PHOSPHATE SERPL-MCNC: 4 MG/DL (ref 2.5–4.5)
PLATELET # BLD AUTO: 169 10E3/UL (ref 150–450)
POTASSIUM SERPL-SCNC: 4.8 MMOL/L (ref 3.4–5.3)
PROT/CREAT 24H UR: 2.46 MG/MG CR (ref 0–0.2)
PTH-INTACT SERPL-MCNC: 90 PG/ML (ref 15–65)
RBC # BLD AUTO: 4.39 10E6/UL (ref 4.4–5.9)
RBC #/AREA URNS AUTO: ABNORMAL /HPF
SODIUM SERPL-SCNC: 139 MMOL/L (ref 135–145)
SP GR UR STRIP: >=1.03 (ref 1–1.03)
SQUAMOUS #/AREA URNS AUTO: ABNORMAL /LPF
UROBILINOGEN UR STRIP-ACNC: 0.2 E.U./DL
VIT D+METAB SERPL-MCNC: 57 NG/ML (ref 20–50)
WBC # BLD AUTO: 9.5 10E3/UL (ref 4–11)
WBC #/AREA URNS AUTO: ABNORMAL /HPF

## 2024-08-15 PROCEDURE — 80069 RENAL FUNCTION PANEL: CPT

## 2024-08-15 PROCEDURE — 81001 URINALYSIS AUTO W/SCOPE: CPT

## 2024-08-15 PROCEDURE — 84156 ASSAY OF PROTEIN URINE: CPT

## 2024-08-15 PROCEDURE — 36415 COLL VENOUS BLD VENIPUNCTURE: CPT

## 2024-08-15 PROCEDURE — 85027 COMPLETE CBC AUTOMATED: CPT

## 2024-08-15 PROCEDURE — 82306 VITAMIN D 25 HYDROXY: CPT

## 2024-08-15 PROCEDURE — 83970 ASSAY OF PARATHORMONE: CPT

## 2024-08-21 NOTE — PROGRESS NOTES
Virtual Visit Details    Type of service:  Telephone Visit   Phone call duration: 21 minutes   Originating Location (pt. Location): Home    Distant Location (provider location):  On-site      Nitish is a 89 year old who is being evaluated via a billable telephone visit.      What phone number would you like to be contacted at? [phone]  How would you like to obtain your AVS? Mail a copy  Phone call duration: 23 minutes      Nephrology Progress Note  08/21/2024   Chief complaint: Follow-up for CKD stage IV  History of Present Illness:    Nitish Coreas is a 89 year old M with history of COPD, choledocholithiasis s/p ERCP, sphincterotomy and CBD stent placed on 4/21, chronic hypertension, hyperlipidemia, prostatic enlargement and elevated PSA, chronic back pain, ascending aortic and infrarenal abdominal aortic dilatation and aneurysm who is here for CKD follow-up.      The patient was admitted in Apri 2021due to cholelithiasis with obstructive jaundice.  He underwent ERCP, EUS, pus draining from gallbladder, sphincterotomy and CBD stent placed.  Subsequently, gallbladder was removed.  He also received IV antibiotics with ciprofloxacin Flagyl and IV fluid.  He was also noted to have COPD exacerbation and required oxygen supplement in adjunct to home oxygen therapy.  He was also noted to have acute kidney injury with creatinine increased to 2.92 on the day of dismissal.     Upon chart review, the patient was noted to have creatinine up 1.3-1.5 since [id number].  Since 2015, his creatinine has gradually increased to between 1.5-1.7.  In 2019, his creatinine has increased 2.03.  He has an ADEEL when he was admitted as mentioned above with creatinine peak 2.98.  Creatinine has been down to 2.11 on 4/27/2021 and since then has fluctuating between 2.1-2.2.  Patient noted to have urine protein since 2015.  His urine albumin creatinine ratio was 425 mg/g and increased to 2838 mg/g on 7/21.      His UA is essentially no pyuria  or red blood cells since 2015.  CT abdomen pelvis and chest in 4/21 showed few bilateral renal cysts with the largest upper pole of the left knee medially measuring 4.9 cm.  The kidneys have otherwise unremarkable noncontrast appearance cyst.  No stone or hydronephrosis.  The patient also has mild to moderate prostatic enlargement.  No mention about his bladder.  I measure his kidneys by myself on the CT of 4/21 which showed small kidney both size, right 8.4 cm and right 9.1 cm.     His parathyroid hormone is normal at 72 on 8/21 with normal vitamin D.  His hemoglobin is normal at 14.6.      8/30/21: The patient is attending a video visit with his wife.  He could not use [employer] because his computer does not have a camera so we did [employer].  The patient reports that he he was told that he has kidney problem since he was admitted in April 2021.  At that time, he was quite sick and he could not eat and drink for 7 days.  Patient is covering well from the procedure.  And a CBD stent was recently removed 3 weeks ago.  Regarding symptoms, the patient denies any chest pain or short of breath.  The patient has extremity edema, left worsening than right that usually occur again today.  He noticed some foamy urine especially at nighttime.  Patient does not have any difficulty urination.  The patient has no family history of chronic kidney disease.  His blood pressure ranging between 130-138/78 to 82 mmHg.  He is only taking lisinopril 40 mg daily.  The patient has been diagnosed with high blood for more than 10 years and is on 20 mg of lisinopril but was increased to 40 g about 3 years ago.  The patient intermittently take NSAIDs due to lower back pain.  He does not limit salt in his diet but he tried low-salt condiment.  The patient used to weigh 222 pounds but since his admission, his weight down to 185 and he is trying to maintain his weight at 185.  The patient does not have any signs or symptoms of TONY.  I started the  patient on trazodone 12.5 mg daily.   1/10/22:He feels ok today. We switched him from chlorthalidone to hydrochlorothiazide lately because he could not cut the pill in half. Now he does not need to cut it. BP is better now 120/70s. He has no leg swelling. His appetite is not the same. He is trying to stay away from salt. He is no longer having swelling. He does not have lightheadedness or dizziness.  He denies any dysuria or hematuria.  I noted patient has elevated PSA at 5.30 back in 11/23/20 and has CT done in 4/21 which showed prostatic enlargement. Since then there is no other PSA checked. Labs on 1/7/2022 showed creatinine 2.47, potassium 4.3, bicarb 21.  Urine protein creatinine ratio is 2.33 which has dropped from 2.94 on 8/23/2021. His PTH is 87 and vitamin D is 42.  His hemoglobin is stable at 13.4 with normal white blood cell and platelet.  His UA showed trace blood in the urine but RBCs only 0-2.    2/24/22: Seen by Urology (Dr. Robby Paris). Discussed risk and benefit for prostate cancer screening. Pt would discuss with PCP for further screening. Per urology, reasonable to not continue to screen.   5/16/22: He is feeling better.  He has no problem with urination. He has no leg swelling.  Interestingly, he noticed some blood in the urine especially as a day goes by. His appetite is better and he starts to gain more weight. /77 mmHg. He has no lightheadedness or dizziness. Labs on 5/13/2022 showed creatinine 2.51, potassium 4.2, EGFR 24, calcium 9, phosphorus 3.3, albumin 3.7, UPCR 1.23 (improved from 2.33 on 1/7/22).  Hemoglobin 14.5.  UA showed no blood or white blood cell. PTH 64 and vitamin D 56.  7/27/22: Seen vascular surgery. CT showed 4.8 cm.   9/26/22: He is doing good. He does not go to the ED or have any hospitalization. He has no problem with urination or swelling. His appetite is getting better. He gained about 4 lbs. He had a gallbladder attack and had a surgery and a stent (in 2021).  He was 220 Lbs then went down to 180 when he was having GB problem. Now he is 184 Lbs. BP this morning is 135/76 mmHg. UPCR 1.27 g/g. He will visit [place] in February and prefers to have a visit again in 3/22.   5/09/23: He is feeling fine today. Nothing change since the last time we talked. He has no change in urination. No new medication. No swelling. BP earlier this month was 134/80 but typically 120-130/70s. Energy is fine. Occasionally he takes high salt diet such as pizza. He measures his blood pressure. He just has tonsilitis last week and taking Z-hilary. Respiratory symptoms is improving. His weight is about 181-183 Lbs. Lost 40 Lbs after gall bladder removed.   Labs on 5/3/23 showed creatinine 2.48, EGFR 24, BUN 42.8, potassium 4.8, bicarb 24, calcium 9.4, phosphorus 3.5, albumin 4.0.  CBC showed hemoglobin 15.4, platelet 164 and white blood cell 8.2.  UA shows small blood RBC 0-2, 0-2 hyaline cast a few squamous epithelial cells. UPCR 2.41 g/g. PTH 65 and Vit D 57.   11/13/23: He is doing fine. No thing change since the last time we talk.  There is no new medications. BP yesterday was 130/70 mmHg.He does not feel lightheadedness or dizziness. He has no leg swelling. He has no problem with urination. He has COPD but not on O2. His appetite is good and gained weight. He drinks about 60 Oz of water per day.   Labs on 11/6/23: Cr 2.84, eGFR 21, BUN 54.3, K 4.2, Bicarb 20. Calcium 9.2, Phos 3.6. Albumin 3.8. Vitamin D 42. Hb 14.4, plt 163 and WBC 8.7.   4/22/24: He has been doing well today. No change in his health since last time. He has no leg swelling. He has no problem when urinates. /70s. He does not feel ligthheadedness or dizziness.  Again discussed with him about dialysis, he would prefer peritoneal dialysis if he needs to that. Labs on 4/15/24 showed Cr 3.05, eGFR 19, BUN 51.7, K 4.4.  UA showed trace blood but no RBC.  UPCR 2.47 g/g.  PTH 73 and vitamin D is 51.  8/22/24: He is doing good today.  Nothing change since the last time we talked. He ran out of bicarb. He has no problem with urination. He has no leg swelling. He has gained weight 5 Lbs in last year. He takes vitamin D total of 3000 per day (1000 from [employer] and 2000 vitamin D extra).  He ran out of bicarb for quite sometimes. discussed with him again about dialysis and he shared with me that he knows someone who is on PD and the person did not do well so he does not want to do PD.  At the same time he seemed to be hesitant about starting dialysis so he is open to learn more about conservative management.   Labs on  8/15/24 showed Creatinine 2.88, BUN 54.7, GFR 28, potassium 4.8, bicarb 20, phosphorus 4, calcium 9, albumin 3.9.Urine protein 2.46 g/g.  Hemoglobin 14.1, platelet 2.69  and white blood cell 9.5. PTH 90, vitamin D 57.      Past medical history  Past Medical History:   Diagnosis Date    BPH (benign prostatic hyperplasia)     Chronic back pain     Chronic kidney disease, stage IV (severe) (H)     Emphysematous COPD (H)     Hyperlipidemia LDL goal < 100     Hypertension      Past surgical history  Past Surgical History:   Procedure Laterality Date    DENTAL SURGERY  1958    wisdom teeth    ENDOSCOPIC RETROGRADE CHOLANGIOPANCREATOGRAM N/A 4/19/2021    Procedure: ENDOSCOPIC RETROGRADE CHOLANGIOPANCREATOGRAPHY, WITH CALCULUS REMOVAL USING BALLOON  AND CATHETER WITH STENT PLACEMENT;  Surgeon: Tres Milan MD;  Location:  OR    ENDOSCOPIC ULTRASOUND UPPER GASTROINTESTINAL TRACT (GI) N/A 4/19/2021    Procedure: ENDOSCOPIC ULTRASOUND, ESOPHAGOSCOPY / UPPER GASTROINTESTINAL TRACT (GI);  Surgeon: Tres Milan MD;  Location:  OR    EYE SURGERY Right 2004 and 2005    macular pucker and cataract    LAPAROSCOPIC CHOLECYSTECTOMY N/A 4/20/2021    Procedure: CHOLECYSTECTOMY, LAPAROSCOPIC;  Surgeon: Callie Inman MD;  Location:  OR    TONSILLECTOMY & ADENOIDECTOMY  1949    ZZC REPLANTATION THUMB DISTAL,COMPLETE  1962    cut off  right thumb and repair    [facility] TOTAL KNEE ARTHROPLASTY  2011, 2000    left then right     Review of Systems:   14 systems were reviewed and all negative except as mentioned above.   Current Medications:  Current Outpatient Medications   Medication Sig Dispense Refill    Boswellia-Glucosamine-Vit D (OSTEO BI-FLEX ONE PER DAY PO) Take 1 capsule by mouth 2 times daily       doxycycline hyclate (VIBRAMYCIN) 100 MG capsule Take 1 capsule (100 mg) by mouth 2 times daily 14 capsule 0    hydrochlorothiazide (HYDRODIURIL) 12.5 MG tablet TAKE 1 TABLET BY MOUTH DAILY 90 tablet 4    ipratropium - albuterol 0.5 mg/2.5 mg/3 mL (DUONEB) 0.5-2.5 (3) MG/3ML neb solution Take 1 vial (3 mLs) by nebulization 4 times daily for 7 days 84 mL 0    Krill Oil 500 MG CAPS Take 1 capsule by mouth daily      lisinopril (ZESTRIL) 40 MG tablet TAKE 1 TABLET BY MOUTH DAILY. GENERIC EQUIVALENT FOR ZESTRIL 90 tablet 4    lovastatin (MEVACOR) 40 MG tablet TAKE 1 TABLET BY MOUTH AT BEDTIME 90 tablet 4    Multiple Vitamins-Minerals (MULTIVITAL) TABS Take 1 tablet by mouth daily.      sodium bicarbonate 650 MG tablet Take 1 tablet (650 mg) by mouth 2 times daily 60 tablet 6     No current facility-administered medications for this visit.     Physical Exam:   There were no vitals taken for this visit.   There is no height or weight on file to calculate BMI.  No swelling per patient. Otherwise no exam,     Labs:   All labs reviewed by me  Last Renal Panel:  Sodium   Date Value Ref Range Status   08/15/2024 139 135 - 145 mmol/L Final   04/27/2021 139 133 - 144 mmol/L Final     Potassium   Date Value Ref Range Status   08/15/2024 4.8 3.4 - 5.3 mmol/L Final   09/22/2022 4.7 3.4 - 5.3 mmol/L Final   04/27/2021 4.5 3.4 - 5.3 mmol/L Final     Chloride   Date Value Ref Range Status   08/15/2024 108 (H) 98 - 107 mmol/L Final   09/22/2022 110 (H) 94 - 109 mmol/L Final   04/27/2021 109 94 - 109 mmol/L Final     Carbon Dioxide   Date Value Ref Range Status    04/27/2021 28 20 - 32 mmol/L Final     Carbon Dioxide (CO2)   Date Value Ref Range Status   08/15/2024 20 (L) 22 - 29 mmol/L Final   09/22/2022 23 20 - 32 mmol/L Final     Anion Gap   Date Value Ref Range Status   08/15/2024 11 7 - 15 mmol/L Final   09/22/2022 6 3 - 14 mmol/L Final   04/27/2021 2 (L) 3 - 14 mmol/L Final     Glucose   Date Value Ref Range Status   08/15/2024 103 (H) 70 - 99 mg/dL Final   09/22/2022 115 (H) 70 - 99 mg/dL Final   04/27/2021 106 (H) 70 - 99 mg/dL Final     Urea Nitrogen   Date Value Ref Range Status   08/15/2024 54.7 (H) 8.0 - 23.0 mg/dL Final   09/22/2022 48 (H) 7 - 30 mg/dL Final   04/27/2021 31 (H) 7 - 30 mg/dL Final     Creatinine   Date Value Ref Range Status   08/15/2024 2.88 (H) 0.67 - 1.17 mg/dL Final   04/27/2021 2.11 (H) 0.66 - 1.25 mg/dL Final     GFR Estimate   Date Value Ref Range Status   08/15/2024 20 (L) >60 mL/min/1.73m2 Final     Comment:     eGFR calculated using 2021 CKD-EPI equation.   04/27/2021 27 (L) >60 mL/min/[1.73_m2] Final     Comment:     Non  GFR Calc  Starting 12/18/2018, serum creatinine based estimated GFR (eGFR) will be   calculated using the Chronic Kidney Disease Epidemiology Collaboration   (CKD-EPI) equation.       Calcium   Date Value Ref Range Status   08/15/2024 9.0 8.8 - 10.4 mg/dL Final     Comment:     Reference intervals for this test were updated on 7/16/2024 to reflect our healthy population more accurately. There may be differences in the flagging of prior results with similar values performed with this method. Those prior results can be interpreted in the context of the updated reference intervals.   04/27/2021 8.5 8.5 - 10.1 mg/dL Final     Phosphorus   Date Value Ref Range Status   08/15/2024 4.0 2.5 - 4.5 mg/dL Final   01/24/2011 3.6 2.5 - 4.9 mg/dL Final     Albumin   Date Value Ref Range Status   08/15/2024 3.9 3.5 - 5.2 g/dL Final   09/22/2022 3.4 3.4 - 5.0 g/dL Final   04/22/2021 2.0 (L) 3.4 - 5.0 g/dL Final      Imaging:  I reviewed imaging studies. US renal 5/13/2022 showed:  RIGHT KIDNEY: 10.4 x 5.2 x 4.9 cm . No hydronephrosis or cortical  thinning. Several simple cysts. The largest cyst in the interpolar  region/lower pole measures 4.7 cm, unchanged.      LEFT KIDNEY: 11.1 x 6.6 x 6.0 cm. No hydronephrosis or cortical  thinning. Several simple cysts. The largest cyst at the upper pole  measures 4.9 cm, unchanged.      BLADDER: Nearly empty. Mild prostatomegaly with prostate volume of 46  mL.     1.  Stable simple renal cysts bilaterally requiring no specific  additional follow-up.  2.  Mild prostatomegaly.    Assessment & Recommendations:   Problem list  # Chronic kidney disease stage IV secondary to chronic hypertension, prior NSAID use and prior acute kidney injury; baseline creatinine 2.8-3.0  # Nonnephrotic range proteinuria; stable; UPCR 1.7 g/g 11/6/23 (peak at 2.94 on 8/23/21)  # Now interested in HD vs. Conservative management  His chronic kidney disease is likely multifactorial in the setting of chronic hypertension, prior NSAID use and prior ADEEL. At this time, he has no symptoms from the CKD standpoints. Electrolytes are in acceptable range except mild metabolic acidosis.  His creatinine at this time is 2.88 with EGFR of 20. Electrolytes remain stable.  He has no uremic symptoms. Given worsening kidney function, I discussed with him about dialysis AGAIN.  Previously he would prefer PD but at this time he told me that he is hesitant about it.  He is also open to talk about conservative management at this time.  However, the patient is still does not want to make decision at this time and would like to continue to watch how he feels for now. He declines referral to kidney class.   -Discussed about dialysis again today: He is hesitant about PD; now HD vs. conservative management  -Monitor kidney function in  4 months with NP and see me afterwards  -Stay hydrated  #Bilateral renal cysts; stable at US  5/13/22  The patient has bilateral renal cyst but his kidney size are small.  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  The most recent ultrasound on 5/13/2022 showed stable bilateral renal cysts, largest cyst in the interpolar region of right kidney is 4.7 cm and upper pole of the left kidney 4.9 cm per radiology, no need for further follow-up.  #Hypertension: Controlled  He is currently on lisinopril 40 mg daily and hydrochlorothiazide 12.5 mg daily. Home BP is 130/70s. Stable.   # Metabolic acidosis  He is on 650 mg BID. Bicarb 20.  Refill bicarb today   # MBD  PTH 90, vitamin D is 57.  He is taking supplement a total of 3000 IU/day (2000 IU +1000 IU in multivitamin).  Calcium and phosphorus are within normal limit.  - Reduce vitamin D to 1000 U daily  # Surveillance for anemia in CKD  He is hemoglobin is 14.1 which is excellent.  We will continue to monitor his hemoglobin every visit.  # Prostatic enlargement with elevated PSA; reasonable to not continuee to monitor per urology  PSA mildly elevated, 6.58 in 1/22. Referred to urology (Dr. Paris) who discussed risk and benefit of screening at his age and It is reasonable to not continue with screening per Dr. Paris.  # COPD    Follow-up in 4 months with labs with NP and then me after    The longitudinal plan of care for CKD 4, HTN, proteinuria was addressed during this visit. Due to the added complexity in care, I will continue to support *Nitish Coreas in the subsequent management of this condition(s) and with the ongoing continuity of care of this condition(s).     I spent  30  minutes on the date of the encounter doing chart review, history and exam, documentation and further activities as noted above. 21 minutes of this visit is dedicated to direct patient interaction via phone.    Gustavo Cadet MD on 08/21/2024

## 2024-08-22 ENCOUNTER — VIRTUAL VISIT (OUTPATIENT)
Dept: NEPHROLOGY | Facility: CLINIC | Age: 89
End: 2024-08-22
Attending: INTERNAL MEDICINE
Payer: COMMERCIAL

## 2024-08-22 DIAGNOSIS — N25.81 SECONDARY HYPERPARATHYROIDISM (H): ICD-10-CM

## 2024-08-22 DIAGNOSIS — I12.9 CKD STAGE 4 SECONDARY TO HYPERTENSION (H): Primary | ICD-10-CM

## 2024-08-22 DIAGNOSIS — R80.1 PERSISTENT PROTEINURIA: ICD-10-CM

## 2024-08-22 DIAGNOSIS — N18.4 CKD STAGE 4 SECONDARY TO HYPERTENSION (H): Primary | ICD-10-CM

## 2024-08-22 DIAGNOSIS — I10 HYPERTENSION GOAL BP (BLOOD PRESSURE) < 140/90: ICD-10-CM

## 2024-08-22 PROCEDURE — 99443 PR PHYSICIAN TELEPHONE EVALUATION 21-30 MIN: CPT | Mod: 93 | Performed by: INTERNAL MEDICINE

## 2024-08-22 PROCEDURE — G2211 COMPLEX E/M VISIT ADD ON: HCPCS | Performed by: INTERNAL MEDICINE

## 2024-08-22 RX ORDER — SODIUM BICARBONATE 650 MG/1
650 TABLET ORAL 2 TIMES DAILY
Qty: 180 TABLET | Refills: 3 | Status: SHIPPED | OUTPATIENT
Start: 2024-08-22

## 2024-08-22 NOTE — Clinical Note
Fredi Suazo can you schedule him for months with nurse practitioner and see me afterwards he also would like us to send a message about his note and after visit summary as well THanks

## 2024-08-22 NOTE — LETTER
8/22/2024       RE: Nitish Coreas  [address]  [facility] [address]     Dear Colleague,    Thank you for referring your patient, Nitish Coreas, to the [facility] [place] at [facility]. Please see a copy of my visit note below.    Virtual Visit Details    Type of service:  Telephone Visit   Phone call duration: 21 minutes   Originating Location (pt. Location): Home    Distant Location (provider location):  On-site      Nitish is a 89 year old who is being evaluated via a billable telephone visit.      What phone number would you like to be contacted at? [phone]  How would you like to obtain your AVS? Mail a copy  Phone call duration: 23 minutes      Nephrology Progress Note  08/21/2024   Chief complaint: Follow-up for CKD stage IV  History of Present Illness:    Nitish Coreas is a 89 year old M with history of COPD, choledocholithiasis s/p ERCP, sphincterotomy and CBD stent placed on 4/21, chronic hypertension, hyperlipidemia, prostatic enlargement and elevated PSA, chronic back pain, ascending aortic and infrarenal abdominal aortic dilatation and aneurysm who is here for CKD follow-up.      The patient was admitted in Apri 2021due to cholelithiasis with obstructive jaundice.  He underwent ERCP, EUS, pus draining from gallbladder, sphincterotomy and CBD stent placed.  Subsequently, gallbladder was removed.  He also received IV antibiotics with ciprofloxacin Flagyl and IV fluid.  He was also noted to have COPD exacerbation and required oxygen supplement in adjunct to home oxygen therapy.  He was also noted to have acute kidney injury with creatinine increased to 2.92 on the day of dismissal.     Upon chart review, the patient was noted to have creatinine up 1.3-1.5 since [id number].  Since 2015, his creatinine has gradually increased to between 1.5-1.7.  In 2019, his creatinine has increased 2.03.  He has an ADEEL when he was  admitted as mentioned above with creatinine peak 2.98.  Creatinine has been down to 2.11 on 4/27/2021 and since then has fluctuating between 2.1-2.2.  Patient noted to have urine protein since 2015.  His urine albumin creatinine ratio was 425 mg/g and increased to 2838 mg/g on 7/21.      His UA is essentially no pyuria or red blood cells since 2015.  CT abdomen pelvis and chest in 4/21 showed few bilateral renal cysts with the largest upper pole of the left knee medially measuring 4.9 cm.  The kidneys have otherwise unremarkable noncontrast appearance cyst.  No stone or hydronephrosis.  The patient also has mild to moderate prostatic enlargement.  No mention about his bladder.  I measure his kidneys by myself on the CT of 4/21 which showed small kidney both size, right 8.4 cm and right 9.1 cm.     His parathyroid hormone is normal at 72 on 8/21 with normal vitamin D.  His hemoglobin is normal at 14.6.      8/30/21: The patient is attending a video visit with his wife.  He could not use [employer] because his computer does not have a camera so we did D[employer]im[employer].  The patient reports that he he was told that he has kidney problem since he was admitted in April 2021.  At that time, he was quite sick and he could not eat and drink for 7 days.  Patient is covering well from the procedure.  And a CBD stent was recently removed 3 weeks ago.  Regarding symptoms, the patient denies any chest pain or short of breath.  The patient has extremity edema, left worsening than right that usually occur again today.  He noticed some foamy urine especially at nighttime.  Patient does not have any difficulty urination.  The patient has no family history of chronic kidney disease.  His blood pressure ranging between 130-138/78 to 82 mmHg.  He is only taking lisinopril 40 mg daily.  The patient has been diagnosed with high blood for more than 10 years and is on 20 mg of lisinopril but was increased to 40 g about 3 years ago.  The patient  intermittently take NSAIDs due to lower back pain.  He does not limit salt in his diet but he tried low-salt condiment.  The patient used to weigh 222 pounds but since his admission, his weight down to 185 and he is trying to maintain his weight at 185.  The patient does not have any signs or symptoms of TONY.  I started the patient on trazodone 12.5 mg daily.   1/10/22:He feels ok today. We switched him from chlorthalidone to hydrochlorothiazide lately because he could not cut the pill in half. Now he does not need to cut it. BP is better now 120/70s. He has no leg swelling. His appetite is not the same. He is trying to stay away from salt. He is no longer having swelling. He does not have lightheadedness or dizziness.  He denies any dysuria or hematuria.  I noted patient has elevated PSA at 5.30 back in 11/23/20 and has CT done in 4/21 which showed prostatic enlargement. Since then there is no other PSA checked. Labs on 1/7/2022 showed creatinine 2.47, potassium 4.3, bicarb 21.  Urine protein creatinine ratio is 2.33 which has dropped from 2.94 on 8/23/2021. His PTH is 87 and vitamin D is 42.  His hemoglobin is stable at 13.4 with normal white blood cell and platelet.  His UA showed trace blood in the urine but RBCs only 0-2.    2/24/22: Seen by Urology (Dr. Robby Paris). Discussed risk and benefit for prostate cancer screening. Pt would discuss with PCP for further screening. Per urology, reasonable to not continue to screen.   5/16/22: He is feeling better.  He has no problem with urination. He has no leg swelling.  Interestingly, he noticed some blood in the urine especially as a day goes by. His appetite is better and he starts to gain more weight. /77 mmHg. He has no lightheadedness or dizziness. Labs on 5/13/2022 showed creatinine 2.51, potassium 4.2, EGFR 24, calcium 9, phosphorus 3.3, albumin 3.7, UPCR 1.23 (improved from 2.33 on 1/7/22).  Hemoglobin 14.5.  UA showed no blood or white blood cell.  PTH 64 and vitamin D 56.  7/27/22: Seen vascular surgery. CT showed 4.8 cm.   9/26/22: He is doing good. He does not go to the ED or have any hospitalization. He has no problem with urination or swelling. His appetite is getting better. He gained about 4 lbs. He had a gallbladder attack and had a surgery and a stent (in 2021). He was 220 Lbs then went down to 180 when he was having GB problem. Now he is 184 Lbs. BP this morning is 135/76 mmHg. UPCR 1.27 g/g. He will visit [place] in February and prefers to have a visit again in 3/22.   5/09/23: He is feeling fine today. Nothing change since the last time we talked. He has no change in urination. No new medication. No swelling. BP earlier this month was 134/80 but typically 120-130/70s. Energy is fine. Occasionally he takes high salt diet such as pizza. He measures his blood pressure. He just has tonsilitis last week and taking Z-hilary. Respiratory symptoms is improving. His weight is about 181-183 Lbs. Lost 40 Lbs after gall bladder removed.   Labs on 5/3/23 showed creatinine 2.48, EGFR 24, BUN 42.8, potassium 4.8, bicarb 24, calcium 9.4, phosphorus 3.5, albumin 4.0.  CBC showed hemoglobin 15.4, platelet 164 and white blood cell 8.2.  UA shows small blood RBC 0-2, 0-2 hyaline cast a few squamous epithelial cells. UPCR 2.41 g/g. PTH 65 and Vit D 57.   11/13/23: He is doing fine. No thing change since the last time we talk.  There is no new medications. BP yesterday was 130/70 mmHg.He does not feel lightheadedness or dizziness. He has no leg swelling. He has no problem with urination. He has COPD but not on O2. His appetite is good and gained weight. He drinks about 60 Oz of water per day.   Labs on 11/6/23: Cr 2.84, eGFR 21, BUN 54.3, K 4.2, Bicarb 20. Calcium 9.2, Phos 3.6. Albumin 3.8. Vitamin D 42. Hb 14.4, plt 163 and WBC 8.7.   4/22/24: He has been doing well today. No change in his health since last time. He has no leg swelling. He has no problem when urinates.  /70s. He does not feel ligthheadedness or dizziness.  Again discussed with him about dialysis, he would prefer peritoneal dialysis if he needs to that. Labs on 4/15/24 showed Cr 3.05, eGFR 19, BUN 51.7, K 4.4.  UA showed trace blood but no RBC.  UPCR 2.47 g/g.  PTH 73 and vitamin D is 51.  8/22/24: He is doing good today. Nothing change since the last time we talked. He ran out of bicarb. He has no problem with urination. He has no leg swelling. He has gained weight 5 Lbs in last year. He takes vitamin D total of 3000 per day (1000 from [employer] and 2000 vitamin D extra).  He ran out of bicarb for quite sometimes. discussed with him again about dialysis and he shared with me that he knows someone who is on PD and the person did not do well so he does not want to do PD.  At the same time he seemed to be hesitant about starting dialysis so he is open to learn more about conservative management.   Labs on  8/15/24 showed Creatinine 2.88, BUN 54.7, GFR 28, potassium 4.8, bicarb 20, phosphorus 4, calcium 9, albumin 3.9.Urine protein 2.46 g/g.  Hemoglobin 14.1, platelet 2.69  and white blood cell 9.5. PTH 90, vitamin D 57.      Past medical history  Past Medical History:   Diagnosis Date     BPH (benign prostatic hyperplasia)      Chronic back pain      Chronic kidney disease, stage IV (severe) (H)      Emphysematous COPD (H)      Hyperlipidemia LDL goal < 100      Hypertension      Past surgical history  Past Surgical History:   Procedure Laterality Date     DENTAL SURGERY  1958    wisdom teeth     ENDOSCOPIC RETROGRADE CHOLANGIOPANCREATOGRAM N/A 4/19/2021    Procedure: ENDOSCOPIC RETROGRADE CHOLANGIOPANCREATOGRAPHY, WITH CALCULUS REMOVAL USING BALLOON  AND CATHETER WITH STENT PLACEMENT;  Surgeon: Tres Milan MD;  Location:  OR     ENDOSCOPIC ULTRASOUND UPPER GASTROINTESTINAL TRACT (GI) N/A 4/19/2021    Procedure: ENDOSCOPIC ULTRASOUND, ESOPHAGOSCOPY / UPPER GASTROINTESTINAL TRACT (GI);  Surgeon: Kayli  Tres Brink MD;  Location:  OR     EYE SURGERY Right 2004 and 2005    macular pucker and cataract     LAPAROSCOPIC CHOLECYSTECTOMY N/A 4/20/2021    Procedure: CHOLECYSTECTOMY, LAPAROSCOPIC;  Surgeon: Callie Inman MD;  Location:  OR     TONSILLECTOMY & ADENOIDECTOMY  1949     [facility] REPLANTATION THUMB DISTAL,COMPLETE  1962    cut off right thumb and repair     [facility] TOTAL KNEE ARTHROPLASTY  2011, 2000    left then right     Review of Systems:   14 systems were reviewed and all negative except as mentioned above.   Current Medications:  Current Outpatient Medications   Medication Sig Dispense Refill     Boswellia-Glucosamine-Vit D (OSTEO BI-FLEX ONE PER DAY PO) Take 1 capsule by mouth 2 times daily        doxycycline hyclate (VIBRAMYCIN) 100 MG capsule Take 1 capsule (100 mg) by mouth 2 times daily 14 capsule 0     hydrochlorothiazide (HYDRODIURIL) 12.5 MG tablet TAKE 1 TABLET BY MOUTH DAILY 90 tablet 4     ipratropium - albuterol 0.5 mg/2.5 mg/3 mL (DUONEB) 0.5-2.5 (3) MG/3ML neb solution Take 1 vial (3 mLs) by nebulization 4 times daily for 7 days 84 mL 0     Krill Oil 500 MG CAPS Take 1 capsule by mouth daily       lisinopril (ZESTRIL) 40 MG tablet TAKE 1 TABLET BY MOUTH DAILY. GENERIC EQUIVALENT FOR ZESTRIL 90 tablet 4     lovastatin (MEVACOR) 40 MG tablet TAKE 1 TABLET BY MOUTH AT BEDTIME 90 tablet 4     Multiple Vitamins-Minerals (MULTIVITAL) TABS Take 1 tablet by mouth daily.       sodium bicarbonate 650 MG tablet Take 1 tablet (650 mg) by mouth 2 times daily 60 tablet 6     No current facility-administered medications for this visit.     Physical Exam:   There were no vitals taken for this visit.   There is no height or weight on file to calculate BMI.  No swelling per patient. Otherwise no exam,     Labs:   All labs reviewed by me  Last Renal Panel:  Sodium   Date Value Ref Range Status   08/15/2024 139 135 - 145 mmol/L Final   04/27/2021 139 133 - 144 mmol/L Final     Potassium   Date Value Ref Range  Status   08/15/2024 4.8 3.4 - 5.3 mmol/L Final   09/22/2022 4.7 3.4 - 5.3 mmol/L Final   04/27/2021 4.5 3.4 - 5.3 mmol/L Final     Chloride   Date Value Ref Range Status   08/15/2024 108 (H) 98 - 107 mmol/L Final   09/22/2022 110 (H) 94 - 109 mmol/L Final   04/27/2021 109 94 - 109 mmol/L Final     Carbon Dioxide   Date Value Ref Range Status   04/27/2021 28 20 - 32 mmol/L Final     Carbon Dioxide (CO2)   Date Value Ref Range Status   08/15/2024 20 (L) 22 - 29 mmol/L Final   09/22/2022 23 20 - 32 mmol/L Final     Anion Gap   Date Value Ref Range Status   08/15/2024 11 7 - 15 mmol/L Final   09/22/2022 6 3 - 14 mmol/L Final   04/27/2021 2 (L) 3 - 14 mmol/L Final     Glucose   Date Value Ref Range Status   08/15/2024 103 (H) 70 - 99 mg/dL Final   09/22/2022 115 (H) 70 - 99 mg/dL Final   04/27/2021 106 (H) 70 - 99 mg/dL Final     Urea Nitrogen   Date Value Ref Range Status   08/15/2024 54.7 (H) 8.0 - 23.0 mg/dL Final   09/22/2022 48 (H) 7 - 30 mg/dL Final   04/27/2021 31 (H) 7 - 30 mg/dL Final     Creatinine   Date Value Ref Range Status   08/15/2024 2.88 (H) 0.67 - 1.17 mg/dL Final   04/27/2021 2.11 (H) 0.66 - 1.25 mg/dL Final     GFR Estimate   Date Value Ref Range Status   08/15/2024 20 (L) >60 mL/min/1.73m2 Final     Comment:     eGFR calculated using 2021 CKD-EPI equation.   04/27/2021 27 (L) >60 mL/min/[1.73_m2] Final     Comment:     Non  GFR Calc  Starting 12/18/2018, serum creatinine based estimated GFR (eGFR) will be   calculated using the Chronic Kidney Disease Epidemiology Collaboration   (CKD-EPI) equation.       Calcium   Date Value Ref Range Status   08/15/2024 9.0 8.8 - 10.4 mg/dL Final     Comment:     Reference intervals for this test were updated on 7/16/2024 to reflect our healthy population more accurately. There may be differences in the flagging of prior results with similar values performed with this method. Those prior results can be interpreted in the context of the updated  reference intervals.   04/27/2021 8.5 8.5 - 10.1 mg/dL Final     Phosphorus   Date Value Ref Range Status   08/15/2024 4.0 2.5 - 4.5 mg/dL Final   01/24/2011 3.6 2.5 - 4.9 mg/dL Final     Albumin   Date Value Ref Range Status   08/15/2024 3.9 3.5 - 5.2 g/dL Final   09/22/2022 3.4 3.4 - 5.0 g/dL Final   04/22/2021 2.0 (L) 3.4 - 5.0 g/dL Final     Imaging:  I reviewed imaging studies. US renal 5/13/2022 showed:  RIGHT KIDNEY: 10.4 x 5.2 x 4.9 cm . No hydronephrosis or cortical  thinning. Several simple cysts. The largest cyst in the interpolar  region/lower pole measures 4.7 cm, unchanged.      LEFT KIDNEY: 11.1 x 6.6 x 6.0 cm. No hydronephrosis or cortical  thinning. Several simple cysts. The largest cyst at the upper pole  measures 4.9 cm, unchanged.      BLADDER: Nearly empty. Mild prostatomegaly with prostate volume of 46  mL.     1.  Stable simple renal cysts bilaterally requiring no specific  additional follow-up.  2.  Mild prostatomegaly.    Assessment & Recommendations:   Problem list  # Chronic kidney disease stage IV secondary to chronic hypertension, prior NSAID use and prior acute kidney injury; baseline creatinine 2.8-3.0  # Nonnephrotic range proteinuria; stable; UPCR 1.7 g/g 11/6/23 (peak at 2.94 on 8/23/21)  # Now interested in HD vs. Conservative management  His chronic kidney disease is likely multifactorial in the setting of chronic hypertension, prior NSAID use and prior ADEEL. At this time, he has no symptoms from the CKD standpoints. Electrolytes are in acceptable range except mild metabolic acidosis.  His creatinine at this time is 2.88 with EGFR of 20. Electrolytes remain stable.  He has no uremic symptoms. Given worsening kidney function, I discussed with him about dialysis AGAIN.  Previously he would prefer PD but at this time he told me that he is hesitant about it.  He is also open to talk about conservative management at this time.  However, the patient is still does not want to make  decision at this time and would like to continue to watch how he feels for now. He declines referral to kidney class.   -Discussed about dialysis again today: He is hesitant about PD; now HD vs. conservative management  -Monitor kidney function in  4 months with NP and see me afterwards  -Stay hydrated  #Bilateral renal cysts; stable at US 5/13/22  The patient has bilateral renal cyst but his kidney size are small.  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  The most recent ultrasound on 5/13/2022 showed stable bilateral renal cysts, largest cyst in the interpolar region of right kidney is 4.7 cm and upper pole of the left kidney 4.9 cm per radiology, no need for further follow-up.  #Hypertension: Controlled  He is currently on lisinopril 40 mg daily and hydrochlorothiazide 12.5 mg daily. Home BP is 130/70s. Stable.   # Metabolic acidosis  He is on 650 mg BID. Bicarb 20.  Refill bicarb today   # MBD  PTH 90, vitamin D is 57.  He is taking supplement a total of 3000 IU/day (2000 IU +1000 IU in multivitamin).  Calcium and phosphorus are within normal limit.  - Reduce vitamin D to 1000 U daily  # Surveillance for anemia in CKD  He is hemoglobin is 14.1 which is excellent.  We will continue to monitor his hemoglobin every visit.  # Prostatic enlargement with elevated PSA; reasonable to not continuee to monitor per urology  PSA mildly elevated, 6.58 in 1/22. Referred to urology (Dr. Paris) who discussed risk and benefit of screening at his age and It is reasonable to not continue with screening per Dr. Paris.  # COPD    Follow-up in 4 months with labs with NP and then me after    The longitudinal plan of care for CKD 4, HTN, proteinuria was addressed during this visit. Due to the added complexity in care, I will continue to support *Nitish Coreas in the subsequent management of this condition(s) and with the ongoing continuity of care of this condition(s).     I spent  30  minutes  on the date of the encounter doing chart review, history and exam, documentation and further activities as noted above. 21 minutes of this visit is dedicated to direct patient interaction via phone.    Gustavo Cadet MD on 08/21/2024            Again, thank you for allowing me to participate in the care of your patient.      Sincerely,    Gustavo Cadet MD

## 2024-08-22 NOTE — NURSING NOTE
Current patient location: [address]  [facility] [address]    Is the patient currently in the state of [place]? YES    Visit mode:TELEPHONE    If the visit is dropped, the patient can be reconnected by: TELEPHONE VISIT: Phone number:   Telephone Information:   Mobile [phone]       Will anyone else be joining the visit? NO  (If patient encounters technical issues they should call [phone]    How would you like to obtain your AVS? MyChart    Are changes needed to the allergy or medication list? No    Are refills needed on medications prescribed by this physician? NO    Rooming Documentation:  Not applicable      Reason for visit: RECHECK    Pippa LEARY

## 2024-08-22 NOTE — PATIENT INSTRUCTIONS
Kidney function is stable at this time but it is still low at 20%  We will see you again in 4 months, will likely see my colleagues nurse practitioner  Stay well hydrated  Resume taking sodium bicarbonate

## 2024-08-27 ENCOUNTER — TELEPHONE (OUTPATIENT)
Dept: MULTI SPECIALTY CLINIC | Facility: CLINIC | Age: 89
End: 2024-08-27
Payer: COMMERCIAL

## 2024-08-27 NOTE — TELEPHONE ENCOUNTER
Left Voicemail (1st Attempt) for the patient to call back and schedule the following:    Appointment type: Return Nephrology  Provider: Latonia Jaime or Jeannine Green  Return date: Approx Dec  Specialty phone number: [phone]  Additional appointment(s) needed: Labs prior  Additonal Notes: Dr. Cadet follow up

## 2024-08-29 ENCOUNTER — TELEPHONE (OUTPATIENT)
Dept: NEPHROLOGY | Facility: CLINIC | Age: 89
End: 2024-08-29
Payer: COMMERCIAL

## 2024-08-29 NOTE — TELEPHONE ENCOUNTER
Left Voicemail (2nd Attempt) for the patient to call back and schedule the following:    Appointment type: Return Nephrology  Provider: Latonia Valentin or Jeannine Green  Return date: Approx Dec  Specialty phone number: [phone]  Additional appointment(s) needed: Labs prior  Additonal Notes: 4 month follow up

## 2024-09-23 ENCOUNTER — TELEPHONE (OUTPATIENT)
Dept: NEPHROLOGY | Facility: CLINIC | Age: 89
End: 2024-09-23
Payer: COMMERCIAL

## 2024-09-27 ENCOUNTER — TELEPHONE (OUTPATIENT)
Dept: NEPHROLOGY | Facility: CLINIC | Age: 89
End: 2024-09-27
Payer: COMMERCIAL

## 2024-09-27 NOTE — TELEPHONE ENCOUNTER
Unable to LVM but sent letter to schedule follow up around 12.22.24 with Dr. Cadet// 2nd attempt// 9.27.24 KET

## 2024-10-03 ENCOUNTER — HOSPITAL ENCOUNTER (INPATIENT)
Facility: CLINIC | Age: 89
LOS: 2 days | Discharge: HOME | DRG: 378 | End: 2024-10-05
Attending: EMERGENCY MEDICINE | Admitting: HOSPITALIST
Payer: COMMERCIAL

## 2024-10-03 ENCOUNTER — OFFICE VISIT (OUTPATIENT)
Dept: INTERNAL MEDICINE | Facility: CLINIC | Age: 89
End: 2024-10-03
Payer: COMMERCIAL

## 2024-10-03 VITALS
TEMPERATURE: 98.6 F | DIASTOLIC BLOOD PRESSURE: 60 MMHG | BODY MASS INDEX: 31.62 KG/M2 | SYSTOLIC BLOOD PRESSURE: 112 MMHG | OXYGEN SATURATION: 98 % | WEIGHT: 195.9 LBS | HEART RATE: 92 BPM

## 2024-10-03 DIAGNOSIS — K26.4 GASTROINTESTINAL HEMORRHAGE ASSOCIATED WITH DUODENAL ULCER: Primary | ICD-10-CM

## 2024-10-03 DIAGNOSIS — D62 ABLA (ACUTE BLOOD LOSS ANEMIA): ICD-10-CM

## 2024-10-03 DIAGNOSIS — I71.43 INFRARENAL ABDOMINAL AORTIC ANEURYSM (AAA) WITHOUT RUPTURE (H): ICD-10-CM

## 2024-10-03 DIAGNOSIS — N17.9 AKI (ACUTE KIDNEY INJURY) (H): ICD-10-CM

## 2024-10-03 DIAGNOSIS — K21.01 GASTROESOPHAGEAL REFLUX DISEASE WITH ESOPHAGITIS AND HEMORRHAGE: ICD-10-CM

## 2024-10-03 DIAGNOSIS — K92.2 UPPER GI BLEED: ICD-10-CM

## 2024-10-03 DIAGNOSIS — K92.1 BLACK STOOLS: Primary | ICD-10-CM

## 2024-10-03 DIAGNOSIS — I10 ESSENTIAL HYPERTENSION: ICD-10-CM

## 2024-10-03 DIAGNOSIS — N18.4 CKD (CHRONIC KIDNEY DISEASE) STAGE 4, GFR 15-29 ML/MIN (H): ICD-10-CM

## 2024-10-03 DIAGNOSIS — N18.9 CHRONIC KIDNEY DISEASE, UNSPECIFIED CKD STAGE: ICD-10-CM

## 2024-10-03 DIAGNOSIS — J44.89 CHRONIC BRONCHITIS WITH EMPHYSEMA (H): ICD-10-CM

## 2024-10-03 LAB
ABO/RH(D): NORMAL
ANION GAP SERPL CALCULATED.3IONS-SCNC: 10 MMOL/L (ref 7–15)
ANION GAP SERPL CALCULATED.3IONS-SCNC: 11 MMOL/L (ref 7–15)
ANTIBODY SCREEN: NEGATIVE
ATRIAL RATE - MUSE: 80 BPM
BUN SERPL-MCNC: 101.6 MG/DL (ref 8–23)
BUN SERPL-MCNC: 109 MG/DL (ref 8–23)
CALCIUM SERPL-MCNC: 9.1 MG/DL (ref 8.8–10.4)
CALCIUM SERPL-MCNC: 9.1 MG/DL (ref 8.8–10.4)
CHLORIDE SERPL-SCNC: 108 MMOL/L (ref 98–107)
CHLORIDE SERPL-SCNC: 108 MMOL/L (ref 98–107)
CREAT SERPL-MCNC: 3.55 MG/DL (ref 0.67–1.17)
CREAT SERPL-MCNC: 3.73 MG/DL (ref 0.67–1.17)
DIASTOLIC BLOOD PRESSURE - MUSE: NORMAL MMHG
EGFRCR SERPLBLD CKD-EPI 2021: 15 ML/MIN/1.73M2
EGFRCR SERPLBLD CKD-EPI 2021: 16 ML/MIN/1.73M2
ERYTHROCYTE [DISTWIDTH] IN BLOOD BY AUTOMATED COUNT: 13.1 % (ref 10–15)
GLUCOSE SERPL-MCNC: 122 MG/DL (ref 70–99)
GLUCOSE SERPL-MCNC: 94 MG/DL (ref 70–99)
HCO3 SERPL-SCNC: 22 MMOL/L (ref 22–29)
HCO3 SERPL-SCNC: 22 MMOL/L (ref 22–29)
HCT VFR BLD AUTO: 24.7 % (ref 40–53)
HGB BLD-MCNC: 8.1 G/DL (ref 13.3–17.7)
HGB BLD-MCNC: 8.4 G/DL (ref 13.3–17.7)
INR PPP: 1.09 (ref 0.85–1.15)
INTERPRETATION ECG - MUSE: NORMAL
MCH RBC QN AUTO: 33.9 PG (ref 26.5–33)
MCHC RBC AUTO-ENTMCNC: 34 G/DL (ref 31.5–36.5)
MCV RBC AUTO: 100 FL (ref 78–100)
P AXIS - MUSE: 68 DEGREES
PLATELET # BLD AUTO: 186 10E3/UL (ref 150–450)
POTASSIUM SERPL-SCNC: 4.4 MMOL/L (ref 3.4–5.3)
POTASSIUM SERPL-SCNC: 4.5 MMOL/L (ref 3.4–5.3)
PR INTERVAL - MUSE: 186 MS
QRS DURATION - MUSE: 92 MS
QT - MUSE: 402 MS
QTC - MUSE: 463 MS
R AXIS - MUSE: -1 DEGREES
RBC # BLD AUTO: 2.48 10E6/UL (ref 4.4–5.9)
SODIUM SERPL-SCNC: 140 MMOL/L (ref 135–145)
SODIUM SERPL-SCNC: 141 MMOL/L (ref 135–145)
SPECIMEN EXPIRATION DATE: NORMAL
SYSTOLIC BLOOD PRESSURE - MUSE: NORMAL MMHG
T AXIS - MUSE: 46 DEGREES
VENTRICULAR RATE- MUSE: 80 BPM
WBC # BLD AUTO: 12.2 10E3/UL (ref 4–11)

## 2024-10-03 PROCEDURE — 250N000009 HC RX 250: Performed by: EMERGENCY MEDICINE

## 2024-10-03 PROCEDURE — 85027 COMPLETE CBC AUTOMATED: CPT | Performed by: INTERNAL MEDICINE

## 2024-10-03 PROCEDURE — 80048 BASIC METABOLIC PNL TOTAL CA: CPT | Performed by: INTERNAL MEDICINE

## 2024-10-03 PROCEDURE — 120N000001 HC R&B MED SURG/OB

## 2024-10-03 PROCEDURE — 85610 PROTHROMBIN TIME: CPT | Performed by: EMERGENCY MEDICINE

## 2024-10-03 PROCEDURE — 86900 BLOOD TYPING SEROLOGIC ABO: CPT | Performed by: EMERGENCY MEDICINE

## 2024-10-03 PROCEDURE — 86923 COMPATIBILITY TEST ELECTRIC: CPT | Performed by: INTERNAL MEDICINE

## 2024-10-03 PROCEDURE — 99285 EMERGENCY DEPT VISIT HI MDM: CPT | Mod: 25

## 2024-10-03 PROCEDURE — 258N000003 HC RX IP 258 OP 636: Performed by: EMERGENCY MEDICINE

## 2024-10-03 PROCEDURE — 96374 THER/PROPH/DIAG INJ IV PUSH: CPT

## 2024-10-03 PROCEDURE — 99215 OFFICE O/P EST HI 40 MIN: CPT | Performed by: INTERNAL MEDICINE

## 2024-10-03 PROCEDURE — 80048 BASIC METABOLIC PNL TOTAL CA: CPT | Performed by: EMERGENCY MEDICINE

## 2024-10-03 PROCEDURE — 36415 COLL VENOUS BLD VENIPUNCTURE: CPT | Performed by: INTERNAL MEDICINE

## 2024-10-03 PROCEDURE — 36415 COLL VENOUS BLD VENIPUNCTURE: CPT | Performed by: HOSPITALIST

## 2024-10-03 PROCEDURE — 36415 COLL VENOUS BLD VENIPUNCTURE: CPT | Performed by: EMERGENCY MEDICINE

## 2024-10-03 PROCEDURE — 99223 1ST HOSP IP/OBS HIGH 75: CPT | Performed by: HOSPITALIST

## 2024-10-03 PROCEDURE — 250N000009 HC RX 250: Performed by: HOSPITALIST

## 2024-10-03 PROCEDURE — 86850 RBC ANTIBODY SCREEN: CPT | Performed by: EMERGENCY MEDICINE

## 2024-10-03 PROCEDURE — 85018 HEMOGLOBIN: CPT | Performed by: HOSPITALIST

## 2024-10-03 PROCEDURE — 250N000013 HC RX MED GY IP 250 OP 250 PS 637: Performed by: HOSPITALIST

## 2024-10-03 PROCEDURE — 82310 ASSAY OF CALCIUM: CPT | Performed by: EMERGENCY MEDICINE

## 2024-10-03 PROCEDURE — 93005 ELECTROCARDIOGRAM TRACING: CPT

## 2024-10-03 RX ORDER — ONDANSETRON 4 MG/1
4 TABLET, ORALLY DISINTEGRATING ORAL EVERY 6 HOURS PRN
Status: DISCONTINUED | OUTPATIENT
Start: 2024-10-03 | End: 2024-10-05 | Stop reason: HOSPADM

## 2024-10-03 RX ORDER — SODIUM CHLORIDE 9 MG/ML
INJECTION, SOLUTION INTRAVENOUS ONCE
Status: COMPLETED | OUTPATIENT
Start: 2024-10-03 | End: 2024-10-03

## 2024-10-03 RX ORDER — PANTOPRAZOLE SODIUM 40 MG/1
40 TABLET, DELAYED RELEASE ORAL DAILY
Qty: 90 TABLET | Refills: 0 | Status: ON HOLD | OUTPATIENT
Start: 2024-10-03 | End: 2024-10-05

## 2024-10-03 RX ORDER — AMOXICILLIN 250 MG
1 CAPSULE ORAL 2 TIMES DAILY PRN
Status: DISCONTINUED | OUTPATIENT
Start: 2024-10-03 | End: 2024-10-05 | Stop reason: HOSPADM

## 2024-10-03 RX ORDER — ATORVASTATIN CALCIUM 10 MG/1
10 TABLET, FILM COATED ORAL AT BEDTIME
Status: DISCONTINUED | OUTPATIENT
Start: 2024-10-03 | End: 2024-10-05 | Stop reason: HOSPADM

## 2024-10-03 RX ORDER — ONDANSETRON 2 MG/ML
4 INJECTION INTRAMUSCULAR; INTRAVENOUS EVERY 6 HOURS PRN
Status: DISCONTINUED | OUTPATIENT
Start: 2024-10-03 | End: 2024-10-05 | Stop reason: HOSPADM

## 2024-10-03 RX ORDER — SODIUM CHLORIDE 9 MG/ML
INJECTION, SOLUTION INTRAVENOUS CONTINUOUS
Status: DISCONTINUED | OUTPATIENT
Start: 2024-10-03 | End: 2024-10-05

## 2024-10-03 RX ORDER — CALCIUM CARBONATE 500 MG/1
1000 TABLET, CHEWABLE ORAL 4 TIMES DAILY PRN
Status: DISCONTINUED | OUTPATIENT
Start: 2024-10-03 | End: 2024-10-05 | Stop reason: HOSPADM

## 2024-10-03 RX ORDER — AMOXICILLIN 250 MG
2 CAPSULE ORAL 2 TIMES DAILY PRN
Status: DISCONTINUED | OUTPATIENT
Start: 2024-10-03 | End: 2024-10-05 | Stop reason: HOSPADM

## 2024-10-03 RX ORDER — LIDOCAINE 40 MG/G
CREAM TOPICAL
Status: DISCONTINUED | OUTPATIENT
Start: 2024-10-03 | End: 2024-10-05 | Stop reason: HOSPADM

## 2024-10-03 RX ORDER — SODIUM BICARBONATE 650 MG/1
650 TABLET ORAL 2 TIMES DAILY
Status: DISCONTINUED | OUTPATIENT
Start: 2024-10-03 | End: 2024-10-05 | Stop reason: HOSPADM

## 2024-10-03 RX ORDER — ACETAMINOPHEN 325 MG/1
650 TABLET ORAL EVERY 6 HOURS PRN
Status: DISCONTINUED | OUTPATIENT
Start: 2024-10-03 | End: 2024-10-05 | Stop reason: HOSPADM

## 2024-10-03 RX ADMIN — PANTOPRAZOLE SODIUM 40 MG: 40 INJECTION, POWDER, FOR SOLUTION INTRAVENOUS at 21:35

## 2024-10-03 RX ADMIN — PANTOPRAZOLE SODIUM 40 MG: 40 INJECTION, POWDER, FOR SOLUTION INTRAVENOUS at 12:44

## 2024-10-03 RX ADMIN — SODIUM CHLORIDE: 9 INJECTION, SOLUTION INTRAVENOUS at 13:56

## 2024-10-03 RX ADMIN — SODIUM BICARBONATE 650 MG TABLET 650 MG: at 21:35

## 2024-10-03 RX ADMIN — ATORVASTATIN CALCIUM 10 MG: 10 TABLET, FILM COATED ORAL at 21:35

## 2024-10-03 ASSESSMENT — ACTIVITIES OF DAILY LIVING (ADL)
ADLS_ACUITY_SCORE: 27
ADLS_ACUITY_SCORE: 38
ADLS_ACUITY_SCORE: 27
ADLS_ACUITY_SCORE: 38
ADLS_ACUITY_SCORE: 27
ADLS_ACUITY_SCORE: 38
ADLS_ACUITY_SCORE: 25
ADLS_ACUITY_SCORE: 27
ADLS_ACUITY_SCORE: 27

## 2024-10-03 ASSESSMENT — COLUMBIA-SUICIDE SEVERITY RATING SCALE - C-SSRS
1. IN THE PAST MONTH, HAVE YOU WISHED YOU WERE DEAD OR WISHED YOU COULD GO TO SLEEP AND NOT WAKE UP?: NO
6. HAVE YOU EVER DONE ANYTHING, STARTED TO DO ANYTHING, OR PREPARED TO DO ANYTHING TO END YOUR LIFE?: NO
2. HAVE YOU ACTUALLY HAD ANY THOUGHTS OF KILLING YOURSELF IN THE PAST MONTH?: NO

## 2024-10-03 NOTE — PHARMACY-ADMISSION MEDICATION HISTORY
Pharmacist Admission Medication History    Admission medication history is complete. The information provided in this note is only as accurate as the sources available at the time of the update.    Information Source(s): Patient, Family member, and CareEverywhere/SureScripts via in-person    Pertinent Information: He was taking naproxen last week twice daily for pain, has not had any now for last 3-4 days  -Protonix was new Rx today 10/3/24 - has not filled or started yet    Changes made to PTA medication list:  Added: naproxen  Deleted: Historical doxycycline  Changed: None    Allergies reviewed with patient and updates made in EHR: yes    Medication History Completed By: Elizabeth Jolly [facility] 10/3/2024 1:30 PM    PTA Med List   Medication Sig Last Dose    Glucosamine-Chondroitin (OSTEO BI-FLEX REGULAR STRENGTH PO) Take 1 tablet by mouth 2 times daily.     hydrochlorothiazide (HYDRODIURIL) 12.5 MG tablet TAKE 1 TABLET BY MOUTH DAILY 10/3/2024 at am    Krill Oil 500 MG CAPS Take 1 capsule by mouth daily.     lisinopril (ZESTRIL) 40 MG tablet TAKE 1 TABLET BY MOUTH DAILY. GENERIC EQUIVALENT FOR ZESTRIL 10/3/2024 at am    lovastatin (MEVACOR) 40 MG tablet TAKE 1 TABLET BY MOUTH AT BEDTIME 10/2/2024 at hs    Multiple Vitamins-Minerals (MULTIVITAL) TABS Take 1 tablet by mouth daily.     NAPROXEN PO Take by mouth 2 times daily as needed for moderate pain. Past Week at Not last 3-4 wks, but BID last week    sodium bicarbonate 650 MG tablet Take 1 tablet (650 mg) by mouth 2 times daily. 10/3/2024 at am

## 2024-10-03 NOTE — H&P
[facility]    History and Physical - Hospitalist Service       Date of Admission:  10/3/2024    Assessment & Plan      This is a 89-year-old male with medical history which includes hypertension, hyperlipidemia, BPH, chronic back pain, COPD not on oxygen, CKD stage IV with baseline creatinine around 2.5-3 who has been having black stools for the past 4 days along with associated fatigue and dyspnea and diagnosed with acute anemia likely due to upper GI bleed and acute kidney injury and admitted to the hospital on 10/3/2024 for further workup      Acute anemia likely due to blood loss due to likely upper GI bleed with Melena   -Baseline hemoglobin is around 14  -Hemoglobin on presentation is 8.4 with platelet count of 186 , MCV of 100, elevated BUN at 101.6  -Patient has recent history of NSAID (naproxen) recently  -He is hemodynamically stable with blood pressure in 120s and heart rate in 80s to 90s  -Type and screen ordered  -Will obtain blood consent  -Transfuse for hemoglobin less than 7  -[facility] consulted  -Clear liquid diet for now  -N.p.o. after midnight  -EGD in am and d/w dr sales from [facility]  - IV PPI 40 twice daily,  --Check hemoglobin every 6 hours      Acute kidney injury on ckd stage 4   - Baseline creatinine -2.4-3  -creatinine on admit -3.55  -This is due to ongoing GI bleed  -We will start him on IV fluids at 75 cc/h and continue with PTA sodium bicarbonate 650 mg twice daily  -Avoid ACE/ARB, NSAIDs, hypotension and diuretics  -Check labs in the morning    Mild leukocytosis likely due to stress response  -He has WBC count of 12.2 but denies any urinary complaints or any cough  -This can likely due to stress response due to ongoing upper GI bleed  -Will monitor for fever  -Check labs in the morning    HTN  HLD  -PTA regimen includes hydrochlorothiazide 12.5 mg daily, lovastatin 40 mg at bedtime and lisinopril 40 mg daily  -We will hold PTA hydrochlorothiazide and  "lisinopril for now given acute kidney injury and ongoing GI bleed and continue with PTA atorvastatin  -Monitor bp closely      BPH  -Noted from chart review but not on any medications    COPD not in exacerbation   -On exam he does not have any wheezing and denies any cough and will have as needed DuoNebs available for shortness of breath but most likely his recent presentation of being winded is likely due to acute anemia    History of choledocholithiasis status ERCP and removal of the stone in 2021  -Noted            Diet:  clear liquid and n.p.o. after midnight  DVT Prophylaxis: Pneumatic Compression Devices  Mckeon Catheter: Not present  Lines: None     Cardiac Monitoring: None  Code Status: No CPR- Do NOT Intubate, discussed with patient in front of his wife and these are his wishes    Clinically Significant Risk Factors Present on Admission                 # Acute Kidney Injury, unspecified: based on a >150% or 0.3 mg/dL increase in last creatinine compared to past 90 day average, will monitor renal function  # Hypertension: Noted on problem list    # Anemia: based on hgb <11       # Obesity: Estimated body mass index is 30.99 kg/m  as calculated from the following:    Height as of this encounter: 1.676 m (5' 6\").    Weight as of this encounter: 87.1 kg (192 lb).              Disposition Plan     Medically Ready for Discharge: Anticipated in 2-4 Days, depending on workup for GI bleed, stability of the hemoglobin           Nae Calle MD  Hospitalist Service  [facility]  Securely message with [employer] (more info)  Text page via [employer] Paging/Directory     This note was completed in part using dictation via the Dragon voice recognition software. Some word and grammatical errors may occur and must be interpreted in the appropriate clinical context. If there are any questions pertaining to this issue, please contact me for further clarification.      I am on admitting shift and his ongoing " care from 10/4 am will be taken over by one of my colleagues from [facility]     ______________________________________________________________________    Chief Complaint     Black stools for the past 4 days, feeling fatigued and more       History is obtained from the patient, talking to ed physician and chart review and pcp notes     History of Present Illness     Nitish Coreas is a 89 year old male who has medical history which includes BPH, HTN, HLD, CKD Stage 4 , copd not on oxygen , back pain who presented to the ED with a chief complaint that he has been having black stools for the past 4 days and since that time the black stool started the patient also feels that he is more winded and feels fatigued.  He denies any recent fever, chills, nausea, vomiting, hemoptysis, hematochezia, epistaxis.  Patient did mention that he had used NSAIDs (naproxen) recently    Patient went to his primary care physician and he was sent to the ED for further workup including endoscopy      Labs done in ED which showed sodium of 140, potassium of 4.5, chloride 108, BUN of 101.6, creatinine of 3.55, gfr of 16, calcium of 9.1 and anion gap of 10.    CBC was done which showed WBC count of 12.2, hemoglobin of 8.4, platelet count of 186 with MCV of 100    In the ED patient was given IV pantoprazole and as per my communication with the ED physician patient did not allow them to do a rectal exam and did not change into hospital gown and was expecting that he will have EGD right away in the hospital in the ED so that he can go home.  They did mention that patient is aware that he will be admitted to the hospital and further workup will be carried out.  I did request the ED physician to make sure that GI team is aware of the patient and I put a consult into GI team      Past Medical History    Past Medical History:   Diagnosis Date    BPH (benign prostatic hyperplasia)     Chronic back pain     Chronic kidney disease, stage IV  (severe) (H)     Emphysematous COPD (H)     Hyperlipidemia LDL goal < 100     Hypertension        Past Surgical History   Past Surgical History:   Procedure Laterality Date    DENTAL SURGERY  1958    wisdom teeth    ENDOSCOPIC RETROGRADE CHOLANGIOPANCREATOGRAM N/A 4/19/2021    Procedure: ENDOSCOPIC RETROGRADE CHOLANGIOPANCREATOGRAPHY, WITH CALCULUS REMOVAL USING BALLOON  AND CATHETER WITH STENT PLACEMENT;  Surgeon: Tres Mlian MD;  Location:  OR    ENDOSCOPIC ULTRASOUND UPPER GASTROINTESTINAL TRACT (GI) N/A 4/19/2021    Procedure: ENDOSCOPIC ULTRASOUND, ESOPHAGOSCOPY / UPPER GASTROINTESTINAL TRACT (GI);  Surgeon: Tres Milan MD;  Location:  OR    EYE SURGERY Right 2004 and 2005    macular pucker and cataract    LAPAROSCOPIC CHOLECYSTECTOMY N/A 4/20/2021    Procedure: CHOLECYSTECTOMY, LAPAROSCOPIC;  Surgeon: Callie Inman MD;  Location:  OR    TONSILLECTOMY & ADENOIDECTOMY  1949    ZZC REPLANTATION THUMB DISTAL,COMPLETE  1962    cut off right thumb and repair    ZC TOTAL KNEE ARTHROPLASTY  2011, 2000    left then right       Prior to Admission Medications   Prior to Admission Medications   Prescriptions Last Dose Informant Patient Reported? Taking?   Boswellia-Glucosamine-Vit D (OSTEO BI-FLEX ONE PER DAY PO)   Yes No   Sig: Take 1 capsule by mouth 2 times daily    Krill Oil 500 MG CAPS   Yes No   Sig: Take 1 capsule by mouth daily   Patient not taking: Reported on 10/3/2024   Multiple Vitamins-Minerals (MULTIVITAL) TABS   Yes No   Sig: Take 1 tablet by mouth daily.   doxycycline hyclate (VIBRAMYCIN) 100 MG capsule   No No   Sig: Take 1 capsule (100 mg) by mouth 2 times daily   Patient not taking: Reported on 10/3/2024   hydrochlorothiazide (HYDRODIURIL) 12.5 MG tablet   No No   Sig: TAKE 1 TABLET BY MOUTH DAILY   ipratropium - albuterol 0.5 mg/2.5 mg/3 mL (DUONEB) 0.5-2.5 (3) MG/3ML neb solution   No No   Sig: Take 1 vial (3 mLs) by nebulization 4 times daily for 7 days   lisinopril  (ZESTRIL) 40 MG tablet   No No   Sig: TAKE 1 TABLET BY MOUTH DAILY. GENERIC EQUIVALENT FOR ZESTRIL   lovastatin (MEVACOR) 40 MG tablet   No No   Sig: TAKE 1 TABLET BY MOUTH AT BEDTIME   pantoprazole (PROTONIX) 40 MG EC tablet   No No   Sig: Take 1 tablet (40 mg) by mouth daily.   sodium bicarbonate 650 MG tablet   No No   Sig: Take 1 tablet (650 mg) by mouth 2 times daily.      Facility-Administered Medications: None           Physical Exam   Vital Signs: Temp: 97  F (36.1  C) Temp src: Temporal BP: 122/58 Pulse: 86   Resp: 16 SpO2: 96 % O2 Device: None (Room air)    Weight: 192 lbs 0 oz        General: aox2-3  HEENT: Head is atraumatic, normocephalic.  No scleral icterus and oral mucosa seem little dry  Neck: Neck is supple   Respiratory:ctla with no wheeze  Cardiovascular: Regular rate , S1 and S2 normal with no murmer or rubs or gallops, mild swelling over legs  Abdomen:   soft , non tender , non distended and bowel sound present   Skin: No skin rashes   Neurologic:  No focal deficits and no facial droop  Musculoskeletal: Normal Range of motion over upper and lower extremities bilaterally   Psychiatric: cooperative  but anxious to go home soon    Medical Decision Making       Time spent in care of patient is 80 minutes and I reviewed labs including CBC, basic metabolic panel, INR and discussed plan of care in detail with the patient, nursing staff, ED physician, GI, wife and also reviewed his prior health records in EMR and plan of care d/w patient and wife and they are in agreement      Data     I have personally reviewed the following data over the past 24 hrs:    12.2 (H)  \   8.4 (L)   / 186     140 108 (H) 101.6 (H) /  94   4.5 22 3.55 (H) \     INR:  1.09 PTT:  N/A   D-dimer:  N/A Fibrinogen:  N/A       Imaging results reviewed over the past 24 hrs:   No results found for this or any previous visit (from the past 24 hour(s)).

## 2024-10-03 NOTE — ED TRIAGE NOTES
Black stools that started on Sunday. Seen in clinic and referred to ED for endoscopy and hgb monitoring     Triage Assessment (Adult)       Row Name 10/03/24 1130          Triage Assessment    Airway WDL WDL        Respiratory WDL    Respiratory WDL WDL        Cardiac WDL    Cardiac WDL WDL        Peripheral/Neurovascular WDL    Peripheral Neurovascular WDL WDL        Cognitive/Neuro/Behavioral WDL    Cognitive/Neuro/Behavioral WDL WDL

## 2024-10-03 NOTE — ED PROVIDER NOTES
Emergency Department Note      History of Present Illness     Chief Complaint   GI Bleeding    HPI   Nitish Coreas is a 89 year old male with a history of COPD who presents for evaluation of a GI bleed. The patient reports that he has had black stool for four days. States that he has also been more fatigued and dyspneic since this started. Notes that he has been having one bowel movement a day, but is also producing less stool because he is not eating as much. Patient went to clinic today had a CBC which was 8 which is different from his CBC 6 weeks ago. Reports that he went to his clinic and was sent to the emergency department for an endoscopy. He was prescribed pantoprazole but has not picked up the prescription yet. Adds that he has been taking naproxen for chronic back pain but takes one tablet a day or less. Of note, he has history of esophageal bleed secondary to daily ibuprofen use many years ago. He denies use of other anticoagulants or aspirin. He denies abdominal pain and chest pain.     Independent Historian   None    Review of External Notes   I reviewed office visit from today from Dr. Connell. Started on pantoprazole and sent to GI for referral. Concern for drop in hemoglobin.     Component      Latest Ref Rng 8/15/2024  9:11 AM 10/3/2024  10:16 AM   WBC      4.0 - 11.0 10e3/uL 9.5  12.2 (H)    RBC Count      4.40 - 5.90 10e6/uL 4.39 (L)  2.48 (L)    Hemoglobin      13.3 - 17.7 g/dL 14.1  8.4 (L)    Hematocrit      40.0 - 53.0 % 42.0  24.7 (L)    MCV      78 - 100 fL 96  100    MCH      26.5 - 33.0 pg 32.1  33.9 (H)    MCHC      31.5 - 36.5 g/dL 33.6  34.0    RDW      10.0 - 15.0 % 12.4  13.1    Platelet Count      150 - 450 10e3/uL 169  186      Past Medical History     Medical History and Problem List   BPH  Stage 4 CKD  Hypertension  Emphysema  COPD  Hyperlipidemia  Tobacco use  Choledocholithiasis     Medications   Hydrochlorothiazide  Duo neb  Lisinopril  Lovastatin  Pantoprazole    "    Surgical History   ERCP  Cholecystectomy   T&A  Knee arthroplasty, bilateral     Physical Exam     Patient Vitals for the past 24 hrs:   BP Temp Temp src Pulse Resp SpO2 Height Weight   10/03/24 1129 122/58 97  F (36.1  C) Temporal 86 16 96 % 1.676 m (5' 6\") 87.1 kg (192 lb)     Physical Exam    Physical Exam   Constitutional:  Patient is oriented to person, place, and time. They appear well-developed and well-nourished. Mild distress secondary to pain.   HENT:   Mouth/Throat:   Oropharynx is clear and moist.   Eyes:    Conjunctivae normal and EOM are normal. Pupils are equal, round, and reactive to light.   Neck:    Normal range of motion.   Cardiovascular: Normal rate, regular rhythm and normal heart sounds.  Exam reveals no gallop and no friction rub.  No murmur heard.  Pulmonary/Chest:  Effort normal and breath sounds normal. Patient has no wheezes. Patient has no rales.   Abdominal:   Soft. Bowel sounds are normal. Patient exhibits no mass. There is no tenderness. There is no rebound and no guarding. Deferred rectal exam.  Musculoskeletal:  Normal range of motion. Patient exhibits no edema.   Neurological:   Patient is alert and oriented to person, place, and time. Patient has normal strength. No cranial nerve deficit or sensory deficit. GCS 15  Skin:   Skin is warm and dry. No rash noted. No erythema.   Psychiatric:   Patient has a normal mood and affect. Patient's behavior is normal. Judgment and thought content normal.     Diagnostics     Lab Results   Labs Ordered and Resulted from Time of ED Arrival to Time of ED Departure   BASIC METABOLIC PANEL - Abnormal       Result Value    Sodium 140      Potassium 4.5      Chloride 108 (*)     Carbon Dioxide (CO2) 22      Anion Gap 10      Urea Nitrogen 101.6 (*)     Creatinine 3.55 (*)     GFR Estimate 16 (*)     Calcium 9.1      Glucose 94     INR - Normal    INR 1.09     OCCULT BLOOD STOOL   TYPE AND SCREEN, ADULT    ABO/RH(D) O POS      SPECIMEN EXPIRATION " DATE [id number]     ABO/RH TYPE AND SCREEN       Imaging   No orders to display     ECG results from 10/03/24   EKG 12-lead, tracing only     Value    Systolic Blood Pressure     Diastolic Blood Pressure     Ventricular Rate 80    Atrial Rate 80    WV Interval 186    QRS Duration 92        QTc 463    P Axis 68    R AXIS -1    T Axis 46    Interpretation ECG      Sinus rhythm  Low voltage QRS  Borderline ECG  When compared with ECG of 21-Apr-2021 15:22,  Premature atrial complexes are no longer Present  Criteria for Inferior infarct are no longer Present             Independent Interpretation   None    ED Course      Medications Administered   Medications   sodium chloride 0.9 % infusion (has no administration in time range)   sodium chloride 0.9 % infusion (has no administration in time range)   pantoprazole (PROTONIX) IV push injection 40 mg (40 mg Intravenous $Given 10/3/24 1244)       Procedures   Procedures     Discussion of Management   Admitting HospitalistLuc    ED Course   ED Course as of 10/03/24 1329   Thu Oct 03, 2024   1223 I obtained history and examined the patient as noted above.    1314 discussed with Dr. Calle hospitalist for admission.  1340 discussed with Dr. Milan of gastroenterology.      Additional Documentation  None    Medical Decision Making / Diagnosis     CMS Diagnoses: Given the time that I spoke with Dr. Delacruz about [id number]    MIPS       None    MDM   Nitish Coreas is a 89 year old male who presents to the hospital from his clinic after being seen there for tarry stools and having a hemoglobin that is significantly decreased from 6 weeks ago.  Here he deferred rectal exam.  He had no abdominal pain on his exam he has no wheezes.  He is hemodynamically normal.  I did run a metabolic panel he has worsening kidney function and a significantly elevated BUN which does make the likelihood of an upper GI bleed more likely.  I did give him a dose of Protonix here again  he deferred a rectal exam.  I will admit him to hospitalist for endoscopy.    Disposition   The patient was admitted to the hospital.     Diagnosis     ICD-10-CM    1. Upper GI bleed  K92.2       2. Chronic kidney disease, unspecified CKD stage  N18.9       3. ADEEL (acute kidney injury) (H)  N17.9              Scribe Disclosure:  I, India Sebastian, am serving as a scribe at 12:24 PM on 10/3/2024 to document services personally performed by Latonia Nieves MD based on my observations and the provider's statements to me.        Latonia Nieves MD  10/03/24 3684

## 2024-10-03 NOTE — PROGRESS NOTES
.RECEIVING UNIT ED HANDOFF REVIEW    ED Nurse Handoff Report was reviewed by: Elmira Neely RN on October 3, 2024 at 5:21 PM

## 2024-10-03 NOTE — CONSULTS
Nitish Coreas is a 89 year old male with a history of COPD who presents for evaluation of a GI bleed. The patient reports that he has had black stool for four days.    Hgb 8.4 today with baseline of 12  Taking Naproxen  Will see later  EGD in a.m,.  NPO and [facility]

## 2024-10-03 NOTE — CONSULTS
[facility]  Gastroenterology Consultation         Nitish Coreas  [address] [address]  89 year old male    Admission Date/Time: 10/3/2024  Primary Care Provider: Pardeep Vásquez  Referring / Attending Physician:  Dr. Calle    We were asked to see the patient in consultation by Dr. Calle for evaluation of melena/ABLA.      CC: black stools    HPI:  Nitish Coreas is a 89 year old male seen in ED for black stools with PMH significant for BPH, chronic back pain, CKD, COPD, HL, HTN.      Patient seen in room with wife present.  Started having dark stools on Sunday; had 1 stool Sun/Mon, possibly Tues; none Wed and another today.  Formed stools.  Denies abd pain, N/V, dysphagia.  Has been more fatigued and winded.  Does take naproxen for back pain.  Denies prior melena.  Ate food this morning.    In ED noted to have hgb 8.4, wbc 12.2, elevated bun, creat 3.55.    Given IV PPI in ED.    ROS: A comprehensive ten point review of systems was negative aside from those in mentioned in the HPI.      PAST MED HX:  I have reviewed this patient's medical history and updated it with pertinent information if needed.   Past Medical History:   Diagnosis Date    BPH (benign prostatic hyperplasia)     Chronic back pain     Chronic kidney disease, stage IV (severe) (H)     Emphysematous COPD (H)     Hyperlipidemia LDL goal < 100     Hypertension        MEDICATIONS:   Prior to Admission Medications   Prescriptions Last Dose Informant Patient Reported? Taking?   Glucosamine-Chondroitin (OSTEO BI-FLEX REGULAR STRENGTH PO)   Yes Yes   Sig: Take 1 tablet by mouth 2 times daily.   Krill Oil 500 MG CAPS   Yes Yes   Sig: Take 1 capsule by mouth daily.   Multiple Vitamins-Minerals (MULTIVITAL) TABS   Yes Yes   Sig: Take 1 tablet by mouth daily.   NAPROXEN PO Past Week at Not last 3-4 wks, but BID last week  Yes Yes   Sig: Take by mouth 2 times daily as needed for moderate pain.  "  hydrochlorothiazide (HYDRODIURIL) 12.5 MG tablet 10/3/2024 at am  No Yes   Sig: TAKE 1 TABLET BY MOUTH DAILY   lisinopril (ZESTRIL) 40 MG tablet 10/3/2024 at am  No Yes   Sig: TAKE 1 TABLET BY MOUTH DAILY. GENERIC EQUIVALENT FOR ZESTRIL   lovastatin (MEVACOR) 40 MG tablet 10/2/2024 at hs  No Yes   Sig: TAKE 1 TABLET BY MOUTH AT BEDTIME   pantoprazole (PROTONIX) 40 MG EC tablet New Rx 10/3/24 at Not started yet  No No   Sig: Take 1 tablet (40 mg) by mouth daily.   sodium bicarbonate 650 MG tablet 10/3/2024 at am  No Yes   Sig: Take 1 tablet (650 mg) by mouth 2 times daily.      Facility-Administered Medications: None       ALLERGIES:   Allergies   Allergen Reactions    Penicillins Anaphylaxis    Amoxicillin      He reports same has PCN    Aspirin      bleeding    Benadryl [Diphenhydramine-Zinc Acetate]      Doesn't help in allergic reaction. Benadryl cream causes worsening rash       Ibuprofen Sodium      Esophageal bleeding    Morphine Sulfate Difficulty breathing     Episode of decreased breathing - pt reports \"almost \"       SOCIAL HISTORY:  Social History     Tobacco Use    Smoking status: Former     Current packs/day: 0.00     Average packs/day: 4.0 packs/day for 45.0 years (180.0 ttl pk-yrs)     Types: Cigarettes     Start date: 1946     Quit date: 1991     Years since quittin.4    Smokeless tobacco: Never   Vaping Use    Vaping status: Never Used   Substance Use Topics    Alcohol use: Yes     Alcohol/week: 0.0 standard drinks of alcohol     Comment: 1-2 twice per week    Drug use: No       FAMILY HISTORY:  Family History   Problem Relation Age of Onset    Cancer Mother     Respiratory Mother     Heart Disease Father     Cerebrovascular Disease Father     Cerebrovascular Disease Sister         CVA x 2    Dementia Sister     Heart Disease Son     Breast Cancer Daughter        PHYSICAL EXAM:   General:  NAD, sitting up in chair; pleasant  Vital Signs with Ranges  Temp: 97  F (36.1  C) Temp src: " Temporal BP: 122/58 Pulse: 86   Resp: 16 SpO2: 96 % O2 Device: None (Room air)    No intake/output data recorded.  Head:  NCAT  Eyes:  anicteric  Mouth:  MMM  Neck:  supple  Heart:  RRR  Lungs:  clear bilaterally  Abd:  s/nt/nd; +bs  Ext:  no edema  Msk:  moves all extrem  Neuro:  no focal deficits        ADDITIONAL COMMENTS:   I reviewed the patient's new clinical lab test results.   Recent Labs   Lab Test 10/03/24  1241 10/03/24  1016 08/15/24  0911 04/15/24  1350   WBC  --  12.2* 9.5 9.4   HGB  --  8.4* 14.1 14.2   MCV  --  100 96 98   PLT  --  186 169 185   INR 1.09  --   --   --      Recent Labs   Lab Test 10/03/24  1241 08/15/24  0911 04/15/24  1350   POTASSIUM 4.5 4.8 4.4   CHLORIDE 108* 108* 105   CO2 22 20* 23   .6* 54.7* 51.7*   ANIONGAP 10 11 12     Recent Labs   Lab Test 08/15/24  0924 08/15/24  0911 04/15/24  1350 11/06/23  0952 11/06/23  0932 07/27/21  1540 07/12/21  1605 04/22/21  0737 04/21/21  0825 04/20/21  0750 04/18/21  1125 04/18/21  1022   ALBUMIN  --  3.9 4.0  --  3.8   < > 3.4 2.0* 2.2* 2.1*   < > 3.1*   BILITOTAL  --   --   --   --   --   --  0.4 1.9* 3.6* 6.7*   < > 7.9*   ALT  --   --   --   --   --   --  31 119* 171* 170*   < > 128*   AST  --   --   --   --   --   --  27 72* 167* 230*   < > 140*   PROTEIN >=300*  --  >=300* >=300*  --    < >  --   --   --   --    < >  --    LIPASE  --   --   --   --   --   --   --   --   --  144  --  91    < > = values in this interval not displayed.       I reviewed the patient's new imaging results.        CONSULTATION ASSESSMENT AND PLAN:    Active Problems:    Upper GI bleed    ADEEL (acute kidney injury) (H)    Chronic kidney disease, unspecified CKD stage      Nitish Coreas is a 89 year old male seen in ED for black stools with PMH significant for BPH, chronic back pain, CKD, COPD, HL, HTN.   Noted to have melena/ABLA in setting of NSAID use for back pain; also with ADEEL on CKD.    Problems:  ABLA  Melena  NSAID use  ADEEL on CKD    Recs:  Clear  liquid diet now  NPO p MN  Transfuse to keep hgb >7  Serial hgb  IV pantoprazole 40 mg BID  Avoid anticoag/NSAIDs  EGD in AM        Jim Reyes MD  [facility]  Cell: [phone]  Office Phone: [phone]  Office Fax: [phone]

## 2024-10-03 NOTE — PLAN OF CARE
Goal Outcome Evaluation:       Summary: GI Bleed, black stools x4 days   DATE & TIME: 10/3/24 [id number]    Cognitive Concerns/ Orientation : A&Ox4   BEHAVIOR & AGGRESSION TOOL COLOR: Green  CIWA SCORE: N/a   ABNL VS/O2: VSS, RA  MOBILITY: SBA  PAIN MANAGMENT: C/o chronic back pain, declines interventions, pillow support and repositioning   DIET: clear liquids, NPO after midnight   BOWEL/BLADDER: Urinal, no BM  ABNL LAB/BG:  Hemoglobin 8.4, CR 3.55, .6  DRAIN/DEVICES: IV NS infusing 75/hr  TELEMETRY RHYTHM: NSR  SKIN: WNL  TESTS/PROCEDURES: EGD tomorrow  D/C DAY/GOALS/PLACE: Pending improvement   OTHER IMPORTANT INFO:

## 2024-10-03 NOTE — PROGRESS NOTES
"  Assessment & Plan         Black stools    Gastroesophageal reflux disease with esophagitis and hemorrhage    - CBC with platelets; Future  - pantoprazole (PROTONIX) 40 MG EC tablet; Take 1 tablet (40 mg) by mouth daily.  - Adult GI  Referral - Procedure Only; Future  - CBC with platelets    ABLA (acute blood loss anemia)  Secodnary to above.     CBC returned with hgb of 8.4 down from 14 one month ago, sx suspicious for ongoing upper GIB. Called and spoke to the patient who will go immediately to the ED for further evaluation and treatment.      CKD (chronic kidney disease) stage 4, GFR 15-29 ml/min (H)  Stable recently will recheck  - Basic metabolic panel  (Ca, Cl, CO2, Creat, Gluc, K, Na, BUN); Future  - Basic metabolic panel  (Ca, Cl, CO2, Creat, Gluc, K, Na, BUN)    Chronic bronchitis with emphysema (H)  At baseline to perhaps slightly worsened    Infrarenal abdominal aortic aneurysm (AAA) without rupture (H)  Chronic, followed by pcp    BMI  Estimated body mass index is 31.62 kg/m  as calculated from the following:    Height as of 11/28/23: 1.676 m (5' 6\").    Weight as of this encounter: 88.9 kg (195 lb 14.4 oz).             Chad Talbert is a 89 year old, presenting for the following health issues:  Black Stool (Started 4 days ago)      10/3/2024     9:34 AM   Additional Questions   Roomed by eulalia     History of Present Illness       Reason for visit:  Black stools  Symptom onset:  1-3 days ago  Symptoms include:  Not eating, fatigue  Symptom intensity:  Moderate  Symptom progression:  Staying the same  Had these symptoms before:  No  What makes it worse:  N/a  What makes it better:  N/a   He is taking medications regularly.     He developed black stools five days ago. Had one two days in a row then did not have a BM again until today. No nausea    He does not eat much and has noticed a lower appetite in the past week as well. He also has had low energy and low ambition. Has slept more than " usual the past few days.    No lightheadedness or dizziness. Urination has been his baseline--he does have frequent urination. No abdominal pain or bloating.     He had CKD4, chronic, stable.    He has COPD currently worse from baseline. He is not currently using any medications as he did not tolerate them in the past.     His back pain has been worse recently and he has been using naproxen. Last week he was taking this BID instead of just once. Didn't help his pain a lot. He has since stopped taking this.                  Objective    /60 (Cuff Size: Adult Regular)   Pulse 92   Temp 98.6  F (37  C) (Tympanic)   Wt 88.9 kg (195 lb 14.4 oz)   SpO2 98%   BMI 31.62 kg/m    Body mass index is 31.62 kg/m .  Physical Exam       Well appearing  RRR no murmur  Diffuse wheezes  No LE edema  Good insight              Signed Electronically by: Christiana Connell MD

## 2024-10-03 NOTE — ED NOTES
"[facility]  ED Nurse Handoff Report    ED Chief complaint: GI Bleeding      ED Diagnosis:   Final diagnoses:   Upper GI bleed   Chronic kidney disease, unspecified CKD stage   ADEEL (acute kidney injury) (H)       Code Status: Full Code    Allergies:   Allergies   Allergen Reactions    Penicillins Anaphylaxis    Amoxicillin      He reports same has PCN    Aspirin      bleeding    Benadryl [Diphenhydramine-Zinc Acetate]      Doesn't help in allergic reaction. Benadryl cream causes worsening rash       Ibuprofen Sodium      Esophageal bleeding    Morphine Sulfate Difficulty breathing     Episode of decreased breathing - pt reports \"almost \"       Patient Story: Black stools that started on . Seen in clinic and referred to ED for endoscopy and hgb monitoring   Focused Assessment:  patient is A& Ox 4, independent in room with wife. Patient has hx of back problems so prefers to sit in chair vs lay in bed. Denies pain in abdomen, just feels weak and tired.    Treatments and/or interventions provided: labs, meds, IVF   Patient's response to treatments and/or interventions:   Labs Ordered and Resulted from Time of ED Arrival to Time of ED Departure   BASIC METABOLIC PANEL - Abnormal       Result Value    Sodium 140      Potassium 4.5      Chloride 108 (*)     Carbon Dioxide (CO2) 22      Anion Gap 10      Urea Nitrogen 101.6 (*)     Creatinine 3.55 (*)     GFR Estimate 16 (*)     Calcium 9.1      Glucose 94     INR - Normal    INR 1.09     OCCULT BLOOD STOOL   TYPE AND SCREEN, ADULT    ABO/RH(D) O POS      Antibody Screen Negative      SPECIMEN EXPIRATION DATE      ABO/RH TYPE AND SCREEN       To be done/followed up on inpatient unit:  GI consult     Does this patient have any cognitive concerns?:  none    Activity level - Baseline/Home:  Independent  Activity Level - Current:   Independent    Patient's Preferred language: English   Needed?: No    Isolation: None  Infection: " "Not Applicable  Patient tested for COVID 19 prior to admission: NO  Bariatric?: No    Vital Signs:   Vitals:    10/03/24 1129   BP: 122/58   Pulse: 86   Resp: 16   Temp: 97  F (36.1  C)   TempSrc: Temporal   SpO2: 96%   Weight: 87.1 kg (192 lb)   Height: 1.676 m (5' 6\")       Cardiac Rhythm:     Was the PSS-3 completed:   Yes  What interventions are required if any?               Family Comments: wife at bedside  OBS brochure/video discussed/provided to patient/family: N/A              Name of person given brochure if not patient:               Relationship to patient:     For the majority of the shift this patient's behavior was .   Behavioral interventions performed were .    ED NURSE PHONE NUMBER: *[phone]           "

## 2024-10-04 LAB
ANION GAP SERPL CALCULATED.3IONS-SCNC: 12 MMOL/L (ref 7–15)
BLD PROD TYP BPU: NORMAL
BLOOD COMPONENT TYPE: NORMAL
BUN SERPL-MCNC: 79.7 MG/DL (ref 8–23)
CALCIUM SERPL-MCNC: 8.6 MG/DL (ref 8.8–10.4)
CHLORIDE SERPL-SCNC: 108 MMOL/L (ref 98–107)
CODING SYSTEM: NORMAL
CREAT SERPL-MCNC: 3.28 MG/DL (ref 0.67–1.17)
CROSSMATCH: NORMAL
EGFRCR SERPLBLD CKD-EPI 2021: 17 ML/MIN/1.73M2
ERYTHROCYTE [DISTWIDTH] IN BLOOD BY AUTOMATED COUNT: 13.6 % (ref 10–15)
GLUCOSE SERPL-MCNC: 109 MG/DL (ref 70–99)
HCO3 SERPL-SCNC: 18 MMOL/L (ref 22–29)
HCT VFR BLD AUTO: 28.8 % (ref 40–53)
HGB BLD-MCNC: 7 G/DL (ref 13.3–17.7)
HGB BLD-MCNC: 9.2 G/DL (ref 13.3–17.7)
HGB BLD-MCNC: 9.6 G/DL (ref 13.3–17.7)
ISSUE DATE AND TIME: NORMAL
MCH RBC QN AUTO: 33.2 PG (ref 26.5–33)
MCHC RBC AUTO-ENTMCNC: 33.3 G/DL (ref 31.5–36.5)
MCV RBC AUTO: 100 FL (ref 78–100)
PLATELET # BLD AUTO: 174 10E3/UL (ref 150–450)
POTASSIUM SERPL-SCNC: 5 MMOL/L (ref 3.4–5.3)
RBC # BLD AUTO: 2.89 10E6/UL (ref 4.4–5.9)
SODIUM SERPL-SCNC: 138 MMOL/L (ref 135–145)
UNIT ABO/RH: NORMAL
UNIT NUMBER: NORMAL
UNIT STATUS: NORMAL
UNIT TYPE ISBT: 5100
UPPER GI ENDOSCOPY: NORMAL
WBC # BLD AUTO: 8.8 10E3/UL (ref 4–11)

## 2024-10-04 PROCEDURE — 120N000001 HC R&B MED SURG/OB

## 2024-10-04 PROCEDURE — 250N000013 HC RX MED GY IP 250 OP 250 PS 637: Performed by: HOSPITALIST

## 2024-10-04 PROCEDURE — 250N000011 HC RX IP 250 OP 636: Performed by: INTERNAL MEDICINE

## 2024-10-04 PROCEDURE — 0DB68ZX EXCISION OF STOMACH, VIA NATURAL OR ARTIFICIAL OPENING ENDOSCOPIC, DIAGNOSTIC: ICD-10-PCS | Performed by: INTERNAL MEDICINE

## 2024-10-04 PROCEDURE — 88305 TISSUE EXAM BY PATHOLOGIST: CPT | Mod: 26 | Performed by: PATHOLOGY

## 2024-10-04 PROCEDURE — 80048 BASIC METABOLIC PNL TOTAL CA: CPT | Performed by: HOSPITALIST

## 2024-10-04 PROCEDURE — 85018 HEMOGLOBIN: CPT | Performed by: HOSPITALIST

## 2024-10-04 PROCEDURE — 99232 SBSQ HOSP IP/OBS MODERATE 35: CPT | Performed by: HOSPITALIST

## 2024-10-04 PROCEDURE — 258N000003 HC RX IP 258 OP 636: Performed by: HOSPITALIST

## 2024-10-04 PROCEDURE — 43239 EGD BIOPSY SINGLE/MULTIPLE: CPT | Performed by: INTERNAL MEDICINE

## 2024-10-04 PROCEDURE — 36415 COLL VENOUS BLD VENIPUNCTURE: CPT | Performed by: HOSPITALIST

## 2024-10-04 PROCEDURE — 250N000009 HC RX 250: Performed by: HOSPITALIST

## 2024-10-04 PROCEDURE — 999N000099 HC STATISTIC MODERATE SEDATION < 10 MIN: Performed by: INTERNAL MEDICINE

## 2024-10-04 PROCEDURE — 85027 COMPLETE CBC AUTOMATED: CPT | Performed by: HOSPITALIST

## 2024-10-04 PROCEDURE — 82310 ASSAY OF CALCIUM: CPT | Performed by: HOSPITALIST

## 2024-10-04 PROCEDURE — P9016 RBC LEUKOCYTES REDUCED: HCPCS | Performed by: INTERNAL MEDICINE

## 2024-10-04 PROCEDURE — 88305 TISSUE EXAM BY PATHOLOGIST: CPT | Mod: TC | Performed by: INTERNAL MEDICINE

## 2024-10-04 RX ORDER — NALOXONE HYDROCHLORIDE 0.4 MG/ML
0.4 INJECTION, SOLUTION INTRAMUSCULAR; INTRAVENOUS; SUBCUTANEOUS
Status: DISCONTINUED | OUTPATIENT
Start: 2024-10-04 | End: 2024-10-04 | Stop reason: HOSPADM

## 2024-10-04 RX ORDER — NALOXONE HYDROCHLORIDE 0.4 MG/ML
0.4 INJECTION, SOLUTION INTRAMUSCULAR; INTRAVENOUS; SUBCUTANEOUS
Status: DISCONTINUED | OUTPATIENT
Start: 2024-10-04 | End: 2024-10-05 | Stop reason: HOSPADM

## 2024-10-04 RX ORDER — NALOXONE HYDROCHLORIDE 0.4 MG/ML
0.2 INJECTION, SOLUTION INTRAMUSCULAR; INTRAVENOUS; SUBCUTANEOUS
Status: DISCONTINUED | OUTPATIENT
Start: 2024-10-04 | End: 2024-10-05 | Stop reason: HOSPADM

## 2024-10-04 RX ORDER — PANTOPRAZOLE SODIUM 40 MG/1
40 TABLET, DELAYED RELEASE ORAL
Status: DISCONTINUED | OUTPATIENT
Start: 2024-10-05 | End: 2024-10-05 | Stop reason: HOSPADM

## 2024-10-04 RX ORDER — FLUMAZENIL 0.1 MG/ML
0.2 INJECTION, SOLUTION INTRAVENOUS
Status: ACTIVE | OUTPATIENT
Start: 2024-10-04 | End: 2024-10-04

## 2024-10-04 RX ORDER — HYDROCHLOROTHIAZIDE 12.5 MG/1
12.5 TABLET ORAL SEE ADMIN INSTRUCTIONS
Status: SHIPPED
Start: 2024-10-04

## 2024-10-04 RX ORDER — DIPHENHYDRAMINE HYDROCHLORIDE 50 MG/ML
25-50 INJECTION INTRAMUSCULAR; INTRAVENOUS
Status: DISCONTINUED | OUTPATIENT
Start: 2024-10-04 | End: 2024-10-04 | Stop reason: HOSPADM

## 2024-10-04 RX ORDER — ATROPINE SULFATE 0.1 MG/ML
1 INJECTION INTRAVENOUS
Status: DISCONTINUED | OUTPATIENT
Start: 2024-10-04 | End: 2024-10-04 | Stop reason: HOSPADM

## 2024-10-04 RX ORDER — NALOXONE HYDROCHLORIDE 0.4 MG/ML
0.2 INJECTION, SOLUTION INTRAMUSCULAR; INTRAVENOUS; SUBCUTANEOUS
Status: DISCONTINUED | OUTPATIENT
Start: 2024-10-04 | End: 2024-10-04 | Stop reason: HOSPADM

## 2024-10-04 RX ORDER — FLUMAZENIL 0.1 MG/ML
0.2 INJECTION, SOLUTION INTRAVENOUS
Status: DISCONTINUED | OUTPATIENT
Start: 2024-10-04 | End: 2024-10-04 | Stop reason: HOSPADM

## 2024-10-04 RX ORDER — LISINOPRIL 40 MG/1
40 TABLET ORAL SEE ADMIN INSTRUCTIONS
Status: SHIPPED
Start: 2024-10-04

## 2024-10-04 RX ORDER — FENTANYL CITRATE 50 UG/ML
50-100 INJECTION, SOLUTION INTRAMUSCULAR; INTRAVENOUS EVERY 5 MIN PRN
Status: DISCONTINUED | OUTPATIENT
Start: 2024-10-04 | End: 2024-10-04 | Stop reason: HOSPADM

## 2024-10-04 RX ORDER — FENTANYL CITRATE 50 UG/ML
INJECTION, SOLUTION INTRAMUSCULAR; INTRAVENOUS PRN
Status: DISCONTINUED | OUTPATIENT
Start: 2024-10-04 | End: 2024-10-04 | Stop reason: HOSPADM

## 2024-10-04 RX ORDER — EPINEPHRINE 1 MG/ML
0.1 INJECTION, SOLUTION INTRAMUSCULAR; SUBCUTANEOUS
Status: DISCONTINUED | OUTPATIENT
Start: 2024-10-04 | End: 2024-10-04 | Stop reason: HOSPADM

## 2024-10-04 RX ORDER — SIMETHICONE 40MG/0.6ML
133 SUSPENSION, DROPS(FINAL DOSAGE FORM)(ML) ORAL
Status: DISCONTINUED | OUTPATIENT
Start: 2024-10-04 | End: 2024-10-04 | Stop reason: HOSPADM

## 2024-10-04 RX ADMIN — SODIUM BICARBONATE 650 MG TABLET 650 MG: at 21:20

## 2024-10-04 RX ADMIN — ATORVASTATIN CALCIUM 10 MG: 10 TABLET, FILM COATED ORAL at 21:20

## 2024-10-04 RX ADMIN — PANTOPRAZOLE SODIUM 40 MG: 40 INJECTION, POWDER, FOR SOLUTION INTRAVENOUS at 21:21

## 2024-10-04 RX ADMIN — SODIUM CHLORIDE: 9 INJECTION, SOLUTION INTRAVENOUS at 18:59

## 2024-10-04 RX ADMIN — SODIUM CHLORIDE: 9 INJECTION, SOLUTION INTRAVENOUS at 01:12

## 2024-10-04 RX ADMIN — PANTOPRAZOLE SODIUM 40 MG: 40 INJECTION, POWDER, FOR SOLUTION INTRAVENOUS at 08:49

## 2024-10-04 ASSESSMENT — ACTIVITIES OF DAILY LIVING (ADL)
ADLS_ACUITY_SCORE: 27

## 2024-10-04 NOTE — PLAN OF CARE
Goal Outcome Evaluation:       Summary: GI Bleed, black stools x4 days   DATE & TIME: 10/4/24 3753-1643   Cognitive Concerns/ Orientation : A&Ox4   BEHAVIOR & AGGRESSION TOOL COLOR: Green  CIWA SCORE: N/a   ABNL VS/O2: VSS, RA  MOBILITY: SBA, up in recliner chair  PAIN MANAGMENT: C/o chronic back pain, declines interventions, pillow support and repositioning refused PRN tylenol, denies pain while up in chair this am   DIET:  NPO  BOWEL/BLADDER: Urinal, no BM this shift  ABNL LAB/BG:  Hemoglobin 7.0, CR 3.55, .6- need Hemoglobin to be drawn after he comes back from endo  DRAIN/DEVICES: IV NS infusing 75/hr  TELEMETRY RHYTHM: NSR  SKIN: WNL  TESTS/PROCEDURES: EGD today  D/C DAY/GOALS/PLACE: Pending improvement   OTHER IMPORTANT INFO: Patient received 1 unit of blood during the noc that finished this am- awaiting recheck of hemoglobin

## 2024-10-04 NOTE — PLAN OF CARE
Summary: GI Bleed, black stools x4 days   DATE & TIME: 10/3/24 7651-5246    Cognitive Concerns/ Orientation : A&Ox4   BEHAVIOR & AGGRESSION TOOL COLOR: Green  CIWA SCORE: N/a   ABNL VS/O2: VSS, RA  MOBILITY: SBA  PAIN MANAGMENT: C/o chronic back pain, declines interventions, pillow support and repositioning refused PRN tylenol  DIET: clear liquids, NPO after midnight   BOWEL/BLADDER: Urinal, no BM  ABNL LAB/BG:  Hemoglobin 8.1, CR 3.55, .6  DRAIN/DEVICES: IV NS infusing 75/hr  TELEMETRY RHYTHM: NSR  SKIN: WNL  TESTS/PROCEDURES: EGD tomorrow  D/C DAY/GOALS/PLACE: Pending improvement   OTHER IMPORTANT INFO:

## 2024-10-04 NOTE — PROGRESS NOTES
[facility]    Medicine Progress Note - Hospitalist Service    Date of Admission:  10/3/2024    Assessment & Plan   89-year-old male with past medical history of hypertension, hyperlipidemia, BPH, chronic back pain, COPD not on home oxygen, chronic kidney disease (CKD) stage IV with baseline creatinine around 2.5-3, admitted for black stools 4 days prior to admission along with associated fatigue and dyspnea.  Diagnosed with acute anemia felt likely due to upper GI bleed with additional acute kidney injury, admitted to the hospital on 10/3/2024.  GI consulted.     Melena with acute blood loss anemia, rule out upper GI bleed  Non-bleeding duodenal ulcers on EGD 10/4/2024  -Baseline hemoglobin is around 14  -Hemoglobin on presentation is 8.4 with platelet count of 186 , MCV of 100, elevated BUN at 101.6  -Patient has recent history of NSAID (naproxen) recently; subsequently discouraged future use  -Hemodynamically stable on admission with blood pressure in 120s and heart rate in 80s to 90s  -Type and screen ordered  -Transfuse for hemoglobin less than 7;  s/p one unit RBC transfusion overnight 10/4  -Dr. iMlan GI consulted  -EGD 10/4/24 with no acute bleeding identified; erythematous mucosa in the prepyloric region of the stomach with normal duodenal bulb; nonbleeding duodenal ulcers with no stigmata of bleeding; normal second portion of the duodenum  -IV pantoprazole BID, transition to oral BID 10/5  -Advance diet to regular as tolerated  -IV fluids, reassess daily  -Monitor Hb every 12 hours  -Avoid future non-steroidal anti-inflammatory drugs (NSAIDs) use  -Increase activity as tolerated; up to chair, walk with assist  -AM labs     Acute kidney injury  Chronic kidney disease stage IV  -Baseline creatinine -2.4 to 3  -Creatinine on admit -3.55, concerns of acute kidney injury (ADEEL)   -Suspect acute kidney injury in the setting of acute GI bleeding  -IV fluids on admission, normal saline,  reassess daily; encourage oral fluid intake following EGD  -Avoid non-steroidal anti-inflammatory drugs (NSAIDs) as above  -HOLD prior to admission lisinopril**  -Monitor I/O's, urine output, serum Cr  -AM labs with basic profile  Recent Labs   Lab 10/04/24  1358 10/03/24  1241 10/03/24  1016   CR 3.28* 3.55* 3.73*      Mild leukocytosis likely due to stress response  -Elevated WBC count of 12.2 on admission and denied any urinary complaints or any cough  -Possible stress response in the setting of acute gastrointestinal bleeding  -Monitor for signs or symptoms of infection; afebrile  Recent Labs   Lab 10/04/24  1358 10/03/24  1016   WBC 8.8 12.2*      Hypertension  Hyperlipidemia  -PTA regimen includes hydrochlorothiazide 12.5 mg daily, lovastatin 40 mg at bedtime and lisinopril 40 mg daily  -HOLD PTA hydrochlorothiazide and lisinopril for now given acute kidney injury and concerns of GI bleed  -Monitor blood pressure and heart rate   -Continue atorvastatin\     Benign prostatic hypertrophy (BPH)  -Noted from chart review but not on any medications  -Monitor, follow-up with primary clinic provider      Chronic obstructive pulmonary disease (COPD), not in exacerbation   -On admission exam he did not have any wheezing and denied any cough   -As needed DuoNebs available for shortness of breath  -Suspect acute anemia on presentation contributing to shortness of breath     History of choledocholithiasis, s/p ERCP and removal of the stone in 2021  -Noted, stable    Disposition  -Estimated length of stay 24 to 48 hours  -Anticipated discharge to home  -Social work consult for discharge planning          Diet: Regular Diet Adult    DVT Prophylaxis: Pneumatic Compression Devices  Mckeon Catheter: Not present  Lines: None     Cardiac Monitoring: ACTIVE order. Indication: gi bleed  Code Status: No CPR- Do NOT Intubate      Clinically Significant Risk Factors                  # Hypertension: Noted on problem list           #  "Obesity: Estimated body mass index is 31.64 kg/m  as calculated from the following:    Height as of this encounter: 1.676 m (5' 6\").    Weight as of this encounter: 88.9 kg (196 lb)., PRESENT ON ADMISSION            Disposition Plan     Medically Ready for Discharge: Anticipated Tomorrow             Satish Ascencio MD  Hospitalist Service  [facility]  Securely message with [employer] (more info)  Text page via [employer] Paging/Directory   ______________________________________________________________________    Interval History   First visit with patient today.  Sitting in his chair, complaining of feeling cold, but otherwise states \"I feel all right\".  No chest pain, abdominal pain, or increased shortness of breath.  Plans for upper endoscopy today.  Hospital admission for melena and concerns of upper GI bleed.    Physical Exam   Vital Signs: Temp: 97.4  F (36.3  C) Temp src: Oral BP: 101/64 Pulse: 68   Resp: 12 SpO2: 96 % O2 Device: None (Room air) Oxygen Delivery: 2 LPM  Weight: 196 lbs 0 oz    GENERAL awake and alert, no acute distress  LUNGS no wheezes or crackles  HEART S1, S2 with RRR  ABDOMEN soft, nontender  EXTREMITIES without significant edema  SKIN warm and dry  NEURO moves upper and lower extremities spontaneously and to command  MENTAL STATUS answering questions and following simple commands    Medical Decision Making             Data     I have personally reviewed the following data over the past 24 hrs:    8.8  \   9.6 (L)   / 174     138 108 (H) 79.7 (H) /  109 (H)   5.0 18 (L) 3.28 (H) \       Imaging results reviewed over the past 24 hrs:   No results found for this or any previous visit (from the past 24 hour(s)).  "

## 2024-10-04 NOTE — PLAN OF CARE
Goal Outcome Evaluation:      Plan of Care Reviewed With: patient               Summary: GI Bleed, black stools x4 days   DATE & TIME: 10/3/24 8215-5046    Cognitive Concerns/ Orientation : A&Ox4   BEHAVIOR & AGGRESSION TOOL COLOR: Green  CIWA SCORE: N/a   ABNL VS/O2: VSS, RA  MOBILITY: SBA,sitting in recliner  PAIN MANAGMENT: C/o chronic back pain, declines interventions,  DIET:  NPO @ midnight   BOWEL/BLADDER: Urinal, no BM this shift  ABNL LAB/BG:  Hemoglobin 7.0, CR 3.55, .6  DRAIN/DEVICES: IV transfusing 1 unit of PRBC  TELEMETRY RHYTHM: NSR  SKIN: WNL  TESTS/PROCEDURES: EGD this AM  D/C DAY/GOALS/PLACE: Pending improvement   OTHER IMPORTANT INFO:

## 2024-10-04 NOTE — PROGRESS NOTES
[facility]  Gastroenterology Progress Note     Nitish Coreas MRN# [id number]   YOB: 1934 Age: 89 year old          Assessment and Plan:     Nitish Coreas is a 89 year old male with a history of COPD who presents for evaluation of a GI bleed. The patient reports that he has had black stool for four days.      Upper GI bleed  Anemia  Melenotic stools   Hgb 8.4 today with baseline of 12 or 4 days. Decrease to 7.0 and received a unit of PRBC. Just finished transfusion. No repeat Hgb    - serial hemoglobin and transfuse for hgb < 7  - IV pantorpazole 40 mg BID  - NPO for EGD today              Interval History:     doing well, denies chest pain, denies shortness of breath, denies abdominal pain, and doing well; no cp, sob, n/v/d, or abd pain.              Review of Systems:     C: NEGATIVE for fever, chills, change in weight  E/M: NEGATIVE for ear, mouth and throat problems  R: NEGATIVE for significant cough or SOB  CV: NEGATIVE for chest pain, palpitations or peripheral edema             Medications:   I have reviewed this patient's current medications  Current Facility-Administered Medications   Medication Dose Route Frequency Provider Last Rate Last Admin    atorvastatin (LIPITOR) tablet 10 mg  10 mg Oral At Bedtime Nae Calle MD   10 mg at 10/03/24 2135    pantoprazole (PROTONIX) IV push injection 40 mg  40 mg Intravenous BID Nae Calle MD   40 mg at 10/04/24 0849    sodium bicarbonate tablet 650 mg  650 mg Oral BID Nae Calle MD   650 mg at 10/03/24 2135    sodium chloride (PF) 0.9% PF flush 3 mL  3 mL Intracatheter Q8H Nae Calle MD                      Physical Exam:   Vitals were reviewed  Vital Signs with Ranges  Temp:  [97  F (36.1  C)-98.6  F (37  C)] 97.4  F (36.3  C)  Pulse:  [68-92] 75  Resp:  [16-18] 18  BP: (103-139)/(56-69) 108/67  SpO2:  [96 %-100 %] 98 %  No intake/output data recorded.  Constitutional: healthy, alert, and no distress    Cardiovascular: negative, PMI normal. No lifts, heaves, or thrills. RRR. No murmurs, clicks gallops or rub  Respiratory: negative, Percussion normal. Good diaphragmatic excursion. Lungs clear  Abdomen: Abdomen soft, non-tender. BS normal. No masses, organomegaly           Data:   I reviewed the patient's new clinical lab test results.   Recent Labs   Lab Test 10/03/24  2349 10/03/24  1854 10/03/24  1241 10/03/24  1016 08/15/24  0911 04/15/24  1350   WBC  --   --   --  12.2* 9.5 9.4   HGB 7.0* 8.1*  --  8.4* 14.1 14.2   MCV  --   --   --  100 96 98   PLT  --   --   --  186 169 185   INR  --   --  1.09  --   --   --      Recent Labs   Lab Test 10/03/24  1241 10/03/24  1016 08/15/24  0911   POTASSIUM 4.5 4.4 4.8   CHLORIDE 108* 108* 108*   CO2 22 22 20*   .6* 109.0* 54.7*   ANIONGAP 10 11 11     Recent Labs   Lab Test 08/15/24  0924 08/15/24  0911 04/15/24  1350 11/06/23  0952 11/06/23  0932 07/27/21  1540 07/12/21  1605 04/22/21  0737 04/21/21  0825 04/20/21  0750 04/18/21  1125 04/18/21  1022   ALBUMIN  --  3.9 4.0  --  3.8   < > 3.4 2.0* 2.2* 2.1*   < > 3.1*   BILITOTAL  --   --   --   --   --   --  0.4 1.9* 3.6* 6.7*   < > 7.9*   ALT  --   --   --   --   --   --  31 119* 171* 170*   < > 128*   AST  --   --   --   --   --   --  27 72* 167* 230*   < > 140*   PROTEIN >=300*  --  >=300* >=300*  --    < >  --   --   --   --    < >  --    LIPASE  --   --   --   --   --   --   --   --   --  144  --  91    < > = values in this interval not displayed.       I reviewed the patient's new imaging results.    All laboratory data reviewed  All imaging studies reviewed by me.    Lakeisha Lantigua PA-C,  10/4/2024  [facility]  Office : [phone]  Cell: [phone] (Dr. Milan)  Cell: [phone] (Lakeisha Lantigua PA-C)

## 2024-10-04 NOTE — PROGRESS NOTES
11 a to 3 p;VSS on RA, Oriented, calm and pleasant, A1 GB and W, chronic back pain, Endoscopy done today, Hgb no at 9.6, creat still above baseline of 3, continue to monitor.

## 2024-10-05 VITALS
OXYGEN SATURATION: 94 % | WEIGHT: 196 LBS | SYSTOLIC BLOOD PRESSURE: 135 MMHG | DIASTOLIC BLOOD PRESSURE: 69 MMHG | BODY MASS INDEX: 31.5 KG/M2 | TEMPERATURE: 98.2 F | RESPIRATION RATE: 18 BRPM | HEART RATE: 80 BPM | HEIGHT: 66 IN

## 2024-10-05 LAB
ANION GAP SERPL CALCULATED.3IONS-SCNC: 11 MMOL/L (ref 7–15)
BUN SERPL-MCNC: 74.3 MG/DL (ref 8–23)
CALCIUM SERPL-MCNC: 8.4 MG/DL (ref 8.8–10.4)
CHLORIDE SERPL-SCNC: 108 MMOL/L (ref 98–107)
CREAT SERPL-MCNC: 3.35 MG/DL (ref 0.67–1.17)
EGFRCR SERPLBLD CKD-EPI 2021: 17 ML/MIN/1.73M2
GLUCOSE SERPL-MCNC: 118 MG/DL (ref 70–99)
HCO3 SERPL-SCNC: 20 MMOL/L (ref 22–29)
HGB BLD-MCNC: 8.8 G/DL (ref 13.3–17.7)
POTASSIUM SERPL-SCNC: 4.6 MMOL/L (ref 3.4–5.3)
SODIUM SERPL-SCNC: 139 MMOL/L (ref 135–145)

## 2024-10-05 PROCEDURE — 250N000013 HC RX MED GY IP 250 OP 250 PS 637: Performed by: HOSPITALIST

## 2024-10-05 PROCEDURE — 85018 HEMOGLOBIN: CPT | Performed by: HOSPITALIST

## 2024-10-05 PROCEDURE — 99239 HOSP IP/OBS DSCHRG MGMT >30: CPT | Performed by: HOSPITALIST

## 2024-10-05 PROCEDURE — 36415 COLL VENOUS BLD VENIPUNCTURE: CPT | Performed by: HOSPITALIST

## 2024-10-05 PROCEDURE — 80048 BASIC METABOLIC PNL TOTAL CA: CPT | Performed by: HOSPITALIST

## 2024-10-05 RX ORDER — PANTOPRAZOLE SODIUM 40 MG/1
40 TABLET, DELAYED RELEASE ORAL SEE ADMIN INSTRUCTIONS
Qty: 60 TABLET | Refills: 0 | Status: SHIPPED | OUTPATIENT
Start: 2024-10-05 | End: 2024-10-10

## 2024-10-05 RX ADMIN — PANTOPRAZOLE SODIUM 40 MG: 40 TABLET, DELAYED RELEASE ORAL at 06:58

## 2024-10-05 RX ADMIN — SODIUM BICARBONATE 650 MG TABLET 650 MG: at 09:46

## 2024-10-05 ASSESSMENT — ACTIVITIES OF DAILY LIVING (ADL)
ADLS_ACUITY_SCORE: 27

## 2024-10-05 NOTE — PROGRESS NOTES
Pt. Stable for discharge.  AVS reviewed and sent with pt. On discharge along with discharge meds.  IV out. Family to provide transportation.

## 2024-10-05 NOTE — PLAN OF CARE
SUMMARY: GI bleed    Date/Time: 10/04/2024 1627-5928  Cognitive Concerns/ Orientation: Alert/Oriented x4  BEHAVIOR & AGGRESSION TOOL COLOR: Green  ABNL VS/O2: BP 95/46 otherwise VSS, room air  MOBILITY: Stand-by Assist, IV pole  PAIN MANAGMENT: Denies overnight  DIET:  Regular  BOWEL/BLADDER: Up to bathroom  ABNL LAB/BG: BUN 79.7; Hemoglobin 9.2 (Q12hr checks), CR 3.28   DRAIN/DEVICES: R PIV infusing normal saline at 50 mL/hr  TELEMETRY RHYTHM: NSR  SKIN: WNL  TESTS/PROCEDURES: EGD completed 10/04-non-bleeding duodenal ulcers  D/C DAY/GOALS/PLACE: Pending hgb/creat labs, to home

## 2024-10-05 NOTE — PLAN OF CARE
Goal Outcome Evaluation:    DATE & TIME: 10/4/24 7115-5341   Cognitive Concerns/ Orientation : A&Ox4   BEHAVIOR & AGGRESSION TOOL COLOR: Green  CIWA SCORE: NA  ABNL VS/O2: VSS on RA  MOBILITY: A1 GB/W  PAIN MANAGMENT: Denies pain while up in chair  DIET:  Reg  BOWEL/BLADDER: Urinal, no BM this shift  ABNL LAB/BG:  Hemoglobin 9.2, CR 3.28   DRAIN/DEVICES: R PIV NS infusing 50 mL/hr  TELEMETRY RHYTHM: NSR  SKIN: WNL  TESTS/PROCEDURES: EGD today  D/C DAY/GOALS/PLACE: Pending   OTHER IMPORTANT INFO:

## 2024-10-05 NOTE — DISCHARGE SUMMARY
"[facility]  Hospitalist Discharge Summary      Date of Admission:  10/3/2024  Date of Discharge:  10/5/2024  Discharging Provider: Satish Ascencio MD  Discharge Service: Hospitalist Service    Discharge Diagnoses   Melena with acute blood loss anemia, upper GI bleed  Non-bleeding duodenal ulcers on EGD 10/4/2024, needs follow-up with GI for biopsy results  Acute kidney injury (ADEEL)  Chronic kidney disease stage IV  Mild leukocytosis likely due to stress response, resolved  Hypertension  Hyperlipidemia  Benign prostatic hypertrophy (BPH)  Chronic obstructive pulmonary disease (COPD), not in exacerbation   History of choledocholithiasis, s/p ERCP and removal of the stone in 2021    Clinically Significant Risk Factors     # Obesity: Estimated body mass index is 31.64 kg/m  as calculated from the following:    Height as of this encounter: 1.676 m (5' 6\").    Weight as of this encounter: 88.9 kg (196 lb).       Follow-ups Needed After Discharge   Follow-up Appointments     Follow-up and recommended labs and tests       Follow up with primary care provider, Pardeep Vásquez, within 5 days for   hospital follow-up of recent GI bleed, anemia, impaired kidney function,   recheck labs, medication review and refills.  Labs at visit to include CBC   with platelets, basic profile (ordered and reviewed by primary clinic   provider).    Follow-up with Dr. Milan's GI team (Dr. Reyes or colleague) in 2 to 4   weeks as recommended for review of biopsy results, follow-up after EGD for   duodenal ulcers.            Unresulted Labs Ordered in the Past 30 Days of this Admission       Date and Time Order Name Status Description    10/4/2024 11:16 AM Surgical Pathology Exam In process         These results will be followed up by Dr. Milan's/Dr. Reyes's GI team and primary clinic provider     Discharge Disposition   Discharged to home with wife and daughter  Condition at discharge: Stable    Hospital Course "   89-year-old male with past medical history of hypertension, hyperlipidemia, BPH, chronic back pain, COPD not on home oxygen, chronic kidney disease (CKD) stage IV with baseline creatinine around 2.5-3, admitted for black stools 4 days prior to admission along with associated fatigue and dyspnea.  Diagnosed with acute anemia felt likely due to upper GI bleed with additional acute kidney injury, admitted to the hospital on 10/3/2024.  GI consulted.  For details, see admission note.      Melena with acute blood loss anemia, upper GI bleed  Non-bleeding duodenal ulcers on EGD 10/4/2024, needs follow-up   -Baseline hemoglobin is around 14  -Hemoglobin on presentation is 8.4 with platelet count of 186 , MCV of 100, elevated BUN at 101.6  -Patient has recent history of NSAID (naproxen) recently; subsequently discouraged future use  -Hemodynamically stable on admission with blood pressure in 120s and heart rate in 80s to 90s  -Type and screen ordered on admission  -Transfuse for hemoglobin less than 7;  s/p one unit RBC transfusion overnight 10/4  -Dr. Milan GI team consulted  -EGD 10/4/24 with no acute bleeding identified; erythematous mucosa in the prepyloric region of the stomach with normal duodenal bulb; nonbleeding duodenal ulcers with no stigmata of bleeding; normal second portion of the duodenum - see report  -IV pantoprazole BID, transitioned to oral BID 10/5; recommendations of GI team for twice daily dosing for 8 weeks, then once daily dosing thereafter  -Advanced diet to regular as tolerated  -IV fluids on admission, later discontinued  -Monitored Hb every 12 hours and prn in hospital  -Avoid future non-steroidal anti-inflammatory drugs (NSAIDs) use; discussed avoiding future use of naproxen, taken prior to admission   -Increased activity as tolerated  -Follow-up visit with primary clinic provider early next week with recheck labs including CBC and basic profile  -Outpatient follow-up with GI as recommended  and outlined in EGD note of 10/4 for biopsy results - Dr. Jim Reyes or colleague  Recent Labs   Lab 10/05/24  0549 10/04/24  2030 10/04/24  1358 10/03/24  2349 10/03/24  1854 10/03/24  1016   HGB 8.8* 9.2* 9.6* 7.0* 8.1* 8.4*     Acute kidney injury (ADEEL)  Chronic kidney disease stage IV  -Baseline creatinine -2.4 to 3  -Creatinine on admit 3.55, with concerns of acute kidney injury (ADEEL)   -Suspected acute kidney injury in the setting of acute GI bleeding and anemia  -IV fluids on admission, normal saline, discontinued 10/5; encourage oral fluid intake  -Avoid non-steroidal anti-inflammatory drugs (NSAIDs) as above  -HOLD prior to admission lisinopril and diuretic, review in outpatient follow-up with primary clinic provider; monitor blood pressure and serum Cr, discussed with patient and family  -Monitored I/O's, urine output, serum Cr in hospital  Recent Labs   Lab 10/05/24  0549 10/04/24  1358 10/03/24  1241 10/03/24  1016   CR 3.35* 3.28* 3.55* 3.73*     Mild leukocytosis likely due to stress response, resolved  -Elevated WBC count of 12.2 on admission and denied any urinary complaints or any cough  -Possible stress response in the setting of acute gastrointestinal bleeding  -Monitor for signs or symptoms of infection; afebrile  Recent Labs   Lab 10/04/24  1358 10/03/24  1016   WBC 8.8 12.2*     Hypertension  Hyperlipidemia  -PTA regimen includes hydrochlorothiazide 12.5 mg daily, lovastatin 40 mg at bedtime and lisinopril 40 mg daily  -HOLD PTA hydrochlorothiazide and lisinopril given acute kidney injury and concerns of GI bleed; blood pressure stable; review future use with primary clinic provider at upcoming visit next week after recheck labs  -Monitor blood pressure and heart rate   -Continue atorvastatin     Benign prostatic hypertrophy (BPH)  -Noted from chart review but not on any medications  -Monitor, follow-up with primary clinic provider      Chronic obstructive pulmonary disease (COPD), not in  exacerbation   -On admission exam he did not have any wheezing and denied any cough   -As needed DuoNebs available for shortness of breath in hospital  -Suspect acute anemia on presentation contributing to shortness of breath     History of choledocholithiasis, s/p ERCP and removal of the stone in 2021  -Noted, stable    Patient will call or seek medical attention if any worrisome signs or symptoms develop after discharge.  Patient and family agrees with plan as outlined and will follow up as recommended and outlined in the dismissal summary.    Consultations This Hospital Stay   GASTROENTEROLOGY IP CONSULT    Code Status   Prior    Time Spent on this Encounter   I, Satish Ascencio MD, personally saw the patient today and spent greater than 30 minutes discharging this patient.       Satish Ascencio MD  [place] [address] [facility]  [address] [place] [address]  Phone: [phone]  ______________________________________________________________________    Physical Exam   Vital Signs: Temp: 98.2  F (36.8  C) Temp src: Oral BP: 135/69 Pulse: 80   Resp: 18 SpO2: 94 % O2 Device: None (Room air)    Weight: 196 lbs 0 oz    GENERAL awake and alert, no acute distress, sitting in chair, feels ready for hospital discharge today, his wife and daughter at the bedside  LUNGS no wheezes or crackles  HEART S1, S2 with RRR  ABDOMEN soft, nontender  EXTREMITIES without significant edema  SKIN warm and dry  NEURO moves upper and lower extremities spontaneously and to command  MENTAL STATUS answering questions and following simple commands       Primary Care Physician   Pardeep Vásquez    Discharge Orders      Reason for your hospital stay    Low hemoglobin (anemia) due to gastrointestinal bleeding.  Duodenal ulcers on esophagogastroduodenoscopy (EGD) scope test.  Impaired kidney function noted, follow-up with primary clinic provider with recheck labs recommended.  Follow-up with GI Team in ~2  weeks recommended.     Follow-up and recommended labs and tests     Follow up with primary care provider, Pardeep Vásquez, within 5 days for hospital follow-up of recent GI bleed, anemia, impaired kidney function, recheck labs, medication review and refills.  Labs at visit to include CBC with platelets, basic profile (ordered and reviewed by primary clinic provider).    Follow-up with Dr. Milan's GI team (Dr. Reyes or colleague) in 2 to 4 weeks as recommended for review of biopsy results, follow-up after EGD for duodenal ulcers.     Activity    Your activity upon discharge: activity as tolerated; advance slowly     Diet    Follow this diet upon discharge: Current Diet:Orders Placed This Encounter      Regular Diet Adult    Avoid non-steroidal anti-inflammatory drugs (NSAIDs) such as naproxen as discussed       Significant Results and Procedures   Most Recent 3 CBC's:  Recent Labs   Lab Test 10/05/24  0549 10/04/24  2030 10/04/24  1358 10/03/24  1854 10/03/24  1016 08/15/24  0911   WBC  --   --  8.8  --  12.2* 9.5   HGB 8.8* 9.2* 9.6*   < > 8.4* 14.1   MCV  --   --  100  --  100 96   PLT  --   --  174  --  186 169    < > = values in this interval not displayed.     Most Recent 3 BMP's:  Recent Labs   Lab Test 10/05/24  0549 10/04/24  1358 10/03/24  1241    138 140   POTASSIUM 4.6 5.0 4.5   CHLORIDE 108* 108* 108*   CO2 20* 18* 22   BUN 74.3* 79.7* 101.6*   CR 3.35* 3.28* 3.55*   ANIONGAP 11 12 10   HANS 8.4* 8.6* 9.1   * 109* 94   ,   Results for orders placed or performed in visit on 09/21/23   XR Chest 2 Views    Narrative    CHEST TWO VIEWS  9/21/2023 1:57 PM     HISTORY: Chronic bronchitis with emphysema (H).    COMPARISON: 4/27/2021.      Impression    IMPRESSION: Streaky opacities at the left lung base could be  bronchitis versus mild atelectasis. Normal cardiac silhouette. No  effusion.    AGUILAR WILSON MD         SYSTEM ID:  [id number]       Discharge Medications   Discharge Medication List as of  10/5/2024 11:30 AM        CONTINUE these medications which have CHANGED    Details   hydroCHLOROthiazide 12.5 MG tablet Take 1 tablet (12.5 mg) by mouth See Admin Instructions. HOLD for now as done in hospital due to impaired kidney function; monitor blood pressure and review future use with primary clinic provider after recheck labs, No Print Out      lisinopril (ZESTRIL) 40 MG tablet Take 1 tablet (40 mg) by mouth See Admin Instructions. HOLD for now as done in hospital due to impaired kidney function; monitor blood pressure and review future use with primary clinic provider after recheck labs, No Print Out      pantoprazole (PROTONIX) 40 MG EC tablet Take 1 tablet (40 mg) by mouth See Admin Instructions. One twice daily for 8 weeks, then once daily thereafter, or as directed by GI team. Future refills and dose adjustments directed to primary clinic provider, review at upcoming visit, Disp-60 tablet,  R-0, E-Prescribe           CONTINUE these medications which have NOT CHANGED    Details   Glucosamine-Chondroitin (OSTEO BI-FLEX REGULAR STRENGTH PO) Take 1 tablet by mouth 2 times daily., Historical      Krill Oil 500 MG CAPS Take 1 capsule by mouth daily., Historical      lovastatin (MEVACOR) 40 MG tablet TAKE 1 TABLET BY MOUTH AT BEDTIME, Disp-90 tablet, R-4, E-Prescribe      Multiple Vitamins-Minerals (MULTIVITAL) TABS Take 1 tablet by mouth daily., Historical      sodium bicarbonate 650 MG tablet Take 1 tablet (650 mg) by mouth 2 times daily., Disp-180 tablet, R-3, E-Prescribe           STOP taking these medications       NAPROXEN PO Comments:   Reason for Stopping:             Allergies   Allergies   Allergen Reactions    Penicillins Anaphylaxis    Amoxicillin      He reports same has PCN    Aspirin      bleeding    Benadryl [Diphenhydramine-Zinc Acetate]      Doesn't help in allergic reaction. Benadryl cream causes worsening rash       Ibuprofen Sodium      Esophageal bleeding    Morphine Sulfate Difficulty  "breathing     Episode of decreased breathing - pt reports \"almost \"     "

## 2024-10-07 ENCOUNTER — PATIENT OUTREACH (OUTPATIENT)
Dept: INTERNAL MEDICINE | Facility: CLINIC | Age: 89
End: 2024-10-07
Payer: COMMERCIAL

## 2024-10-07 ENCOUNTER — TELEPHONE (OUTPATIENT)
Dept: INTERNAL MEDICINE | Facility: CLINIC | Age: 89
End: 2024-10-07
Payer: COMMERCIAL

## 2024-10-07 LAB
PATH REPORT.COMMENTS IMP SPEC: NORMAL
PATH REPORT.COMMENTS IMP SPEC: NORMAL
PATH REPORT.FINAL DX SPEC: NORMAL
PATH REPORT.GROSS SPEC: NORMAL
PATH REPORT.MICROSCOPIC SPEC OTHER STN: NORMAL
PATH REPORT.RELEVANT HX SPEC: NORMAL
PHOTO IMAGE: NORMAL

## 2024-10-07 NOTE — TELEPHONE ENCOUNTER
Reason for Call:  Appointment Request    Patient requesting this type of appt:  Hospital/ED Follow-Up     Requested provider: Pardeep Vásquez or anyone    Reason patient unable to be scheduled: Not within requested timeframe    When does patient want to be seen/preferred time:  Within 5 days of Oct 5th    Comments: Hospital Follow Up: 10/3-10/5, GI bleed, anemia, and impaired kidney function. Needs recheck on labs (include a CBC with platelets).     Okay to leave a detailed message?: Yes at Other phone number:  [phone]- Wife    Call taken on 10/7/2024 at 9:19 AM by Krista Rendon

## 2024-10-08 ENCOUNTER — MEDICAL CORRESPONDENCE (OUTPATIENT)
Dept: HEALTH INFORMATION MANAGEMENT | Facility: CLINIC | Age: 89
End: 2024-10-08

## 2024-10-10 ENCOUNTER — OFFICE VISIT (OUTPATIENT)
Dept: INTERNAL MEDICINE | Facility: CLINIC | Age: 89
End: 2024-10-10
Payer: COMMERCIAL

## 2024-10-10 VITALS
OXYGEN SATURATION: 99 % | BODY MASS INDEX: 32.35 KG/M2 | DIASTOLIC BLOOD PRESSURE: 77 MMHG | SYSTOLIC BLOOD PRESSURE: 128 MMHG | WEIGHT: 201.3 LBS | HEIGHT: 66 IN | TEMPERATURE: 97.4 F | HEART RATE: 75 BPM

## 2024-10-10 DIAGNOSIS — Z09 HOSPITAL DISCHARGE FOLLOW-UP: Primary | ICD-10-CM

## 2024-10-10 DIAGNOSIS — N18.9 CHRONIC KIDNEY DISEASE, UNSPECIFIED CKD STAGE: ICD-10-CM

## 2024-10-10 DIAGNOSIS — K26.4 GASTROINTESTINAL HEMORRHAGE ASSOCIATED WITH DUODENAL ULCER: ICD-10-CM

## 2024-10-10 LAB
ERYTHROCYTE [DISTWIDTH] IN BLOOD BY AUTOMATED COUNT: 13.1 % (ref 10–15)
HCT VFR BLD AUTO: 26 % (ref 40–53)
HGB BLD-MCNC: 8.8 G/DL (ref 13.3–17.7)
MCH RBC QN AUTO: 32.4 PG (ref 26.5–33)
MCHC RBC AUTO-ENTMCNC: 33.8 G/DL (ref 31.5–36.5)
MCV RBC AUTO: 96 FL (ref 78–100)
PLATELET # BLD AUTO: 150 10E3/UL (ref 150–450)
RBC # BLD AUTO: 2.72 10E6/UL (ref 4.4–5.9)
WBC # BLD AUTO: 5.4 10E3/UL (ref 4–11)

## 2024-10-10 PROCEDURE — 90480 ADMN SARSCOV2 VAC 1/ONLY CMP: CPT | Performed by: INTERNAL MEDICINE

## 2024-10-10 PROCEDURE — 91320 SARSCV2 VAC 30MCG TRS-SUC IM: CPT | Performed by: INTERNAL MEDICINE

## 2024-10-10 PROCEDURE — 85027 COMPLETE CBC AUTOMATED: CPT | Performed by: INTERNAL MEDICINE

## 2024-10-10 PROCEDURE — 80048 BASIC METABOLIC PNL TOTAL CA: CPT | Performed by: INTERNAL MEDICINE

## 2024-10-10 PROCEDURE — 90662 IIV NO PRSV INCREASED AG IM: CPT | Performed by: INTERNAL MEDICINE

## 2024-10-10 PROCEDURE — G0008 ADMIN INFLUENZA VIRUS VAC: HCPCS | Performed by: INTERNAL MEDICINE

## 2024-10-10 PROCEDURE — 36415 COLL VENOUS BLD VENIPUNCTURE: CPT | Performed by: INTERNAL MEDICINE

## 2024-10-10 PROCEDURE — 99214 OFFICE O/P EST MOD 30 MIN: CPT | Mod: 25 | Performed by: INTERNAL MEDICINE

## 2024-10-10 RX ORDER — PANTOPRAZOLE SODIUM 40 MG/1
40 TABLET, DELAYED RELEASE ORAL SEE ADMIN INSTRUCTIONS
Qty: 180 TABLET | Refills: 0 | Status: SHIPPED | OUTPATIENT
Start: 2024-10-10

## 2024-10-10 RX ORDER — PANTOPRAZOLE SODIUM 40 MG/1
40 TABLET, DELAYED RELEASE ORAL SEE ADMIN INSTRUCTIONS
Qty: 60 TABLET | Refills: 0 | Status: CANCELLED | OUTPATIENT
Start: 2024-10-10

## 2024-10-10 NOTE — PROGRESS NOTES
"  Assessment & Plan     Hospital discharge follow-up  Doing better now. Notes and labs reviewed. Overall improving.    Chronic kidney disease, unspecified CKD stage  Will need recheck of BMP today. Had ADEEL related to aBLA, I'm[propving on discharge.    - Basic metabolic panel  (Ca, Cl, CO2, Creat, Gluc, K, Na, BUN); Future  - Basic metabolic panel  (Ca, Cl, CO2, Creat, Gluc, K, Na, BUN)    Gastrointestinal hemorrhage associated with duodenal ulcer  Now stablized. Continue with PPI    - pantoprazole (PROTONIX) 40 MG EC tablet; Take 1 tablet (40 mg) by mouth See Admin Instructions. One twice daily for 8 weeks, then once daily thereafter, or as directed by GI team. Future refills and dose adjustments directed to primary clinic provider, review at upcoming visit  - CBC with platelets; Future  - CBC with platelets        MED REC REQUIRED  Post Medication Reconciliation Status: discharge medications reconciled, continue medications without change  BMI  Estimated body mass index is 32.49 kg/m  as calculated from the following:    Height as of this encounter: 1.676 m (5' 6\").    Weight as of this encounter: 91.3 kg (201 lb 4.8 oz).             Chad Talbert is a 89 year old, presenting for the following health issues:  Hospital F/U  Pt is requesting more than a 60 refill       Was hospitalized last week with an acute GIB and ADEEL. Had 1 Unit PRBC and EGD.     Since going home he has been slowly recovering, getting strength back. Energy is still low.     History of Present Illness       Reason for visit:  Black stools  Symptom onset:  1-3 days ago  Symptoms include:  Not eating, fatigue  Symptom intensity:  Moderate  Symptom progression:  Staying the same  Had these symptoms before:  No  What makes it worse:  N/a  What makes it better:  N/a   He is taking medications regularly.           Hospital Follow-up Visit:    Hospital/Nursing Home/IP Rehab Facility: [facility]  Date of Admission: " "10/3  Date of Discharge: 10/5  Reason(s) for Admission: low hemoglobin due to GI bleeding, impaired kidney function  Was the patient in the ICU or did the patient experience delirium during hospitalization?  No  Do you have any other stressors you would like to discuss with your provider? No    Problems taking medications regularly:  None  Medication changes since discharge: None  Problems adhering to non-medication therapy:  None    Summary of hospitalization:  [facility] discharge summary reviewed  Diagnostic Tests/Treatments reviewed.  Follow up needed: none  Other Healthcare Providers Involved in Patient s Care:         None  Update since discharge: improved.         Plan of care communicated with patient and family                       Objective    /77   Pulse 75   Temp 97.4  F (36.3  C) (Temporal)   Ht 1.676 m (5' 6\")   Wt 91.3 kg (201 lb 4.8 oz)   SpO2 99%   BMI 32.49 kg/m    There is no height or weight on file to calculate BMI.  Physical Exam       Well appearing   RRR  CTAB  Abd soft  Neuro: No focal deficits            Signed Electronically by: Christiana Connell MD    "

## 2024-10-10 NOTE — LETTER
October 11, 2024      Nitish Coreas  [address] [address]        Dear ,    We are writing to inform you of your test results.    Your blood counts and kidney function are stable since coming home from the hospital. Please make an appointment to see Dr Vásquez in 3-4 weeks to recheck your blood work.    Resulted Orders   Basic metabolic panel  (Ca, Cl, CO2, Creat, Gluc, K, Na, BUN)   Result Value Ref Range    Sodium 139 135 - 145 mmol/L    Potassium 4.0 3.4 - 5.3 mmol/L    Chloride 108 (H) 98 - 107 mmol/L    Carbon Dioxide (CO2) 20 (L) 22 - 29 mmol/L    Anion Gap 11 7 - 15 mmol/L    Urea Nitrogen 52.0 (H) 8.0 - 23.0 mg/dL    Creatinine 3.36 (H) 0.67 - 1.17 mg/dL    GFR Estimate 17 (L) >60 mL/min/1.73m2      Comment:      eGFR calculated using 2021 CKD-EPI equation.    Calcium 7.6 (L) 8.8 - 10.4 mg/dL      Comment:      Reference intervals for this test were updated on 7/16/2024 to reflect our healthy population more accurately. There may be differences in the flagging of prior results with similar values performed with this method. Those prior results can be interpreted in the context of the updated reference intervals.    Glucose 90 70 - 99 mg/dL   CBC with platelets   Result Value Ref Range    WBC Count 5.4 4.0 - 11.0 10e3/uL    RBC Count 2.72 (L) 4.40 - 5.90 10e6/uL    Hemoglobin 8.8 (L) 13.3 - 17.7 g/dL    Hematocrit 26.0 (L) 40.0 - 53.0 %    MCV 96 78 - 100 fL    MCH 32.4 26.5 - 33.0 pg    MCHC 33.8 31.5 - 36.5 g/dL    RDW 13.1 10.0 - 15.0 %    Platelet Count 150 150 - 450 10e3/uL       If you have any questions or concerns, please call the clinic at the number listed above.       Sincerely,      Christiana Connell MD

## 2024-10-11 LAB
ANION GAP SERPL CALCULATED.3IONS-SCNC: 11 MMOL/L (ref 7–15)
BUN SERPL-MCNC: 52 MG/DL (ref 8–23)
CALCIUM SERPL-MCNC: 7.6 MG/DL (ref 8.8–10.4)
CHLORIDE SERPL-SCNC: 108 MMOL/L (ref 98–107)
CREAT SERPL-MCNC: 3.36 MG/DL (ref 0.67–1.17)
EGFRCR SERPLBLD CKD-EPI 2021: 17 ML/MIN/1.73M2
GLUCOSE SERPL-MCNC: 90 MG/DL (ref 70–99)
HCO3 SERPL-SCNC: 20 MMOL/L (ref 22–29)
POTASSIUM SERPL-SCNC: 4 MMOL/L (ref 3.4–5.3)
SODIUM SERPL-SCNC: 139 MMOL/L (ref 135–145)

## 2024-10-16 ENCOUNTER — TELEPHONE (OUTPATIENT)
Dept: INTERNAL MEDICINE | Facility: CLINIC | Age: 89
End: 2024-10-16
Payer: COMMERCIAL

## 2024-10-16 NOTE — TELEPHONE ENCOUNTER
Test Results        Who ordered the test:  Hospital F/u    Type of test: Lab    Date of test:  10/3/2024    Where was the test performed:  [facility]    What are your questions/concerns?:  just want to go over results    Okay to leave a detailed message?: Yes at Cell number on file:    Telephone Information:   Mobile [phone]

## 2024-10-16 NOTE — TELEPHONE ENCOUNTER
Spoke with pt and scheduled a telephone visit with provider on 10/18 to discuss lab results from 10/3.

## 2024-10-18 ENCOUNTER — VIRTUAL VISIT (OUTPATIENT)
Dept: INTERNAL MEDICINE | Facility: CLINIC | Age: 89
End: 2024-10-18
Payer: COMMERCIAL

## 2024-10-18 DIAGNOSIS — N18.4 CHRONIC KIDNEY DISEASE, STAGE 4 (SEVERE) (H): Primary | ICD-10-CM

## 2024-10-18 DIAGNOSIS — K26.4 GASTROINTESTINAL HEMORRHAGE ASSOCIATED WITH DUODENAL ULCER: ICD-10-CM

## 2024-10-18 DIAGNOSIS — N25.81 SECONDARY HYPERPARATHYROIDISM (H): ICD-10-CM

## 2024-10-18 PROCEDURE — 99442 PR PHYSICIAN TELEPHONE EVALUATION 11-20 MIN: CPT | Mod: 93 | Performed by: INTERNAL MEDICINE

## 2024-10-18 NOTE — PROGRESS NOTES
Nitish is a 89 year old who is being evaluated via a billable telephone visit.    What phone number would you like to be contacted at? [phone]  How would you like to obtain your AVS? Favio  Originating Location (pt. Location): Home  Distant Location (provider location):  On-site    Assessment & Plan   Chronic kidney disease, stage 4 (severe) (H)  Stable on recent labs. Following with nephrology. I do wonder if this issue will impact Nitish's ability to regain blood counts. Defer EPO discussion to his nephrology team but something to consider if hgb remains low and Nitish's bowels remain asymptomatic.    Gastrointestinal hemorrhage associated with duodenal ulcer  Hgb last week stable from discharge (5 days prior). He got 1u pRBC, which obviously has a bit of iron in it. Also recommended he start an iron supplement every other day (he plans to obtain OTC) for a short period of time (1-2 months). Strongly encouraged him to keep his follow-up appointment with GI as scheduled and to continue to monitor for s/sx of another bleed.    Secondary hyperparathyroidism (H)  Known issue that I take into account for their medical decisions, no current exacerbations or new concerns. Managed by nephrology.    Signed Electronically by:  Pardeep Eller MD, MPH  [facility]  Internal Medicine    Subjective   Nitish is a 89 year old who presents for an acute care virtual telephone visit with chief concern of: Results    HPI   Patient seen by Dr. Connell in our clinic on 10/10/2024 for hospital follow-up visit. He scheduled this telephone visit to discuss the lab results from that visit as he has still not heard anything about them 8 days later (though I see Dr. Connell sent patient a results letter on 10/11/2024). Unfortunately, Dr. Connell's note from that encounter remains unfinished. Nitish reports his stools are normal. No bloody, black, or tarry stools. He remains more tired than usual. Has  follow-up with GI scheduled on 11/01/2024.        Objective       Vitals: No vitals were obtained today due to virtual visit.    Physical Exam   General: Alert and no distress //Respiratory: No audible wheeze, cough, or shortness of breath // Psychiatric:  Appropriate affect, tone, and pace of words    Phone call duration: 11 minutes

## 2024-11-01 ENCOUNTER — TRANSFERRED RECORDS (OUTPATIENT)
Dept: HEALTH INFORMATION MANAGEMENT | Facility: CLINIC | Age: 89
End: 2024-11-01

## 2024-11-25 ENCOUNTER — ANCILLARY PROCEDURE (OUTPATIENT)
Dept: GENERAL RADIOLOGY | Facility: CLINIC | Age: 89
End: 2024-11-25
Attending: PREVENTIVE MEDICINE
Payer: COMMERCIAL

## 2024-11-25 ENCOUNTER — NURSE TRIAGE (OUTPATIENT)
Dept: INTERNAL MEDICINE | Facility: CLINIC | Age: 89
End: 2024-11-25

## 2024-11-25 ENCOUNTER — OFFICE VISIT (OUTPATIENT)
Dept: PEDIATRICS | Facility: CLINIC | Age: 89
End: 2024-11-25
Payer: COMMERCIAL

## 2024-11-25 DIAGNOSIS — R60.9 EDEMA, UNSPECIFIED TYPE: ICD-10-CM

## 2024-11-25 DIAGNOSIS — R60.9 EDEMA, UNSPECIFIED TYPE: Primary | ICD-10-CM

## 2024-11-25 LAB
ALBUMIN SERPL BCG-MCNC: 3.3 G/DL (ref 3.5–5.2)
ALBUMIN UR-MCNC: >=300 MG/DL
ALP SERPL-CCNC: 88 U/L (ref 40–150)
ALT SERPL W P-5'-P-CCNC: 15 U/L (ref 0–70)
ANION GAP SERPL CALCULATED.3IONS-SCNC: 9 MMOL/L (ref 7–15)
APPEARANCE UR: CLEAR
AST SERPL W P-5'-P-CCNC: 32 U/L (ref 0–45)
BACTERIA #/AREA URNS HPF: ABNORMAL /HPF
BASOPHILS # BLD AUTO: 0 10E3/UL (ref 0–0.2)
BASOPHILS NFR BLD AUTO: 0 %
BILIRUB SERPL-MCNC: 0.3 MG/DL
BILIRUB UR QL STRIP: NEGATIVE
BUN SERPL-MCNC: 32.1 MG/DL (ref 8–23)
CALCIUM SERPL-MCNC: 8.9 MG/DL (ref 8.8–10.4)
CHLORIDE SERPL-SCNC: 107 MMOL/L (ref 98–107)
COLOR UR AUTO: YELLOW
CREAT SERPL-MCNC: 3.25 MG/DL (ref 0.67–1.17)
EGFRCR SERPLBLD CKD-EPI 2021: 17 ML/MIN/1.73M2
EOSINOPHIL # BLD AUTO: 0.2 10E3/UL (ref 0–0.7)
EOSINOPHIL NFR BLD AUTO: 3 %
ERYTHROCYTE [DISTWIDTH] IN BLOOD BY AUTOMATED COUNT: 13.2 % (ref 10–15)
GLUCOSE SERPL-MCNC: 83 MG/DL (ref 70–99)
GLUCOSE UR STRIP-MCNC: 100 MG/DL
HCO3 SERPL-SCNC: 24 MMOL/L (ref 22–29)
HCT VFR BLD AUTO: 39.2 % (ref 40–53)
HGB BLD-MCNC: 12.9 G/DL (ref 13.3–17.7)
HGB UR QL STRIP: ABNORMAL
HYALINE CASTS #/AREA URNS LPF: ABNORMAL /LPF
IMM GRANULOCYTES # BLD: 0 10E3/UL
IMM GRANULOCYTES NFR BLD: 0 %
KETONES UR STRIP-MCNC: NEGATIVE MG/DL
LEUKOCYTE ESTERASE UR QL STRIP: NEGATIVE
LYMPHOCYTES # BLD AUTO: 1.7 10E3/UL (ref 0.8–5.3)
LYMPHOCYTES NFR BLD AUTO: 21 %
MCH RBC QN AUTO: 31.2 PG (ref 26.5–33)
MCHC RBC AUTO-ENTMCNC: 32.9 G/DL (ref 31.5–36.5)
MCV RBC AUTO: 95 FL (ref 78–100)
MONOCYTES # BLD AUTO: 0.9 10E3/UL (ref 0–1.3)
MONOCYTES NFR BLD AUTO: 10 %
NEUTROPHILS # BLD AUTO: 5.4 10E3/UL (ref 1.6–8.3)
NEUTROPHILS NFR BLD AUTO: 66 %
NITRATE UR QL: NEGATIVE
PH UR STRIP: 7 [PH] (ref 5–7)
PLATELET # BLD AUTO: 195 10E3/UL (ref 150–450)
POTASSIUM SERPL-SCNC: 4.2 MMOL/L (ref 3.4–5.3)
PROT SERPL-MCNC: 6.5 G/DL (ref 6.4–8.3)
RBC # BLD AUTO: 4.13 10E6/UL (ref 4.4–5.9)
RBC #/AREA URNS AUTO: ABNORMAL /HPF
SODIUM SERPL-SCNC: 140 MMOL/L (ref 135–145)
SP GR UR STRIP: 1.02 (ref 1–1.03)
SQUAMOUS #/AREA URNS AUTO: ABNORMAL /LPF
UROBILINOGEN UR STRIP-ACNC: 0.2 E.U./DL
WBC # BLD AUTO: 8.2 10E3/UL (ref 4–11)
WBC #/AREA URNS AUTO: ABNORMAL /HPF

## 2024-11-25 PROCEDURE — 71046 X-RAY EXAM CHEST 2 VIEWS: CPT | Mod: TC | Performed by: RADIOLOGY

## 2024-11-25 PROCEDURE — 80053 COMPREHEN METABOLIC PANEL: CPT | Performed by: PREVENTIVE MEDICINE

## 2024-11-25 PROCEDURE — 85025 COMPLETE CBC W/AUTO DIFF WBC: CPT | Performed by: PREVENTIVE MEDICINE

## 2024-11-25 PROCEDURE — 99417 PROLNG OP E/M EACH 15 MIN: CPT | Performed by: PREVENTIVE MEDICINE

## 2024-11-25 PROCEDURE — 36415 COLL VENOUS BLD VENIPUNCTURE: CPT | Performed by: PREVENTIVE MEDICINE

## 2024-11-25 PROCEDURE — 81001 URINALYSIS AUTO W/SCOPE: CPT | Performed by: PREVENTIVE MEDICINE

## 2024-11-25 PROCEDURE — 99215 OFFICE O/P EST HI 40 MIN: CPT | Performed by: PREVENTIVE MEDICINE

## 2024-11-25 RX ORDER — HYDROCHLOROTHIAZIDE 12.5 MG/1
12.5 TABLET ORAL DAILY
Qty: 30 TABLET | Refills: 0 | Status: SHIPPED | OUTPATIENT
Start: 2024-11-25

## 2024-11-25 ASSESSMENT — PAIN SCALES - GENERAL: PAINLEVEL_OUTOF10: WORST PAIN (10)

## 2024-11-25 NOTE — TELEPHONE ENCOUNTER
Patient in the hospital 7 weeks ago took.   Nurse Triage SBAR    Is this a 2nd Level Triage? YES, LICENSED PRACTITIONER REVIEW IS REQUIRED    Situation: Patient is having swelling in his legs, hands and body. Patient states that he has stage 4 kidney disease. He was stopped on his lisinopril and hydrochlorothiazide. Patient needs to be seen and assessed. Patient says the pain is bearable compared to his broken back.     Background: see above    Assessment: patient needs to be seen     Protocol Recommended Disposition:   Go To Office Now    Recommendation: Patient needs to be assessed     Sent to ADS    Does the patient meet one of the following criteria for ADS visit consideration? 16+ years old, with an [facility] PCP     TIP  Providers, please consider if this condition is appropriate for management at one of our Acute and Diagnostic Services sites.     If patient is a good candidate, please use dotphrase <dot>triageresponse and select Refer to ADS to document.    RN Referral to Acute and Diagnostic Services    [phone] ([place]) [address], [address], [place] [address]           Presenting symptoms/reason for ADS referral:  leg swelling    Relevant Medical History:      Transition to ADS clinic discussed with patient and patient is agreeable.    Patient has been advised that appointments at ADS typically takes significantly longer than in clinic/urgent care (~ 2.5-3 hours or more):    Patient has transportation and is available for ASAP same day appointment:   Patient aware that ADS will contact them directly      Does patient does have claustrophobia   Patient has contrast allergy   Patient has PICC line or port in place          ADS clinic has  this patient.              Reason for Disposition   SEVERE swelling (e.g., swelling extends above knee, entire leg is swollen, weeping fluid)    Additional Information   Negative: Chest pain   Negative: Followed an insect bite and has localized swelling  "(e.g., small area of puffy or swollen skin)   Negative: Followed a knee injury   Negative: Ankle injury   Negative: Pregnant with leg swelling or edema   Negative: Sounds like a life-threatening emergency to the triager   Negative: Difficulty breathing at rest   Negative: Entire foot is cool or blue in comparison to other side   Negative: Cast on leg or ankle and has increasing pain   Negative: Can't walk or can barely stand (new-onset)   Negative: Fever and red area (or area very tender to touch)   Negative: Patient sounds very sick or weak to the triager    Answer Assessment - Initial Assessment Questions  1. ONSET: \"When did the swelling start?\" (e.g., minutes, hours, days)      Swelling ccumulating for last 7 weeks   2. LOCATION: \"What part of the leg is swollen?\"  \"Are both legs swollen or just one leg?\"      Hand swollen, knee down to ankel and feet   3. SEVERITY: \"How bad is the swelling?\" (e.g., localized; mild, moderate, severe)      mild  4. REDNESS: \"Does the swelling look red or infected?\"      no  5. PAIN: \"Is the swelling painful to touch?\" If Yes, ask: \"How painful is it?\"   (Scale 1-10; mild, moderate or severe)      Pain when touch 3/10  6. FEVER: \"Do you have a fever?\" If Yes, ask: \"What is it, how was it measured, and when did it start?\"       Leg   7. CAUSE: \"What do you think is causing the leg swelling?\"     Taken off hydrochlorothiazide and lisinopril  8. MEDICAL HISTORY: \"Do you have a history of blood clots (e.g., DVT), cancer, heart failure, kidney disease, or liver failure?\"      Kidney not great   9. RECURRENT SYMPTOM: \"Have you had leg swelling before?\" If Yes, ask: \"When was the last time?\" \"What happened that time?\"      Yeah- when put   10. OTHER SYMPTOMS: \"Do you have any other symptoms?\" (e.g., chest pain, difficulty breathing)        COPD- has not gotten worse  11. PREGNANCY: \"Is there any chance you are pregnant?\" \"When was your last menstrual period?\"        no    Protocols used: " Leg Swelling and Edema-A-[place]

## 2024-11-25 NOTE — PROGRESS NOTES
"Acute and Diagnostic Services Clinic Visit    Assessment & Plan     Edema, unspecified type  Bilateral lower extremity edema for 3-4 weeks. Progressing to include hand swelling and puffy eyes within last several days. Patient also hypertensive today with /88. CMP with BUN 32.1 (improved from 52.0 on 10/10/24), creatinine 3.25 which appears to be his new baseline, eGFR 17, and low albumin of 3.3. CBC with normal WBC count. Hb low at 12.9, but much improved compared to prior value of 8.8 on 10/10/24. UA with albumin >300 (not acute), 5-10 RBC, and negative for infectious markers. Chest xray without cardiomegaly or congestion.   Suspect that edema is related to fluid overload in the setting of stage IV CKD. Patient's diuretics were discontinued during prior hospitalization at the beginning of October this year. Discussed case with nephrology today who recommended restarting patient's hydrochlorothiazide diuretic and following up with PCP in 1 week to reassess swelling, blood pressure, and labs.   - UA with Microscopic reflex to Culture - Clinic Collect  - XR Chest 2 Views; Future  - CBC with platelets and differential  - Comprehensive metabolic panel    Pulmonary nodule  New indeterminate 2.3cm nodule found on chest xray at periphery of left mid lung. Neoplasm vs. Small area of pneumonia remain in differential. Patient will require further workup including chest CT. Recommend follow-up with PCP for further assessment.    60 minutes were spent doing chart review, history and exam, documentation and further activities per the note.       BMI  Estimated body mass index is 32.6 kg/m  as calculated from the following:    Height as of this encounter: 1.676 m (5' 6\").    Weight as of this encounter: 91.6 kg (202 lb).         See Patient Instructions    No follow-ups on file.    Subjective   Nitish is a 89 year old, presenting for the following health issues:  Edema (Patient is here with edema in all extremities.)    HPI " "    Bilateral lower extremity edema that started initially 3-4 weeks ago but has gotten worse over the past week. In the past few days he has also noticed swelling in his hands. This morning his eyes were puffy and difficult to open. He also endorses increased thirst. He is drinking about 2 extra glasses of water daily and urinating often including several times overnight. He does not endorse any increased SOB above his baseline COPD. He reports some nasal congestion that worsens overnight, but he does not need to sleep sitting up.     History of stage IV kidney disease. Hospitalization from 10/3-10/5 with upper GI bleed complicated by ADEEL. His lisinopril and hydrochlorothiazide were stopped during that admission and not restarted. He started on pantoprazole on 10/10 40mg BID which he is still taking.     Edema/Swelling  Onset/Duration: 7 weeks ago  Description:   Location:  All 4 extremities   Associated redness: No  Associated skin changes:  YES  Associated pain:  No  Progression of Symptoms:  worsening  Accompanying Signs & Symptoms:  Chest pain: No  Shortness of breath: YES- Chronic COPD           Nausea or vomiting: No  Lightheadedness: No  Palpitations: No  Fever/Chills: No  Cough: YES- Periodically cough.  History:   History of heart disease or heart failure: No  History of sleep apnea: No  Tobacco use: YES- 45 years.  Quit 1991           Previous similar symptoms: no   Precipitating factors: No   Alleviating factors: No   Therapies tried:         Review of Systems  Constitutional, HEENT, cardiovascular, pulmonary, gi and gu systems are negative, except as otherwise noted.      Objective    BP (!) 177/88 (BP Location: Right arm, Patient Position: Sitting, Cuff Size: Adult Large)   Pulse 73   Temp 97.2  F (36.2  C) (Temporal)   Resp 20   Ht 1.676 m (5' 6\")   Wt 91.6 kg (202 lb)   SpO2 97%   BMI 32.60 kg/m    Body mass index is 32.6 kg/m .  Physical Exam   GENERAL: alert and no distress  NECK: no " adenopathy, no asymmetry, masses, or scars  RESP: lungs clear to auscultation - no rales, rhonchi or wheezes  CV: regular rate and rhythm, normal S1 S2, no S3 or S4, no murmur, click or rub, bilateral, symmetric 2+ pitting edema up to knees that is diffusely tender to palpation  ABDOMEN: soft, nontender, no hepatosplenomegaly, no masses and bowel sounds normal  MS: no gross musculoskeletal defects noted    Results for orders placed or performed in visit on 11/25/24   XR Chest 2 Views     Status: None    Narrative    CHEST TWO VIEWS  11/25/2024 2:29 PM     HISTORY:  Edema, unspecified type.    COMPARISON: 6/21/2023.      Impression    IMPRESSION: New indeterminate 2.3 cm nodular opacity at the periphery  of the left mid lung. This is nonspecific. Small area of pneumonia is  possible, but neoplasm remains in the differential. Suggest further  workup. CT chest could provide additional assessment.    AGUILAR WILSON MD         SYSTEM ID:  [id number]   Results for orders placed or performed in visit on 11/25/24   UA with Microscopic reflex to Culture - Clinic Collect     Status: Abnormal    Specimen: Urine, Clean Catch   Result Value Ref Range    Color Urine Yellow Colorless, Straw, Light Yellow, Yellow    Appearance Urine Clear Clear    Glucose Urine 100 (A) Negative mg/dL    Bilirubin Urine Negative Negative    Ketones Urine Negative Negative mg/dL    Specific Gravity Urine 1.025 1.003 - 1.035    Blood Urine Small (A) Negative    pH Urine 7.0 5.0 - 7.0    Protein Albumin Urine >=300 (A) Negative mg/dL    Urobilinogen Urine 0.2 0.2, 1.0 E.U./dL    Nitrite Urine Negative Negative    Leukocyte Esterase Urine Negative Negative   Comprehensive metabolic panel     Status: Abnormal   Result Value Ref Range    Sodium 140 135 - 145 mmol/L    Potassium 4.2 3.4 - 5.3 mmol/L    Carbon Dioxide (CO2) 24 22 - 29 mmol/L    Anion Gap 9 7 - 15 mmol/L    Urea Nitrogen 32.1 (H) 8.0 - 23.0 mg/dL    Creatinine 3.25 (H) 0.67 - 1.17 mg/dL    GFR  Estimate 17 (L) >60 mL/min/1.73m2    Calcium 8.9 8.8 - 10.4 mg/dL    Chloride 107 98 - 107 mmol/L    Glucose 83 70 - 99 mg/dL    Alkaline Phosphatase 88 40 - 150 U/L    AST 32 0 - 45 U/L    ALT 15 0 - 70 U/L    Protein Total 6.5 6.4 - 8.3 g/dL    Albumin 3.3 (L) 3.5 - 5.2 g/dL    Bilirubin Total 0.3 <=1.2 mg/dL   CBC with platelets and differential     Status: Abnormal   Result Value Ref Range    WBC Count 8.2 4.0 - 11.0 10e3/uL    RBC Count 4.13 (L) 4.40 - 5.90 10e6/uL    Hemoglobin 12.9 (L) 13.3 - 17.7 g/dL    Hematocrit 39.2 (L) 40.0 - 53.0 %    MCV 95 78 - 100 fL    MCH 31.2 26.5 - 33.0 pg    MCHC 32.9 31.5 - 36.5 g/dL    RDW 13.2 10.0 - 15.0 %    Platelet Count 195 150 - 450 10e3/uL    % Neutrophils 66 %    % Lymphocytes 21 %    % Monocytes 10 %    % Eosinophils 3 %    % Basophils 0 %    % Immature Granulocytes 0 %    Absolute Neutrophils 5.4 1.6 - 8.3 10e3/uL    Absolute Lymphocytes 1.7 0.8 - 5.3 10e3/uL    Absolute Monocytes 0.9 0.0 - 1.3 10e3/uL    Absolute Eosinophils 0.2 0.0 - 0.7 10e3/uL    Absolute Basophils 0.0 0.0 - 0.2 10e3/uL    Absolute Immature Granulocytes 0.0 <=0.4 10e3/uL   UA Microscopic with Reflex to Culture     Status: Abnormal   Result Value Ref Range    Bacteria Urine Few (A) None Seen /HPF    RBC Urine 5-10 (A) 0-2 /HPF /HPF    WBC Urine 0-5 0-5 /HPF /HPF    Squamous Epithelials Urine Few (A) None Seen /LPF    Hyaline Casts Urine 0-2 (A) None Seen /LPF    Narrative    Urine Culture not indicated   CBC with platelets and differential     Status: Abnormal    Narrative    The following orders were created for panel order CBC with platelets and differential.  Procedure                               Abnormality         Status                     ---------                               -----------         ------                     CBC with platelets and d...[[id number]]  Abnormal            Final result                 Please view results for these tests on the individual orders.            Rebecca Kolb, MS3  Medical Student    I agree with the medical student's note above and participated fully in the evaluation of this patient.    Briefly, this is a 89 yo male with stage 4 kidney disease who was hospitalized two months ago with gi bleed and had peyton.  His antihypertensives hydrochlorothiazide and lisinopril were stopped at that point.  He was started on pantoprazole a month ago.  In the past few days, he has noted his legs have been swollen below the knee and also has had some hand swelling.  His bp has been quite elevated as well.  No headache, cp, sob.  No longer taking nsaids as he was 2 months ago.  No confusion.  Feels like he is emptying his bladder adequately.  No orthopnea or pnd.    Evaluation shows stable gfr, pulmonary nodule.  Discussed case with nephrology at [facility] who advised resuming hydrochlorothiazide and having him see his pcp in one week for recheck of labs, edema and bp.    Edema - restart hydrochlorothiazide 12.5 mg daily, compression stockings and elevation also advised.  Pulmonary nodule  - new on cxr today.  Discuss getting a ct scan with your pcp in follow up in one week.    Signed Electronically by: Joe Pitt MD

## 2024-11-25 NOTE — PATIENT INSTRUCTIONS
Thanks for choosing [facility] today for your medical care.     You were seen for leg swelling and elevated blood pressure.      I recommend restarting hydrochlorothiazide 12.5 mg daily and following up with primary care for a recheck in one week in the clinic for blood pressure, leg swelling, and labs.    There is also a pulmonary nodule in the left mid lung noted.  I advise discussing this with your primary care in one week and would consider a ct scan in follow up.

## 2024-11-26 ENCOUNTER — TELEPHONE (OUTPATIENT)
Dept: NEPHROLOGY | Facility: CLINIC | Age: 89
End: 2024-11-26
Payer: COMMERCIAL

## 2024-11-26 DIAGNOSIS — I12.9 CKD STAGE 4 SECONDARY TO HYPERTENSION (H): Primary | ICD-10-CM

## 2024-11-26 DIAGNOSIS — N18.4 CKD STAGE 4 SECONDARY TO HYPERTENSION (H): Primary | ICD-10-CM

## 2024-11-26 NOTE — TELEPHONE ENCOUNTER
Called patient with a appointment reminder for Tues. 12/10/24 @ 9:30 am with Latonia Jaime video labs done 11/25/24.    Hipolito Pitt on 11/26/2024 at 2:59 PM

## 2024-11-27 VITALS
WEIGHT: 202 LBS | HEART RATE: 73 BPM | TEMPERATURE: 97.2 F | OXYGEN SATURATION: 97 % | SYSTOLIC BLOOD PRESSURE: 177 MMHG | HEIGHT: 66 IN | BODY MASS INDEX: 32.47 KG/M2 | DIASTOLIC BLOOD PRESSURE: 88 MMHG | RESPIRATION RATE: 20 BRPM

## 2024-12-03 ENCOUNTER — OFFICE VISIT (OUTPATIENT)
Dept: INTERNAL MEDICINE | Facility: CLINIC | Age: 89
End: 2024-12-03
Attending: INTERNAL MEDICINE
Payer: COMMERCIAL

## 2024-12-03 VITALS
DIASTOLIC BLOOD PRESSURE: 88 MMHG | OXYGEN SATURATION: 94 % | RESPIRATION RATE: 20 BRPM | HEIGHT: 66 IN | HEART RATE: 89 BPM | SYSTOLIC BLOOD PRESSURE: 166 MMHG | BODY MASS INDEX: 30.94 KG/M2 | WEIGHT: 192.5 LBS

## 2024-12-03 DIAGNOSIS — I12.9 CKD STAGE 4 SECONDARY TO HYPERTENSION (H): ICD-10-CM

## 2024-12-03 DIAGNOSIS — N18.4 CKD STAGE 4 SECONDARY TO HYPERTENSION (H): ICD-10-CM

## 2024-12-03 DIAGNOSIS — R60.1 GENERALIZED EDEMA: ICD-10-CM

## 2024-12-03 DIAGNOSIS — N18.4 CHRONIC KIDNEY DISEASE, STAGE 4 (SEVERE) (H): ICD-10-CM

## 2024-12-03 DIAGNOSIS — Z00.00 MEDICARE ANNUAL WELLNESS VISIT, SUBSEQUENT: Primary | ICD-10-CM

## 2024-12-03 DIAGNOSIS — R91.1 LUNG NODULE SEEN ON IMAGING STUDY: ICD-10-CM

## 2024-12-03 DIAGNOSIS — E78.5 HYPERLIPIDEMIA LDL GOAL <100: Chronic | ICD-10-CM

## 2024-12-03 LAB
ALBUMIN SERPL BCG-MCNC: 3.4 G/DL (ref 3.5–5.2)
ALBUMIN UR-MCNC: >=300 MG/DL
ANION GAP SERPL CALCULATED.3IONS-SCNC: 12 MMOL/L (ref 7–15)
APPEARANCE UR: CLEAR
BACTERIA #/AREA URNS HPF: ABNORMAL /HPF
BILIRUB UR QL STRIP: NEGATIVE
BUN SERPL-MCNC: 43.2 MG/DL (ref 8–23)
CALCIUM SERPL-MCNC: 9.1 MG/DL (ref 8.8–10.4)
CHLORIDE SERPL-SCNC: 103 MMOL/L (ref 98–107)
COLOR UR AUTO: YELLOW
CREAT SERPL-MCNC: 4.05 MG/DL (ref 0.67–1.17)
CREAT UR-MCNC: 102 MG/DL
EGFRCR SERPLBLD CKD-EPI 2021: 13 ML/MIN/1.73M2
ERYTHROCYTE [DISTWIDTH] IN BLOOD BY AUTOMATED COUNT: 13 % (ref 10–15)
GLUCOSE SERPL-MCNC: 111 MG/DL (ref 70–99)
GLUCOSE UR STRIP-MCNC: 100 MG/DL
HCO3 SERPL-SCNC: 26 MMOL/L (ref 22–29)
HCT VFR BLD AUTO: 38.8 % (ref 40–53)
HGB BLD-MCNC: 13.3 G/DL (ref 13.3–17.7)
HGB UR QL STRIP: ABNORMAL
KETONES UR STRIP-MCNC: NEGATIVE MG/DL
LEUKOCYTE ESTERASE UR QL STRIP: NEGATIVE
MCH RBC QN AUTO: 31.1 PG (ref 26.5–33)
MCHC RBC AUTO-ENTMCNC: 34.3 G/DL (ref 31.5–36.5)
MCV RBC AUTO: 91 FL (ref 78–100)
MICROALBUMIN UR-MCNC: >4400 MG/L
MICROALBUMIN/CREAT UR: NORMAL MG/G{CREAT}
NITRATE UR QL: NEGATIVE
PH UR STRIP: 6.5 [PH] (ref 5–7)
PHOSPHATE SERPL-MCNC: 4.2 MG/DL (ref 2.5–4.5)
PLATELET # BLD AUTO: 211 10E3/UL (ref 150–450)
POTASSIUM SERPL-SCNC: 3.9 MMOL/L (ref 3.4–5.3)
RBC # BLD AUTO: 4.27 10E6/UL (ref 4.4–5.9)
RBC #/AREA URNS AUTO: ABNORMAL /HPF
SODIUM SERPL-SCNC: 141 MMOL/L (ref 135–145)
SP GR UR STRIP: 1.02 (ref 1–1.03)
SQUAMOUS #/AREA URNS AUTO: ABNORMAL /LPF
UROBILINOGEN UR STRIP-ACNC: 0.2 E.U./DL
WBC # BLD AUTO: 8 10E3/UL (ref 4–11)
WBC #/AREA URNS AUTO: ABNORMAL /HPF
YEAST #/AREA URNS HPF: ABNORMAL /HPF

## 2024-12-03 PROCEDURE — 82043 UR ALBUMIN QUANTITATIVE: CPT | Performed by: INTERNAL MEDICINE

## 2024-12-03 PROCEDURE — 36415 COLL VENOUS BLD VENIPUNCTURE: CPT | Performed by: INTERNAL MEDICINE

## 2024-12-03 PROCEDURE — 85027 COMPLETE CBC AUTOMATED: CPT | Performed by: INTERNAL MEDICINE

## 2024-12-03 PROCEDURE — 82570 ASSAY OF URINE CREATININE: CPT | Performed by: INTERNAL MEDICINE

## 2024-12-03 PROCEDURE — 80069 RENAL FUNCTION PANEL: CPT | Performed by: INTERNAL MEDICINE

## 2024-12-03 PROCEDURE — 84156 ASSAY OF PROTEIN URINE: CPT | Performed by: INTERNAL MEDICINE

## 2024-12-03 PROCEDURE — 81001 URINALYSIS AUTO W/SCOPE: CPT | Performed by: INTERNAL MEDICINE

## 2024-12-03 RX ORDER — LOVASTATIN 20 MG/1
20 TABLET ORAL AT BEDTIME
Qty: 90 TABLET | Refills: 4 | Status: SHIPPED | OUTPATIENT
Start: 2024-12-03

## 2024-12-03 NOTE — PATIENT INSTRUCTIONS
- Labs today, see kidney doctors as scheduled next week to review the labs    - Keep taking lovastatin but let's reduce the dose of it to keep it safe with your kidneys (you're currently on 40mg daily, let's decrease it to 20mg daily)

## 2024-12-03 NOTE — PROGRESS NOTES
Preventive Care Visit  [facility]  Pardeep Eller MD, Internal Medicine  Dec 3, 2024    Assessment & Plan   Medicare annual wellness visit, subsequent  Reviewed PMH. Discussed healthcare maintenance issues, including cancer screenings, relevant immunizations, and cardiac risk factor screenings such as for cholesterol, HTN, and DM.  - PRIMARY CARE FOLLOW-UP SCHEDULING    Lung nodule seen on imaging study  Noted on recent CXR. He does report a chronic cough. Lungs CTAB today. Recommended CT chest to follow-up on this finding and he was in agreement.  - CT Chest w/o Contrast; Future    Generalized edema  Chronic kidney disease, stage 4 (severe) (H)  Swelling appears improved today now that he's back on hydrochlorothiazide. Renal has ordered labs which we will draw today. He has follow-up with them next week, encouraged him to keep it.    Hyperlipidemia LDL goal <100  Discussed it's okay to lovastatin, but we ought to reduce the dose from 40mg to 20mg. New script sent.  - lovastatin (MEVACOR) 20 MG tablet; Take 1 tablet (20 mg) by mouth at bedtime.    Signed Electronically by:  Pardeep Eller MD, MPH  [facility]  Internal Medicine    Subjective   Nitish is a 89 year old presenting for the following: Physical    HPI  Nitish presents today for an AWV. We also discussed his recent swelling.    Health Care Directive Patient has a Health Care Directive on file        11/23/2020   General Health   How would you rate your overall physical health? Good          11/23/2020   Nutrition   At least 4 servings of fruits and vegetables/day No          11/23/2020   Exercise   Frequency of exercise: None          11/23/2020   Activities of Daily Living- Home Safety   Needs help with the following daily activites NO assistance is needed   Safety concerns in the home No grab bars in the bathroom    None of the above       Multiple values from one day are sorted in reverse-chronological  order          No data to display                  2020   Hearing Screening   Hearing concerns? No concerns           No data to display              Today's PHQ-2 Score:       2024     1:40 PM   PHQ-2 (  [employer])   Q1: Little interest or pleasure in doing things 0   Q2: Feeling down, depressed or hopeless 0   PHQ-2 Score 0         2020   Substance Use   Alcohol more than 3/day or more than 7/wk No      Social History     Tobacco Use    Smoking status: Former     Current packs/day: 0.00     Average packs/day: 4.0 packs/day for 45.0 years (180.0 ttl pk-yrs)     Types: Cigarettes     Start date: 1946     Quit date: 1991     Years since quittin.5    Smokeless tobacco: Never   Vaping Use    Vaping status: Never Used   Substance Use Topics    Alcohol use: Yes     Alcohol/week: 0.0 standard drinks of alcohol     Comment: 1-2 twice per week    Drug use: No     Reviewed and updated as needed this visit by Provider   Tobacco  Allergies  Meds  Problems  Med Hx  Surg Hx  Fam Hx          Current providers sharing in care for this patient include:  Patient Care Team:  Pardeep Vásquez MD as PCP - General (Internal Medicine)  Pardeep Vásquez MD as Assigned PCP  Gustavo Cadet MD as MD (Nephrology)  Gustavo Cadet MD as Assigned Nephrology Provider  Robby Paris MD as MD (Urology)    The following health maintenance items are reviewed in Epic and correct as of today:  Health Maintenance   Topic Date Due    ZOSTER IMMUNIZATION (1 of 2) Never done    Pneumococcal Vaccine: 65+ Years (2 of 2 - PCV) 2005    RSV VACCINE (1 - 1-dose 75+ series) Never done    ANNUAL REVIEW OF HM ORDERS  2022    MICROALBUMIN  07/15/2024    FALL RISK ASSESSMENT  2024    MEDICARE ANNUAL WELLNESS VISIT  2024    BMP  2025    LIPID  04/15/2025    HEMOGLOBIN  2025    ADVANCE CARE PLANNING  2028    DTAP/TDAP/TD IMMUNIZATION (3 - Td or Tdap)  "11/07/2029    PARATHYROID  Completed    PHOSPHORUS  Completed    SPIROMETRY  Completed    COPD ACTION PLAN  Completed    PHQ-2 (once per calendar year)  Completed    INFLUENZA VACCINE  Completed    URINALYSIS  Completed    ALK PHOS  Completed    COVID-19 Vaccine  Completed    HPV IMMUNIZATION  Aged Out    MENINGITIS IMMUNIZATION  Aged Out    RSV MONOCLONAL ANTIBODY  Aged Out        Objective    Exam  BP (!) 166/88   Pulse 89   Resp 20   Ht 1.676 m (5' 6\")   Wt 87.3 kg (192 lb 8 oz)   SpO2 94%   BMI 31.07 kg/m     Estimated body mass index is 31.07 kg/m  as calculated from the following:    Height as of this encounter: 1.676 m (5' 6\").    Weight as of this encounter: 87.3 kg (192 lb 8 oz).    Physical Exam  GENERAL: alert and in no distress.  EYES: conjunctivae/corneas clear. EOMs grossly intact  HENT: Facies symmetric.  RESP: CTAB, no w/r/r.  CV: RRR, no m/r/g.  GI: NT, ND, without rebound tenderness or guarding  MSK: Pitting edema 3+ in LLE, 2+ in RLE up to mid-shins. Moves all four extremities freely.  SKIN: No significant ulcers, lesions, or rashes on the visualized portions of the skin  NEURO: CN II-XII grossly intact.        12/3/2024   Mini Cog   Clock Draw Score 2 Normal   3 Item Recall 1 object recalled   Mini Cog Total Score 3        "

## 2024-12-04 DIAGNOSIS — N18.4 CKD STAGE 4 SECONDARY TO HYPERTENSION (H): Primary | ICD-10-CM

## 2024-12-04 DIAGNOSIS — I12.9 CKD STAGE 4 SECONDARY TO HYPERTENSION (H): Primary | ICD-10-CM

## 2024-12-04 LAB
ALBUMIN MFR UR ELPH: 749 MG/DL
CREAT UR-MCNC: 102 MG/DL
PROT/CREAT 24H UR: 7.34 MG/MG CR (ref 0–0.2)

## 2024-12-09 DIAGNOSIS — I10 HYPERTENSION, ESSENTIAL: Primary | ICD-10-CM

## 2024-12-09 DIAGNOSIS — R91.1 LUNG NODULE SEEN ON IMAGING STUDY: ICD-10-CM

## 2024-12-09 NOTE — TELEPHONE ENCOUNTER
Patient went to pharmacy and picked up lovastatin. This is not the med he wanted refilled. Patient would like lisinopril refilled. Which should he be taking? Patient saw you last week and you discussed this.     MALVIN Estes  [facility]

## 2024-12-09 NOTE — TELEPHONE ENCOUNTER
Discussed many things. Cut off 2 prescriptions. Lisinopril gonna be 1/2 strength. Patient picked up prescription and its lovastatin. Patient would like to know what is the right thing? Patient convinced it is Lisinopril.     MALVIN Estes  [facility]

## 2024-12-09 NOTE — PROGRESS NOTES
Virtual Visit Details    Switched to telephone visit due to technical issues.          Nephrology Progress Note  12/09/2024     Chief complaint: Follow-up for CKD stage IV  History of Present Illness:    Nitish Coreas is a 89 year old M with history of COPD, choledocholithiasis s/p ERCP, sphincterotomy and CBD stent placed on 4/21, chronic hypertension, hyperlipidemia, prostatic enlargement and elevated PSA, chronic back pain, ascending aortic and infrarenal abdominal aortic dilatation and aneurysm who is here for CKD follow-up.      The patient was admitted in Apri 2021due to cholelithiasis with obstructive jaundice.  He underwent ERCP, EUS, pus draining from gallbladder, sphincterotomy and CBD stent placed.  Subsequently, gallbladder was removed.  He also received IV antibiotics with ciprofloxacin Flagyl and IV fluid.  He was also noted to have COPD exacerbation and required oxygen supplement in adjunct to home oxygen therapy.  He was also noted to have acute kidney injury with creatinine increased to 2.92 on the day of dismissal.     Upon chart review, the patient was noted to have creatinine up 1.3-1.5 since 2423-7304.  Since 2015, his creatinine has gradually increased to between 1.5-1.7.  In 2019, his creatinine has increased 2.03.  He has an ADEEL when he was admitted as mentioned above with creatinine peak 2.98.  Creatinine has been down to 2.11 on 4/27/2021 and since then has fluctuating between 2.1-2.2.  Patient noted to have urine protein since 2015.  His urine albumin creatinine ratio was 425 mg/g and increased to 2838 mg/g on 7/21.      His UA is essentially no pyuria or red blood cells since 2015.  CT abdomen pelvis and chest in 4/21 showed few bilateral renal cysts with the largest upper pole of the left knee medially measuring 4.9 cm.  The kidneys have otherwise unremarkable noncontrast appearance cyst.  No stone or hydronephrosis.  The patient also has mild to moderate prostatic enlargement.  No  mention about his bladder.  I measure his kidneys by myself on the CT of 4/21 which showed small kidney both size, right 8.4 cm and right 9.1 cm.     His parathyroid hormone is normal at 72 on 8/21 with normal vitamin D.  His hemoglobin is normal at 14.6.      8/30/21: The patient is attending a video visit with his wife.  He could not use [employer] because his computer does not have a camera so we did [employer].  The patient reports that he he was told that he has kidney problem since he was admitted in April 2021.  At that time, he was quite sick and he could not eat and drink for 7 days.  Patient is covering well from the procedure.  And a CBD stent was recently removed 3 weeks ago.  Regarding symptoms, the patient denies any chest pain or short of breath.  The patient has extremity edema, left worsening than right that usually occur again today.  He noticed some foamy urine especially at nighttime.  Patient does not have any difficulty urination.  The patient has no family history of chronic kidney disease.  His blood pressure ranging between 130-138/78 to 82 mmHg.  He is only taking lisinopril 40 mg daily.  The patient has been diagnosed with high blood for more than 10 years and is on 20 mg of lisinopril but was increased to 40 g about 3 years ago.  The patient intermittently take NSAIDs due to lower back pain.  He does not limit salt in his diet but he tried low-salt condiment.  The patient used to weigh 222 pounds but since his admission, his weight down to 185 and he is trying to maintain his weight at 185.  The patient does not have any signs or symptoms of TONY.  I started the patient on trazodone 12.5 mg daily.   1/10/22:He feels ok today. We switched him from chlorthalidone to hydrochlorothiazide lately because he could not cut the pill in half. Now he does not need to cut it. BP is better now 120/70s. He has no leg swelling. His appetite is not the same. He is trying to stay away from salt. He is no longer  having swelling. He does not have lightheadedness or dizziness.  He denies any dysuria or hematuria.  I noted patient has elevated PSA at 5.30 back in 11/23/20 and has CT done in 4/21 which showed prostatic enlargement. Since then there is no other PSA checked. Labs on 1/7/2022 showed creatinine 2.47, potassium 4.3, bicarb 21.  Urine protein creatinine ratio is 2.33 which has dropped from 2.94 on 8/23/2021. His PTH is 87 and vitamin D is 42.  His hemoglobin is stable at 13.4 with normal white blood cell and platelet.  His UA showed trace blood in the urine but RBCs only 0-2.    2/24/22: Seen by Urology (Dr. Robby Paris). Discussed risk and benefit for prostate cancer screening. Pt would discuss with PCP for further screening. Per urology, reasonable to not continue to screen.   5/16/22: He is feeling better.  He has no problem with urination. He has no leg swelling.  Interestingly, he noticed some blood in the urine especially as a day goes by. His appetite is better and he starts to gain more weight. /77 mmHg. He has no lightheadedness or dizziness. Labs on 5/13/2022 showed creatinine 2.51, potassium 4.2, EGFR 24, calcium 9, phosphorus 3.3, albumin 3.7, UPCR 1.23 (improved from 2.33 on 1/7/22).  Hemoglobin 14.5.  UA showed no blood or white blood cell. PTH 64 and vitamin D 56.  7/27/22: Seen vascular surgery. CT showed 4.8 cm.   9/26/22: He is doing good. He does not go to the ED or have any hospitalization. He has no problem with urination or swelling. His appetite is getting better. He gained about 4 lbs. He had a gallbladder attack and had a surgery and a stent (in 2021). He was 220 Lbs then went down to 180 when he was having GB problem. Now he is 184 Lbs. BP this morning is 135/76 mmHg. UPCR 1.27 g/g. He will visit [place] in February and prefers to have a visit again in 3/22.   5/09/23: He is feeling fine today. Nothing change since the last time we talked. He has no change in urination. No new  medication. No swelling. BP earlier this month was 134/80 but typically 120-130/70s. Energy is fine. Occasionally he takes high salt diet such as pizza. He measures his blood pressure. He just has tonsilitis last week and taking Z-hliary. Respiratory symptoms is improving. His weight is about 181-183 Lbs. Lost 40 Lbs after gall bladder removed.   Labs on 5/3/23 showed creatinine 2.48, EGFR 24, BUN 42.8, potassium 4.8, bicarb 24, calcium 9.4, phosphorus 3.5, albumin 4.0.  CBC showed hemoglobin 15.4, platelet 164 and white blood cell 8.2.  UA shows small blood RBC 0-2, 0-2 hyaline cast a few squamous epithelial cells. UPCR 2.41 g/g. PTH 65 and Vit D 57.   11/13/23: He is doing fine. No thing change since the last time we talk.  There is no new medications. BP yesterday was 130/70 mmHg.He does not feel lightheadedness or dizziness. He has no leg swelling. He has no problem with urination. He has COPD but not on O2. His appetite is good and gained weight. He drinks about 60 Oz of water per day.   Labs on 11/6/23: Cr 2.84, eGFR 21, BUN 54.3, K 4.2, Bicarb 20. Calcium 9.2, Phos 3.6. Albumin 3.8. Vitamin D 42. Hb 14.4, plt 163 and WBC 8.7.   4/22/24: He has been doing well today. No change in his health since last time. He has no leg swelling. He has no problem when urinates. /70s. He does not feel ligthheadedness or dizziness.  Again discussed with him about dialysis, he would prefer peritoneal dialysis if he needs to that. Labs on 4/15/24 showed Cr 3.05, eGFR 19, BUN 51.7, K 4.4.  UA showed trace blood but no RBC.  UPCR 2.47 g/g.  PTH 73 and vitamin D is 51.  8/22/24: He is doing good today. Nothing change since the last time we talked. He ran out of bicarb. He has no problem with urination. He has no leg swelling. He has gained weight 5 Lbs in last year. He takes vitamin D total of 3000 per day (1000 from [employer] and 2000 vitamin D extra).  He ran out of bicarb for quite sometimes. discussed with him again about dialysis  and he shared with me that he knows someone who is on PD and the person did not do well so he does not want to do PD.  At the same time he seemed to be hesitant about starting dialysis so he is open to learn more about conservative management.   Labs on  8/15/24 showed Creatinine 2.88, BUN 54.7, GFR 28, potassium 4.8, bicarb 20, phosphorus 4, calcium 9, albumin 3.9.Urine protein 2.46 g/g.  Hemoglobin 14.1, platelet 2.69  and white blood cell 9.5. PTH 90, vitamin D 57.      12/10/2024:recently hospitalized with acute anemia in setting of GI bleed and ulcer. Had ADEEL, lisinopril and hydrochlorothiazide were held. He resumed hydrochlorothiazide at hospital follow up but lisinopril remained on hold until a week ago, now taking 20 mg daily. He notes BP's have been high in clinic, not checking at home. Had significant increase in albuminuria and proteinuria since off lisinopril. He has no appetite which is not new for him. He has no n/v/d. His energy level is slowly improving. He has some scalp pruritus intermittently. Denies change in UOP. He has no metallic taste in mouth. Scr 4.05, eGFR 13. Electrolytes are stable. Hgb up to 13.3 and he has stopped iron supplement. He has decided to pursue conservative management.     Past medical history  Past Medical History:   Diagnosis Date    BPH (benign prostatic hyperplasia)     Chronic back pain     Chronic kidney disease, stage IV (severe) (H)     Emphysematous COPD (H)     Hyperlipidemia LDL goal < 100     Hypertension      Past surgical history  Past Surgical History:   Procedure Laterality Date    DENTAL SURGERY  1958    wisdom teeth    ENDOSCOPIC RETROGRADE CHOLANGIOPANCREATOGRAM N/A 4/19/2021    Procedure: ENDOSCOPIC RETROGRADE CHOLANGIOPANCREATOGRAPHY, WITH CALCULUS REMOVAL USING BALLOON  AND CATHETER WITH STENT PLACEMENT;  Surgeon: Tres Milan MD;  Location:  OR    ENDOSCOPIC ULTRASOUND UPPER GASTROINTESTINAL TRACT (GI) N/A 4/19/2021    Procedure: ENDOSCOPIC  ULTRASOUND, ESOPHAGOSCOPY / UPPER GASTROINTESTINAL TRACT (GI);  Surgeon: Tres Milan MD;  Location:  OR    ESOPHAGOSCOPY, GASTROSCOPY, DUODENOSCOPY (EGD), COMBINED N/A 10/4/2024    Procedure: ESOPHAGOGASTRODUODENOSCOPY, WITH BIOPSY;  Surgeon: Jim Reyes MD;  Location:  GI    EYE SURGERY Right 2004 and 2005    macular pucker and cataract    LAPAROSCOPIC CHOLECYSTECTOMY N/A 4/20/2021    Procedure: CHOLECYSTECTOMY, LAPAROSCOPIC;  Surgeon: Callie Inman MD;  Location:  OR    TONSILLECTOMY & ADENOIDECTOMY  1949    Z REPLANTATION THUMB DISTAL,COMPLETE  1962    cut off right thumb and repair    [facility]  2011, 2000    left then right     Review of Systems:   14 systems were reviewed and all negative except as mentioned above.   Current Medications:  Current Outpatient Medications   Medication Sig Dispense Refill    Glucosamine-Chondroitin (OSTEO BI-FLEX REGULAR STRENGTH PO) Take 1 tablet by mouth 2 times daily.      hydroCHLOROthiazide 12.5 MG tablet Take 1 tablet (12.5 mg) by mouth daily. 30 tablet 0    Krill Oil 500 MG CAPS Take 1 capsule by mouth daily.      lovastatin (MEVACOR) 20 MG tablet Take 1 tablet (20 mg) by mouth at bedtime. 90 tablet 4    Multiple Vitamins-Minerals (MULTIVITAL) TABS Take 1 tablet by mouth daily.      pantoprazole (PROTONIX) 40 MG EC tablet Take 1 tablet (40 mg) by mouth See Admin Instructions. One twice daily for 8 weeks, then once daily thereafter, or as directed by GI team. Future refills and dose adjustments directed to primary clinic provider, review at upcoming visit 180 tablet 0    sodium bicarbonate 650 MG tablet Take 1 tablet (650 mg) by mouth 2 times daily. 180 tablet 3     No current facility-administered medications for this visit.     Physical Exam:   There were no vitals taken for this visit.   There is no height or weight on file to calculate BMI.  No swelling per patient. Otherwise no exam,     Labs:   All labs reviewed by me  Last  Renal Panel:  Sodium   Date Value Ref Range Status   12/03/2024 141 135 - 145 mmol/L Final   04/27/2021 139 133 - 144 mmol/L Final     Potassium   Date Value Ref Range Status   12/03/2024 3.9 3.4 - 5.3 mmol/L Final   09/22/2022 4.7 3.4 - 5.3 mmol/L Final   04/27/2021 4.5 3.4 - 5.3 mmol/L Final     Chloride   Date Value Ref Range Status   12/03/2024 103 98 - 107 mmol/L Final   09/22/2022 110 (H) 94 - 109 mmol/L Final   04/27/2021 109 94 - 109 mmol/L Final     Carbon Dioxide   Date Value Ref Range Status   04/27/2021 28 20 - 32 mmol/L Final     Carbon Dioxide (CO2)   Date Value Ref Range Status   12/03/2024 26 22 - 29 mmol/L Final   09/22/2022 23 20 - 32 mmol/L Final     Anion Gap   Date Value Ref Range Status   12/03/2024 12 7 - 15 mmol/L Final   09/22/2022 6 3 - 14 mmol/L Final   04/27/2021 2 (L) 3 - 14 mmol/L Final     Glucose   Date Value Ref Range Status   12/03/2024 111 (H) 70 - 99 mg/dL Final   09/22/2022 115 (H) 70 - 99 mg/dL Final   04/27/2021 106 (H) 70 - 99 mg/dL Final     Urea Nitrogen   Date Value Ref Range Status   12/03/2024 43.2 (H) 8.0 - 23.0 mg/dL Final   09/22/2022 48 (H) 7 - 30 mg/dL Final   04/27/2021 31 (H) 7 - 30 mg/dL Final     Creatinine   Date Value Ref Range Status   12/03/2024 4.05 (H) 0.67 - 1.17 mg/dL Final   04/27/2021 2.11 (H) 0.66 - 1.25 mg/dL Final     GFR Estimate   Date Value Ref Range Status   12/03/2024 13 (L) >60 mL/min/1.73m2 Final     Comment:     eGFR calculated using 2021 CKD-EPI equation.   04/27/2021 27 (L) >60 mL/min/[1.73_m2] Final     Comment:     Non  GFR Calc  Starting 12/18/2018, serum creatinine based estimated GFR (eGFR) will be   calculated using the Chronic Kidney Disease Epidemiology Collaboration   (CKD-EPI) equation.       Calcium   Date Value Ref Range Status   12/03/2024 9.1 8.8 - 10.4 mg/dL Final     Comment:     Reference intervals for this test were updated on 7/16/2024 to reflect our healthy population more accurately. There may be  differences in the flagging of prior results with similar values performed with this method. Those prior results can be interpreted in the context of the updated reference intervals.   04/27/2021 8.5 8.5 - 10.1 mg/dL Final     Phosphorus   Date Value Ref Range Status   12/03/2024 4.2 2.5 - 4.5 mg/dL Final   01/24/2011 3.6 2.5 - 4.9 mg/dL Final     Albumin   Date Value Ref Range Status   12/03/2024 3.4 (L) 3.5 - 5.2 g/dL Final   09/22/2022 3.4 3.4 - 5.0 g/dL Final   04/22/2021 2.0 (L) 3.4 - 5.0 g/dL Final     Imaging:  I reviewed imaging studies. US renal 5/13/2022 showed:  RIGHT KIDNEY: 10.4 x 5.2 x 4.9 cm . No hydronephrosis or cortical  thinning. Several simple cysts. The largest cyst in the interpolar  region/lower pole measures 4.7 cm, unchanged.      LEFT KIDNEY: 11.1 x 6.6 x 6.0 cm. No hydronephrosis or cortical  thinning. Several simple cysts. The largest cyst at the upper pole  measures 4.9 cm, unchanged.      BLADDER: Nearly empty. Mild prostatomegaly with prostate volume of 46  mL.     1.  Stable simple renal cysts bilaterally requiring no specific  additional follow-up.  2.  Mild prostatomegaly.    Assessment & Recommendations:   Problem list  # Chronic kidney disease stage IV secondary to chronic hypertension, prior NSAID use and prior acute kidney injury; baseline creatinine 2.8-3.0  # Nephrotic range proteinuria; stable; UPCR 7.3 g/g 12/3/2024 and UACR albumin too high to calculate (lisinopril held during recent admission and at discharge, recently resumed at 20 mg daily).   # Now interested in Conservative management  His chronic kidney disease is likely multifactorial in the setting of chronic hypertension, prior NSAID use and prior ADEEL. At this time, he has no symptoms from the CKD standpoints. Electrolytes are in acceptable range except mild metabolic acidosis.  His creatinine was  2.88 with EGFR of 20 last visit in August. Recently Scr 4.05 and eGFR 13. Electrolytes remain stable.  He has no  appetite though states this has been chronic since his gallbladder was removed.    He declines referral to kidney class.   -Discussed about dialysis again today: He has elected not to pursue dialysis and will proceed with supportive care and conservative management  -Stay hydrated  #Bilateral renal cysts; stable at US 5/13/22  The patient has bilateral renal cyst but his kidney size are small.  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  The most recent ultrasound on 5/13/2022 showed stable bilateral renal cysts, largest cyst in the interpolar region of right kidney is 4.7 cm and upper pole of the left kidney 4.9 cm per radiology, no need for further follow-up.  #Hypertension: Uncontrolled  He is currently on lisinopril 20 mg daily and hydrochlorothiazide 12.5 mg daily. Not checking at home, BP in clinic recently 166/88, swelling somewhat improved but persists in lower leg below the knee  - stop hydrochlorothiazide, start chlorthalidone 12.5 mg daily and check BP at home.   # Metabolic acidosis  He is on 650 mg BID. Bicarb 26, no changes   # MBD  PTH 90, vitamin D is 57.  He is taking supplement a total of 3000 IU/day (2000 IU +1000 IU in multivitamin).  Calcium and phosphorus are within normal limit.  - Reduce vitamin D to 1000 U daily, recheck vit D and PTH next visit  # Surveillance for anemia in CKD  He is hemoglobin is 13.3, up from 7 during recent admission with GI bleed. Holding iron supplement.  We will continue to monitor his hemoglobin every visit.  # Prostatic enlargement with elevated PSA; reasonable to not continuee to monitor per urology  PSA mildly elevated, 6.58 in 1/22. Referred to urology (Dr. Paris) who discussed risk and benefit of screening at his age and It is reasonable to not continue with screening per Dr. Paris.  # COPD    Follow-up in 4 months with labs with NP and then me after      MAHAMED TAMAYO PA-C     Visit length 14 minutes. An additional  20 minutes were spent on date of service in chart review, documentation, and other activities as noted.

## 2024-12-10 ENCOUNTER — VIRTUAL VISIT (OUTPATIENT)
Dept: NEPHROLOGY | Facility: CLINIC | Age: 89
End: 2024-12-10
Attending: PHYSICIAN ASSISTANT
Payer: COMMERCIAL

## 2024-12-10 DIAGNOSIS — N18.4 CKD STAGE 4 SECONDARY TO HYPERTENSION (H): ICD-10-CM

## 2024-12-10 DIAGNOSIS — I12.9 CKD STAGE 4 SECONDARY TO HYPERTENSION (H): ICD-10-CM

## 2024-12-10 DIAGNOSIS — R80.9 ALBUMINURIA: ICD-10-CM

## 2024-12-10 DIAGNOSIS — N28.1 RENAL CYST: ICD-10-CM

## 2024-12-10 DIAGNOSIS — I10 HYPERTENSION GOAL BP (BLOOD PRESSURE) < 140/90: ICD-10-CM

## 2024-12-10 DIAGNOSIS — N17.9 AKI (ACUTE KIDNEY INJURY) (H): Primary | ICD-10-CM

## 2024-12-10 DIAGNOSIS — R80.1 PERSISTENT PROTEINURIA: ICD-10-CM

## 2024-12-10 DIAGNOSIS — N25.81 SECONDARY HYPERPARATHYROIDISM (H): ICD-10-CM

## 2024-12-10 RX ORDER — LISINOPRIL 20 MG/1
20 TABLET ORAL DAILY
COMMUNITY

## 2024-12-10 RX ORDER — CHLORTHALIDONE 25 MG/1
12.5 TABLET ORAL DAILY
Qty: 90 TABLET | Refills: 3 | Status: SHIPPED | OUTPATIENT
Start: 2024-12-10

## 2024-12-10 NOTE — TELEPHONE ENCOUNTER
Patient requesting clarification of why Lovastatin was decreased from 40 mg to 20 mg at 12/3 OV.       Patient is adamant that Lisinopril was discussed at 12/3 OV, not Lovastatin, and his Lisinopril is managed by his PCP. Writer informed patient I am not seeing any notes reflecting discussion of Lisinopril.  Per chart review, patient has received Lisinopril 40 mg tablets from PCP starting in 2021.    He states Nephrology agreed to PCP restarting Lisinopril 20 mg at today's VV.      Patient requesting PCP clarification.   - Why was Lovastatin dosage decreased to 20 mg? (Writer is unable to locate reasoning in OV notes)   - Is he taking Lovastatin 40 mg, or 20 mg?   - Is he taking Lisinopril 20 mg?        Can we leave a detailed message on this number? YES  Phone number patient can be reached at: Cell number on file:    Telephone Information:   Mobile [phone]       Genesis Orozco RN  [facility] Triage

## 2024-12-10 NOTE — PATIENT INSTRUCTIONS
Stop hydrochlorothiazide   Start chlorthalidone 12.5 mg daily  Check blood pressure daily, keep log. Nurse will call in 2 weeks for report.   Avoid ibuprofen/aleve/naprosyn/ advil  Drink to thirst, follow low sodium diet.   Labs and follow up in 4-6 weeks.

## 2024-12-10 NOTE — TELEPHONE ENCOUNTER
We discussed his lovastatin. Please see the note from that encounter for relevant information. Nephrology manages his lisinopril, and I encourage him to address concerns related to that medication with his nephrologist when he sees them later today.

## 2024-12-10 NOTE — NURSING NOTE
Current patient location: [address] [facility] [address]    Is the patient currently in the state of [place]? YES    Visit mode:VIDEO    If the visit is dropped, the patient can be reconnected by:VIDEO VISIT: Text to cell phone:   Telephone Information:   Mobile [phone]       Will anyone else be joining the visit? NO  (If patient encounters technical issues they should call [phone]    Are changes needed to the allergy or medication list? Yes lisinopril 20mg  and lovastatin 40mg    Are refills needed on medications prescribed by this physician? NO    Rooming Documentation:  Not applicable    Reason for visit: RECHECK    Pippa PEREAF

## 2024-12-10 NOTE — LETTER
12/10/2024       RE: Nitish Coreas  [address]  [facility] [address]     Dear Colleague,    Thank you for referring your patient, Nitish Coreas, to the [facility] [place] [facility]. Please see a copy of my visit note below.    Virtual Visit Details    Switched to telephone visit due to technical issues.          Nephrology Progress Note  12/09/2024     Chief complaint: Follow-up for CKD stage IV  History of Present Illness:    Nitish Coreas is a 89 year old M with history of COPD, choledocholithiasis s/p ERCP, sphincterotomy and CBD stent placed on 4/21, chronic hypertension, hyperlipidemia, prostatic enlargement and elevated PSA, chronic back pain, ascending aortic and infrarenal abdominal aortic dilatation and aneurysm who is here for CKD follow-up.      The patient was admitted in Apri 2021due to cholelithiasis with obstructive jaundice.  He underwent ERCP, EUS, pus draining from gallbladder, sphincterotomy and CBD stent placed.  Subsequently, gallbladder was removed.  He also received IV antibiotics with ciprofloxacin Flagyl and IV fluid.  He was also noted to have COPD exacerbation and required oxygen supplement in adjunct to home oxygen therapy.  He was also noted to have acute kidney injury with creatinine increased to 2.92 on the day of dismissal.     Upon chart review, the patient was noted to have creatinine up 1.3-1.5 since [id number].  Since 2015, his creatinine has gradually increased to between 1.5-1.7.  In 2019, his creatinine has increased 2.03.  He has an ADEEL when he was admitted as mentioned above with creatinine peak 2.98.  Creatinine has been down to 2.11 on 4/27/2021 and since then has fluctuating between 2.1-2.2.  Patient noted to have urine protein since 2015.  His urine albumin creatinine ratio was 425 mg/g and increased to 2838 mg/g on 7/21.      His UA is essentially no pyuria or red blood cells  since 2015.  CT abdomen pelvis and chest in 4/21 showed few bilateral renal cysts with the largest upper pole of the left knee medially measuring 4.9 cm.  The kidneys have otherwise unremarkable noncontrast appearance cyst.  No stone or hydronephrosis.  The patient also has mild to moderate prostatic enlargement.  No mention about his bladder.  I measure his kidneys by myself on the CT of 4/21 which showed small kidney both size, right 8.4 cm and right 9.1 cm.     His parathyroid hormone is normal at 72 on 8/21 with normal vitamin D.  His hemoglobin is normal at 14.6.      8/30/21: The patient is attending a video visit with his wife.  He could not use [employer] because his computer does not have a camera so we did [employer].  The patient reports that he he was told that he has kidney problem since he was admitted in April 2021.  At that time, he was quite sick and he could not eat and drink for 7 days.  Patient is covering well from the procedure.  And a CBD stent was recently removed 3 weeks ago.  Regarding symptoms, the patient denies any chest pain or short of breath.  The patient has extremity edema, left worsening than right that usually occur again today.  He noticed some foamy urine especially at nighttime.  Patient does not have any difficulty urination.  The patient has no family history of chronic kidney disease.  His blood pressure ranging between 130-138/78 to 82 mmHg.  He is only taking lisinopril 40 mg daily.  The patient has been diagnosed with high blood for more than 10 years and is on 20 mg of lisinopril but was increased to 40 g about 3 years ago.  The patient intermittently take NSAIDs due to lower back pain.  He does not limit salt in his diet but he tried low-salt condiment.  The patient used to weigh 222 pounds but since his admission, his weight down to 185 and he is trying to maintain his weight at 185.  The patient does not have any signs or symptoms of TONY.  I started the patient on trazodone  12.5 mg daily.   1/10/22:He feels ok today. We switched him from chlorthalidone to hydrochlorothiazide lately because he could not cut the pill in half. Now he does not need to cut it. BP is better now 120/70s. He has no leg swelling. His appetite is not the same. He is trying to stay away from salt. He is no longer having swelling. He does not have lightheadedness or dizziness.  He denies any dysuria or hematuria.  I noted patient has elevated PSA at 5.30 back in 11/23/20 and has CT done in 4/21 which showed prostatic enlargement. Since then there is no other PSA checked. Labs on 1/7/2022 showed creatinine 2.47, potassium 4.3, bicarb 21.  Urine protein creatinine ratio is 2.33 which has dropped from 2.94 on 8/23/2021. His PTH is 87 and vitamin D is 42.  His hemoglobin is stable at 13.4 with normal white blood cell and platelet.  His UA showed trace blood in the urine but RBCs only 0-2.    2/24/22: Seen by Urology (Dr. Robby Paris). Discussed risk and benefit for prostate cancer screening. Pt would discuss with PCP for further screening. Per urology, reasonable to not continue to screen.   5/16/22: He is feeling better.  He has no problem with urination. He has no leg swelling.  Interestingly, he noticed some blood in the urine especially as a day goes by. His appetite is better and he starts to gain more weight. /77 mmHg. He has no lightheadedness or dizziness. Labs on 5/13/2022 showed creatinine 2.51, potassium 4.2, EGFR 24, calcium 9, phosphorus 3.3, albumin 3.7, UPCR 1.23 (improved from 2.33 on 1/7/22).  Hemoglobin 14.5.  UA showed no blood or white blood cell. PTH 64 and vitamin D 56.  7/27/22: Seen vascular surgery. CT showed 4.8 cm.   9/26/22: He is doing good. He does not go to the ED or have any hospitalization. He has no problem with urination or swelling. His appetite is getting better. He gained about 4 lbs. He had a gallbladder attack and had a surgery and a stent (in 2021). He was 220 Lbs then  went down to 180 when he was having GB problem. Now he is 184 Lbs. BP this morning is 135/76 mmHg. UPCR 1.27 g/g. He will visit [place] in February and prefers to have a visit again in 3/22.   5/09/23: He is feeling fine today. Nothing change since the last time we talked. He has no change in urination. No new medication. No swelling. BP earlier this month was 134/80 but typically 120-130/70s. Energy is fine. Occasionally he takes high salt diet such as pizza. He measures his blood pressure. He just has tonsilitis last week and taking Z-hilary. Respiratory symptoms is improving. His weight is about 181-183 Lbs. Lost 40 Lbs after gall bladder removed.   Labs on 5/3/23 showed creatinine 2.48, EGFR 24, BUN 42.8, potassium 4.8, bicarb 24, calcium 9.4, phosphorus 3.5, albumin 4.0.  CBC showed hemoglobin 15.4, platelet 164 and white blood cell 8.2.  UA shows small blood RBC 0-2, 0-2 hyaline cast a few squamous epithelial cells. UPCR 2.41 g/g. PTH 65 and Vit D 57.   11/13/23: He is doing fine. No thing change since the last time we talk.  There is no new medications. BP yesterday was 130/70 mmHg.He does not feel lightheadedness or dizziness. He has no leg swelling. He has no problem with urination. He has COPD but not on O2. His appetite is good and gained weight. He drinks about 60 Oz of water per day.   Labs on 11/6/23: Cr 2.84, eGFR 21, BUN 54.3, K 4.2, Bicarb 20. Calcium 9.2, Phos 3.6. Albumin 3.8. Vitamin D 42. Hb 14.4, plt 163 and WBC 8.7.   4/22/24: He has been doing well today. No change in his health since last time. He has no leg swelling. He has no problem when urinates. /70s. He does not feel ligthheadedness or dizziness.  Again discussed with him about dialysis, he would prefer peritoneal dialysis if he needs to that. Labs on 4/15/24 showed Cr 3.05, eGFR 19, BUN 51.7, K 4.4.  UA showed trace blood but no RBC.  UPCR 2.47 g/g.  PTH 73 and vitamin D is 51.  8/22/24: He is doing good today. Nothing change since  the last time we talked. He ran out of bicarb. He has no problem with urination. He has no leg swelling. He has gained weight 5 Lbs in last year. He takes vitamin D total of 3000 per day (1000 from [employer] and 2000 vitamin D extra).  He ran out of bicarb for quite sometimes. discussed with him again about dialysis and he shared with me that he knows someone who is on PD and the person did not do well so he does not want to do PD.  At the same time he seemed to be hesitant about starting dialysis so he is open to learn more about conservative management.   Labs on  8/15/24 showed Creatinine 2.88, BUN 54.7, GFR 28, potassium 4.8, bicarb 20, phosphorus 4, calcium 9, albumin 3.9.Urine protein 2.46 g/g.  Hemoglobin 14.1, platelet 2.69  and white blood cell 9.5. PTH 90, vitamin D 57.      12/10/2024:recently hospitalized with acute anemia in setting of GI bleed and ulcer. Had ADEEL, lisinopril and hydrochlorothiazide were held. He resumed hydrochlorothiazide at hospital follow up but lisinopril remained on hold until a week ago, now taking 20 mg daily. He notes BP's have been high in clinic, not checking at home. Had significant increase in albuminuria and proteinuria since off lisinopril. He has no appetite which is not new for him. He has no n/v/d. His energy level is slowly improving. He has some scalp pruritus intermittently. Denies change in UOP. He has no metallic taste in mouth. Scr 4.05, eGFR 13. Electrolytes are stable. Hgb up to 13.3 and he has stopped iron supplement. He has decided to pursue conservative management.     Past medical history  Past Medical History:   Diagnosis Date     BPH (benign prostatic hyperplasia)      Chronic back pain      Chronic kidney disease, stage IV (severe) (H)      Emphysematous COPD (H)      Hyperlipidemia LDL goal < 100      Hypertension      Past surgical history  Past Surgical History:   Procedure Laterality Date     DENTAL SURGERY  1958    wisdom teeth     ENDOSCOPIC RETROGRADE  CHOLANGIOPANCREATOGRAM N/A 4/19/2021    Procedure: ENDOSCOPIC RETROGRADE CHOLANGIOPANCREATOGRAPHY, WITH CALCULUS REMOVAL USING BALLOON  AND CATHETER WITH STENT PLACEMENT;  Surgeon: Tres Milan MD;  Location:  OR     ENDOSCOPIC ULTRASOUND UPPER GASTROINTESTINAL TRACT (GI) N/A 4/19/2021    Procedure: ENDOSCOPIC ULTRASOUND, ESOPHAGOSCOPY / UPPER GASTROINTESTINAL TRACT (GI);  Surgeon: Tres Milan MD;  Location:  OR     ESOPHAGOSCOPY, GASTROSCOPY, DUODENOSCOPY (EGD), COMBINED N/A 10/4/2024    Procedure: ESOPHAGOGASTRODUODENOSCOPY, WITH BIOPSY;  Surgeon: Jim Reyes MD;  Location:  GI     EYE SURGERY Right 2004 and 2005    macular pucker and cataract     LAPAROSCOPIC CHOLECYSTECTOMY N/A 4/20/2021    Procedure: CHOLECYSTECTOMY, LAPAROSCOPIC;  Surgeon: Callie Inman MD;  Location:  OR     TONSILLECTOMY & ADENOIDECTOMY  1949     Z REPLANTATION THUMB DISTAL,COMPLETE  1962    cut off right thumb and repair     [facility] TOTAL KNEE ARTHROPLASTY  2011, 2000    left then right     Review of Systems:   14 systems were reviewed and all negative except as mentioned above.   Current Medications:  Current Outpatient Medications   Medication Sig Dispense Refill     Glucosamine-Chondroitin (OSTEO BI-FLEX REGULAR STRENGTH PO) Take 1 tablet by mouth 2 times daily.       hydroCHLOROthiazide 12.5 MG tablet Take 1 tablet (12.5 mg) by mouth daily. 30 tablet 0     Krill Oil 500 MG CAPS Take 1 capsule by mouth daily.       lovastatin (MEVACOR) 20 MG tablet Take 1 tablet (20 mg) by mouth at bedtime. 90 tablet 4     Multiple Vitamins-Minerals (MULTIVITAL) TABS Take 1 tablet by mouth daily.       pantoprazole (PROTONIX) 40 MG EC tablet Take 1 tablet (40 mg) by mouth See Admin Instructions. One twice daily for 8 weeks, then once daily thereafter, or as directed by GI team. Future refills and dose adjustments directed to primary clinic provider, review at upcoming visit 180 tablet 0     sodium bicarbonate 650 MG  tablet Take 1 tablet (650 mg) by mouth 2 times daily. 180 tablet 3     No current facility-administered medications for this visit.     Physical Exam:   There were no vitals taken for this visit.   There is no height or weight on file to calculate BMI.  No swelling per patient. Otherwise no exam,     Labs:   All labs reviewed by me  Last Renal Panel:  Sodium   Date Value Ref Range Status   12/03/2024 141 135 - 145 mmol/L Final   04/27/2021 139 133 - 144 mmol/L Final     Potassium   Date Value Ref Range Status   12/03/2024 3.9 3.4 - 5.3 mmol/L Final   09/22/2022 4.7 3.4 - 5.3 mmol/L Final   04/27/2021 4.5 3.4 - 5.3 mmol/L Final     Chloride   Date Value Ref Range Status   12/03/2024 103 98 - 107 mmol/L Final   09/22/2022 110 (H) 94 - 109 mmol/L Final   04/27/2021 109 94 - 109 mmol/L Final     Carbon Dioxide   Date Value Ref Range Status   04/27/2021 28 20 - 32 mmol/L Final     Carbon Dioxide (CO2)   Date Value Ref Range Status   12/03/2024 26 22 - 29 mmol/L Final   09/22/2022 23 20 - 32 mmol/L Final     Anion Gap   Date Value Ref Range Status   12/03/2024 12 7 - 15 mmol/L Final   09/22/2022 6 3 - 14 mmol/L Final   04/27/2021 2 (L) 3 - 14 mmol/L Final     Glucose   Date Value Ref Range Status   12/03/2024 111 (H) 70 - 99 mg/dL Final   09/22/2022 115 (H) 70 - 99 mg/dL Final   04/27/2021 106 (H) 70 - 99 mg/dL Final     Urea Nitrogen   Date Value Ref Range Status   12/03/2024 43.2 (H) 8.0 - 23.0 mg/dL Final   09/22/2022 48 (H) 7 - 30 mg/dL Final   04/27/2021 31 (H) 7 - 30 mg/dL Final     Creatinine   Date Value Ref Range Status   12/03/2024 4.05 (H) 0.67 - 1.17 mg/dL Final   04/27/2021 2.11 (H) 0.66 - 1.25 mg/dL Final     GFR Estimate   Date Value Ref Range Status   12/03/2024 13 (L) >60 mL/min/1.73m2 Final     Comment:     eGFR calculated using 2021 CKD-EPI equation.   04/27/2021 27 (L) >60 mL/min/[1.73_m2] Final     Comment:     Non  GFR Calc  Starting 12/18/2018, serum creatinine based estimated GFR  (eGFR) will be   calculated using the Chronic Kidney Disease Epidemiology Collaboration   (CKD-EPI) equation.       Calcium   Date Value Ref Range Status   12/03/2024 9.1 8.8 - 10.4 mg/dL Final     Comment:     Reference intervals for this test were updated on 7/16/2024 to reflect our healthy population more accurately. There may be differences in the flagging of prior results with similar values performed with this method. Those prior results can be interpreted in the context of the updated reference intervals.   04/27/2021 8.5 8.5 - 10.1 mg/dL Final     Phosphorus   Date Value Ref Range Status   12/03/2024 4.2 2.5 - 4.5 mg/dL Final   01/24/2011 3.6 2.5 - 4.9 mg/dL Final     Albumin   Date Value Ref Range Status   12/03/2024 3.4 (L) 3.5 - 5.2 g/dL Final   09/22/2022 3.4 3.4 - 5.0 g/dL Final   04/22/2021 2.0 (L) 3.4 - 5.0 g/dL Final     Imaging:  I reviewed imaging studies. US renal 5/13/2022 showed:  RIGHT KIDNEY: 10.4 x 5.2 x 4.9 cm . No hydronephrosis or cortical  thinning. Several simple cysts. The largest cyst in the interpolar  region/lower pole measures 4.7 cm, unchanged.      LEFT KIDNEY: 11.1 x 6.6 x 6.0 cm. No hydronephrosis or cortical  thinning. Several simple cysts. The largest cyst at the upper pole  measures 4.9 cm, unchanged.      BLADDER: Nearly empty. Mild prostatomegaly with prostate volume of 46  mL.     1.  Stable simple renal cysts bilaterally requiring no specific  additional follow-up.  2.  Mild prostatomegaly.    Assessment & Recommendations:   Problem list  # Chronic kidney disease stage IV secondary to chronic hypertension, prior NSAID use and prior acute kidney injury; baseline creatinine 2.8-3.0  # Nephrotic range proteinuria; stable; UPCR 7.3 g/g 12/3/2024 and UACR albumin too high to calculate (lisinopril held during recent admission and at discharge, recently resumed at 20 mg daily).   # Now interested in Conservative management  His chronic kidney disease is likely multifactorial in  the setting of chronic hypertension, prior NSAID use and prior ADEEL. At this time, he has no symptoms from the CKD standpoints. Electrolytes are in acceptable range except mild metabolic acidosis.  His creatinine was  2.88 with EGFR of 20 last visit in August. Recently Scr 4.05 and eGFR 13. Electrolytes remain stable.  He has no appetite though states this has been chronic since his gallbladder was removed.    He declines referral to kidney class.   -Discussed about dialysis again today: He has elected not to pursue dialysis and will proceed with supportive care and conservative management  -Stay hydrated  #Bilateral renal cysts; stable at US 5/13/22  The patient has bilateral renal cyst but his kidney size are small.  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  The most recent ultrasound on 5/13/2022 showed stable bilateral renal cysts, largest cyst in the interpolar region of right kidney is 4.7 cm and upper pole of the left kidney 4.9 cm per radiology, no need for further follow-up.  #Hypertension: Uncontrolled  He is currently on lisinopril 20 mg daily and hydrochlorothiazide 12.5 mg daily. Not checking at home, BP in clinic recently 166/88, swelling somewhat improved but persists in lower leg below the knee  - stop hydrochlorothiazide, start chlorthalidone 12.5 mg daily and check BP at home.   # Metabolic acidosis  He is on 650 mg BID. Bicarb 26, no changes   # MBD  PTH 90, vitamin D is 57.  He is taking supplement a total of 3000 IU/day (2000 IU +1000 IU in multivitamin).  Calcium and phosphorus are within normal limit.  - Reduce vitamin D to 1000 U daily, recheck vit D and PTH next visit  # Surveillance for anemia in CKD  He is hemoglobin is 13.3, up from 7 during recent admission with GI bleed. Holding iron supplement.  We will continue to monitor his hemoglobin every visit.  # Prostatic enlargement with elevated PSA; reasonable to not continuee to monitor per urology  PSA  mildly elevated, 6.58 in 1/22. Referred to urology (Dr. Paris) who discussed risk and benefit of screening at his age and It is reasonable to not continue with screening per Dr. Paris.  # COPD    Follow-up in 4 months with labs with NP and then me after      MAHAMED TAMAYO PA-C     Visit length 14 minutes. An additional 20 minutes were spent on date of service in chart review, documentation, and other activities as noted.            Again, thank you for allowing me to participate in the care of your patient.      Sincerely,    MAHAMED TAMAYO PA-C

## 2024-12-11 RX ORDER — LISINOPRIL 20 MG/1
20 TABLET ORAL DAILY
Qty: 90 TABLET | Refills: 3 | Status: CANCELLED | OUTPATIENT
Start: 2024-12-11

## 2024-12-11 NOTE — TELEPHONE ENCOUNTER
Chloé with Dr. Vásquez who states that patient should be taking Lovastatin 20MG as it was reduced from 40mg due to kidney function decreasing     Patients lisinopril is managed by Nephrology and per note from 12/10 patient is to still be on it    Patient now understands and is needing a refill of lisinopril     Pended and sent to nephrology

## 2024-12-12 ENCOUNTER — PATIENT OUTREACH (OUTPATIENT)
Dept: ONCOLOGY | Facility: CLINIC | Age: 89
End: 2024-12-12
Payer: COMMERCIAL

## 2024-12-12 ENCOUNTER — ANCILLARY PROCEDURE (OUTPATIENT)
Dept: CT IMAGING | Facility: CLINIC | Age: 89
End: 2024-12-12
Attending: INTERNAL MEDICINE
Payer: COMMERCIAL

## 2024-12-12 DIAGNOSIS — R91.1 LUNG NODULE SEEN ON IMAGING STUDY: ICD-10-CM

## 2024-12-12 PROCEDURE — 71250 CT THORAX DX C-: CPT

## 2024-12-12 RX ORDER — LISINOPRIL 20 MG/1
20 TABLET ORAL DAILY
Qty: 90 TABLET | Refills: 3 | Status: SHIPPED | OUTPATIENT
Start: 2024-12-12

## 2024-12-12 NOTE — PROGRESS NOTES
New IP (Interventional Pulmonology) referral rec'd.  Chart reviewed.     New Patient: Interventional Pulmonary (Lung nodule) Nurse Navigator Note    [facility]    Referred to (specialty): Interventional Pulmonary (Lung nodule)    Requested provider (if applicable): n/a    Date Referral Received: 12/12/2024    Evaluation for :  Lung nodule    Clinical History (per Nurse review of records provided):    **BOOK MARKED**    EXAM: CT CHEST W/O CONTRAST  LOCATION: [facility]  DATE: 12/12/2024     INDICATION: Follow up on incidental lung nodule seen at chest radiography  COMPARISON: Chest radiographs 11/25/2024  TECHNIQUE: CT chest without IV contrast. Multiplanar reformats were obtained. Dose reduction techniques were used.  CONTRAST: None.     FINDINGS:   LUNGS AND PLEURA: Minimal airway secretions. Advanced lung emphysematous changes. The finding at chest radiography correlates with an irregular 2.5 x 1.6 x 1.4 cm left upper lobe nodular opacity (series 4, image 114) which contains a couple of tiny   calcifications and seems embedded within a scar. There is another solid indeterminant 1.2 x 0.9 x 1.5 cm left lower lobe solid nodule just posterior to the descending aorta (image 124). Both of these nodules are new since 2016. There are multiple other   scattered smaller nodules and calcified granulomas. No confluent pneumonic consolidation or pleural effusion.     MEDIASTINUM/AXILLAE: No lymphadenopathy. The heart is normal in size without pericardial effusion. Mitral valve annular calcifications. Dilated ascending thoracic aorta measuring 49 x 47 mm. A 1.2 cm hypoattenuating nodule in the left thyroid lobe is too   small to warrant dedicated evaluation. Normal esophagus.     CORONARY ARTERY CALCIFICATION: Severe.     UPPER ABDOMEN: No actionable finding. Cholecystectomy and pneumobilia, the latter of which is  most suggestive of prior sphincterotomy. Normal adrenal glands.     MUSCULOSKELETAL: No aggressive osseous lesion. Multilevel thoracic spondylosis.                                                                      IMPRESSION:   1.  Two indeterminate left lung nodules warrant further evaluation with PET or follow up chest CT in one month as a primary lung malignancy is within the differential.  2.  Advanced lung emphysematous changes.  3.  No lymphadenopathy.  4.  Dilated ascending thoracic aorta.    Records Location: [facility]     Records Needed: none    Additional testing needed prior to consult: NONE

## 2024-12-18 NOTE — PROGRESS NOTES
Pre-visit planning and chart review completed.     NEW patient appointment:    1/7 with Dr. Robby Ann  12/12 CT chest wo contrast    - No anticoagulation.  - Former smoker.    CE updated. Medications, allergies, problem list, and immunizations reconciled.

## 2024-12-19 ENCOUNTER — PATIENT OUTREACH (OUTPATIENT)
Dept: NEPHROLOGY | Facility: CLINIC | Age: 89
End: 2024-12-19
Payer: COMMERCIAL

## 2024-12-19 NOTE — PROGRESS NOTES
12/19- Returning call from pt pharmacy in regards to the following message.   Pharmacy is calling requesting clarification on directions/quantity. Please review and clarify since, pharmacy states there is some discrepancy with directions and quantity.   I spoke to the pharmacy and confirmed the new dose and amount of chlorthalidone.  Dago RACHEL RN  Nephrology care coordinator  [facility]

## 2024-12-26 NOTE — TELEPHONE ENCOUNTER
RECORDS STATUS - ALL OTHER DIAGNOSIS      RECORDS RECEIVED FROM: [facility] - Internal records   DATE RECEIVED: 12/26

## 2025-01-07 ENCOUNTER — VIRTUAL VISIT (OUTPATIENT)
Dept: PULMONOLOGY | Facility: CLINIC | Age: OVER 89
End: 2025-01-07
Attending: INTERNAL MEDICINE
Payer: COMMERCIAL

## 2025-01-07 ENCOUNTER — PRE VISIT (OUTPATIENT)
Dept: PULMONOLOGY | Facility: CLINIC | Age: OVER 89
End: 2025-01-07
Payer: COMMERCIAL

## 2025-01-07 VITALS — WEIGHT: 185 LBS | BODY MASS INDEX: 29.03 KG/M2 | HEIGHT: 67 IN

## 2025-01-07 DIAGNOSIS — R91.1 LUNG NODULE SEEN ON IMAGING STUDY: ICD-10-CM

## 2025-01-07 PROCEDURE — 98002 SYNCH AUDIO-VIDEO NEW MOD 45: CPT | Performed by: INTERNAL MEDICINE

## 2025-01-07 ASSESSMENT — PAIN SCALES - GENERAL: PAINLEVEL_OUTOF10: MILD PAIN (2)

## 2025-01-07 NOTE — NURSING NOTE
Current patient location: [address] [facility] [address]    Is the patient currently in the state of [place]? YES    Visit mode:VIDEO    If the visit is dropped, the patient can be reconnected by:VIDEO VISIT: Text to cell phone:   Telephone Information:   Mobile [phone]       Will anyone else be joining the visit? NO  (If patient encounters technical issues they should call [phone])    Are changes needed to the allergy or medication list? Pt stated no changes to allergies and Pt stated no med changes    Are refills needed on medications prescribed by this physician? NO    Rooming Documentation:  Unable to complete questionnaire(s) due to time    Reason for visit: JORGE LEARY

## 2025-01-07 NOTE — LETTER
1/7/2025       RE: Nitish Coreas  [address] [facility] [address]     Dear Colleague,    Thank you for referring your patient, Nitish Coreas, [facility]. Please see a copy of my visit note below.    Virtual Visit Details    Type of service:  Video Visit   Video Start Time: 9:30 AM  Video End Time: 945 PM    Originating Location (pt. Location): Home    Distant Location (provider location):  On-site  Platform used for Video Visit: [facility]    LUNG NODULE & INTERVENTIONAL PULMONARY CLINIC  CLINICS & SURGERY [place], [facility]     Nitish Coreas MRN# [id number]   Age: 89 year old YOB: 1934       Requesting Physician: Pardeep Vásquez MD  [address] [place] [address]       Assessment and Plan:    89-year-old man with history of COPD, good functional status, here with new finding of nodules concerning for cancer.  He overall is feeling well and a little bit hesitant to be aggressive about his evaluation.  We discussed the different options.  He would like to do a PET scan to get a sense of areas of involvement and aggressiveness and then we can decide on biopsy versus monitoring.           History:     Nitish Coreas is a 89 year old male with sig h/o for tobacco use who is here for evaluation/followup of lung nodule(s). This was an incidental finding. He has some mild dyspnea on exertion but no other concerning pulm symptoms. He is not sure how aggressive he wants to be in his evaluation.      - My interpretation of the images relevant for this visit includes: new mass and nodule with morphology concerning for cancer.  Significant emphysmea              Past Medical History:      Past Medical History:   Diagnosis Date     BPH (benign prostatic hyperplasia)      Chronic back pain      Chronic kidney disease, stage IV (severe) (H)       Emphysematous COPD (H)      Hyperlipidemia LDL goal < 100      Hypertension            Past Surgical History:      Past Surgical History:   Procedure Laterality Date     DENTAL SURGERY      wisdom teeth     ENDOSCOPIC RETROGRADE CHOLANGIOPANCREATOGRAM N/A 2021    Procedure: ENDOSCOPIC RETROGRADE CHOLANGIOPANCREATOGRAPHY, WITH CALCULUS REMOVAL USING BALLOON  AND CATHETER WITH STENT PLACEMENT;  Surgeon: Tres Milan MD;  Location:  OR     ENDOSCOPIC ULTRASOUND UPPER GASTROINTESTINAL TRACT (GI) N/A 2021    Procedure: ENDOSCOPIC ULTRASOUND, ESOPHAGOSCOPY / UPPER GASTROINTESTINAL TRACT (GI);  Surgeon: Tres Milan MD;  Location:  OR     ESOPHAGOSCOPY, GASTROSCOPY, DUODENOSCOPY (EGD), COMBINED N/A 10/4/2024    Procedure: ESOPHAGOGASTRODUODENOSCOPY, WITH BIOPSY;  Surgeon: Jim Reyes MD;  Location:  GI     EYE SURGERY Right  and     macular pucker and cataract     LAPAROSCOPIC CHOLECYSTECTOMY N/A 2021    Procedure: CHOLECYSTECTOMY, LAPAROSCOPIC;  Surgeon: Callie Inman MD;  Location:  OR     TONSILLECTOMY & ADENOIDECTOMY       Z REPLANTATION THUMB DISTAL,COMPLETE      cut off right thumb and repair     [facility] TOTAL KNEE ARTHROPLASTY  ,     left then right          Social History:     Social History     Tobacco Use     Smoking status: Former     Current packs/day: 0.00     Average packs/day: 4.0 packs/day for 45.0 years (180.0 ttl pk-yrs)     Types: Cigarettes     Start date: 1946     Quit date: 1991     Years since quittin.6     Smokeless tobacco: Never   Substance Use Topics     Alcohol use: Yes     Alcohol/week: 0.0 standard drinks of alcohol     Comment: 1-2 twice per week          Family History:     Family History   Problem Relation Age of Onset     Cancer Mother      Respiratory Mother      Heart Disease Father      Cerebrovascular Disease Father      Cerebrovascular Disease Sister         CVA x 2     Dementia Sister       "Heart Disease Son      Breast Cancer Daughter            Allergies:      Allergies   Allergen Reactions     Morphine Sulfate Shortness Of Breath and Difficulty breathing     Penicillins Anaphylaxis     Amoxicillin      Aspirin      bleeding     Benadryl [Diphenhydramine-Zinc Acetate] Rash     Doesn't help in allergic reaction. Benadryl cream causes worsening rash        Ibuprofen Sodium      Esophageal bleeding          Medications:     Current Outpatient Medications   Medication Sig Dispense Refill     chlorthalidone (HYGROTON) 25 MG tablet Take 0.5 tablets (12.5 mg) by mouth daily. 90 tablet 3     Glucosamine-Chondroitin (OSTEO BI-FLEX REGULAR STRENGTH PO) Take 1 tablet by mouth 2 times daily.       Krill Oil 500 MG CAPS Take 1 capsule by mouth daily.       lisinopril (ZESTRIL) 20 MG tablet Take 20 mg by mouth daily.       lovastatin (MEVACOR) 20 MG tablet Take 1 tablet (20 mg) by mouth at bedtime. 90 tablet 4     Multiple Vitamins-Minerals (MULTIVITAL) TABS Take 1 tablet by mouth daily.       pantoprazole (PROTONIX) 40 MG EC tablet Take 1 tablet (40 mg) by mouth See Admin Instructions. One twice daily for 8 weeks, then once daily thereafter, or as directed by GI team. Future refills and dose adjustments directed to primary clinic provider, review at upcoming visit 180 tablet 0     sodium bicarbonate 650 MG tablet Take 1 tablet (650 mg) by mouth 2 times daily. 180 tablet 3     lisinopril (ZESTRIL) 20 MG tablet Take 1 tablet (20 mg) by mouth daily. 90 tablet 3     No current facility-administered medications for this visit.          Review of Systems:     See HPI         Physical Exam:   Ht 1.702 m (5' 7\")   Wt 83.9 kg (185 lb)   BMI 28.98 kg/m      Constitutional - looks well, in no apparent distress  Eyes - no redness or discharge  Respiratory -breathing appears comfortable. No wheeze or rhonchi.   Skin - No appreciable discoloration or lesions (very limited exam)  Neurological - No apparent tremors. Speech " fluent and articlate  Psychiatric - no signs of delirium or anxiety          Current Laboratory Data:   All laboratory and imaging data reviewed.    No results found for this or any previous visit (from the past 24 hours).               Again, thank you for allowing me to participate in the care of your patient.      Sincerely,    Robby Ann MD

## 2025-01-07 NOTE — PROGRESS NOTES
Virtual Visit Details    Type of service:  Video Visit   Video Start Time: 9:30 AM  Video End Time: 945 PM    Originating Location (pt. Location): Home    Distant Location (provider location):  On-site  Platform used for Video Visit: [facility]    LUNG NODULE & INTERVENTIONAL PULMONARY CLINIC  CLINICS & SURGERY CENTER, [facility], [facility]     Nitish Coreas MRN# [id number]   Age: 89 year old YOB: 1934       Requesting Physician: Pardeep Vásquez MD  [address] [place] [id number]       Assessment and Plan:    89-year-old man with history of COPD, good functional status, here with new finding of nodules concerning for cancer.  He overall is feeling well and a little bit hesitant to be aggressive about his evaluation.  We discussed the different options.  He would like to do a PET scan to get a sense of areas of involvement and aggressiveness and then we can decide on biopsy versus monitoring.           History:     Nitish Coreas is a 89 year old male with sig h/o for tobacco use who is here for evaluation/followup of lung nodule(s). This was an incidental finding. He has some mild dyspnea on exertion but no other concerning pulm symptoms. He is not sure how aggressive he wants to be in his evaluation.      - My interpretation of the images relevant for this visit includes: new mass and nodule with morphology concerning for cancer.  Significant emphysmea              Past Medical History:      Past Medical History:   Diagnosis Date    BPH (benign prostatic hyperplasia)     Chronic back pain     Chronic kidney disease, stage IV (severe) (H)     Emphysematous COPD (H)     Hyperlipidemia LDL goal < 100     Hypertension            Past Surgical History:      Past Surgical History:   Procedure Laterality Date    DENTAL SURGERY  1958    wisdom teeth    ENDOSCOPIC RETROGRADE CHOLANGIOPANCREATOGRAM N/A 4/19/2021    Procedure: ENDOSCOPIC RETROGRADE  CHOLANGIOPANCREATOGRAPHY, WITH CALCULUS REMOVAL USING BALLOON  AND CATHETER WITH STENT PLACEMENT;  Surgeon: Tres Milan MD;  Location:  OR    ENDOSCOPIC ULTRASOUND UPPER GASTROINTESTINAL TRACT (GI) N/A 2021    Procedure: ENDOSCOPIC ULTRASOUND, ESOPHAGOSCOPY / UPPER GASTROINTESTINAL TRACT (GI);  Surgeon: Tres Milan MD;  Location:  OR    ESOPHAGOSCOPY, GASTROSCOPY, DUODENOSCOPY (EGD), COMBINED N/A 10/4/2024    Procedure: ESOPHAGOGASTRODUODENOSCOPY, WITH BIOPSY;  Surgeon: Jim Reyes MD;  Location:  GI    EYE SURGERY Right  and     macular pucker and cataract    LAPAROSCOPIC CHOLECYSTECTOMY N/A 2021    Procedure: CHOLECYSTECTOMY, LAPAROSCOPIC;  Surgeon: Callie Inman MD;  Location:  OR    TONSILLECTOMY & ADENOIDECTOMY      ZZC REPLANTATION THUMB DISTAL,COMPLETE      cut off right thumb and repair    ZC TOTAL KNEE ARTHROPLASTY  2000    left then right          Social History:     Social History     Tobacco Use    Smoking status: Former     Current packs/day: 0.00     Average packs/day: 4.0 packs/day for 45.0 years (180.0 ttl pk-yrs)     Types: Cigarettes     Start date: 1946     Quit date: 1991     Years since quittin.6    Smokeless tobacco: Never   Substance Use Topics    Alcohol use: Yes     Alcohol/week: 0.0 standard drinks of alcohol     Comment: 1-2 twice per week          Family History:     Family History   Problem Relation Age of Onset    Cancer Mother     Respiratory Mother     Heart Disease Father     Cerebrovascular Disease Father     Cerebrovascular Disease Sister         CVA x 2    Dementia Sister     Heart Disease Son     Breast Cancer Daughter            Allergies:      Allergies   Allergen Reactions    Morphine Sulfate Shortness Of Breath and Difficulty breathing    Penicillins Anaphylaxis    Amoxicillin     Aspirin      bleeding    Benadryl [Diphenhydramine-Zinc Acetate] Rash     Doesn't help in allergic reaction.  "Benadryl cream causes worsening rash       Ibuprofen Sodium      Esophageal bleeding          Medications:     Current Outpatient Medications   Medication Sig Dispense Refill    chlorthalidone (HYGROTON) 25 MG tablet Take 0.5 tablets (12.5 mg) by mouth daily. 90 tablet 3    Glucosamine-Chondroitin (OSTEO BI-FLEX REGULAR STRENGTH PO) Take 1 tablet by mouth 2 times daily.      Krill Oil 500 MG CAPS Take 1 capsule by mouth daily.      lisinopril (ZESTRIL) 20 MG tablet Take 20 mg by mouth daily.      lovastatin (MEVACOR) 20 MG tablet Take 1 tablet (20 mg) by mouth at bedtime. 90 tablet 4    Multiple Vitamins-Minerals (MULTIVITAL) TABS Take 1 tablet by mouth daily.      pantoprazole (PROTONIX) 40 MG EC tablet Take 1 tablet (40 mg) by mouth See Admin Instructions. One twice daily for 8 weeks, then once daily thereafter, or as directed by GI team. Future refills and dose adjustments directed to primary clinic provider, review at upcoming visit 180 tablet 0    sodium bicarbonate 650 MG tablet Take 1 tablet (650 mg) by mouth 2 times daily. 180 tablet 3    lisinopril (ZESTRIL) 20 MG tablet Take 1 tablet (20 mg) by mouth daily. 90 tablet 3     No current facility-administered medications for this visit.          Review of Systems:     See HPI         Physical Exam:   Ht 1.702 m (5' 7\")   Wt 83.9 kg (185 lb)   BMI 28.98 kg/m      Constitutional - looks well, in no apparent distress  Eyes - no redness or discharge  Respiratory -breathing appears comfortable. No wheeze or rhonchi.   Skin - No appreciable discoloration or lesions (very limited exam)  Neurological - No apparent tremors. Speech fluent and articlate  Psychiatric - no signs of delirium or anxiety          Current Laboratory Data:   All laboratory and imaging data reviewed.    No results found for this or any previous visit (from the past 24 hours).               "

## 2025-02-10 ENCOUNTER — HOSPITAL ENCOUNTER (OUTPATIENT)
Dept: PET IMAGING | Facility: CLINIC | Age: OVER 89
Discharge: HOME | End: 2025-02-10
Attending: INTERNAL MEDICINE | Admitting: INTERNAL MEDICINE
Payer: COMMERCIAL

## 2025-02-10 DIAGNOSIS — R91.1 LUNG NODULE SEEN ON IMAGING STUDY: ICD-10-CM

## 2025-02-10 PROCEDURE — 78816 PET IMAGE W/CT FULL BODY: CPT | Mod: PI

## 2025-02-10 PROCEDURE — 343N000001 HC RX 343 MED OP 636: Performed by: INTERNAL MEDICINE

## 2025-02-10 PROCEDURE — A9552 F18 FDG: HCPCS | Performed by: INTERNAL MEDICINE

## 2025-02-10 RX ORDER — FLUDEOXYGLUCOSE F 18 200 MCI/ML
10-18 INJECTION, SOLUTION INTRAVENOUS ONCE
Status: COMPLETED | OUTPATIENT
Start: 2025-02-10 | End: 2025-02-10

## 2025-02-10 RX ADMIN — FLUDEOXYGLUCOSE F 18 12.21 MILLICURIE: 200 INJECTION, SOLUTION INTRAVENOUS at 09:36

## 2025-02-12 ENCOUNTER — VIRTUAL VISIT (OUTPATIENT)
Dept: PULMONOLOGY | Facility: CLINIC | Age: OVER 89
End: 2025-02-12
Payer: COMMERCIAL

## 2025-02-12 VITALS — HEIGHT: 66 IN | WEIGHT: 192 LBS | BODY MASS INDEX: 30.86 KG/M2

## 2025-02-12 DIAGNOSIS — R91.8 LUNG NODULES: Primary | ICD-10-CM

## 2025-02-12 DIAGNOSIS — J43.2 CENTRILOBULAR EMPHYSEMA (H): ICD-10-CM

## 2025-02-12 PROCEDURE — 98014 SYNCH AUDIO-ONLY EST MOD 30: CPT | Performed by: INTERNAL MEDICINE

## 2025-02-12 ASSESSMENT — PAIN SCALES - GENERAL: PAINLEVEL_OUTOF10: MILD PAIN (2)

## 2025-02-12 NOTE — NURSING NOTE
Current patient location: [address]  [facility] [address]      Is the patient currently in the state of [place]? YES    Visit mode:TELEPHONE    If the visit is dropped, the patient can be reconnected by: TELEPHONE VISIT: Phone number:   Telephone Information:   Mobile [phone]       Will anyone else be joining the visit? NO  (If patient encounters technical issues they should call [phone])    How would you like to obtain your AVS? MyChart    Are changes needed to the allergy or medication list? Pt stated no changes to allergies and Pt stated no med changes    Are refills needed on medications prescribed by this physician? NO    Rooming Documentation:  Questionnaire(s) completed    Reason for visit: RECHECK      Pallavi LEARY

## 2025-02-12 NOTE — PROGRESS NOTES
"Virtual Visit Details    Type of service:  Telephone Visit   Phone call duration: *** minutes   Originating Location (pt. Location): {patient location:077108::\"Home\"}  {PROVIDER LOCATION On-site should be selected for visits conducted from your clinic location or adjoining [facility] hospital, academic office, or other nearby [facility] building. Off-site should be selected for all other provider locations, including home:[id number]}  Distant Location (provider location):  {virtual location provider:296303}  Telephone visit completed due to {audio only reason:429049}  "    ENDOSCOPIC RETROGRADE CHOLANGIOPANCREATOGRAM N/A 2021    Procedure: ENDOSCOPIC RETROGRADE CHOLANGIOPANCREATOGRAPHY, WITH CALCULUS REMOVAL USING BALLOON  AND CATHETER WITH STENT PLACEMENT;  Surgeon: Tres Milan MD;  Location:  OR    ENDOSCOPIC ULTRASOUND UPPER GASTROINTESTINAL TRACT (GI) N/A 2021    Procedure: ENDOSCOPIC ULTRASOUND, ESOPHAGOSCOPY / UPPER GASTROINTESTINAL TRACT (GI);  Surgeon: Tres Milan MD;  Location:  OR    ESOPHAGOSCOPY, GASTROSCOPY, DUODENOSCOPY (EGD), COMBINED N/A 10/4/2024    Procedure: ESOPHAGOGASTRODUODENOSCOPY, WITH BIOPSY;  Surgeon: Jim Reyes MD;  Location:  GI    EYE SURGERY Right  and     macular pucker and cataract    LAPAROSCOPIC CHOLECYSTECTOMY N/A 2021    Procedure: CHOLECYSTECTOMY, LAPAROSCOPIC;  Surgeon: Callie Inman MD;  Location:  OR    TONSILLECTOMY & ADENOIDECTOMY      ZZC REPLANTATION THUMB DISTAL,COMPLETE      cut off right thumb and repair    ZC TOTAL KNEE ARTHROPLASTY  2000    left then right          Social History:     Social History     Tobacco Use    Smoking status: Former     Current packs/day: 0.00     Average packs/day: 4.0 packs/day for 45.0 years (180.0 ttl pk-yrs)     Types: Cigarettes     Start date: 1946     Quit date: 1991     Years since quittin.8    Smokeless tobacco: Never   Substance Use Topics    Alcohol use: Yes     Alcohol/week: 0.0 standard drinks of alcohol     Comment: 1-2 twice per week          Family History:     Family History   Problem Relation Age of Onset    Cancer Mother     Respiratory Mother     Heart Disease Father     Cerebrovascular Disease Father     Cerebrovascular Disease Sister         CVA x 2    Dementia Sister     Heart Disease Son     Breast Cancer Daughter            Allergies:      Allergies   Allergen Reactions    Morphine Sulfate Shortness Of Breath and Difficulty breathing    Penicillins Anaphylaxis    Amoxicillin     Aspirin       "bleeding    Benadryl [Diphenhydramine-Zinc Acetate] Rash     Doesn't help in allergic reaction. Benadryl cream causes worsening rash       Ibuprofen Sodium      Esophageal bleeding          Medications:     Current Outpatient Medications   Medication Sig Dispense Refill    chlorthalidone (HYGROTON) 25 MG tablet Take 0.5 tablets (12.5 mg) by mouth daily. 90 tablet 3    Glucosamine-Chondroitin (OSTEO BI-FLEX REGULAR STRENGTH PO) Take 1 tablet by mouth 2 times daily.      Krill Oil 500 MG CAPS Take 1 capsule by mouth daily.      lisinopril (ZESTRIL) 20 MG tablet Take 1 tablet (20 mg) by mouth daily. 90 tablet 3    lisinopril (ZESTRIL) 20 MG tablet Take 20 mg by mouth daily.      lovastatin (MEVACOR) 20 MG tablet Take 1 tablet (20 mg) by mouth at bedtime. 90 tablet 4    Multiple Vitamins-Minerals (MULTIVITAL) TABS Take 1 tablet by mouth daily.      pantoprazole (PROTONIX) 40 MG EC tablet Take 1 tablet (40 mg) by mouth See Admin Instructions. One twice daily for 8 weeks, then once daily thereafter, or as directed by GI team. Future refills and dose adjustments directed to primary clinic provider, review at upcoming visit 180 tablet 0    sodium bicarbonate 650 MG tablet Take 1 tablet (650 mg) by mouth 2 times daily. 180 tablet 3     No current facility-administered medications for this visit.          Review of Systems:     See HPI         Physical Exam:   Ht 1.676 m (5' 6\")   Wt 87.1 kg (192 lb)   BMI 30.99 kg/m              Current Laboratory Data:   All laboratory and imaging data reviewed.    No results found for this or any previous visit (from the past 24 hours).               "

## 2025-02-17 DIAGNOSIS — I10 ESSENTIAL HYPERTENSION: ICD-10-CM

## 2025-02-18 ENCOUNTER — PATIENT OUTREACH (OUTPATIENT)
Dept: ONCOLOGY | Facility: CLINIC | Age: OVER 89
End: 2025-02-18
Payer: COMMERCIAL

## 2025-02-18 NOTE — PROGRESS NOTES
New Patient Oncology Nurse Navigator Note     Referring provider: Dr. Robby Ann    Referring Clinic/Organization: [facility]  Referred to: Radiation Oncology  Requested provider (if applicable): First available - did not specify   Referral Received: 02/18/25       Evaluation for :   Diagnosis   R91.8 (ICD-10-CM) - Lung nodules        My Clinical Question Is: would you consider empiric therapy for high suspicion lung nodule?     Clinical History (per Nurse review of records provided):      02/10/2025 PET (bookmarked) showed:   IMPRESSION:     1. 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     2. Focal FDG uptake in the right peripheral zone of the prostate gland, which likely represents focal prostatitis, but can be seen with prostate malignancy in up to 13% of patients.    02/12/2025 OV note from referring provider (bookmarked) discusses referral to Rad Onc:   89-year-old man with history of COPD, good functional status, here with new finding of nodules concerning for synchronous primary lung cancer. The likelihood that these are cancer is high. He is unsure about whether he would want treatment.  I will refer him to rad onc to see if he is a candidate and determine if they are willing to treat empirically.  His risk for bronchoscopic biopsy is relatively high due to his age and emphysema.     Clinical Assessment / Barriers to Care (Per Nurse):  Tobacco History    Smoking Status  Former Smoking Start Date  5/8/1946 Quit Date  5/8/1991 Average Packs/Day  4.0 packs/day for 45.0 years (180.0 ttl pk-yrs) Smoking Tobacco Type  Cigarettes from 5/8/1946 to 5/8/1991     Previous Radiation Therapy: No  Records Location: [facility]   Records Needed: None  Additional testing needed prior to consult: None  Referral updates and Plan:   2/18: Writer called Nitish to discuss the referral. He confirmed he is aware of the referral and is ready to schedule.  We discussed locations for radiation consultation and he declined any of the [facility] sites as he prefers [place]. Writer explained we can send his referral to [facility] but he would need to check his insurance that it is covered. He was given the [facility] phone number as well as our phone number in case he changes his mind and would like to schedule with [facility]. All questions were answered. Will have NPS send the referral externally to [facility]. Msg sent to referring provider, updating him on Nitish's decision.    RADHA HillN, RN, OCN  [facility] Oncology Nurse Navigator  [phone] / [phone]

## 2025-02-19 RX ORDER — HYDROCHLOROTHIAZIDE 12.5 MG/1
12.5 TABLET ORAL DAILY
Qty: 90 TABLET | Refills: 3 | Status: SHIPPED | OUTPATIENT
Start: 2025-02-19 | End: 2025-02-20

## 2025-02-19 NOTE — TELEPHONE ENCOUNTER
Dr. Vásquez, please advise.    Per chart review, pt was in for AWV 12/3/24 and confirmed he was taking hydrochlorothiazide:       Order and pharmacy pended for your review.    Theresa Kang RN

## 2025-02-20 ENCOUNTER — TRANSFERRED RECORDS (OUTPATIENT)
Dept: HEALTH INFORMATION MANAGEMENT | Facility: CLINIC | Age: OVER 89
End: 2025-02-20
Payer: COMMERCIAL

## 2025-02-20 NOTE — TELEPHONE ENCOUNTER
After refilling the hydrochlorothiazide, I received a message from the pharmacy asking if patient ought to be on both hydrochlorothiazide and chlorthalidone. I do not have patients on both of these medications. I have CANCELLED the hydrochlorothiazide prescription and will defer his diuretic therapy to his nephrology team.

## 2025-02-24 ENCOUNTER — TELEPHONE (OUTPATIENT)
Dept: INTERNAL MEDICINE | Facility: CLINIC | Age: OVER 89
End: 2025-02-24
Payer: COMMERCIAL

## 2025-02-24 DIAGNOSIS — I10 ESSENTIAL HYPERTENSION: ICD-10-CM

## 2025-02-24 DIAGNOSIS — C34.90 LUNG CANCER (H): Primary | ICD-10-CM

## 2025-02-25 NOTE — TELEPHONE ENCOUNTER
Refill Request, hydroCHLOROthiazide 12.5 MG tablet (Discontinued)     Rx appears to be discontinued on 02/20/2025 by primary care provider. See telephone encounter from 21/17/2025:  Gustavo aCdet MD to Dago Simeon RN, RN  Latonia Jaime PAEmperatrizC       2/20/25  3:11 PM   When Latonia saw him in Dec, she changed to Cholrthalidone 12.5 mg daily instead of hydrochlorothiazide 12.5 mg daily.

## 2025-03-07 ENCOUNTER — PATIENT OUTREACH (OUTPATIENT)
Dept: NEPHROLOGY | Facility: CLINIC | Age: OVER 89
End: 2025-03-07
Payer: COMMERCIAL

## 2025-03-07 NOTE — PROGRESS NOTES
3/7- Called Nitish to check in and see how his bp's have been running at home. He sent a message stating that his chlorthalidone 12.5 mg is no longer covered. Routed to provider to see if she wants to try a different amount. He has been getting good control taking 25 mg daily.  Dago RACHEL RN  Nephrology care coordinator  [facility]

## 2025-03-11 DIAGNOSIS — I10 HYPERTENSION GOAL BP (BLOOD PRESSURE) < 140/90: ICD-10-CM

## 2025-03-11 DIAGNOSIS — I10 HYPERTENSION GOAL BP (BLOOD PRESSURE) < 140/90: Primary | ICD-10-CM

## 2025-03-11 RX ORDER — CHLORTHALIDONE 25 MG/1
25 TABLET ORAL DAILY
Qty: 90 TABLET | Refills: 3 | Status: SHIPPED | OUTPATIENT
Start: 2025-03-11

## 2025-03-11 NOTE — TELEPHONE ENCOUNTER
3/11- Medication filled and sent to pt pharmacy. Nitish notified.   Dago RACHEL RN  Nephrology care coordinator  [facility]

## 2025-03-21 ENCOUNTER — TRANSFERRED RECORDS (OUTPATIENT)
Dept: HEALTH INFORMATION MANAGEMENT | Facility: CLINIC | Age: OVER 89
End: 2025-03-21
Payer: COMMERCIAL

## 2025-05-27 ENCOUNTER — PATIENT OUTREACH (OUTPATIENT)
Dept: NEPHROLOGY | Facility: CLINIC | Age: OVER 89
End: 2025-05-27
Payer: COMMERCIAL

## 2025-05-27 DIAGNOSIS — I10 HYPERTENSION GOAL BP (BLOOD PRESSURE) < 140/90: ICD-10-CM

## 2025-05-27 NOTE — TELEPHONE ENCOUNTER
5/27- Medication filled and sent to pts requested pharmacy of choice.   Dago RACHEL RN  Nephrology care coordinator  [facility]

## 2025-05-27 NOTE — PROGRESS NOTES
5/27- Called Nitish in regards to medication refill. He stated his insurance won't cover his next refill of chlorthalidone and will possibly need a PA. I resent the prescription matching his daily dose to a 12.5 mg tab. He had been splitting the 25 mg tabs and not having any luck. I also scheduled a return visit with Latonia on 6/9/25 as he is overdue for a visit. He will make his lab appt at the [facility] prior to appt.  Dago RACHEL RN  Nephrology care coordinator  [facility]

## 2025-05-28 ENCOUNTER — PATIENT OUTREACH (OUTPATIENT)
Dept: NEPHROLOGY | Facility: CLINIC | Age: OVER 89
End: 2025-05-28
Payer: COMMERCIAL

## 2025-05-28 NOTE — PROGRESS NOTES
2/28- Pharmacy contacted me and stated they don't hve a 12.5 mg dose. He will need to cut the 25 mg in half. He was having trouble doing this. I will see if he has been using a pill splitter as pharmacy unable to do so. Nitish stated he has been using a new pill cutter and he can't get them He stated his bp has been pretty good and is never above 140 despite not having the chlorthalidone. Routed to Latonia to update and advise.  Dago RACHEL RN  Nephrology care coordinator  [facility]

## 2025-06-05 ENCOUNTER — TELEPHONE (OUTPATIENT)
Dept: NEPHROLOGY | Facility: CLINIC | Age: OVER 89
End: 2025-06-05
Payer: COMMERCIAL

## 2025-06-05 ENCOUNTER — TELEPHONE (OUTPATIENT)
Dept: INTERNAL MEDICINE | Facility: CLINIC | Age: OVER 89
End: 2025-06-05
Payer: COMMERCIAL

## 2025-06-05 NOTE — TELEPHONE ENCOUNTER
Please see the refill request dated 2/17/25. He should not be on both. These medications are managed by his nephrology team.

## 2025-06-05 NOTE — TELEPHONE ENCOUNTER
Pharmacy is calling today to check on messages that were sent in early February.  In February the pharmacy wanted clarification on whether the patient should be taking hydrochlorothiazide or chlorthalidone?  Patient received the chlorthalidone (prescribed by a PA in renal) in March.  Patient is currently trying to refilled the hydrochlorothiazide which was prescribed in February by Dr. Nelson.  The hydrochlorothiazide is not on his med list.  Pharmacy states patient is trying to request both.  Does he need both?  They would prefer a call back over a fax.  But they did fax this question 2 times in early February.  Routing to PCP for more info.       MALVIN Estes  [facility]

## 2025-06-05 NOTE — TELEPHONE ENCOUNTER
Patient confirmed rescheduled appointment:     Date: 7/15/25       Time: 10:10 AM  Appointment type: Return Nephrology  Visit mode: Telephone (per previously scheduled appt)  Provider: Latonia Jaime PA-C   [facility] @ 10:00 AM

## 2025-06-05 NOTE — TELEPHONE ENCOUNTER
Called and spoke with Sara to relay provider note below.  She verbalized understanding and will contact Nephrology for any further clarification needed.     Thank you,  Jailyn Gardiner RN

## 2025-06-11 ENCOUNTER — PATIENT OUTREACH (OUTPATIENT)
Dept: NEPHROLOGY | Facility: CLINIC | Age: OVER 89
End: 2025-06-11
Payer: COMMERCIAL

## 2025-06-12 DIAGNOSIS — I10 HYPERTENSION GOAL BP (BLOOD PRESSURE) < 140/90: ICD-10-CM

## 2025-06-12 DIAGNOSIS — I12.9 CKD STAGE 4 SECONDARY TO HYPERTENSION (H): Primary | ICD-10-CM

## 2025-06-12 DIAGNOSIS — N18.4 CKD STAGE 4 SECONDARY TO HYPERTENSION (H): Primary | ICD-10-CM

## 2025-06-12 RX ORDER — HYDROCHLOROTHIAZIDE 12.5 MG/1
12.5 TABLET ORAL DAILY
Qty: 90 TABLET | Refills: 3 | Status: SHIPPED | OUTPATIENT
Start: 2025-06-12

## 2025-06-12 NOTE — PROGRESS NOTES
6/12- Switched chlorthalidone 12.5 mg to hydrochlorothiazide 12.5 mg daily. Nitish arboleda.  Dago RACHEL RN  Nephrology care coordinator  [facility]

## 2025-07-07 DIAGNOSIS — C34.90 LUNG CANCER (H): Primary | ICD-10-CM

## 2025-07-08 ENCOUNTER — LAB (OUTPATIENT)
Dept: LAB | Facility: CLINIC | Age: OVER 89
End: 2025-07-08
Payer: COMMERCIAL

## 2025-07-08 DIAGNOSIS — I12.9 CKD STAGE 4 SECONDARY TO HYPERTENSION (H): ICD-10-CM

## 2025-07-08 DIAGNOSIS — N18.4 CKD STAGE 4 SECONDARY TO HYPERTENSION (H): ICD-10-CM

## 2025-07-08 LAB
ALBUMIN MFR UR ELPH: 497 MG/DL
ALBUMIN SERPL BCG-MCNC: 3.6 G/DL (ref 3.5–5.2)
ALBUMIN UR-MCNC: >=300 MG/DL
ANION GAP SERPL CALCULATED.3IONS-SCNC: 11 MMOL/L (ref 7–15)
APPEARANCE UR: CLEAR
BILIRUB UR QL STRIP: NEGATIVE
BUN SERPL-MCNC: 46 MG/DL (ref 8–23)
CALCIUM SERPL-MCNC: 9.3 MG/DL (ref 8.8–10.4)
CHLORIDE SERPL-SCNC: 107 MMOL/L (ref 98–107)
COLOR UR AUTO: YELLOW
CREAT SERPL-MCNC: 3.87 MG/DL (ref 0.67–1.17)
CREAT UR-MCNC: 116 MG/DL
CREAT UR-MCNC: 116 MG/DL
EGFRCR SERPLBLD CKD-EPI 2021: 14 ML/MIN/1.73M2
ERYTHROCYTE [DISTWIDTH] IN BLOOD BY AUTOMATED COUNT: 12.5 % (ref 10–15)
GLUCOSE SERPL-MCNC: 92 MG/DL (ref 70–99)
GLUCOSE UR STRIP-MCNC: 100 MG/DL
HCO3 SERPL-SCNC: 24 MMOL/L (ref 22–29)
HCT VFR BLD AUTO: 36.3 % (ref 40–53)
HGB BLD-MCNC: 12.9 G/DL (ref 13.3–17.7)
HGB UR QL STRIP: ABNORMAL
KETONES UR STRIP-MCNC: NEGATIVE MG/DL
LEUKOCYTE ESTERASE UR QL STRIP: NEGATIVE
MCH RBC QN AUTO: 32.8 PG (ref 26.5–33)
MCHC RBC AUTO-ENTMCNC: 35.5 G/DL (ref 31.5–36.5)
MCV RBC AUTO: 92 FL (ref 78–100)
MICROALBUMIN UR-MCNC: 3270 MG/L
MICROALBUMIN/CREAT UR: 2818.97 MG/G CR (ref 0–17)
NITRATE UR QL: NEGATIVE
PH UR STRIP: 7 [PH] (ref 5–7)
PHOSPHATE SERPL-MCNC: 4.2 MG/DL (ref 2.5–4.5)
PLATELET # BLD AUTO: 148 10E3/UL (ref 150–450)
POTASSIUM SERPL-SCNC: 4.3 MMOL/L (ref 3.4–5.3)
PROT/CREAT 24H UR: 4.28 MG/MG CR (ref 0–0.2)
PTH-INTACT SERPL-MCNC: 143 PG/ML (ref 15–65)
RBC # BLD AUTO: 3.93 10E6/UL (ref 4.4–5.9)
RBC #/AREA URNS AUTO: ABNORMAL /HPF
SODIUM SERPL-SCNC: 142 MMOL/L (ref 135–145)
SP GR UR STRIP: 1.02 (ref 1–1.03)
SQUAMOUS #/AREA URNS AUTO: ABNORMAL /LPF
UROBILINOGEN UR STRIP-ACNC: 0.2 E.U./DL
VIT D+METAB SERPL-MCNC: 31 NG/ML (ref 20–50)
WBC # BLD AUTO: 8 10E3/UL (ref 4–11)
WBC #/AREA URNS AUTO: ABNORMAL /HPF

## 2025-07-08 PROCEDURE — 82306 VITAMIN D 25 HYDROXY: CPT

## 2025-07-08 PROCEDURE — 36415 COLL VENOUS BLD VENIPUNCTURE: CPT

## 2025-07-08 PROCEDURE — 80069 RENAL FUNCTION PANEL: CPT

## 2025-07-08 PROCEDURE — 82043 UR ALBUMIN QUANTITATIVE: CPT

## 2025-07-08 PROCEDURE — 84156 ASSAY OF PROTEIN URINE: CPT

## 2025-07-08 PROCEDURE — 81001 URINALYSIS AUTO W/SCOPE: CPT

## 2025-07-08 PROCEDURE — 85027 COMPLETE CBC AUTOMATED: CPT

## 2025-07-08 PROCEDURE — 82570 ASSAY OF URINE CREATININE: CPT

## 2025-07-08 PROCEDURE — 83970 ASSAY OF PARATHORMONE: CPT

## 2025-07-09 DIAGNOSIS — N18.4 CKD STAGE 4 SECONDARY TO HYPERTENSION (H): Primary | ICD-10-CM

## 2025-07-09 DIAGNOSIS — I12.9 CKD STAGE 4 SECONDARY TO HYPERTENSION (H): Primary | ICD-10-CM

## 2025-07-15 ENCOUNTER — PATIENT OUTREACH (OUTPATIENT)
Dept: NEPHROLOGY | Facility: CLINIC | Age: OVER 89
End: 2025-07-15
Payer: COMMERCIAL

## 2025-07-15 NOTE — PROGRESS NOTES
"7/15- Called Nitish to check in and see how he's feeling. He needs to see [facility] for an in person appt. I will also inquire what his goals are as he is now stage 5. I had a nice talk with Nitish and he stated and sounded pretty well. He is eating and hydrating adequately. He rarely checks his bp at home. He takes it \"maybe once a month?\" It's usually in the 130's/70's and has been for 20 years. No swelling reported. He is not interested in dialysis should that time come. He would prefer to let nature take it's course. He worked until he was 85 and would like to enjoy penitentiary. He doesn't want to come to [facility] [place] an in-person visit. I explained the importance of seeing us in-person and he will see Latonia in [place].I will send request to have scheduling reach out for an appt. I encouraged him to start taking and recording his bp at least once a week and I will continue to check in with him. Routed to [facility] for update.  Dago RACHEL RN  Nephrology care coordinator  [facility]    "

## 2025-07-20 ENCOUNTER — HOSPITAL ENCOUNTER (OUTPATIENT)
Dept: CT IMAGING | Facility: CLINIC | Age: OVER 89
Discharge: HOME | End: 2025-07-20
Attending: RADIOLOGY | Admitting: RADIOLOGY
Payer: COMMERCIAL

## 2025-07-20 DIAGNOSIS — C34.90 LUNG CANCER (H): ICD-10-CM

## 2025-07-20 PROCEDURE — 71250 CT THORAX DX C-: CPT

## 2025-07-23 ENCOUNTER — TELEPHONE (OUTPATIENT)
Dept: NEPHROLOGY | Facility: CLINIC | Age: OVER 89
End: 2025-07-23
Payer: COMMERCIAL

## 2025-07-23 NOTE — TELEPHONE ENCOUNTER
Patient confirmed scheduled appointment:  Date: 10/29/25  Time: 12:50PM  Visit type: RTN NEPH  Provider: Addison  Location: [facility]  Testing/imaging: labs  Additional notes: Pt will be getting labs done at [facility] lab on 10/24/25 @ 10:30AM

## 2025-08-16 DIAGNOSIS — I12.9 CKD STAGE 4 SECONDARY TO HYPERTENSION (H): ICD-10-CM

## 2025-08-16 DIAGNOSIS — N18.4 CKD STAGE 4 SECONDARY TO HYPERTENSION (H): ICD-10-CM

## 2025-08-19 RX ORDER — SODIUM BICARBONATE 650 MG/1
650 TABLET ORAL 2 TIMES DAILY
Qty: 180 TABLET | Refills: 3 | Status: SHIPPED | OUTPATIENT
Start: 2025-08-19

## (undated) DEVICE — ESU PENCIL W/SMOKE EVAC CVPLP2000

## (undated) DEVICE — SOL NACL 0.9% INJ 1000ML BAG 2B1324X

## (undated) DEVICE — SUCTION CANISTER MEDIVAC LINER 3000ML W/LID 65651-530

## (undated) DEVICE — SOL WATER IRRIG 1000ML BOTTLE 2F7114

## (undated) DEVICE — ENDO POUCH UNIV RETRIEVAL SYSTEM INZII 10MM CD001

## (undated) DEVICE — SYSTEM CLEARIFY VISUALIZATION 21-345

## (undated) DEVICE — CLIP APPLIER ENDO ROTATING 10MM MED/LG ER320

## (undated) DEVICE — ESU PENCIL W/HOLSTER E2350H

## (undated) DEVICE — SU VICRYL 0 UR-6 27" J603H

## (undated) DEVICE — LINEN TOWEL PACK X5 5464

## (undated) DEVICE — GLOVE PROTEXIS W/NEU-THERA 6.5  2D73TE65

## (undated) DEVICE — EVAC SYSTEM CLEAR FLOW SC082500

## (undated) DEVICE — PACK LAP CHOLE SLC15LCFSD

## (undated) DEVICE — PREP CHLORAPREP 26ML TINTED ORANGE  260815

## (undated) DEVICE — ENDO TROCAR SLEEVE KII Z-THREADED 05X100MM CTS02

## (undated) DEVICE — SU MONOCRYL 4-0 PS-2 18" UND Y496G

## (undated) DEVICE — ENDO TROCAR FIRST ENTRY KII FIOS Z-THRD 05X100MM CTF03

## (undated) DEVICE — SUCTION IRR STRYKERFLOW II W/TIP 250-070-520

## (undated) DEVICE — ESU ELEC BLADE 2.75" COATED/INSULATED E1455

## (undated) DEVICE — ESU HOLDER LAP INST DISP PURPLE LONG 330MM H-PRO-330

## (undated) DEVICE — ESU GROUND PAD UNIVERSAL W/O CORD

## (undated) DEVICE — DEVICE SUTURE GRASPER TROCAR CLOSURE 14GA PMITCSG

## (undated) DEVICE — ENDO TROCAR FIRST ENTRY KII FIOS Z-THRD 11X100MM CTF33

## (undated) RX ORDER — LIDOCAINE HYDROCHLORIDE 20 MG/ML
INJECTION, SOLUTION EPIDURAL; INFILTRATION; INTRACAUDAL; PERINEURAL
Status: DISPENSED
Start: 2021-04-20

## (undated) RX ORDER — HYDROMORPHONE HYDROCHLORIDE 1 MG/ML
INJECTION, SOLUTION INTRAMUSCULAR; INTRAVENOUS; SUBCUTANEOUS
Status: DISPENSED
Start: 2021-04-20

## (undated) RX ORDER — FENTANYL CITRATE 50 UG/ML
INJECTION, SOLUTION INTRAMUSCULAR; INTRAVENOUS
Status: DISPENSED
Start: 2021-04-19

## (undated) RX ORDER — FENTANYL CITRATE 50 UG/ML
INJECTION, SOLUTION INTRAMUSCULAR; INTRAVENOUS
Status: DISPENSED
Start: 2021-04-20

## (undated) RX ORDER — ALBUTEROL SULFATE 90 UG/1
AEROSOL, METERED RESPIRATORY (INHALATION)
Status: DISPENSED
Start: 2021-04-20

## (undated) RX ORDER — IPRATROPIUM BROMIDE AND ALBUTEROL SULFATE 2.5; .5 MG/3ML; MG/3ML
SOLUTION RESPIRATORY (INHALATION)
Status: DISPENSED
Start: 2021-04-20

## (undated) RX ORDER — FENTANYL CITRATE 50 UG/ML
INJECTION, SOLUTION INTRAMUSCULAR; INTRAVENOUS
Status: DISPENSED
Start: 2024-10-04

## (undated) RX ORDER — ALBUMIN, HUMAN INJ 5% 5 %
SOLUTION INTRAVENOUS
Status: DISPENSED
Start: 2021-04-20

## (undated) RX ORDER — BUPIVACAINE HYDROCHLORIDE AND EPINEPHRINE 5; 5 MG/ML; UG/ML
INJECTION, SOLUTION EPIDURAL; INTRACAUDAL; PERINEURAL
Status: DISPENSED
Start: 2021-04-20

## (undated) RX ORDER — GLYCOPYRROLATE 0.2 MG/ML
INJECTION, SOLUTION INTRAMUSCULAR; INTRAVENOUS
Status: DISPENSED
Start: 2021-04-20

## (undated) RX ORDER — LIDOCAINE HYDROCHLORIDE 10 MG/ML
INJECTION, SOLUTION EPIDURAL; INFILTRATION; INTRACAUDAL; PERINEURAL
Status: DISPENSED
Start: 2021-04-20

## (undated) RX ORDER — ONDANSETRON 2 MG/ML
INJECTION INTRAMUSCULAR; INTRAVENOUS
Status: DISPENSED
Start: 2021-04-20

## (undated) RX ORDER — NEOSTIGMINE METHYLSULFATE 1 MG/ML
VIAL (ML) INJECTION
Status: DISPENSED
Start: 2021-04-20